# Patient Record
Sex: MALE | Race: WHITE | NOT HISPANIC OR LATINO | Employment: OTHER | URBAN - METROPOLITAN AREA
[De-identification: names, ages, dates, MRNs, and addresses within clinical notes are randomized per-mention and may not be internally consistent; named-entity substitution may affect disease eponyms.]

---

## 2018-09-18 ENCOUNTER — OFFICE VISIT (OUTPATIENT)
Dept: PAIN MEDICINE | Facility: CLINIC | Age: 58
End: 2018-09-18

## 2018-09-18 VITALS
DIASTOLIC BLOOD PRESSURE: 82 MMHG | HEART RATE: 93 BPM | TEMPERATURE: 98.8 F | HEIGHT: 64 IN | RESPIRATION RATE: 18 BRPM | SYSTOLIC BLOOD PRESSURE: 129 MMHG | OXYGEN SATURATION: 95 % | BODY MASS INDEX: 40.8 KG/M2 | WEIGHT: 239 LBS

## 2018-09-18 DIAGNOSIS — M54.50 CHRONIC BILATERAL LOW BACK PAIN WITHOUT SCIATICA: Primary | ICD-10-CM

## 2018-09-18 DIAGNOSIS — G89.29 CHRONIC BILATERAL LOW BACK PAIN WITHOUT SCIATICA: Primary | ICD-10-CM

## 2018-09-18 LAB
POC AMPHETAMINES: POSITIVE
POC BARBITURATES: NEGATIVE
POC BENZODIAZEPHINES: NEGATIVE
POC COCAINE: NEGATIVE
POC METHADONE: NEGATIVE
POC METHAMPHETAMINE SCREEN URINE: POSITIVE
POC OPIATES: NEGATIVE
POC OXYCODONE: NEGATIVE
POC PHENCYCLIDINE: NEGATIVE
POC PROPOXYPHENE: NEGATIVE
POC THC: POSITIVE
POC TRICYCLIC ANTIDEPRESSANTS: NEGATIVE

## 2018-09-18 PROCEDURE — 99204 OFFICE O/P NEW MOD 45 MIN: CPT | Performed by: PAIN MEDICINE

## 2018-09-18 PROCEDURE — 80305 DRUG TEST PRSMV DIR OPT OBS: CPT | Performed by: PAIN MEDICINE

## 2018-09-18 NOTE — PROGRESS NOTES
CHIEF COMPLAINT: Back Pain    Lloyd Greene is a 58 y.o. male.   He was referred here by SOFIA Reynoso . He presents to the office for evaluation and treatment of Back Pain    HPI  Back Pain  Started about 5 years ago. Mr. Greene states that he was previously in pain management at [facility] for about 8 or 9 months until they closed in June 2018. Has been on disability for low back pain for over 2 years. Managed with pain medication only. Was on for 5 times/day. Helping him be more active with less pain. denies side effects. Has not undergone low back injections due to cost. States he can only perform things that are 100% covered my medicare. Living on disability.   The patient states their pain is a 7 on a scale of 1-10.  The patient describes this pain as constant sharp, stabbing and throbbing.  The pain is located in bilateral low back and does not radiate. This painful problem is aggravated by physical activity, lifting, prolonged sitting and carrying heavy items and is alleviated by pain medication, relaxation and lying down.    Admits to MJ use - last use was this am.     Past pain medications:   norco 10 mg 5/day - helping  mobic - helping     Current pain medications:   Gabapentin 300 mg tid - picking up today to restart  Diclofenac 75 mg bid - not sure if it helping    Past therapies:  Physical Therapy: yes  Chiropractor: yes  Massage Therapy: no  TENS: yes  Neck or back surgery: no  Past pain management: yes ([facility])    Previous Injections: none    PEG Assessment   What number best describes your pain on average in the past week? 7  What number best describes how, during the past week, pain has interfered with your enjoyment of life? 8  What number best describes how, during the past week, pain has interfered with your general activity? 7      Current Outpatient Prescriptions:   •  ARIPiprazole (ABILIFY) 5 MG tablet, Take 5 mg by mouth daily., Disp: ,  Rfl:   •  diclofenac (VOLTAREN) 75 MG EC tablet, Take 75 mg by mouth 2 (two) times a day., Disp: , Rfl:   •  fenofibrate (TRICOR) 145 MG tablet, Take 145 mg by mouth daily., Disp: , Rfl:   •  gabapentin (NEURONTIN) 300 MG capsule, Take 300 mg by mouth 2 (two) times a day., Disp: , Rfl:   •  lisinopril (PRINIVIL,ZESTRIL) 20 MG tablet, Take 20 mg by mouth daily., Disp: , Rfl:   •  metFORMIN (GLUCOPHAGE) 500 MG tablet, Take 500 mg by mouth daily., Disp: , Rfl:   •  simvastatin (ZOCOR) 20 MG tablet, Take 20 mg by mouth every night., Disp: , Rfl:   •  traZODone (DESYREL) 100 MG tablet, Take 100 mg by mouth every night., Disp: , Rfl:   •  venlafaxine (EFFEXOR) 75 MG tablet, Take 150 mg by mouth daily., Disp: , Rfl:     REVIEW OF PERTINENT MEDICAL DATA  Chart reviewed and summarization of all medical records up to new patient visit performed.  Last A1c: 7.7. Referral by urgent care APRN. Castorena score: 29. Was on xanex for anxiety.     IMAGING  Lumbar MRI - 4/2018:        PFSH:  The following portions of the patient's history were reviewed and updated as appropriate: problem list, past medical history, past surgery history, social history, family history, medications, and allergies    Review of Systems   Constitutional: Negative for fatigue.   HENT: Negative for congestion.    Eyes: Negative for visual disturbance.   Respiratory: Negative for cough, shortness of breath and wheezing.    Cardiovascular: Negative.    Gastrointestinal: Negative for constipation and diarrhea.   Genitourinary: Negative for difficulty urinating.   Musculoskeletal: Positive for back pain.   Skin: Negative for rash and wound.   Allergic/Immunologic: Negative for immunocompromised state.   Neurological: Negative for weakness and numbness.   Hematological: Does not bruise/bleed easily.   Psychiatric/Behavioral: Positive for sleep disturbance. Negative for suicidal ideas. The patient is nervous/anxious.    All other systems reviewed and are  "negative.      Vitals:    09/18/18 1435   BP: 129/82   Pulse: 93   Resp: 18   Temp: 98.8 °F (37.1 °C)   SpO2: 95%   Weight: 108 kg (239 lb)   Height: 162.6 cm (64\")   PainSc:   7   PainLoc: Back       Physical Exam   Constitutional: He appears well-developed and well-nourished. No distress.   HENT:   Head: Normocephalic and atraumatic.   Nose: Nose normal.   Mouth/Throat: Oropharynx is clear and moist.   Eyes: Conjunctivae and EOM are normal.   Neck: Normal range of motion. Neck supple.   Cardiovascular: Normal rate, regular rhythm and normal heart sounds.    Pulmonary/Chest: Effort normal and breath sounds normal. No stridor. No respiratory distress.   Abdominal: Soft. Bowel sounds are normal. He exhibits no distension. There is no tenderness.   Musculoskeletal:        Lumbar back: He exhibits decreased range of motion, tenderness and pain.   +bilateral lumbar facet tenderness  +bilateral lumbar facet loading    Neurological: He is alert. He has normal strength. No cranial nerve deficit or sensory deficit. Gait normal.   Skin: Skin is warm and dry. No rash noted. He is not diaphoretic.   Psychiatric: He has a normal mood and affect. His speech is normal and behavior is normal.   Nursing note and vitals reviewed.    Ortho Exam  Neurologic Exam     Mental Status   Speech: speech is normal     Cranial Nerves     CN III, IV, VI   Extraocular motions are normal.     Motor Exam     Strength   Strength 5/5 throughout.     Gait, Coordination, and Reflexes     Gait  Gait: normal      No results found for: POCMETH, POCAMPHET, POCBARBITUR, POCBENZO, POCCOCAINE, POCMETHADO, POCOPIATES, POCOXYCODO, POCPHENCYC, POCPROPOXY, POCTHC, POCTRICYC    Comments: Reviewed POC today  +MJ +meth +amph    Date of last BENI reviewed : 09/18/18   Comments: Aldair Desai was seen today for back pain.    Diagnoses and all orders for this visit:    Chronic bilateral low back pain without sciatica      Requested Prescriptions      No " prescriptions requested or ordered in this encounter     - Imaging reviewed with patient.  Degenerative changes seen.   - Compound cream ordered today with gabapentin 5% and ketamine 5%. Ordered today.  Alternatives ok if his insurance does not cover.   - Random urine drug screen per office policy today, to be checked at next visit. +MJ, +amph +meth - given such abnormalities in his POC will not give any pain medications today. Will reschedule follow up in a few weeks. Will repeat UDS at that time. If prelim is negative for any illegal substances, will write pain medication at that time. If positive, will have to send off to lab and wait for confirmation before prescribing. Discussed how small amount of THC may remain in his system if he has used daily for a long time. As long as his levels are minimal and prove that he has stopped, can restart medication. Will restart at lower dose since he has been off for several months and his tolerance is lower.   - can try injections but if any co-pay he will not perform. I am unsure if any co pays with his insurance?  - will need psych for opioid risk assessment if we take over pain medications in future.   - will need narcotic agreement if take over pain medication.   - continue with gabapentin and diclofenac - no refills needed.   - not interested in surgical consult -states he has never heard good things of low back surgery. I'm surprised he received disability for low back when he has never had surgical evaluation.   -    Wt Readings from Last 3 Encounters:   09/18/18 108 kg (239 lb)   11/07/16 101 kg (222 lb)   08/18/16 95.3 kg (210 lb)     Body mass index is 41.02 kg/m². Patient counseled on the importance of weight loss to help with overall health and pain control. Patient instructed to attempt weight loss.   Plan: Calorie counting reduce fast food intake and plan meals    Follow-up in 1 month.       Kelsie Hargrove MD  Pain Management

## 2018-09-23 ENCOUNTER — RESULTS ENCOUNTER (OUTPATIENT)
Dept: PAIN MEDICINE | Facility: CLINIC | Age: 58
End: 2018-09-23

## 2018-09-23 DIAGNOSIS — M54.50 CHRONIC BILATERAL LOW BACK PAIN WITHOUT SCIATICA: ICD-10-CM

## 2018-09-23 DIAGNOSIS — G89.29 CHRONIC BILATERAL LOW BACK PAIN WITHOUT SCIATICA: ICD-10-CM

## 2018-10-22 ENCOUNTER — OFFICE VISIT (OUTPATIENT)
Dept: PAIN MEDICINE | Facility: CLINIC | Age: 58
End: 2018-10-22

## 2018-10-22 VITALS
SYSTOLIC BLOOD PRESSURE: 160 MMHG | RESPIRATION RATE: 16 BRPM | DIASTOLIC BLOOD PRESSURE: 98 MMHG | HEIGHT: 64 IN | BODY MASS INDEX: 39.95 KG/M2 | HEART RATE: 92 BPM | WEIGHT: 234 LBS | TEMPERATURE: 97.8 F | OXYGEN SATURATION: 98 %

## 2018-10-22 DIAGNOSIS — M54.50 CHRONIC BILATERAL LOW BACK PAIN WITHOUT SCIATICA: ICD-10-CM

## 2018-10-22 DIAGNOSIS — M47.816 LUMBAR FACET ARTHROPATHY: ICD-10-CM

## 2018-10-22 DIAGNOSIS — G89.29 CHRONIC BILATERAL LOW BACK PAIN WITHOUT SCIATICA: ICD-10-CM

## 2018-10-22 DIAGNOSIS — G89.29 OTHER CHRONIC PAIN: Primary | ICD-10-CM

## 2018-10-22 PROCEDURE — 99214 OFFICE O/P EST MOD 30 MIN: CPT | Performed by: NURSE PRACTITIONER

## 2018-10-22 NOTE — PROGRESS NOTES
"CHIEF COMPLAINT  Pt states his LBP is \"not quite tolerably controlled now,since being off of Norco\".    Subjective   Lloyd Greene is a 58 y.o. male  who presents to the office for follow-up.He has a history of chronic low back pain. Reports his pain is unchanged since last office visit and not well controlled. Is off Norco since July.  Just returned from vacation in [place].    Initially evaluated by Dr. Hargrove 9-18-18 for back pain. Previously under care of [facility].  Admitted to Marijuana use. Last used morning of appointment.  Compounded pain creme ordered. Random urine drug screen in office was positive MJ, Amphetamine and Methamphetamine. Confirmation was positive for marijuana. NO plans for opioid therapy at initial office visit. Plan to follow-up in 1 month and repeat UDS. If appropriate, could consider opioids. Would need opioid risk assessment before prescribing. Patient had previously declined injections due to cost. Also declined ANNELISE consult. Continues with Gabapentin and DIclofenac at that time.     Complains of pain in his low back. Today his pain is 7/10VAS. Describes the pain as continuous burning with occasional hot poker sensation. Pain does not radiate down legs. Pain increases with standing, walking, bending/lifting/twisting, and prolonged sitting; pain decreases with laying down and medication. Currently taking Diclofenac 75 mg 1-2/day and gabapentin 300 mg 3/night. ADL's by self.  Is on disability. He reports he needs pain medication to do every day activities without pain. Having trouble with household chores and cooking.     His PCP is currently a NP at [facility], goes by the name of \"kim.\" Will be seeing Dr. Donell Diggs on 12-6-18 as a new patient. Is no longer wanting to be a patient at [facility].  Admits his last use of marijuana was approximately 3 weeks. Which means it was since his last office visit.  He now reports he has stopped using marijuana. I asked if his urine drug " "screen would be positive for marijuana today and he replied affirmatively.   Back Pain   This is a chronic problem. The current episode started more than 1 year ago. The problem occurs constantly. The problem has been gradually worsening since onset. The pain is present in the lumbar spine. The quality of the pain is described as burning and stabbing. The pain does not radiate. The pain is at a severity of 7/10 (ranges from 5-8/10VAS). The pain is moderate. The pain is worse during the day. The symptoms are aggravated by bending, position, standing and twisting. Pertinent negatives include no bladder incontinence, bowel incontinence, chest pain, numbness or weakness. Risk factors include lack of exercise, obesity and sedentary lifestyle. He has tried analgesics and NSAIDs (gabapentin) for the symptoms.      Past pain medications:   norco 10 mg 5/day - helping  mobic - helping      Current pain medications:   Gabapentin 300 mg tid - picking up today to restart  Diclofenac 75 mg bid - not sure if it helping     Past therapies:  Physical Therapy: yes-- \"A couple of years ago.\" --- minimal relief  Chiropractor: yes  Massage Therapy: no  TENS: yes  Neck or back surgery: no  Past pain management: yes ([facility])     Previous Injections: none-- reports cost as a deterrent. \"If medicare doesn't 100% cover it, I can't do it.\" Reports he is on disability and can't afford anything extra.     PEG Assessment   What number best describes your pain on average in the past week?8  What number best describes how, during the past week, pain has interfered with your enjoyment of life?7  What number best describes how, during the past week, pain has interfered with your general activity?  7    The following portions of the patient's history were reviewed and updated as appropriate: allergies, current medications, past family history, past medical history, past social history, past surgical history and problem " "list.    Review of Systems   Constitutional: Positive for activity change (decrease). Negative for fatigue.   HENT: Negative for congestion.    Eyes: Negative for visual disturbance.   Respiratory: Positive for apnea (cpap) and shortness of breath (copd). Negative for cough, chest tightness and wheezing.    Cardiovascular: Negative.  Negative for chest pain.   Gastrointestinal: Negative for bowel incontinence, constipation and diarrhea.   Genitourinary: Negative for bladder incontinence and difficulty urinating.   Musculoskeletal: Positive for back pain.   Skin: Negative for rash and wound.   Allergic/Immunologic: Negative for immunocompromised state.   Neurological: Negative for dizziness, weakness, light-headedness and numbness.   Hematological: Does not bruise/bleed easily.   Psychiatric/Behavioral: Positive for sleep disturbance. Negative for suicidal ideas. The patient is not nervous/anxious.    All other systems reviewed and are negative.      Vitals:    10/22/18 0852   BP: 160/98   Pulse: 92   Resp: 16   Temp: 97.8 °F (36.6 °C)   SpO2: 98%   Weight: 106 kg (234 lb)   Height: 162.6 cm (64.02\")   PainSc: 7  Comment: LBP bilaterally ranges from 5-8/10   PainLoc: Back     Objective   Physical Exam   Constitutional: He is oriented to person, place, and time. Vital signs are normal. He appears well-developed and well-nourished. He is cooperative.   HENT:   Head: Normocephalic and atraumatic.   Nose: Nose normal.   Eyes: Conjunctivae and lids are normal.   Cardiovascular: Normal rate.    Pulmonary/Chest: Effort normal.   Abdominal:   obese   Musculoskeletal:        Lumbar back: He exhibits decreased range of motion, tenderness and bony tenderness (moderate tenderness of bilateral L2-L5 facets-- + loading manuever).   Negative SLR bilaterally   Neurological: He is alert and oriented to person, place, and time. Gait normal.   Reflex Scores:       Patellar reflexes are 1+ on the right side and 1+ on the left " side.  Skin: Skin is warm, dry and intact.   Psychiatric: He has a normal mood and affect. His speech is normal and behavior is normal. Judgment and thought content normal. Cognition and memory are normal.   Nursing note and vitals reviewed.      Assessment/Plan   Lloyd was seen today for back pain.    Diagnoses and all orders for this visit:    Other chronic pain  -     Ambulatory Referral to Psychology    Chronic bilateral low back pain without sciatica  -     Ambulatory Referral to Psychology    Lumbar facet arthropathy  -     Case Request  -     Ambulatory Referral to Psychology      --- The urine drug screen confirmation from 9-19-18 has been reviewed and the result is ABNORMAL (presence of MJ) based on patient history and BENI report  --- No opioids at this time. Can refer for opioid risk assessment. Patient wants to try Norco in future if injections are not helpful. Reviewed importance of completing alternatives before continuing with opioid therapy, in accordance with HB 1 expectations.   --- Bilateral L2-L5 MBB. no blood thinners. Reviewed the procedure at length with the patient.  Included in the review was expectations, complications, risk and benefits.The procedure was described in detail and the risks, benefits and alternatives were discussed with the patient (including but not limited to: bleeding, infection, nerve damage, worsening of pain, inability to perform injection, paralysis, seizures, and death) who agreed to proceed.  Discussed the potential for sedation if warranted/wanted.  Questions were answered and in a way the patient could understand.  Patient verbalized understanding and wishes to proceed.  This intervention will be ordered.  --- Referral to Dr. Paul for opioid risk assessment.  --- Encouraged weight loss.  --- Follow-up after procedure or sooner if needed.    -------  Education about Medial Branch Blockade and RF Therapy:    This medial branch blockade (MBB) suggested is  "intended for diagnostic purposes, with the intent of offering the patient Radiofrequency thermal rhizotomy (RF) if the MBB is diagnostically effective.  The diagnostic blockade is necessary to determine the likelihood that RF therapy could be efficacious in providing long term relief to the patient.    Medial branches are sensory nerve branches that connect to a facet joint and transmit sensations & pain signals from that joint.  Facet is a term for the type of joints found in the spine.  Medial branches are the nerves that go to a facet, and therefore are also sometimes called \"facet joint nerves\" (FJNs).      In a medial branch blockade procedure, xray fluoroscopy is used to verify the locations of the outside of the joint lines which are being targeted.  Under xray guidance, needles are placed to these areas.  Contrast dye is injected to confirm proper placement, with dye flowing over the joint area, and to ensure that the dye does not flow into unintended areas such as a vein.  When this is confirmed, local anesthetic is injected to block the medial branch at that joint level.      If MBBs are diagnostically successful in blocking pain, then the patient is most likely a great candidate for Radiofrequency of those facet joint nerves.  In the RF procedure, needles are placed to the joint lines in the same fashion, and after testing, the needle tips are heated to thermally treat the nerves, blocking the nerves by in essence damaging the nerves with the heat treatment.       Medically, a successful RF procedure should provide a patient with 50% pain relief or more for at least 6 months.  Clinical experience suggests that successful patients receive relief more in the range of 12 months on average.  We also discussed that a fortunate minority of patients receive therapeutic success from the MBB, and may not require RF ablation.  If a patient receives more than 8 weeks of relief from MBB, then occasional repeat MBB " for therapeutic purposes is a very reasonable alternative therapy.  This course of therapy is consistent with our LCDs according to our CMS  in the area, and therefore other insurance providers should follow accordingly.  We will monitor our patients to screen for these therapeutic responders and will offer RF therapy only when necessary.        We discussed that MBB & RF are not without risks.  Guidelines regarding anticoagulant use & neuraxial procedures will be respected.  Patients that are ill or otherwise may be at risk for sepsis will not have their spines accessed by neuraxial injections of any type.  This patient will not be offered these therapies if there is an increased risk.   We discussed that there is a risk of postprocedural pain and also a risk of worsening of clinical picture with these procedures as with any neuraxial procedure.    -------       BENI REPORT  BENI report has been reviewed and scanned into the patient's chart.    As the clinician, I personally reviewed the BENI from 10-18-18 while the patient was in the office today.          EMR Dragon/Transcription disclaimer:   Much of this encounter note is an electronic transcription/translation of spoken language to printed text. The electronic translation of spoken language may permit erroneous, or at times, nonsensical words or phrases to be inadvertently transcribed; Although I have reviewed the note for such errors, some may still exist.

## 2018-11-14 ENCOUNTER — DOCUMENTATION (OUTPATIENT)
Dept: PAIN MEDICINE | Facility: CLINIC | Age: 58
End: 2018-11-14

## 2018-11-14 ENCOUNTER — OUTSIDE FACILITY SERVICE (OUTPATIENT)
Dept: PAIN MEDICINE | Facility: CLINIC | Age: 58
End: 2018-11-14

## 2018-11-14 PROCEDURE — 64494 INJ PARAVERT F JNT L/S 2 LEV: CPT | Performed by: PAIN MEDICINE

## 2018-11-14 PROCEDURE — 64495 INJ PARAVERT F JNT L/S 3 LEV: CPT | Performed by: PAIN MEDICINE

## 2018-11-14 PROCEDURE — 64493 INJ PARAVERT F JNT L/S 1 LEV: CPT | Performed by: PAIN MEDICINE

## 2018-11-14 NOTE — PROGRESS NOTES
Bilateral L2-5 Lumbar Medial Branch Blockade  [facility]    PREOPERATIVE DIAGNOSIS:  Lumbar spondylosis without myelopathy    POSTOPERATIVE DIAGNOSIS:  Lumbar spondylosis without myelopathy    PROCEDURE:   Diagnostic Bilateral Lumbar Medial Branch Nerve Blockades, with fluoroscopy:  L2, L3, L4, and L5 nerves (at the L3, L4, L5 transverse processes and the sacral alar groove) to block facet joints L3-4, L4-5, and L5-S1  1. 12447-64 -- Bilateral Lumbar Facet blocks, 1st Level  2. 94010-45 -- Bilateral Lumbar Facet blocks, 2nd  Level  3. 29654-31 -- Bilateral Lumbar Facet blocks, 3rd Level    PRE-PROCEDURE DISCUSSION WITH PATIENT:    Risks and complications were discussed with the patient prior to starting the procedure and informed consent was obtained.      SURGEON:  Kelsie Hargrove MD    REASON FOR PROCEDURE:   The patient complains of pain that seems to have a significant axial component, Increased back pain on range of motion exams and Pain on extension of the lumbar spine    SEDATION:  Patient declined administration of moderate sedation    ANESTHETIC:  Marcaine 0.25%  STEROID:  Methylprednisolone (DEPO MEDROL) 80mg/ml  TOTAL VOLUME OF SOLUTION: 8ml    DESCRIPTON OF PROCEDURE:  After obtaining informed consent, IV access was obtained in the preoperative area.   The patient was taken to the operating room.  The patient was placed in the prone position with a pillow under the abdomen. All pressure points were well padded.  EKG, blood pressure, and pulse oximeter were monitored.  The patient was monitored and sedated by the RN under my direction. The lumbosacral area was prepped with Chloraprep and draped in a sterile fashion. Under fluoroscopic guidance the transverse processes of the L3, L4, and L5 vertebrae at the junctions of the superior articular processes were identified on the right. Also identified was the groove between the ala and the superior articular process of the sacrum on the  ipsilateral side.  Skin and subcutaneous tissue were anesthetized with 1% lidocaine above each of these points. A 22-gauge spinal needle was introduced under fluoroscopic guidance at the above junctions. Aspiration was negative for blood and CSF.  After confirming the position of the needle with fluoroscope in all views, 1 mL of the anesthetic solution noted above was injected at each of these points.  Needles were removed intact from each of the areas.  A similar procedure was repeated to block the L2, L3, L4, and L5 nerves on the contralateral side.   Onset of analgesia was noted.  Vital signs remained stable throughout.      ESTIMATED BLOOD LOSS:  <5 mL  SPECIMENS:  none    COMPLICATIONS:   No complications were noted.    TOLERANCE & DISCHARGE CONDITION:    The patient tolerated the procedure well.  The patient was transported to the recovery area without difficulties.  The patient was discharged to home under the care of family in stable and satisfactory condition.    PLAN OF CARE:  1. The patient was given our standard instruction sheet.  2. We discussed that Lumbar Medial Branch Blockade is a diagnostic procedure in consideration for radiofrequency ablation if two diagnostic procedures prove to be positive for significant benefit.  If sustained relief of 6 to eight weeks is obtained, then an alternative plan could be therapeutic lumbar branch blockades.  3. The patient is asked to keep a pain log each hour for 8 hours after the procedure today.  4. The patient will  Return to clinic 4 wks.  5. The patient will resume all medications as per the medication reconciliation sheet.

## 2018-11-15 ENCOUNTER — HOSPITAL ENCOUNTER (INPATIENT)
Facility: HOSPITAL | Age: 58
LOS: 2 days | Discharge: HOME | End: 2018-11-17
Attending: EMERGENCY MEDICINE | Admitting: SURGERY

## 2018-11-15 ENCOUNTER — APPOINTMENT (OUTPATIENT)
Dept: CT IMAGING | Facility: HOSPITAL | Age: 58
End: 2018-11-15

## 2018-11-15 DIAGNOSIS — K56.600 PARTIAL SMALL BOWEL OBSTRUCTION (HCC): Primary | ICD-10-CM

## 2018-11-15 LAB
ALBUMIN SERPL-MCNC: 4.4 G/DL (ref 3.5–5.2)
ALBUMIN/GLOB SERPL: 1.4 G/DL
ALP SERPL-CCNC: 69 U/L (ref 40–129)
ALT SERPL W P-5'-P-CCNC: 40 U/L (ref 5–41)
ANION GAP SERPL CALCULATED.3IONS-SCNC: 13 MMOL/L
AST SERPL-CCNC: 23 U/L (ref 5–40)
BASOPHILS # BLD AUTO: 0.05 10*3/MM3 (ref 0–0.2)
BASOPHILS NFR BLD AUTO: 0.4 % (ref 0–2)
BILIRUB SERPL-MCNC: 0.3 MG/DL (ref 0.2–1.2)
BILIRUB UR QL STRIP: NEGATIVE
BUN BLD-MCNC: 21 MG/DL (ref 6–20)
BUN/CREAT SERPL: 19.4 (ref 7–25)
CALCIUM SPEC-SCNC: 9 MG/DL (ref 8.6–10.5)
CHLORIDE SERPL-SCNC: 98 MMOL/L (ref 98–107)
CLARITY UR: CLEAR
CO2 SERPL-SCNC: 24 MMOL/L (ref 22–29)
COLOR UR: YELLOW
CREAT BLD-MCNC: 1.08 MG/DL (ref 0.76–1.27)
DEPRECATED RDW RBC AUTO: 43 FL (ref 37–54)
EOSINOPHIL # BLD AUTO: 0.01 10*3/MM3 (ref 0.1–0.3)
EOSINOPHIL NFR BLD AUTO: 0.1 % (ref 0–4)
ERYTHROCYTE [DISTWIDTH] IN BLOOD BY AUTOMATED COUNT: 12.8 % (ref 11.5–14.5)
GFR SERPL CREATININE-BSD FRML MDRD: 71 ML/MIN/1.73
GLOBULIN UR ELPH-MCNC: 3.2 GM/DL
GLUCOSE BLD-MCNC: 270 MG/DL (ref 65–99)
GLUCOSE BLDC GLUCOMTR-MCNC: 164 MG/DL (ref 70–130)
GLUCOSE BLDC GLUCOMTR-MCNC: 171 MG/DL (ref 70–130)
GLUCOSE UR STRIP-MCNC: ABNORMAL MG/DL
HCT VFR BLD AUTO: 42.9 % (ref 42–52)
HGB BLD-MCNC: 14.1 G/DL (ref 14–18)
HGB UR QL STRIP.AUTO: NEGATIVE
IMM GRANULOCYTES # BLD: 0.06 10*3/MM3 (ref 0–0.03)
IMM GRANULOCYTES NFR BLD: 0.4 % (ref 0–0.5)
KETONES UR QL STRIP: NEGATIVE
LEUKOCYTE ESTERASE UR QL STRIP.AUTO: NEGATIVE
LYMPHOCYTES # BLD AUTO: 2.8 10*3/MM3 (ref 0.6–4.8)
LYMPHOCYTES NFR BLD AUTO: 20.5 % (ref 20–45)
MCH RBC QN AUTO: 30.3 PG (ref 27–31)
MCHC RBC AUTO-ENTMCNC: 32.9 G/DL (ref 31–37)
MCV RBC AUTO: 92.1 FL (ref 80–94)
MONOCYTES # BLD AUTO: 0.87 10*3/MM3 (ref 0–1)
MONOCYTES NFR BLD AUTO: 6.4 % (ref 3–8)
NEUTROPHILS # BLD AUTO: 9.88 10*3/MM3 (ref 1.5–8.3)
NEUTROPHILS NFR BLD AUTO: 72.2 % (ref 45–70)
NITRITE UR QL STRIP: NEGATIVE
NRBC BLD MANUAL-RTO: 0 /100 WBC (ref 0–0)
PH UR STRIP.AUTO: 5.5 [PH] (ref 4.5–8)
PLATELET # BLD AUTO: 412 10*3/MM3 (ref 140–500)
PMV BLD AUTO: 11.5 FL (ref 7.4–10.4)
POTASSIUM BLD-SCNC: 4.4 MMOL/L (ref 3.5–5.2)
PROT SERPL-MCNC: 7.6 G/DL (ref 6–8.5)
PROT UR QL STRIP: NEGATIVE
RBC # BLD AUTO: 4.66 10*6/MM3 (ref 4.7–6.1)
SODIUM BLD-SCNC: 135 MMOL/L (ref 136–145)
SP GR UR STRIP: 1.02 (ref 1–1.03)
UROBILINOGEN UR QL STRIP: ABNORMAL
WBC NRBC COR # BLD: 13.67 10*3/MM3 (ref 4.8–10.8)

## 2018-11-15 PROCEDURE — 99284 EMERGENCY DEPT VISIT MOD MDM: CPT

## 2018-11-15 PROCEDURE — 81003 URINALYSIS AUTO W/O SCOPE: CPT | Performed by: EMERGENCY MEDICINE

## 2018-11-15 PROCEDURE — 80053 COMPREHEN METABOLIC PANEL: CPT | Performed by: EMERGENCY MEDICINE

## 2018-11-15 PROCEDURE — 99223 1ST HOSP IP/OBS HIGH 75: CPT | Performed by: SURGERY

## 2018-11-15 PROCEDURE — 99284 EMERGENCY DEPT VISIT MOD MDM: CPT | Performed by: EMERGENCY MEDICINE

## 2018-11-15 PROCEDURE — 85025 COMPLETE CBC W/AUTO DIFF WBC: CPT | Performed by: EMERGENCY MEDICINE

## 2018-11-15 PROCEDURE — 0 IOPAMIDOL PER 1 ML: Performed by: EMERGENCY MEDICINE

## 2018-11-15 PROCEDURE — 74177 CT ABD & PELVIS W/CONTRAST: CPT

## 2018-11-15 PROCEDURE — 82962 GLUCOSE BLOOD TEST: CPT

## 2018-11-15 PROCEDURE — 25810000003 SODIUM CHLORIDE 0.9 % WITH KCL 20 MEQ 20-0.9 MEQ/L-% SOLUTION: Performed by: SURGERY

## 2018-11-15 RX ORDER — PROMETHAZINE HYDROCHLORIDE 25 MG/ML
12.5 INJECTION, SOLUTION INTRAMUSCULAR; INTRAVENOUS EVERY 6 HOURS PRN
Status: DISCONTINUED | OUTPATIENT
Start: 2018-11-15 | End: 2018-11-17 | Stop reason: HOSPADM

## 2018-11-15 RX ORDER — PROMETHAZINE HYDROCHLORIDE 12.5 MG/1
12.5 TABLET ORAL EVERY 6 HOURS PRN
Status: DISCONTINUED | OUTPATIENT
Start: 2018-11-15 | End: 2018-11-17 | Stop reason: HOSPADM

## 2018-11-15 RX ORDER — SODIUM CHLORIDE AND POTASSIUM CHLORIDE 150; 900 MG/100ML; MG/100ML
50 INJECTION, SOLUTION INTRAVENOUS CONTINUOUS
Status: DISCONTINUED | OUTPATIENT
Start: 2018-11-15 | End: 2018-11-17 | Stop reason: HOSPADM

## 2018-11-15 RX ORDER — LISINOPRIL 20 MG/1
20 TABLET ORAL DAILY
Status: DISCONTINUED | OUTPATIENT
Start: 2018-11-15 | End: 2018-11-17 | Stop reason: HOSPADM

## 2018-11-15 RX ORDER — ACETAMINOPHEN 325 MG/1
650 TABLET ORAL EVERY 4 HOURS PRN
Status: DISCONTINUED | OUTPATIENT
Start: 2018-11-15 | End: 2018-11-17 | Stop reason: HOSPADM

## 2018-11-15 RX ORDER — PROMETHAZINE HYDROCHLORIDE 12.5 MG/1
12.5 SUPPOSITORY RECTAL EVERY 6 HOURS PRN
Status: DISCONTINUED | OUTPATIENT
Start: 2018-11-15 | End: 2018-11-17 | Stop reason: HOSPADM

## 2018-11-15 RX ORDER — VENLAFAXINE 37.5 MG/1
150 TABLET ORAL DAILY
Status: DISCONTINUED | OUTPATIENT
Start: 2018-11-15 | End: 2018-11-17 | Stop reason: HOSPADM

## 2018-11-15 RX ORDER — GABAPENTIN 300 MG/1
300 CAPSULE ORAL EVERY 12 HOURS SCHEDULED
Status: DISCONTINUED | OUTPATIENT
Start: 2018-11-15 | End: 2018-11-17 | Stop reason: HOSPADM

## 2018-11-15 RX ORDER — NALOXONE HCL 0.4 MG/ML
0.4 VIAL (ML) INJECTION
Status: DISCONTINUED | OUTPATIENT
Start: 2018-11-15 | End: 2018-11-17 | Stop reason: HOSPADM

## 2018-11-15 RX ORDER — NICOTINE POLACRILEX 4 MG
15 LOZENGE BUCCAL
Status: DISCONTINUED | OUTPATIENT
Start: 2018-11-15 | End: 2018-11-17 | Stop reason: HOSPADM

## 2018-11-15 RX ORDER — SODIUM CHLORIDE 0.9 % (FLUSH) 0.9 %
3 SYRINGE (ML) INJECTION EVERY 12 HOURS SCHEDULED
Status: DISCONTINUED | OUTPATIENT
Start: 2018-11-15 | End: 2018-11-17 | Stop reason: HOSPADM

## 2018-11-15 RX ORDER — HYDROMORPHONE HCL 110MG/55ML
0.5 PATIENT CONTROLLED ANALGESIA SYRINGE INTRAVENOUS
Status: DISCONTINUED | OUTPATIENT
Start: 2018-11-15 | End: 2018-11-17 | Stop reason: HOSPADM

## 2018-11-15 RX ORDER — DEXTROSE MONOHYDRATE 25 G/50ML
25 INJECTION, SOLUTION INTRAVENOUS
Status: DISCONTINUED | OUTPATIENT
Start: 2018-11-15 | End: 2018-11-17 | Stop reason: HOSPADM

## 2018-11-15 RX ORDER — ARIPIPRAZOLE 5 MG/1
5 TABLET ORAL DAILY
Status: DISCONTINUED | OUTPATIENT
Start: 2018-11-15 | End: 2018-11-17 | Stop reason: HOSPADM

## 2018-11-15 RX ORDER — TRAZODONE HYDROCHLORIDE 50 MG/1
100 TABLET ORAL NIGHTLY
Status: DISCONTINUED | OUTPATIENT
Start: 2018-11-15 | End: 2018-11-17 | Stop reason: HOSPADM

## 2018-11-15 RX ORDER — SODIUM CHLORIDE AND POTASSIUM CHLORIDE 150; 900 MG/100ML; MG/100ML
INJECTION, SOLUTION INTRAVENOUS
Status: COMPLETED
Start: 2018-11-15 | End: 2018-11-16

## 2018-11-15 RX ADMIN — Medication 3 ML: at 22:13

## 2018-11-15 RX ADMIN — POTASSIUM CHLORIDE AND SODIUM CHLORIDE 150 ML/HR: 900; 150 INJECTION, SOLUTION INTRAVENOUS at 15:49

## 2018-11-15 RX ADMIN — GABAPENTIN 300 MG: 300 CAPSULE ORAL at 22:11

## 2018-11-15 RX ADMIN — TRAZODONE HYDROCHLORIDE 100 MG: 50 TABLET ORAL at 22:12

## 2018-11-15 RX ADMIN — SODIUM CHLORIDE 1000 ML: 9 INJECTION, SOLUTION INTRAVENOUS at 11:42

## 2018-11-15 RX ADMIN — IOPAMIDOL 100 ML: 755 INJECTION, SOLUTION INTRAVENOUS at 11:13

## 2018-11-15 RX ADMIN — POTASSIUM CHLORIDE AND SODIUM CHLORIDE 150 ML/HR: 900; 150 INJECTION, SOLUTION INTRAVENOUS at 23:05

## 2018-11-15 NOTE — PLAN OF CARE
Problem: Patient Care Overview  Goal: Plan of Care Review  Outcome: Ongoing (interventions implemented as appropriate)   11/15/18 0502   Coping/Psychosocial   Plan of Care Reviewed With patient;mother   Plan of Care Review   Progress no change       Problem: Bowel Obstruction (Adult)  Goal: Signs and Symptoms of Listed Potential Problems Will be Absent, Minimized or Managed (Bowel Obstruction)  Outcome: Ongoing (interventions implemented as appropriate)

## 2018-11-15 NOTE — H&P
CC: mid abdominal pain      Patient is a 58 y.o. male presenting to the emergency room with a one-week history of mid abdominal pain that started acutely one week ago in the mid abdomen that felt like a stabbing sensation.  Patient reports he had associated nausea and vomiting.  Patient reports he has been having normal bowel movements and had a bowel movement today.  Patient had been slowly improving however it had lingered longer than he expected therefore he presented to the emergency room.  Patient denies fever, chills, diarrhea or unexplained weight loss.  Patient has not had this in the past.  Patient has had a previous ruptured diverticulitis with colostomy about 2 years ago and reversal.    Past Medical History:   Diagnosis Date   • Anxiety    • Arthritis    • Colon polyp    • Depression    • Diabetes mellitus (CMS/HCC)    • Diverticulitis    • Hyperlipidemia    • Hypertension    • Sleep apnea    • Stroke (CMS/HCC)      Past Surgical History:   Procedure Laterality Date   • COLONOSCOPY  2014   • COLOSTOMY      Dr. Gupta   • COLOSTOMY REVISION  07/2006    Diverticulitis-Dr. Gupta   • INCISIONAL HERNIA REPAIR  2011   • ORIF ANKLE FRACTURE Right 1995     Family History   Problem Relation Age of Onset   • Depression Mother    • Depression Father    • Kidney disease Father    • COPD Father    • Hypertension Father      Social History     Tobacco Use   • Smoking status: Former Smoker     Types: Electronic Cigarette   • Smokeless tobacco: Never Used   Substance Use Topics   • Alcohol use: Yes     Comment: rarely   • Drug use: No     No Known Allergies    Current Facility-Administered Medications:   •  acetaminophen (TYLENOL) tablet 650 mg, 650 mg, Oral, Q4H PRN, Lisette Whittington MD  •  ARIPiprazole (ABILIFY) tablet 5 mg, 5 mg, Oral, Daily, Lisette Whittington MD  •  dextrose (D50W) 25 g/ 50mL Intravenous Solution 25 g, 25 g, Intravenous, Q15 Min PRN, Lisette Whittington MD  •  dextrose (GLUTOSE) oral gel 15 g, 15  g, Oral, Q15 Min PRN, Lisette Whittington MD  •  gabapentin (NEURONTIN) capsule 300 mg, 300 mg, Oral, Q12H, Lisette Whittington MD  •  glucagon (GLUCAGEN) injection 1 mg, 1 mg, Subcutaneous, PRN, Lisette Whittington MD  •  HYDROmorphone (DILAUDID) injection 0.5 mg, 0.5 mg, Intravenous, Q2H PRN **AND** naloxone (NARCAN) injection 0.4 mg, 0.4 mg, Intravenous, Q5 Min PRN, Lisette Whittington MD  •  lisinopril (PRINIVIL,ZESTRIL) tablet 20 mg, 20 mg, Oral, Daily, Lisette Whittington MD  •  promethazine (PHENERGAN) tablet 12.5 mg, 12.5 mg, Oral, Q6H PRN **OR** promethazine (PHENERGAN) injection 12.5 mg, 12.5 mg, Intramuscular, Q6H PRN **OR** promethazine (PHENERGAN) injection 12.5 mg, 12.5 mg, Intravenous, Q6H PRN **OR** promethazine (PHENERGAN) suppository 12.5 mg, 12.5 mg, Rectal, Q6H PRN, Lisette Whittington MD  •  sodium chloride 0.9 % flush 3 mL, 3 mL, Intravenous, Q12H, Lisette Whittington MD  •  sodium chloride 0.9 % with KCl 20 mEq/L infusion, 150 mL/hr, Intravenous, Continuous, Lisette Whittington MD, Last Rate: 150 mL/hr at 11/15/18 1549, 150 mL/hr at 11/15/18 1549  •  traZODone (DESYREL) tablet 100 mg, 100 mg, Oral, Nightly, Lisette Whittington MD  •  venlafaxine (EFFEXOR) tablet 150 mg, 150 mg, Oral, Daily, Lisette Whittington MD    Review of Systems  General: Patient reports good health  Eyes: No eye problems  Ears, nose, mouth and throat: No rhinitis, no hearing problems, no chronic cough  Cardiovascular/heart: Denies palpitations, syncope or chest pain  Respiratory/lung: Denies shortness of breath, hemoptysis, or dyspnea on exertion   Genital/urinary: No frequency, hematuria or dysuria  Hematological/lymphatic: Denies anemia or other problems  Musculoskeletal: Positive joint pain  Skin: No psoriasis or other skin issues  Neurological: No seizures or other neurological problems  Psychiatric: Anxiety and depression  Endocrine: Negative  Gastro-intestinal: No constipation, no diarrhea, no melena, no  "hematochezia  Vitals:    11/15/18 1333 11/15/18 1358 11/15/18 1431 11/15/18 1440   BP: 148/83  (!) 185/88 (!) 185/88   BP Location:   Right arm Right arm   Patient Position:   Sitting Sitting   Pulse:  74 82 82   Resp:  16 16 16   Temp:  98.8 °F (37.1 °C) 98.3 °F (36.8 °C) 98.3 °F (36.8 °C)   TempSrc:   Oral    SpO2:  97% 97% 97%   Weight:   108 kg (238 lb)    Height:   165.1 cm (65\")        Physical Exam  General/physcological:   Alert and oriented x3, in no acute distress  HEENT: Normal cephalic, atraumatic, PERRLA, EOMI, sclera anicteric, moist mucous membranes, neck is supple, no JVD, no carotid bruits, no thyromegaly no adenopathy  Respiratory: CTA and percussion  CVA: RRR, normal S1-S2, no murmurs, no gallops or rubs  GI: Positive BS, soft, nondistended, nontender, no rebound, no guarding, no hernias, no organomegaly and no masses  Musculoskeletal: Full range of motion, no clubbing, no cyanosis or edema  Neurovascular: Grossly intact  Debilities: none  Emotional behavior: appropriate   Results from last 7 days   Lab Units  11/15/18   1011   WBC 10*3/mm3  13.67*   HEMOGLOBIN g/dL  14.1   HEMATOCRIT %  42.9   PLATELETS 10*3/mm3  412     Results from last 7 days   Lab Units  11/15/18   1011   SODIUM mmol/L  135*   POTASSIUM mmol/L  4.4   CHLORIDE mmol/L  98   CO2 mmol/L  24.0   BUN mg/dL  21*   CREATININE mg/dL  1.08   GLUCOSE mg/dL  270*   CALCIUM mg/dL  9.0      I have personally reviewed his CT scan and revealed some dilated loops of small bowel which may represent a resolving partial small bowel obstruction     Assessment:  abdominal pain  Partial small bowel obstruction  Leukocytosis    Plan:    At this time we will treat patient with supportive care, nothing by mouth, IV fluids, bowel rest, pain medicine and anti-emetics.  I will continue to monitor and recheck CBC in a.m. as well as KUB.      Lisette Whittington MD  General, Minimally Invasive and Endoscopic Surgery  [facility]      [address] [facility] [address] [place] [address]   [address]    [address]  [place] [id number]               [place] [address]    P: [phone]  F: [phone]

## 2018-11-15 NOTE — ED NOTES
Call made to Dr. Whittington at 12:19, answered on the first call. Transferred to Dr. Marina, this call is complete.      Geraldine Celaya, CNA  11/15/18 5845

## 2018-11-15 NOTE — PLAN OF CARE
Problem: Patient Care Overview  Goal: Plan of Care Review  Outcome: Ongoing (interventions implemented as appropriate)   11/15/18 1710   Coping/Psychosocial   Plan of Care Reviewed With patient;mother   Plan of Care Review   Progress no change   OTHER   Outcome Summary Bowel Rest; Mtc Fluids on board; LBM 11/15/18; NPO w/ sips/chips; Ambulation Promoted; VSS       Problem: Bowel Obstruction (Adult)  Goal: Signs and Symptoms of Listed Potential Problems Will be Absent, Minimized or Managed (Bowel Obstruction)  Outcome: Ongoing (interventions implemented as appropriate)         Regarding: possible implantation bleeding on monday  ----- Message from Ty Hayes sent at 2/8/2018  5:49 PM CST -----  Patient Name: Nicole Floyd  Specialist or PCP: Dr. Santa Mehta  Pregnant (If Yes, how long?): possibly but on mirena  Symptoms: possible implantation bleeding on monday  Call Back #: [phone]  Is the patientâs permanent residence located in [place], [place], or a [facility]?  Julio Ontiveros [place] [address] Account #: [id number]

## 2018-11-15 NOTE — ED PROVIDER NOTES
Subjective   History of Present Illness  History of Present Illness    Chief complaint: Abdominal pain    Location: Supra umbilical, nonradiating    Quality/Severity:  3/10 at its worst, sharp    Timing/Onset/Duration: Gradual onset since Thursday    Modifying Factors: Gotten better since Sunday    Associated Symptoms: No fever or chills.  The patient's had nausea and vomiting on Saturday.  The emesis was nonbloody and nonbilious.  The patient denies any diarrhea.  No chest pain or shortness of breath.    Narrative: This 58-year-old white male with history of ruptured diverticulitis with colostomy in the past, presents with supra umbilical abdominal pain since Thursday.  The patient had partial colectomy with colostomy and then colostomy takedown in 2006 and 2007 by Dr. Gupta for perforated diverticulitis.    PCP:  No Known Provider      Review of Systems   Constitutional: Negative for chills and fever.   HENT: Negative for ear pain and sore throat.    Respiratory: Negative for cough, chest tightness, shortness of breath and wheezing.    Cardiovascular: Negative for chest pain.   Gastrointestinal: Positive for abdominal pain, nausea and vomiting. Negative for blood in stool, constipation and diarrhea.   Genitourinary: Negative for difficulty urinating, dysuria and flank pain.   Musculoskeletal: Negative for back pain.   Skin: Negative for rash.   Neurological: Negative for headaches.   Psychiatric/Behavioral: Negative.  Negative for confusion.        Medication List      ASK your doctor about these medications    ARIPiprazole 5 MG tablet  Commonly known as:  ABILIFY     diclofenac 75 MG EC tablet  Commonly known as:  VOLTAREN     fenofibrate 145 MG tablet  Commonly known as:  TRICOR     gabapentin 300 MG capsule  Commonly known as:  NEURONTIN     lisinopril 20 MG tablet  Commonly known as:  PRINIVILZESTRIL     metFORMIN 500 MG tablet  Commonly known as:  GLUCOPHAGE     simvastatin 20 MG tablet  Commonly known as:   ZOCOR     traZODone 100 MG tablet  Commonly known as:  DESYREL     venlafaxine 75 MG tablet  Commonly known as:  EFFEXOR          Past Medical History:   Diagnosis Date   • Anxiety    • Arthritis    • Colon polyp    • Depression    • Diabetes mellitus (CMS/HCC)    • Diverticulitis    • Hyperlipidemia    • Hypertension    • Sleep apnea    • Stroke (CMS/HCC)        No Known Allergies    Past Surgical History:   Procedure Laterality Date   • COLONOSCOPY  2014   • COLOSTOMY      Dr. Gupta   • COLOSTOMY REVISION  07/2006    Diverticulitis-Dr. Gupta   • INCISIONAL HERNIA REPAIR  2011   • ORIF ANKLE FRACTURE Right 1995       Family History   Problem Relation Age of Onset   • Depression Mother    • Depression Father    • Kidney disease Father    • COPD Father    • Hypertension Father        Social History     Socioeconomic History   • Marital status:      Spouse name: Not on file   • Number of children: 2   • Years of education: Not on file   • Highest education level: Not on file   Occupational History   • Occupation:    Tobacco Use   • Smoking status: Former Smoker     Types: Electronic Cigarette   • Smokeless tobacco: Never Used   Substance and Sexual Activity   • Alcohol use: Yes     Comment: rarely   • Drug use: Yes     Types: Marijuana   • Sexual activity: Yes     Partners: Female           Objective   Physical Exam   Constitutional: He is oriented to person, place, and time. He appears well-developed and well-nourished. No distress.   ED Triage Vitals (11/15/18 0953)  Temp: 98 °F (36.7 °C)  Heart Rate: 93  Resp: 18  BP: 151/99  SpO2: 96 %  Temp src: n/a  Heart Rate Source: n/a  Patient Position: n/a  BP Location: n/a  FiO2 (%): n/a    The patient's vitals were reviewed by me.  Unless otherwise noted they are within normal limits.     HENT:   Head: Normocephalic and atraumatic.   Right Ear: External ear normal.   Left Ear: External ear normal.   Nose: Nose normal.   Mouth/Throat: Oropharynx is  clear and moist. No oropharyngeal exudate.   Eyes: Conjunctivae and EOM are normal. Pupils are equal, round, and reactive to light. Right eye exhibits no discharge. Left eye exhibits no discharge. No scleral icterus.   Neck: Normal range of motion. Neck supple. No JVD present. No tracheal deviation present. No thyromegaly present.   Cardiovascular: Normal rate, regular rhythm, normal heart sounds and intact distal pulses. Exam reveals no gallop and no friction rub.   No murmur heard.  Pulmonary/Chest: Effort normal and breath sounds normal. No stridor. No respiratory distress. He has no wheezes. He has no rales. He exhibits no tenderness.   Abdominal: Soft. Bowel sounds are normal. He exhibits no distension and no mass. There is tenderness (Mild supraumbilical). There is no rebound and no guarding. No hernia.   Musculoskeletal: Normal range of motion. He exhibits no edema or deformity.   Lymphadenopathy:     He has no cervical adenopathy.   Neurological: He is alert and oriented to person, place, and time.   Skin: Skin is warm and dry. No rash noted. He is not diaphoretic. No erythema. No pallor.   Psychiatric: His behavior is normal.   Nursing note and vitals reviewed.      Procedures           ED Course  ED Course as of Nov 15 1222   Thu Nov 15, 2018   1215 Laboratory values were reviewed by me.  The glucose in the urine is greater than 1000.  The CMP shows a glucose of 270 and the BUN 21 and sodium of 135.  The white blood cell count is 13.6.  The neutrophil percent is 72%.  The laboratory values otherwise unremarkable.  [RC]      ED Course User Index  [RC] London Marina MD      12:22 PM, 11/15/18:  I spoke with Dr. Whittington, on-call for general surgery, she will admit the patient.    12:22 PM, 11/15/18:  The patient was reassessed.  He rates his pain as 3/10.  His vital signs were reviewed and are stable.  Abdominal exam: Soft, mild supraumbilical tenderness, no rebound, no guarding, no masses, positive bowel  maria.     12:23 PM, 11/15/18:  The patient's diagnosis of partial small bowel obstruction was discussed with him.  HKe will be admitted for gut rest to Dr. Whittington.  All of the patient's questions were answered the patient will be admitted in stable condition.            MDM    No orders to display     Labs Reviewed - No data to display  No results found.    Final diagnoses:   Partial small bowel obstruction (CMS/HCC)         ED Medications:  Medications - No data to display    New Medications:     Medication List      ASK your doctor about these medications    ARIPiprazole 5 MG tablet  Commonly known as:  ABILIFY     diclofenac 75 MG EC tablet  Commonly known as:  VOLTAREN     fenofibrate 145 MG tablet  Commonly known as:  TRICOR     gabapentin 300 MG capsule  Commonly known as:  NEURONTIN     lisinopril 20 MG tablet  Commonly known as:  PRINIVIL,ZESTRIL     metFORMIN 500 MG tablet  Commonly known as:  GLUCOPHAGE     simvastatin 20 MG tablet  Commonly known as:  ZOCOR     traZODone 100 MG tablet  Commonly known as:  DESYREL     venlafaxine 75 MG tablet  Commonly known as:  EFFEXOR          Stopped Medications:     Medication List      ASK your doctor about these medications    ARIPiprazole 5 MG tablet  Commonly known as:  ABILIFY     diclofenac 75 MG EC tablet  Commonly known as:  VOLTAREN     fenofibrate 145 MG tablet  Commonly known as:  TRICOR     gabapentin 300 MG capsule  Commonly known as:  NEURONTIN     lisinopril 20 MG tablet  Commonly known as:  PRINIVIL,ZESTRIL     metFORMIN 500 MG tablet  Commonly known as:  GLUCOPHAGE     simvastatin 20 MG tablet  Commonly known as:  ZOCOR     traZODone 100 MG tablet  Commonly known as:  DESYREL     venlafaxine 75 MG tablet  Commonly known as:  EFFEXOR            Final diagnoses:   Partial small bowel obstruction (CMS/HCC)            London Marina MD  11/15/18 3458

## 2018-11-16 ENCOUNTER — APPOINTMENT (OUTPATIENT)
Dept: GENERAL RADIOLOGY | Facility: HOSPITAL | Age: 58
End: 2018-11-16

## 2018-11-16 LAB
ANION GAP SERPL CALCULATED.3IONS-SCNC: 9.3 MMOL/L
BASOPHILS # BLD AUTO: 0.05 10*3/MM3 (ref 0–0.2)
BASOPHILS NFR BLD AUTO: 0.5 % (ref 0–2)
BUN BLD-MCNC: 17 MG/DL (ref 6–20)
BUN/CREAT SERPL: 20.7 (ref 7–25)
CALCIUM SPEC-SCNC: 8.3 MG/DL (ref 8.6–10.5)
CHLORIDE SERPL-SCNC: 106 MMOL/L (ref 98–107)
CO2 SERPL-SCNC: 23.7 MMOL/L (ref 22–29)
CREAT BLD-MCNC: 0.82 MG/DL (ref 0.76–1.27)
DEPRECATED RDW RBC AUTO: 44.6 FL (ref 37–54)
EOSINOPHIL # BLD AUTO: 0.04 10*3/MM3 (ref 0.1–0.3)
EOSINOPHIL NFR BLD AUTO: 0.4 % (ref 0–4)
ERYTHROCYTE [DISTWIDTH] IN BLOOD BY AUTOMATED COUNT: 13 % (ref 11.5–14.5)
GFR SERPL CREATININE-BSD FRML MDRD: 97 ML/MIN/1.73
GLUCOSE BLD-MCNC: 190 MG/DL (ref 65–99)
GLUCOSE BLDC GLUCOMTR-MCNC: 119 MG/DL (ref 70–130)
GLUCOSE BLDC GLUCOMTR-MCNC: 162 MG/DL (ref 70–130)
GLUCOSE BLDC GLUCOMTR-MCNC: 172 MG/DL (ref 70–130)
GLUCOSE BLDC GLUCOMTR-MCNC: 175 MG/DL (ref 70–130)
HCT VFR BLD AUTO: 38.3 % (ref 42–52)
HGB BLD-MCNC: 12.2 G/DL (ref 14–18)
IMM GRANULOCYTES # BLD: 0.04 10*3/MM3 (ref 0–0.03)
IMM GRANULOCYTES NFR BLD: 0.4 % (ref 0–0.5)
LYMPHOCYTES # BLD AUTO: 2.81 10*3/MM3 (ref 0.6–4.8)
LYMPHOCYTES NFR BLD AUTO: 28.1 % (ref 20–45)
MCH RBC QN AUTO: 29.8 PG (ref 27–31)
MCHC RBC AUTO-ENTMCNC: 31.9 G/DL (ref 31–37)
MCV RBC AUTO: 93.4 FL (ref 80–94)
MONOCYTES # BLD AUTO: 0.68 10*3/MM3 (ref 0–1)
MONOCYTES NFR BLD AUTO: 6.8 % (ref 3–8)
NEUTROPHILS # BLD AUTO: 6.38 10*3/MM3 (ref 1.5–8.3)
NEUTROPHILS NFR BLD AUTO: 63.8 % (ref 45–70)
NRBC BLD MANUAL-RTO: 0 /100 WBC (ref 0–0)
PLATELET # BLD AUTO: 322 10*3/MM3 (ref 140–500)
PMV BLD AUTO: 11.2 FL (ref 7.4–10.4)
POTASSIUM BLD-SCNC: 4.9 MMOL/L (ref 3.5–5.2)
RBC # BLD AUTO: 4.1 10*6/MM3 (ref 4.7–6.1)
SODIUM BLD-SCNC: 139 MMOL/L (ref 136–145)
WBC NRBC COR # BLD: 10 10*3/MM3 (ref 4.8–10.8)

## 2018-11-16 PROCEDURE — 25810000003 SODIUM CHLORIDE 0.9 % WITH KCL 20 MEQ 20-0.9 MEQ/L-% SOLUTION: Performed by: SURGERY

## 2018-11-16 PROCEDURE — 80048 BASIC METABOLIC PNL TOTAL CA: CPT | Performed by: SURGERY

## 2018-11-16 PROCEDURE — 82962 GLUCOSE BLOOD TEST: CPT

## 2018-11-16 PROCEDURE — 85025 COMPLETE CBC W/AUTO DIFF WBC: CPT | Performed by: SURGERY

## 2018-11-16 PROCEDURE — 99232 SBSQ HOSP IP/OBS MODERATE 35: CPT | Performed by: SURGERY

## 2018-11-16 PROCEDURE — 74018 RADEX ABDOMEN 1 VIEW: CPT

## 2018-11-16 PROCEDURE — 25810000003 SODIUM CHLORIDE 0.9 % WITH KCL 20 MEQ 20-0.9 MEQ/L-% SOLUTION

## 2018-11-16 RX ORDER — SODIUM CHLORIDE AND POTASSIUM CHLORIDE 150; 900 MG/100ML; MG/100ML
INJECTION, SOLUTION INTRAVENOUS
Status: COMPLETED
Start: 2018-11-16 | End: 2018-11-16

## 2018-11-16 RX ADMIN — POTASSIUM CHLORIDE AND SODIUM CHLORIDE 150 ML/HR: 900; 150 INJECTION, SOLUTION INTRAVENOUS at 05:11

## 2018-11-16 RX ADMIN — ARIPIPRAZOLE 5 MG: 5 TABLET ORAL at 11:08

## 2018-11-16 RX ADMIN — POTASSIUM CHLORIDE AND SODIUM CHLORIDE 150 ML/HR: 900; 150 INJECTION, SOLUTION INTRAVENOUS at 11:36

## 2018-11-16 RX ADMIN — GABAPENTIN 300 MG: 300 CAPSULE ORAL at 21:12

## 2018-11-16 RX ADMIN — TRAZODONE HYDROCHLORIDE 100 MG: 50 TABLET ORAL at 21:12

## 2018-11-16 RX ADMIN — VENLAFAXINE HYDROCHLORIDE 150 MG: 37.5 TABLET ORAL at 11:08

## 2018-11-16 RX ADMIN — Medication 10 ML: at 08:45

## 2018-11-16 RX ADMIN — GABAPENTIN 300 MG: 300 CAPSULE ORAL at 08:44

## 2018-11-16 RX ADMIN — LISINOPRIL 20 MG: 20 TABLET ORAL at 08:44

## 2018-11-16 RX ADMIN — POTASSIUM CHLORIDE AND SODIUM CHLORIDE 50 ML/HR: 900; 150 INJECTION, SOLUTION INTRAVENOUS at 21:15

## 2018-11-16 RX ADMIN — Medication 3 ML: at 21:07

## 2018-11-16 NOTE — PLAN OF CARE
Problem: Patient Care Overview  Goal: Plan of Care Review  Outcome: Ongoing (interventions implemented as appropriate)   11/16/18 3633   Coping/Psychosocial   Plan of Care Reviewed With patient   Plan of Care Review   Progress improving   OTHER   Outcome Summary ADV to clears; tolerating well; Ambulating Independently; PSBO resolving; Tentative D/C 11/7/18       Problem: Bowel Obstruction (Adult)  Goal: Signs and Symptoms of Listed Potential Problems Will be Absent, Minimized or Managed (Bowel Obstruction)  Outcome: Ongoing (interventions implemented as appropriate)

## 2018-11-16 NOTE — PLAN OF CARE
Problem: Patient Care Overview  Goal: Discharge Needs Assessment  Outcome: Ongoing (interventions implemented as appropriate)   11/16/18 1017   Discharge Needs Assessment   Readmission Within the Last 30 Days no previous admission in last 30 days   Concerns to be Addressed no discharge needs identified;denies needs/concerns at this time   Patient/Family Anticipates Transition to home   Patient/Family Anticipated Services at Transition none   Transportation Concerns car, none   Transportation Anticipated car, drives self;family or friend will provide   Anticipated Changes Related to Illness none   Equipment Needed After Discharge none   Outpatient/Agency/Support Group Needs homecare agency  (HH in the past)   Offered/Gave Vendor List no   Disability   Equipment Currently Used at Home bipap/cpap

## 2018-11-16 NOTE — NURSING NOTE
Discharge Planning Assessment   Francesca Cardozo     Patient Name: Lloyd Greene  MRN: [id number]  Today's Date: 11/16/2018    Admit Date: 11/15/2018    Discharge Needs Assessment     Row Name 11/16/18 1017       Living Environment    Lives With  alone    Current Living Arrangements  home/apartment/condo    Primary Care Provided by  self    Provides Primary Care For  no one    Family Caregiver if Needed  parent(s)    Quality of Family Relationships  helpful;supportive;involved    Able to Return to Prior Arrangements  yes       Resource/Environmental Concerns    Resource/Environmental Concerns  none    Transportation Concerns  car, none       Transition Planning    Patient/Family Anticipates Transition to  home    Patient/Family Anticipated Services at Transition  none    Transportation Anticipated  car, drives self;family or friend will provide       Discharge Needs Assessment    Readmission Within the Last 30 Days  no previous admission in last 30 days    Concerns to be Addressed  no discharge needs identified;denies needs/concerns at this time    Equipment Currently Used at Home  bipap/cpap    Anticipated Changes Related to Illness  none    Equipment Needed After Discharge  none    Outpatient/Agency/Support Group Needs  homecare agency HH in the past    Offered/Gave Vendor List  no        Discharge Plan     Row Name 11/16/18 1018       Plan    Plan  home    Patient/Family in Agreement with Plan  yes    Plan Comments  spoke with patient at bedside. agreeable to speak to . he lives at home alone in a 2nd level apt. HH in the past. he has a c-pap. independent with ADL's including shopping, meal prep and driving. uses [facility] and denies having trouble paying for medications. he has prescription coverage. does not have a living will and is not interested at this time. PCP not listed: he states his first appt is 12/6 with Dr Diggs. face sheet verified. plan is home when medically stable. will  continue to follow.         Destination      No service coordination in this encounter.      Durable Medical Equipment      No service coordination in this encounter.      Dialysis/Infusion      No service coordination in this encounter.      Home Medical Care      No service coordination in this encounter.      Community Resources      No service coordination in this encounter.          Demographic Summary     Row Name 11/16/18 1014       General Information    Admission Type  inpatient    Arrived From  home    Referral Source  admission list    Reason for Consult  discharge planning    Preferred Language  English     Used During This Interaction  no       Contact Information    Permission Granted to Share Info With          Functional Status    No documentation.       Psychosocial    No documentation.       Abuse/Neglect    No documentation.       Legal    No documentation.       Substance Abuse    No documentation.       Patient Forms    No documentation.           Paloma Horn RN

## 2018-11-16 NOTE — PLAN OF CARE
Problem: Patient Care Overview  Goal: Plan of Care Review  Outcome: Ongoing (interventions implemented as appropriate)   11/16/18 1546   Coping/Psychosocial   Plan of Care Reviewed With patient   Plan of Care Review   Progress improving   OTHER   Outcome Summary VSS, NPO diet with sips/chips, VSS, , BS improving, BM 11-15-18, ambulated in [place]      Goal: Individualization and Mutuality  Outcome: Ongoing (interventions implemented as appropriate)      Problem: Bowel Obstruction (Adult)  Goal: Signs and Symptoms of Listed Potential Problems Will be Absent, Minimized or Managed (Bowel Obstruction)  Outcome: Ongoing (interventions implemented as appropriate)

## 2018-11-16 NOTE — PROGRESS NOTES
Daily Progress Note    Chief Complaint: abdominal pain     Subjective     Pt reports feeling better   Objective     Vital signs in last 24 hours:  Temp:  [97.2 °F (36.2 °C)-98.1 °F (36.7 °C)] 98 °F (36.7 °C)  Heart Rate:  [71-76] 73  Resp:  [16] 16  BP: (119-142)/(68-90) 120/74    Intake/Output last 3 shifts:  I/O last 3 completed shifts:  In: 1950 [I.V.:950; IV Piggyback:1000]  Out: 600 [Urine:600]    Physical Exam:  Respiratory: CTA, normal inspiratory effort  CV: RRR, no JVD  Abd: Positive BS, soft, ND, NT, no rebound, no guarding  Ext:  No cyanosis, no edema  Results from last 7 days   Lab Units  11/16/18   0441   WBC 10*3/mm3  10.00   HEMOGLOBIN g/dL  12.2*   HEMATOCRIT %  38.3*   PLATELETS 10*3/mm3  322     Results from last 7 days   Lab Units  11/16/18   0441   SODIUM mmol/L  139   POTASSIUM mmol/L  4.9   CHLORIDE mmol/L  106   CO2 mmol/L  23.7   BUN mg/dL  17   CREATININE mg/dL  0.82   GLUCOSE mg/dL  190*   CALCIUM mg/dL  8.3*     KUB no change     Assessment/Plan   PSBO  Resolving   Try advancing diet     Lisette Whittington MD  General, Minimally Invasive and Endoscopic Surgery  [facility]    [address] [facility] [address] [place] [address]    [address]  [place], [place] [id number] [place] [address]    P: [phone]  F: [phone]    Cc:  Provider, No Known

## 2018-11-17 VITALS
BODY MASS INDEX: 39.65 KG/M2 | RESPIRATION RATE: 18 BRPM | TEMPERATURE: 97.3 F | OXYGEN SATURATION: 97 % | HEIGHT: 65 IN | HEART RATE: 92 BPM | SYSTOLIC BLOOD PRESSURE: 162 MMHG | WEIGHT: 238 LBS | DIASTOLIC BLOOD PRESSURE: 83 MMHG

## 2018-11-17 LAB
ANION GAP SERPL CALCULATED.3IONS-SCNC: 11.5 MMOL/L
BASOPHILS # BLD AUTO: 0.05 10*3/MM3 (ref 0–0.2)
BASOPHILS NFR BLD AUTO: 0.6 % (ref 0–2)
BUN BLD-MCNC: 12 MG/DL (ref 6–20)
BUN/CREAT SERPL: 14.3 (ref 7–25)
CALCIUM SPEC-SCNC: 8.5 MG/DL (ref 8.6–10.5)
CHLORIDE SERPL-SCNC: 109 MMOL/L (ref 98–107)
CO2 SERPL-SCNC: 21.5 MMOL/L (ref 22–29)
CREAT BLD-MCNC: 0.84 MG/DL (ref 0.76–1.27)
DEPRECATED RDW RBC AUTO: 44.2 FL (ref 37–54)
EOSINOPHIL # BLD AUTO: 0.09 10*3/MM3 (ref 0.1–0.3)
EOSINOPHIL NFR BLD AUTO: 1 % (ref 0–4)
ERYTHROCYTE [DISTWIDTH] IN BLOOD BY AUTOMATED COUNT: 12.9 % (ref 11.5–14.5)
GFR SERPL CREATININE-BSD FRML MDRD: 95 ML/MIN/1.73
GLUCOSE BLD-MCNC: 164 MG/DL (ref 65–99)
GLUCOSE BLDC GLUCOMTR-MCNC: 134 MG/DL (ref 70–130)
GLUCOSE BLDC GLUCOMTR-MCNC: 165 MG/DL (ref 70–130)
HCT VFR BLD AUTO: 39 % (ref 42–52)
HGB BLD-MCNC: 12.2 G/DL (ref 14–18)
IMM GRANULOCYTES # BLD: 0.03 10*3/MM3 (ref 0–0.03)
IMM GRANULOCYTES NFR BLD: 0.3 % (ref 0–0.5)
LYMPHOCYTES # BLD AUTO: 2.73 10*3/MM3 (ref 0.6–4.8)
LYMPHOCYTES NFR BLD AUTO: 31.2 % (ref 20–45)
MCH RBC QN AUTO: 29.5 PG (ref 27–31)
MCHC RBC AUTO-ENTMCNC: 31.3 G/DL (ref 31–37)
MCV RBC AUTO: 94.2 FL (ref 80–94)
MONOCYTES # BLD AUTO: 0.69 10*3/MM3 (ref 0–1)
MONOCYTES NFR BLD AUTO: 7.9 % (ref 3–8)
NEUTROPHILS # BLD AUTO: 5.16 10*3/MM3 (ref 1.5–8.3)
NEUTROPHILS NFR BLD AUTO: 59 % (ref 45–70)
NRBC BLD MANUAL-RTO: 0 /100 WBC (ref 0–0)
PLATELET # BLD AUTO: 334 10*3/MM3 (ref 140–500)
PMV BLD AUTO: 11.5 FL (ref 7.4–10.4)
POTASSIUM BLD-SCNC: 4.6 MMOL/L (ref 3.5–5.2)
RBC # BLD AUTO: 4.14 10*6/MM3 (ref 4.7–6.1)
SODIUM BLD-SCNC: 142 MMOL/L (ref 136–145)
WBC NRBC COR # BLD: 8.75 10*3/MM3 (ref 4.8–10.8)

## 2018-11-17 PROCEDURE — 94799 UNLISTED PULMONARY SVC/PX: CPT

## 2018-11-17 PROCEDURE — 82962 GLUCOSE BLOOD TEST: CPT

## 2018-11-17 PROCEDURE — 80048 BASIC METABOLIC PNL TOTAL CA: CPT | Performed by: SURGERY

## 2018-11-17 PROCEDURE — 99238 HOSP IP/OBS DSCHRG MGMT 30/<: CPT | Performed by: SURGERY

## 2018-11-17 PROCEDURE — 85025 COMPLETE CBC W/AUTO DIFF WBC: CPT | Performed by: SURGERY

## 2018-11-17 RX ADMIN — Medication 3 ML: at 08:31

## 2018-11-17 RX ADMIN — LISINOPRIL 20 MG: 20 TABLET ORAL at 08:30

## 2018-11-17 RX ADMIN — VENLAFAXINE HYDROCHLORIDE 150 MG: 37.5 TABLET ORAL at 08:30

## 2018-11-17 RX ADMIN — GABAPENTIN 300 MG: 300 CAPSULE ORAL at 08:30

## 2018-11-17 RX ADMIN — ARIPIPRAZOLE 5 MG: 5 TABLET ORAL at 08:30

## 2018-11-17 NOTE — PROGRESS NOTES
Daily Progress Note    Chief Complaint: abdominal pain     Subjective     Pt reports abdominal pain resolved, tolerating diet     Objective     Vital signs in last 24 hours:  Temp:  [96.8 °F (36 °C)-98 °F (36.7 °C)] 97.3 °F (36.3 °C)  Heart Rate:  [68-92] 92  Resp:  [17-18] 18  BP: (117-162)/(74-89) 162/83    Intake/Output last 3 shifts:  I/O last 3 completed shifts:  In: 4889.8 [P.O.:1244; I.V.:3645.8]  Out: 900 [Urine:900]    Physical Exam:  Respiratory: CTA, normal inspiratory effort  CV: RRR, no JVD  Abd: Positive BS, soft, ND, NT, no rebound, no guarding  Ext:  No cyanosis, no edema    Assessment/Plan   PSBO  Resolved   Adv diet   D/c home   F/u PRN    Lisette Whittington MD  General, Minimally Invasive and Endoscopic Surgery  [facility]    [address]    [address]  [place], [place] [address] [place] [address]    P: [phone]  F: [phone]    Cc:  Provider, No Known

## 2018-11-17 NOTE — PLAN OF CARE
Problem: Patient Care Overview  Goal: Plan of Care Review  Outcome: Ongoing (interventions implemented as appropriate)   11/17/18 1231   Coping/Psychosocial   Plan of Care Reviewed With patient   Plan of Care Review   Progress improving   OTHER   Outcome Summary Patient doing well. Tolerating full liquids. When finished with lunch will be discharged. NO N/V or c/o pain.        Problem: Skin Injury Risk (Adult)  Goal: Skin Health and Integrity  Outcome: Ongoing (interventions implemented as appropriate)

## 2018-11-18 ENCOUNTER — READMISSION MANAGEMENT (OUTPATIENT)
Dept: CALL CENTER | Facility: HOSPITAL | Age: 58
End: 2018-11-18

## 2018-11-18 NOTE — DISCHARGE SUMMARY
Admitting date: 11/15/2018    Discharging date: 11/17/2018    Admitting diagnosis: Partial small bowel obstruction    Discharging diagnoses: Same    Admitting/discharging physician: Dr. Whittington    Procedures: None    Hospital course:  This is a pleasant 58-year-old gentleman who presented to the emergency room on 11/15/2018 with abdominal pain nausea and vomiting.  Patient had had previous intra-abdominal surgeries and was found to have a partial small bowel obstruction on CT scan and elevated white count.  Patient was admitted and treated with conservative bowel rest, anti-medics, pain medicine, IV fluids and supportive care.  Patient's partial small bowel obstruction responded well to conservative treatment and had resolved on 11/17/2018.  Patient was tolerating a soft diet and ambulating in the castillo with positive bowel movements and passing flatus.  Patient was discharged to home in stable condition.  Patient was instructed to advance his diet slowly and to follow-up with me as needed.  Patient was discharged on his admitting medications.        Lisette Whittington MD  General, Minimally Invasive and Endoscopic Surgery  [facility]    [address] [facility] [address] [place] [address]    [address]  [place], [place] [id number] [place] [id number]    P: [phone]  F: [phone]    Cc:  Provider, No Known

## 2018-11-18 NOTE — OUTREACH NOTE
Prep Survey      Responses   Facility patient discharged from?  [facility]   Is LACE score < 7 ?  Yes   Is patient eligible?  Yes   Discharge diagnosis  Partial Small Bowel Obstruction    Does the patient have one of the following disease processes/diagnoses(primary or secondary)?  Other   Does the patient have Home health ordered?  No   Is there a DME ordered?  No   Prep survey completed?  Yes          Daria Noble RN

## 2018-11-18 NOTE — NURSING NOTE
Case Management Discharge Note    Final Note: Discharged  home    Destination      No service has been selected for the patient.      Durable Medical Equipment      No service has been selected for the patient.      Dialysis/Infusion      No service has been selected for the patient.      Home Medical Care      No service has been selected for the patient.      Community Resources      No service has been selected for the patient.             Final Discharge Disposition Code: 01 - home or self-care

## 2018-11-19 ENCOUNTER — READMISSION MANAGEMENT (OUTPATIENT)
Dept: CALL CENTER | Facility: HOSPITAL | Age: 58
End: 2018-11-19

## 2018-11-19 NOTE — OUTREACH NOTE
LAG < 7 Survey      Responses   Facility patient discharged from?  [facility]   Does the patient have one of the following disease processes/diagnoses(primary or secondary)?  Other   Is there a successful TCM telephone encounter documented?  No   BHLAG <7 Attempt successful?  No   Unsuccessful attempts  Attempt 1          Gavi Pereira, RN

## 2018-11-20 ENCOUNTER — READMISSION MANAGEMENT (OUTPATIENT)
Dept: CALL CENTER | Facility: HOSPITAL | Age: 58
End: 2018-11-20

## 2018-11-20 NOTE — OUTREACH NOTE
LAG < 7 Survey      Responses   Facility patient discharged from?  [facility]   Does the patient have one of the following disease processes/diagnoses(primary or secondary)?  Other   Is there a successful TCM telephone encounter documented?  No   BHLAG <7 Attempt successful?  No   Unsuccessful attempts  Attempt 2   Call end time  1531   Graduated  Yes   Wrap up additional comments  unable to reach after 2 calls graduated and LACE less than 7          Chantelle Lewis, RN

## 2018-12-06 ENCOUNTER — OFFICE VISIT (OUTPATIENT)
Dept: FAMILY MEDICINE CLINIC | Facility: CLINIC | Age: 58
End: 2018-12-06

## 2018-12-06 VITALS
RESPIRATION RATE: 14 BRPM | BODY MASS INDEX: 39.65 KG/M2 | HEIGHT: 65 IN | HEART RATE: 87 BPM | OXYGEN SATURATION: 97 % | SYSTOLIC BLOOD PRESSURE: 130 MMHG | WEIGHT: 238 LBS | TEMPERATURE: 98.3 F | DIASTOLIC BLOOD PRESSURE: 72 MMHG

## 2018-12-06 DIAGNOSIS — E78.5 HYPERLIPIDEMIA, UNSPECIFIED HYPERLIPIDEMIA TYPE: ICD-10-CM

## 2018-12-06 DIAGNOSIS — M54.50 CHRONIC BILATERAL LOW BACK PAIN WITHOUT SCIATICA: Primary | ICD-10-CM

## 2018-12-06 DIAGNOSIS — I10 HYPERTENSION, ESSENTIAL: ICD-10-CM

## 2018-12-06 DIAGNOSIS — F32.A ANXIETY AND DEPRESSION: ICD-10-CM

## 2018-12-06 DIAGNOSIS — G89.29 CHRONIC BILATERAL LOW BACK PAIN WITHOUT SCIATICA: Primary | ICD-10-CM

## 2018-12-06 DIAGNOSIS — F41.9 ANXIETY AND DEPRESSION: ICD-10-CM

## 2018-12-06 DIAGNOSIS — G89.29 OTHER CHRONIC PAIN: ICD-10-CM

## 2018-12-06 DIAGNOSIS — E11.42 TYPE 2 DIABETES MELLITUS WITH DIABETIC POLYNEUROPATHY, WITHOUT LONG-TERM CURRENT USE OF INSULIN (HCC): ICD-10-CM

## 2018-12-06 DIAGNOSIS — M47.816 LUMBAR FACET ARTHROPATHY: ICD-10-CM

## 2018-12-06 DIAGNOSIS — F51.01 PRIMARY INSOMNIA: ICD-10-CM

## 2018-12-06 PROCEDURE — 99215 OFFICE O/P EST HI 40 MIN: CPT | Performed by: FAMILY MEDICINE

## 2018-12-06 RX ORDER — TRAZODONE HYDROCHLORIDE 100 MG/1
100 TABLET ORAL NIGHTLY
Qty: 90 TABLET | Refills: 3 | Status: SHIPPED | OUTPATIENT
Start: 2018-12-06 | End: 2020-11-23

## 2018-12-06 RX ORDER — VENLAFAXINE 75 MG/1
150 TABLET ORAL DAILY
Qty: 90 TABLET | Refills: 3 | Status: SHIPPED | OUTPATIENT
Start: 2018-12-06 | End: 2019-03-17 | Stop reason: SDUPTHER

## 2018-12-06 RX ORDER — LISINOPRIL 20 MG/1
20 TABLET ORAL DAILY
Qty: 90 TABLET | Refills: 3 | Status: ON HOLD | OUTPATIENT
Start: 2018-12-06 | End: 2019-01-14

## 2018-12-06 RX ORDER — GABAPENTIN 300 MG/1
300 CAPSULE ORAL 3 TIMES DAILY
Qty: 270 CAPSULE | Refills: 0 | Status: ON HOLD | OUTPATIENT
Start: 2018-12-06 | End: 2019-01-13

## 2018-12-06 RX ORDER — ALPRAZOLAM 0.25 MG/1
0.25 TABLET ORAL DAILY
Qty: 30 TABLET | Refills: 0 | Status: SHIPPED | OUTPATIENT
Start: 2018-12-06 | End: 2020-11-23

## 2018-12-06 RX ORDER — SIMVASTATIN 40 MG
40 TABLET ORAL DAILY
COMMUNITY
Start: 2018-11-27 | End: 2018-12-06 | Stop reason: SDUPTHER

## 2018-12-06 RX ORDER — FENOFIBRATE 145 MG/1
145 TABLET, COATED ORAL DAILY
Qty: 90 TABLET | Refills: 3 | Status: SHIPPED | OUTPATIENT
Start: 2018-12-06 | End: 2019-03-17 | Stop reason: SDUPTHER

## 2018-12-06 RX ORDER — ALPRAZOLAM 0.25 MG/1
0.25 TABLET ORAL 3 TIMES DAILY PRN
COMMUNITY
End: 2018-12-06 | Stop reason: SDUPTHER

## 2018-12-06 RX ORDER — SIMVASTATIN 40 MG
40 TABLET ORAL DAILY
Qty: 90 TABLET | Refills: 3 | Status: SHIPPED | OUTPATIENT
Start: 2018-12-06 | End: 2019-03-17 | Stop reason: SDUPTHER

## 2018-12-06 RX ORDER — DICLOFENAC SODIUM 75 MG/1
75 TABLET, DELAYED RELEASE ORAL 2 TIMES DAILY
Qty: 180 TABLET | Refills: 3 | OUTPATIENT
Start: 2018-12-06 | End: 2019-01-07

## 2018-12-06 RX ORDER — ARIPIPRAZOLE 5 MG/1
5 TABLET ORAL DAILY
Qty: 90 TABLET | Refills: 3 | Status: SHIPPED | OUTPATIENT
Start: 2018-12-06 | End: 2019-03-17 | Stop reason: SDUPTHER

## 2018-12-06 NOTE — PROGRESS NOTES
Lloyd Greene is a 58 y.o. male.     Chief Complaint   Patient presents with   • Hypertension     Patient needs to establish a care and refill on med.   • Hyperlipidemia       HPI     Patient presents the office today to discuss issues that are all new to me.  Patient has a complex and complicated past medical history.  Was seeing urgent care for his primary care.  Has a history of using high-dose opiates, benzodiazepines.  Is seeing pain management for chronic bilateral low back pain.  He receives injections.  Patient also with a history of anxiety and depression.  Has seen a psychiatrist previously.  Currently uses Xanax, Abilify, and venlafaxine.  Patient also with history hyperlipidemia.  Currently taking simvastatin and fenofibrate.  History of hypertension for which she is using lisinopril.  History of diabetes.  Currently using metformin.  It is unknown what the patient's previous blood work is look like.  Patient also uses trazodone for insomnia.    The following portions of the patient's history were reviewed and updated as appropriate: allergies, current medications, past family history, past medical history, past social history, past surgical history and problem list.    Review of Systems   Constitutional: Negative for activity change.   All other systems reviewed and are negative.      Objective  Vitals:    12/06/18 1058   BP: 130/72   Pulse: 87   Resp: 14   Temp: 98.3 °F (36.8 °C)   SpO2: 97%       Physical Exam   Constitutional: He is oriented to person, place, and time. He appears well-developed and well-nourished. No distress.   HENT:   Head: Normocephalic and atraumatic.   Right Ear: External ear normal.   Left Ear: External ear normal.   Nose: Nose normal.   Mouth/Throat: Oropharynx is clear and moist.   Eyes: Conjunctivae and EOM are normal. Pupils are equal, round, and reactive to light. Right eye exhibits no discharge. Left eye exhibits no discharge. No scleral icterus.   Cardiovascular:  Normal rate, regular rhythm and normal heart sounds.   Pulmonary/Chest: Effort normal and breath sounds normal. No respiratory distress. He has no wheezes. He has no rales.   Abdominal: Soft. Bowel sounds are normal. He exhibits no distension. There is no tenderness.   Neurological: He is alert and oriented to person, place, and time.   Skin: Skin is warm and dry. He is not diaphoretic.   Nursing note and vitals reviewed.        Current Outpatient Medications:   •  ALPRAZolam (XANAX) 0.25 MG tablet, Take 1 tablet by mouth Daily., Disp: 30 tablet, Rfl: 0  •  ARIPiprazole (ABILIFY) 5 MG tablet, Take 1 tablet by mouth Daily., Disp: 90 tablet, Rfl: 3  •  diclofenac (VOLTAREN) 75 MG EC tablet, Take 1 tablet by mouth 2 (Two) Times a Day., Disp: 180 tablet, Rfl: 3  •  fenofibrate (TRICOR) 145 MG tablet, Take 1 tablet by mouth Daily., Disp: 90 tablet, Rfl: 3  •  gabapentin (NEURONTIN) 300 MG capsule, Take 1 capsule by mouth 3 (Three) Times a Day., Disp: 270 capsule, Rfl: 0  •  lisinopril (PRINIVIL,ZESTRIL) 20 MG tablet, Take 1 tablet by mouth Daily., Disp: 90 tablet, Rfl: 3  •  metFORMIN (GLUCOPHAGE) 500 MG tablet, Take 1 tablet by mouth 3 (Three) Times a Day., Disp: 270 tablet, Rfl: 3  •  simvastatin (ZOCOR) 40 MG tablet, Take 1 tablet by mouth Daily., Disp: 90 tablet, Rfl: 3  •  traZODone (DESYREL) 100 MG tablet, Take 1 tablet by mouth Every Night., Disp: 90 tablet, Rfl: 3  •  venlafaxine (EFFEXOR) 75 MG tablet, Take 2 tablets by mouth Daily., Disp: 90 tablet, Rfl: 3  Current outpatient and discharge medications have been reconciled for the patient.  Reviewed by: Donell Diggs MD      Procedures    Lab Results (most recent)     None                  Lloyd was seen today for hypertension and hyperlipidemia.    Diagnoses and all orders for this visit:    Chronic bilateral low back pain without sciatica  -     diclofenac (VOLTAREN) 75 MG EC tablet; Take 1 tablet by mouth 2 (Two) Times a Day.  -     gabapentin (NEURONTIN)  300 MG capsule; Take 1 capsule by mouth 3 (Three) Times a Day.    Other chronic pain  -     diclofenac (VOLTAREN) 75 MG EC tablet; Take 1 tablet by mouth 2 (Two) Times a Day.  -     gabapentin (NEURONTIN) 300 MG capsule; Take 1 capsule by mouth 3 (Three) Times a Day.    Lumbar facet arthropathy  -     diclofenac (VOLTAREN) 75 MG EC tablet; Take 1 tablet by mouth 2 (Two) Times a Day.  -     gabapentin (NEURONTIN) 300 MG capsule; Take 1 capsule by mouth 3 (Three) Times a Day.    Anxiety and depression  -     ALPRAZolam (XANAX) 0.25 MG tablet; Take 1 tablet by mouth Daily.  -     ARIPiprazole (ABILIFY) 5 MG tablet; Take 1 tablet by mouth Daily.  -     venlafaxine (EFFEXOR) 75 MG tablet; Take 2 tablets by mouth Daily.    Hyperlipidemia, unspecified hyperlipidemia type  -     fenofibrate (TRICOR) 145 MG tablet; Take 1 tablet by mouth Daily.  -     simvastatin (ZOCOR) 40 MG tablet; Take 1 tablet by mouth Daily.    Hypertension, essential  -     lisinopril (PRINIVIL,ZESTRIL) 20 MG tablet; Take 1 tablet by mouth Daily.    Type 2 diabetes mellitus with diabetic polyneuropathy, without long-term current use of insulin (CMS/[facility])  -     gabapentin (NEURONTIN) 300 MG capsule; Take 1 capsule by mouth 3 (Three) Times a Day.  -     metFORMIN (GLUCOPHAGE) 500 MG tablet; Take 1 tablet by mouth 3 (Three) Times a Day.    Primary insomnia  -     traZODone (DESYREL) 100 MG tablet; Take 1 tablet by mouth Every Night.    Extensive conversation had with the patient regarding his above medications.  I advised him on the dangers of using chronic benzodiazepines.  Advised him that if he uses opiates in the future he will not be able to use concomitantly with enzymes.  Patient voiced understanding.  I reviewed records from his pain management office.  We'll give refills as above today.  BENI was run which showed no abnormalities.  We'll do urine drug screen in the next 3 months.  Records have been requested from his previous providers.    35  minutes was spent of the 45 minute visit in direct face to face counseling and coordination of care regarding:   Encounter Diagnoses   Name Primary?   • Chronic bilateral low back pain without sciatica Yes   • Other chronic pain    • Lumbar facet arthropathy    • Anxiety and depression    • Hyperlipidemia, unspecified hyperlipidemia type    • Hypertension, essential    • Type 2 diabetes mellitus with diabetic polyneuropathy, without long-term current use of insulin (CMS/[facility])    • Primary insomnia            Return in about 3 months (around 3/6/2019) for Recheck.      Donell Diggs MD

## 2018-12-13 ENCOUNTER — OFFICE VISIT (OUTPATIENT)
Dept: PAIN MEDICINE | Facility: CLINIC | Age: 58
End: 2018-12-13

## 2018-12-13 VITALS
HEIGHT: 65 IN | DIASTOLIC BLOOD PRESSURE: 99 MMHG | SYSTOLIC BLOOD PRESSURE: 158 MMHG | OXYGEN SATURATION: 97 % | WEIGHT: 242 LBS | RESPIRATION RATE: 18 BRPM | TEMPERATURE: 98.6 F | HEART RATE: 90 BPM | BODY MASS INDEX: 40.32 KG/M2

## 2018-12-13 DIAGNOSIS — G89.29 OTHER CHRONIC PAIN: Primary | ICD-10-CM

## 2018-12-13 DIAGNOSIS — M47.816 LUMBAR FACET ARTHROPATHY: ICD-10-CM

## 2018-12-13 PROCEDURE — 99213 OFFICE O/P EST LOW 20 MIN: CPT | Performed by: NURSE PRACTITIONER

## 2018-12-13 RX ORDER — PHENTERMINE HYDROCHLORIDE 30 MG/1
30 CAPSULE ORAL EVERY MORNING
COMMUNITY
End: 2019-01-07 | Stop reason: ALTCHOICE

## 2018-12-13 NOTE — PROGRESS NOTES
"CHIEF COMPLAINT  Back pain is unchanged since last visit. He states that he received 50-75% relief for 4 days from the injection    Subjective   Lloyd Greene is a 58 y.o. male  who presents to the office for follow-up of procedure.  He completed a Bilateral L2-5 Lumbar Medial Branch Blockade   on  11/14/18 performed by Dr. HARGROVE for management of LOW BACK PAIN. Patient reports 75% relief from the procedure. \"For about 4 or 5 days, I felt good.\" \"The money is the problem.\"    Last evaluated in office on 10/23/18. At that time he was having increasing back pain. At last office visit he was requesting pain medication. He was referred to Dr. Paul. Previously been on Norco 10-3 25. Is also ordered have bilateral L2-L5 MBB performed by Dr. Hargrove. This was performed on 11/14/18.  Since last office visit, the patient was admitted to hospital and 1518 with partial small bowel obstruction. He was treated conservatively with bowel rest, anti-emetics, pain medicine, IV fluids and supportive care. Resolved on 11/17/18 and he was discharged.    Also seen by new PCP Dr. Donell Diggs on 12/6/18-- patient has a history of high-dose opioids, benzodiazepines with a history of anxiety and depression. Currently uses Xanax, Abilify, and venlafaxine. Also has a history of hyperlipidemia and diabetes. Also takes trazodone for insomnia. Advised the dangers of chronic benzodiazepine use. Currently concomitantly with opioids. Refill Xanax and other psychiatric medications. Requested records from previous provider. Urine drug screen in 3 months.    Complains of pain in his low back. Today his pain is 7/10VAS.  Describes the pain as continuous aching and throbbing.  Pain increases with walking, standing; pain decreases with medication, rest and injections.  Currently taking Diclofenac 75 mg 1-2/day and gabapentin 300 mg 3/night. ADL's by self.  Is on disability. He reports he needs pain medication to do every day activities without " "pain. Having trouble with household chores and cooking.   Reviewed he has gained 8 lbs since last office visit. Reviewed how this impacts back pain.   Reports he has prescription for PHenteramine that he takes sparingly.     Back Pain   This is a chronic problem. The current episode started more than 1 year ago. The problem occurs constantly. The problem has been waxing and waning since onset. The pain is present in the sacro-iliac and lumbar spine. The quality of the pain is described as burning. The pain is at a severity of 7/10. The pain is the same all the time. The symptoms are aggravated by bending, coughing, position, standing, stress and twisting. Stiffness is present all day. Pertinent negatives include no abdominal pain, bladder incontinence, bowel incontinence, chest pain, dysuria, fever, headaches, leg pain, numbness, paresis, paresthesias, pelvic pain, perianal numbness, tingling, weakness or weight loss. Risk factors include lack of exercise, obesity and sedentary lifestyle. He has tried NSAIDs, muscle relaxant, analgesics and bed rest (gabapentin) for the symptoms.      Past pain medications:   norco 10 mg 5/day - helping  mobic - helping      Current pain medications:   Gabapentin 300 mg tid - picking up today to restart  Diclofenac 75 mg bid - not sure if it helping     Past therapies:  Physical Therapy: yes-- \"A couple of years ago.\" --- minimal relief  Chiropractor: yes  Massage Therapy: no  TENS: yes  Neck or back surgery: no  Past pain management: yes ([facility])     Previous Injections: bilateral L2-L5 MBB 11-14-18  Effect of Injection (%): 50%  Length of Relief: 4 days      Previous Injections: none-- reports cost as a deterrent. \"If medicare doesn't 100% cover it, I can't do it.\" Reports he is on disability and can't afford anything extra.     PEG Assessment   What number best describes your pain on average in the past week?7  What number best describes how, during the past " "week, pain has interfered with your enjoyment of life?8  What number best describes how, during the past week, pain has interfered with your general activity?  8      The following portions of the patient's history were reviewed and updated as appropriate: allergies, current medications, past family history, past medical history, past social history, past surgical history and problem list.    Review of Systems   Constitutional: Negative for chills, fever and weight loss.   Respiratory: Positive for shortness of breath.    Cardiovascular: Negative for chest pain.   Gastrointestinal: Negative for abdominal pain, bowel incontinence, constipation, diarrhea, nausea and vomiting.   Genitourinary: Negative for bladder incontinence, difficulty urinating, dysuria, enuresis and pelvic pain.   Musculoskeletal: Positive for back pain.   Neurological: Negative for dizziness, tingling, weakness, light-headedness, numbness, headaches and paresthesias.   Psychiatric/Behavioral: Positive for sleep disturbance. Negative for confusion, hallucinations, self-injury and suicidal ideas. The patient is not nervous/anxious.        Vitals:    12/13/18 0837   BP: 158/99   BP Location: Left arm   Patient Position: Sitting   Cuff Size: Adult   Pulse: 90   Resp: 18   Temp: 98.6 °F (37 °C)   TempSrc: Oral   SpO2: 97%   Weight: 110 kg (242 lb)   Height: 165.1 cm (65\")   PainSc:   7   PainLoc: Back     Objective   Physical Exam   Constitutional: He is oriented to person, place, and time. Vital signs are normal. He appears well-developed and well-nourished. He is cooperative.   HENT:   Head: Normocephalic and atraumatic.   Nose: Nose normal.   Eyes: Conjunctivae and lids are normal.   Cardiovascular: Normal rate.   Pulmonary/Chest: Effort normal.   Abdominal:   obese   Musculoskeletal:        Lumbar back: He exhibits decreased range of motion, tenderness and bony tenderness (moderate tenderness of bilateral L2-L5 facets-- + loading manuever). "   Negative SLR bilaterally   Neurological: He is alert and oriented to person, place, and time. Gait normal.   Reflex Scores:       Patellar reflexes are 1+ on the right side and 1+ on the left side.  Skin: Skin is warm, dry and intact.   Psychiatric: He has a normal mood and affect. His speech is normal and behavior is normal. Judgment and thought content normal. Cognition and memory are normal.   Nursing note and vitals reviewed.      Assessment/Plan   Lloyd was seen today for back pain.    Diagnoses and all orders for this visit:    Other chronic pain    Lumbar facet arthropathy  -     Case Request      --- The urine drug screen confirmation from 9-18-18 has been reviewed and the result is ABNORMAL(presence of MARIJUANA) based on patient history and BENI report  --- NO PLANS FOR OPIOID THERAPY.  --- bilateral L2-L5 MBB. No blood thinners. Reviewed the procedure at length with the patient.  Included in the review was expectations, complications, risk and benefits.The procedure was described in detail and the risks, benefits and alternatives were discussed with the patient (including but not limited to: bleeding, infection, nerve damage, worsening of pain, inability to perform injection, paralysis, seizures, and death) who agreed to proceed.  Discussed the potential for sedation if warranted/wanted.  The procedure will plan to be performed at [facility] with fluoroscopic guidance(unless ultrasound is indicated). Questions were answered and in a way the patient could understand.  Patient verbalized understanding and wishes to proceed.  This intervention will be ordered.  --- Reviewed weight gain and how this can affect his pain control.  --- Follow-up after procedure or sooner if needed.    -------  Education about Medial Branch Blockade and RF Therapy:    This medial branch blockade (MBB) suggested is intended for diagnostic purposes, with the intent of offering the patient Radiofrequency  "thermal rhizotomy (RF) if the MBB is diagnostically effective.  The diagnostic blockade is necessary to determine the likelihood that RF therapy could be efficacious in providing long term relief to the patient.    Medial branches are sensory nerve branches that connect to a facet joint and transmit sensations & pain signals from that joint.  Facet is a term for the type of joints found in the spine.  Medial branches are the nerves that go to a facet, and therefore are also sometimes called \"facet joint nerves\" (FJNs).      In a medial branch blockade procedure, xray fluoroscopy is used to verify the locations of the outside of the joint lines which are being targeted.  Under xray guidance, needles are placed to these areas.  Contrast dye is injected to confirm proper placement, with dye flowing over the joint area, and to ensure that the dye does not flow into unintended areas such as a vein.  When this is confirmed, local anesthetic is injected to block the medial branch at that joint level.      If MBBs are diagnostically successful in blocking pain, then the patient is most likely a great candidate for Radiofrequency of those facet joint nerves.  In the RF procedure, needles are placed to the joint lines in the same fashion, and after testing, the needle tips are heated to thermally treat the nerves, blocking the nerves by in essence damaging the nerves with the heat treatment.       Medically, a successful RF procedure should provide a patient with 50% pain relief or more for at least 6 months.  Clinical experience suggests that successful patients receive relief more in the range of 12 months on average.  We also discussed that a fortunate minority of patients receive therapeutic success from the MBB, and may not require RF ablation.  If a patient receives more than 8 weeks of relief from MBB, then occasional repeat MBB for therapeutic purposes is a very reasonable alternative therapy.  This course of therapy " is consistent with our LCDs according to our CMS  in the area, and therefore other insurance providers should follow accordingly.  We will monitor our patients to screen for these therapeutic responders and will offer RF therapy only when necessary.        We discussed that MBB & RF are not without risks.  Guidelines regarding anticoagulant use & neuraxial procedures will be respected.  Patients that are ill or otherwise may be at risk for sepsis will not have their spines accessed by neuraxial injections of any type.  This patient will not be offered these therapies if there is an increased risk.   We discussed that there is a risk of postprocedural pain and also a risk of worsening of clinical picture with these procedures as with any neuraxial procedure.    -------           BENI REPORT  BENI report has been reviewed and scanned into the patient's chart.    As the clinician, I personally reviewed the BENI from 12-12-18 while the patient was in the office today.          EMR Dragon/Transcription disclaimer:   Much of this encounter note is an electronic transcription/translation of spoken language to printed text. The electronic translation of spoken language may permit erroneous, or at times, nonsensical words or phrases to be inadvertently transcribed; Although I have reviewed the note for such errors, some may still exist.

## 2018-12-19 ENCOUNTER — DOCUMENTATION (OUTPATIENT)
Dept: PAIN MEDICINE | Facility: CLINIC | Age: 58
End: 2018-12-19

## 2018-12-19 ENCOUNTER — OUTSIDE FACILITY SERVICE (OUTPATIENT)
Dept: PAIN MEDICINE | Facility: CLINIC | Age: 58
End: 2018-12-19

## 2018-12-19 PROCEDURE — 64495 INJ PARAVERT F JNT L/S 3 LEV: CPT | Performed by: PAIN MEDICINE

## 2018-12-19 PROCEDURE — 64493 INJ PARAVERT F JNT L/S 1 LEV: CPT | Performed by: PAIN MEDICINE

## 2018-12-19 PROCEDURE — 64494 INJ PARAVERT F JNT L/S 2 LEV: CPT | Performed by: PAIN MEDICINE

## 2018-12-19 NOTE — PROGRESS NOTES
Bilateral L2-5 Lumbar Medial Branch Blockade  [facility]    PREOPERATIVE DIAGNOSIS:  Lumbar spondylosis without myelopathy    POSTOPERATIVE DIAGNOSIS:  Lumbar spondylosis without myelopathy    PROCEDURE:   Diagnostic Bilateral Lumbar Medial Branch Nerve Blockades, with fluoroscopy:  L2, L3, L4, and L5 nerves (at the L3, L4, L5 transverse processes and the sacral alar groove) to block facet joints L3-4, L4-5, and L5-S1  1. 02747-51 -- Bilateral Lumbar Facet blocks, 1st Level  2. 44411-78 -- Bilateral Lumbar Facet blocks, 2nd  Level  3. 14617-25 -- Bilateral Lumbar Facet blocks, 3rd Level    PRE-PROCEDURE DISCUSSION WITH PATIENT:    Risks and complications were discussed with the patient prior to starting the procedure and informed consent was obtained.  Lower dose steroids will be used due to evaluated BG. Discussed with patient need to keep close eye on BG over next several days.     SURGEON:  Kelsie Hargrove MD    REASON FOR PROCEDURE:   The patient complains of pain that seems to have a significant axial component, Tenderness of the affected facet joints on palpation, Increased back pain on range of motion exams and Pain on extension of the lumbar spine    SEDATION:  Patient declined administration of moderate sedation    ANESTHETIC:  Marcaine 0.25%  STEROID:  Methylprednisolone (DEPO MEDROL) 40mg/ml  TOTAL VOLUME OF SOLUTION: 8ml    DESCRIPTON OF PROCEDURE:  After obtaining informed consent, IV access was obtained in the preoperative area.   The patient was taken to the operating room.  The patient was placed in the prone position with a pillow under the abdomen. All pressure points were well padded.  EKG, blood pressure, and pulse oximeter were monitored.  The patient was monitored and sedated by the RN under my direction. The lumbosacral area was prepped with Chloraprep and draped in a sterile fashion. Under fluoroscopic guidance the transverse processes of the L3, L4, and L5 vertebrae at  the junctions of the superior articular processes were identified on the right. Also identified was the groove between the ala and the superior articular process of the sacrum on the ipsilateral side.  Skin and subcutaneous tissue were anesthetized with 1% lidocaine above each of these points. A 22-gauge spinal needle was introduced under fluoroscopic guidance at the above junctions. Aspiration was negative for blood and CSF.  After confirming the position of the needle with fluoroscope in all views, 1 mL of the anesthetic solution noted above was injected at each of these points.  Needles were removed intact from each of the areas.  A similar procedure was repeated to block the L2, L3, L4, and L5 nerves on the contralateral side.   Onset of analgesia was noted.  Vital signs remained stable throughout.      ESTIMATED BLOOD LOSS:  <5 mL  SPECIMENS:  none    COMPLICATIONS:   No complications were noted.    TOLERANCE & DISCHARGE CONDITION:    The patient tolerated the procedure well.  The patient was transported to the recovery area without difficulties.  The patient was discharged to home under the care of family in stable and satisfactory condition.    PLAN OF CARE:  1. The patient was given our standard instruction sheet.  2. We discussed that Lumbar Medial Branch Blockade is a diagnostic procedure in consideration for radiofrequency ablation if two diagnostic procedures prove to be positive for significant benefit.  If sustained relief of 6 to eight weeks is obtained, then an alternative plan could be therapeutic lumbar branch blockades.  3. The patient is asked to keep a pain log each hour for 8 hours after the procedure today.  4. The patient will  Return to clinic 4 wks.  5. The patient will resume all medications as per the medication reconciliation sheet.

## 2019-01-07 ENCOUNTER — APPOINTMENT (OUTPATIENT)
Dept: ULTRASOUND IMAGING | Facility: HOSPITAL | Age: 59
End: 2019-01-07

## 2019-01-07 ENCOUNTER — APPOINTMENT (OUTPATIENT)
Dept: GENERAL RADIOLOGY | Facility: HOSPITAL | Age: 59
End: 2019-01-07

## 2019-01-07 ENCOUNTER — HOSPITAL ENCOUNTER (EMERGENCY)
Facility: HOSPITAL | Age: 59
Discharge: HOME | End: 2019-01-07
Attending: EMERGENCY MEDICINE | Admitting: EMERGENCY MEDICINE

## 2019-01-07 VITALS
BODY MASS INDEX: 39.22 KG/M2 | DIASTOLIC BLOOD PRESSURE: 76 MMHG | HEART RATE: 85 BPM | TEMPERATURE: 98.5 F | WEIGHT: 235.44 LBS | RESPIRATION RATE: 14 BRPM | SYSTOLIC BLOOD PRESSURE: 110 MMHG | HEIGHT: 65 IN | OXYGEN SATURATION: 96 %

## 2019-01-07 DIAGNOSIS — R10.13 EPIGASTRIC ABDOMINAL PAIN: Primary | ICD-10-CM

## 2019-01-07 LAB
ALBUMIN SERPL-MCNC: 4.1 G/DL (ref 3.5–5.2)
ALBUMIN/GLOB SERPL: 1.1 G/DL
ALP SERPL-CCNC: 73 U/L (ref 40–129)
ALT SERPL W P-5'-P-CCNC: 22 U/L (ref 5–41)
AMYLASE SERPL-CCNC: 30 U/L (ref 28–100)
ANION GAP SERPL CALCULATED.3IONS-SCNC: 18.7 MMOL/L
AST SERPL-CCNC: 18 U/L (ref 5–40)
BASOPHILS # BLD AUTO: 0.05 10*3/MM3 (ref 0–0.2)
BASOPHILS NFR BLD AUTO: 0.3 % (ref 0–2)
BILIRUB SERPL-MCNC: 0.3 MG/DL (ref 0.2–1.2)
BUN BLD-MCNC: 19 MG/DL (ref 6–20)
BUN/CREAT SERPL: 18.6 (ref 7–25)
CALCIUM SPEC-SCNC: 9.5 MG/DL (ref 8.6–10.5)
CHLORIDE SERPL-SCNC: 93 MMOL/L (ref 98–107)
CO2 SERPL-SCNC: 23.3 MMOL/L (ref 22–29)
CREAT BLD-MCNC: 1.02 MG/DL (ref 0.76–1.27)
DEPRECATED RDW RBC AUTO: 43 FL (ref 37–54)
EOSINOPHIL # BLD AUTO: 0.14 10*3/MM3 (ref 0.1–0.3)
EOSINOPHIL NFR BLD AUTO: 1 % (ref 0–4)
ERYTHROCYTE [DISTWIDTH] IN BLOOD BY AUTOMATED COUNT: 13.2 % (ref 11.5–14.5)
GFR SERPL CREATININE-BSD FRML MDRD: 76 ML/MIN/1.73
GLOBULIN UR ELPH-MCNC: 3.8 GM/DL
GLUCOSE BLD-MCNC: 224 MG/DL (ref 65–99)
HCT VFR BLD AUTO: 39.6 % (ref 42–52)
HGB BLD-MCNC: 13 G/DL (ref 14–18)
IMM GRANULOCYTES # BLD AUTO: 0.11 10*3/MM3 (ref 0–0.03)
IMM GRANULOCYTES NFR BLD AUTO: 0.7 % (ref 0–0.5)
LDH SERPL-CCNC: 149 U/L (ref 135–225)
LIPASE SERPL-CCNC: 69 U/L (ref 13–60)
LYMPHOCYTES # BLD AUTO: 3.71 10*3/MM3 (ref 0.6–4.8)
LYMPHOCYTES NFR BLD AUTO: 25.3 % (ref 20–45)
MCH RBC QN AUTO: 29.1 PG (ref 27–31)
MCHC RBC AUTO-ENTMCNC: 32.8 G/DL (ref 31–37)
MCV RBC AUTO: 88.8 FL (ref 80–94)
MONOCYTES # BLD AUTO: 1.18 10*3/MM3 (ref 0–1)
MONOCYTES NFR BLD AUTO: 8 % (ref 3–8)
NEUTROPHILS # BLD AUTO: 9.48 10*3/MM3 (ref 1.5–8.3)
NEUTROPHILS NFR BLD AUTO: 64.7 % (ref 45–70)
NRBC BLD AUTO-RTO: 0 /100 WBC (ref 0–0)
PLATELET # BLD AUTO: 589 10*3/MM3 (ref 140–500)
PMV BLD AUTO: 10.8 FL (ref 7.4–10.4)
POTASSIUM BLD-SCNC: 4.2 MMOL/L (ref 3.5–5.2)
PROT SERPL-MCNC: 7.9 G/DL (ref 6–8.5)
RBC # BLD AUTO: 4.46 10*6/MM3 (ref 4.7–6.1)
SODIUM BLD-SCNC: 135 MMOL/L (ref 136–145)
TROPONIN T SERPL-MCNC: <0.01 NG/ML (ref 0–0.03)
WBC NRBC COR # BLD: 14.67 10*3/MM3 (ref 4.8–10.8)

## 2019-01-07 PROCEDURE — 96374 THER/PROPH/DIAG INJ IV PUSH: CPT

## 2019-01-07 PROCEDURE — 84484 ASSAY OF TROPONIN QUANT: CPT | Performed by: EMERGENCY MEDICINE

## 2019-01-07 PROCEDURE — 82150 ASSAY OF AMYLASE: CPT | Performed by: EMERGENCY MEDICINE

## 2019-01-07 PROCEDURE — 99284 EMERGENCY DEPT VISIT MOD MDM: CPT

## 2019-01-07 PROCEDURE — 93005 ELECTROCARDIOGRAM TRACING: CPT | Performed by: EMERGENCY MEDICINE

## 2019-01-07 PROCEDURE — 80053 COMPREHEN METABOLIC PANEL: CPT | Performed by: EMERGENCY MEDICINE

## 2019-01-07 PROCEDURE — 76705 ECHO EXAM OF ABDOMEN: CPT

## 2019-01-07 PROCEDURE — 71046 X-RAY EXAM CHEST 2 VIEWS: CPT

## 2019-01-07 PROCEDURE — 85025 COMPLETE CBC W/AUTO DIFF WBC: CPT | Performed by: EMERGENCY MEDICINE

## 2019-01-07 PROCEDURE — 96361 HYDRATE IV INFUSION ADD-ON: CPT

## 2019-01-07 PROCEDURE — 83690 ASSAY OF LIPASE: CPT | Performed by: EMERGENCY MEDICINE

## 2019-01-07 PROCEDURE — 93010 ELECTROCARDIOGRAM REPORT: CPT | Performed by: INTERNAL MEDICINE

## 2019-01-07 PROCEDURE — 25010000002 ONDANSETRON PER 1 MG: Performed by: EMERGENCY MEDICINE

## 2019-01-07 PROCEDURE — 99284 EMERGENCY DEPT VISIT MOD MDM: CPT | Performed by: EMERGENCY MEDICINE

## 2019-01-07 PROCEDURE — 96375 TX/PRO/DX INJ NEW DRUG ADDON: CPT

## 2019-01-07 PROCEDURE — 83615 LACTATE (LD) (LDH) ENZYME: CPT | Performed by: EMERGENCY MEDICINE

## 2019-01-07 RX ORDER — SODIUM CHLORIDE 0.9 % (FLUSH) 0.9 %
10 SYRINGE (ML) INJECTION AS NEEDED
Status: DISCONTINUED | OUTPATIENT
Start: 2019-01-07 | End: 2019-01-07 | Stop reason: HOSPADM

## 2019-01-07 RX ORDER — OMEPRAZOLE 20 MG/1
20 CAPSULE, DELAYED RELEASE ORAL DAILY
Qty: 30 CAPSULE | Refills: 0 | Status: SHIPPED | OUTPATIENT
Start: 2019-01-07 | End: 2019-02-06

## 2019-01-07 RX ORDER — ONDANSETRON 2 MG/ML
8 INJECTION INTRAMUSCULAR; INTRAVENOUS ONCE
Status: COMPLETED | OUTPATIENT
Start: 2019-01-07 | End: 2019-01-07

## 2019-01-07 RX ORDER — SODIUM CHLORIDE 9 MG/ML
250 INJECTION, SOLUTION INTRAVENOUS CONTINUOUS
Status: DISCONTINUED | OUTPATIENT
Start: 2019-01-07 | End: 2019-01-07 | Stop reason: HOSPADM

## 2019-01-07 RX ORDER — GLYCOPYRROLATE 0.2 MG/ML
0.2 INJECTION INTRAMUSCULAR; INTRAVENOUS ONCE
Status: COMPLETED | OUTPATIENT
Start: 2019-01-07 | End: 2019-01-07

## 2019-01-07 RX ORDER — PROMETHAZINE HYDROCHLORIDE 25 MG/1
25 TABLET ORAL EVERY 6 HOURS PRN
Qty: 10 TABLET | Refills: 0 | Status: SHIPPED | OUTPATIENT
Start: 2019-01-07 | End: 2020-07-31 | Stop reason: HOSPADM

## 2019-01-07 RX ORDER — PANTOPRAZOLE SODIUM 40 MG/1
40 TABLET, DELAYED RELEASE ORAL ONCE
Status: COMPLETED | OUTPATIENT
Start: 2019-01-07 | End: 2019-01-07

## 2019-01-07 RX ORDER — GLYCOPYRROLATE 1 MG/1
TABLET ORAL
Qty: 16 TABLET | Refills: 0 | Status: ON HOLD | OUTPATIENT
Start: 2019-01-07 | End: 2019-04-12

## 2019-01-07 RX ADMIN — PANTOPRAZOLE SODIUM 40 MG: 40 TABLET, DELAYED RELEASE ORAL at 09:37

## 2019-01-07 RX ADMIN — GLYCOPYRROLATE 0.2 MG: 0.2 INJECTION INTRAMUSCULAR; INTRAVENOUS at 08:16

## 2019-01-07 RX ADMIN — SODIUM CHLORIDE 250 ML/HR: 9 INJECTION, SOLUTION INTRAVENOUS at 08:11

## 2019-01-07 RX ADMIN — ONDANSETRON 8 MG: 2 INJECTION, SOLUTION INTRAMUSCULAR; INTRAVENOUS at 08:13

## 2019-01-07 NOTE — ED PROVIDER NOTES
Subjective     History provided by:  Patient    History of Present Illness    · Chief complaint: Epigastric pain    · Location: Epigastric area just below the xiphoid process that does not radiate.    · Quality/Severity: The patient reports a constant dull ache just below his breast bone that is constant.    · Timing/Onset: Started on 12/27/18 when he developed flu A.    · Modifying Factors: The patient states he initially thought it was due to the Tamiflu and he stopped it, but the discomfort continued.    · Associated symptoms: Reports associated nausea and has vomited a couple times.  He last vomited yesterday evening.  He reports a little associated shortness of breath and occasional nonproductive cough.  He denies any diaphoresis or fever.    · Narrative: The patient is a 58-year-old white male complaining of pain just below his breast bone that does not radiate.  He describes as a constant dull ache.  This discomfort started while he had flu A. the patient at first thought that the discomfort was due to the Tamiflu and stopped it, but the pain persisted.  He states the discomfort was constant day and night up until 3 days ago when it would go away during the day only to recur at night.  The pain recurred last night and has failed to resolve this morning.  He has no previous history of cardiac disease.  He got a history of hypertension, hypercholesterolemia, diabetes type 2 for which he is on metformin, COPD, diverticulosis, and chronic back pain.  Has surgical history is significant for a diverticular rupture and incisional hernia repair.  Social history he is retired .  He quit smoking 6 years ago and rarely drinks alcohol.  Family history is negative for father having a pacemaker, otherwise he does not know much about it.    ED Triage Vitals [01/07/19 0734]   Temp Heart Rate Resp BP SpO2   98.3 °F (36.8 °C) 99 20 123/95 98 %      Temp src Heart Rate Source Patient Position BP Location FiO2  (%)   Oral Monitor Lying Right arm --       Review of Systems   Constitutional: Negative for activity change, appetite change, chills, diaphoresis, fatigue and fever.   HENT: Negative for congestion, dental problem, ear pain, hearing loss, mouth sores, postnasal drip, rhinorrhea, sinus pressure, sore throat and voice change.    Eyes: Negative for photophobia, pain, discharge, redness and visual disturbance.   Respiratory: Positive for cough (occasional). Negative for chest tightness, shortness of breath, wheezing and stridor.    Cardiovascular: Negative for chest pain, palpitations and leg swelling.   Gastrointestinal: Positive for abdominal pain (epigastric), nausea and vomiting. Negative for diarrhea.   Genitourinary: Negative for difficulty urinating, dysuria, flank pain, frequency, hematuria and urgency.   Musculoskeletal: Negative for arthralgias, back pain, gait problem, joint swelling, myalgias, neck pain and neck stiffness.   Skin: Negative for color change and rash.   Neurological: Negative for dizziness, tremors, seizures, syncope, facial asymmetry, speech difficulty, weakness, light-headedness, numbness and headaches.   Hematological: Negative for adenopathy.   Psychiatric/Behavioral: Positive for sleep disturbance. Negative for confusion and decreased concentration. The patient is not nervous/anxious.        Past Medical History:   Diagnosis Date   • Anxiety    • Arthritis    • Colon polyp    • Depression    • Diabetes mellitus (CMS/HCC)    • Diverticulitis    • Hyperlipidemia    • Hypertension    • Sleep apnea    • Stroke (CMS/HCC)        No Known Allergies    Past Surgical History:   Procedure Laterality Date   • COLONOSCOPY  2014   • COLOSTOMY      Dr. Gupta   • COLOSTOMY REVISION  07/2006    Diverticulitis-Dr. Gupta   • INCISIONAL HERNIA REPAIR  2011   • ORIF ANKLE FRACTURE Right 1995       Family History   Problem Relation Age of Onset   • Depression Mother    • Depression Father    • Kidney disease  Father    • COPD Father    • Hypertension Father        Social History     Socioeconomic History   • Marital status:      Spouse name: Not on file   • Number of children: 2   • Years of education: Not on file   • Highest education level: Not on file   Occupational History   • Occupation:    Tobacco Use   • Smoking status: Former Smoker     Types: Electronic Cigarette   • Smokeless tobacco: Never Used   Substance and Sexual Activity   • Alcohol use: Yes     Comment: rarely   • Drug use: No   • Sexual activity: Yes     Partners: Female           Objective   Physical Exam   Constitutional: He is oriented to person, place, and time. He appears well-developed and well-nourished. No distress.   The patient is obese white male who appears in discomfort.  He does not appear toxic.  Review of his vital signs: He is afebrile with temperature 98.3, tachypnea with a retropharyngeal 20 with a normal oxygen saturation of 98% on room air, tachycardic with a heart rate of 99, blood pressure slightly elevated 123/95.   HENT:   Head: Normocephalic and atraumatic.   Nose: Nose normal.   Mouth/Throat: Oropharynx is clear and moist. No oropharyngeal exudate.   Eyes: EOM are normal. Pupils are equal, round, and reactive to light. Right eye exhibits no discharge. Left eye exhibits no discharge. No scleral icterus.   Neck: Normal range of motion. Neck supple. No JVD present. No thyromegaly present.   Cardiovascular: Normal rate, regular rhythm and normal heart sounds.   No murmur heard.  Pulmonary/Chest: Effort normal. He has wheezes (diffuse end expiratory). He has no rales. He exhibits no tenderness.   Abdominal: Soft. Bowel sounds are normal. He exhibits no distension. There is tenderness.   The patient has epigastric tenderness just below the subxiphoid process, he also has tenderness under the right costal margin with a positive Clinton sign.  Rest the abdomen is benign.  He has a large midline abdominal  surgical scar.   Musculoskeletal: Normal range of motion. He exhibits no edema, tenderness or deformity.   Lymphadenopathy:     He has no cervical adenopathy.   Neurological: He is alert and oriented to person, place, and time. No cranial nerve deficit. Coordination normal.   No focal motor sensory deficit   Skin: Skin is warm and dry. No rash noted. He is not diaphoretic.   Psychiatric: He has a normal mood and affect. His behavior is normal. Judgment and thought content normal.   Nursing note and vitals reviewed.      ECG 12 Lead    Date/Time: 1/7/2019 7:38 AM  Performed by: Angel Greenberg MD  Authorized by: Angel Greenberg MD   Comments: EKG performed 07:38, EKG read 07:40.  Normal sinus rhythm with a rate of 93, normal axis, wide QRS complex due to a right bundle branch block, no acute ST segment elevation or depression consistent with ischemia, no ectopy, normal TX and QT intervals.                 ED Course  ED Course as of Jan 07 0949   Mon Jan 07, 2019   0846 Reviewed the patient's test results: His EKG was interpreted by me as normal sinus rhythm with a rate of 93, right bundle branch block, no acute ischemic changes, no old tracings available for comparison.  His CBC had an elevated white count of 14.67 with a normal differential.  He was mildly anemic with a hemoglobin 13 and hematocrit 39.6.  His CMP had an elevated blood glucose at 224, otherwise had normal electrolytes, normal renal and liver function tests.  Lipase and amylase were normal.  His chest x-ray was interpreted by me as no acute disease.  Ultrasound of the gallbladder revealed fatty liver but a normal gallbladder.  [TP]   0946 The patient was administered Zofran 8 mg IV and Robinul 0.2 mg IV with relief of his discomfort.  [TP]   0947 The etiology of the patient's epigastric pain is unclear, but presumptively gastric in origin in light of the normal ultrasound.  The patient's symptomatology is inconsistent with coronary artery disease  and is EKG and cardiac enzymes showed no evidence of ischemia.  The patient will be discharged with prescriptions for Prilosec 20 mg, Robinul 1 mg #16, and Phenergan 25 mg #10.  He's instructed to make an appointment to follow-up with GI Dr. Giang in with his PCP Dr. Diggs.  [TP]      ED Course User Index  [TP] Angel Greenberg MD                  MDM  Number of Diagnoses or Management Options  Epigastric abdominal pain: new and requires workup     Amount and/or Complexity of Data Reviewed  Clinical lab tests: ordered and reviewed  Tests in the radiology section of CPT®: ordered and reviewed  Tests in the medicine section of CPT®: ordered and reviewed  Independent visualization of images, tracings, or specimens: yes    Risk of Complications, Morbidity, and/or Mortality  Presenting problems: high  Diagnostic procedures: high  Management options: high  General comments: My differential diagnosis for abdominal pain includes but is not limited to:  Gastritis, gastroenteritis, peptic ulcer disease, GERD, esophageal perforation, acute appendicitis, mesenteric adenitis, Meckel’s diverticulum, epiploic appendagitis, diverticulitis, colon cancer, ulcerative colitis, Crohn’s disease, intussusception, small bowel obstruction, adhesions, ischemic bowel, perforated viscus, ileus, obstipation, biliary colic, cholecystitis, cholelithiasis, Alonzo-Donnie Frederick, hepatitis, pancreatitis, common bile duct obstruction, cholangitis, bile leak, splenic trauma, splenic rupture, splenic infarction, splenic abscess, abdominal abscess, ascites, spontaneous bacterial peritonitis, hernia, UTI, cystitis, prostatitis, ureterolithiasis, urinary obstruction, ovarian cyst, torsion, pregnancy, ectopic pregnancy, PID, pelvic abscess, mittelschmerz, endometriosis, AAA, myocardial infarction, pneumonia, cancer, porphyria, DKA, medications, sickle cell, viral syndrome, zoster    Patient Progress  Patient progress: improved         Final diagnoses:    Epigastric abdominal pain           Labs Reviewed   COMPREHENSIVE METABOLIC PANEL - Abnormal; Notable for the following components:       Result Value    Glucose 224 (*)     Sodium 135 (*)     Chloride 93 (*)     All other components within normal limits   LIPASE - Abnormal; Notable for the following components:    Lipase 69 (*)     All other components within normal limits   CBC WITH AUTO DIFFERENTIAL - Abnormal; Notable for the following components:    WBC 14.67 (*)     RBC 4.46 (*)     Hemoglobin 13.0 (*)     Hematocrit 39.6 (*)     MPV 10.8 (*)     Platelets 589 (*)     Immature Grans % 0.7 (*)     Neutrophils, Absolute 9.48 (*)     Monocytes, Absolute 1.18 (*)     Immature Grans, Absolute 0.11 (*)     All other components within normal limits   TROPONIN (IN-HOUSE) - Normal    Narrative:     Troponin T Reference Ranges:  Less than 0.03 ng/mL:    Negative for AMI  0.03 to 0.09 ng/mL:      Indeterminant for AMI  Greater than 0.09 ng/mL: Positive for AMI   LACTATE DEHYDROGENASE - Normal   AMYLASE - Normal   CBC AND DIFFERENTIAL    Narrative:     The following orders were created for panel order CBC & Differential.  Procedure                               Abnormality         Status                     ---------                               -----------         ------                     CBC Auto Differential[[id number]]        Abnormal            Final result                 Please view results for these tests on the individual orders.     XR Chest 2 View   ED Interpretation   Possible cardiomegaly, normal lung fields, no acute disease.      Final Result   No active disease.       This report was finalized on 1/7/2019 9:23 AM by Dr. Jayjay Arredondo MD.          US Gallbladder   ED Interpretation   1. Negative gallbladder ultrasound examination. No cholelithiasis or   bile duct dilatation.   2. Diffuse hepatic steatosis.       This report was finalized on 1/7/2019 8:44 AM by Dr. Jayjay Arredondo MD.           Final Result   1. Negative gallbladder ultrasound examination. No cholelithiasis or   bile duct dilatation.   2. Diffuse hepatic steatosis.       This report was finalized on 1/7/2019 8:44 AM by Dr. Jayjay Arredondo MD.                 Medication List      New Prescriptions    glycopyrrolate 1 MG tablet  Commonly known as:  ROBINUL  1 tablet po every 6 hours PRN epigastric abdominal pain     omeprazole 20 MG capsule  Commonly known as:  PRILOSEC  Take 1 capsule by mouth Daily for 30 days.     promethazine 25 MG tablet  Commonly known as:  PHENERGAN  Take 1 tablet by mouth Every 6 (Six) Hours As Needed for Nausea or   Vomiting for up to 10 doses.        Stop    diclofenac 75 MG EC tablet  Commonly known as:  Angel Berg MD  01/07/19 0973

## 2019-01-13 ENCOUNTER — HOSPITAL ENCOUNTER (OUTPATIENT)
Facility: HOSPITAL | Age: 59
Setting detail: OBSERVATION
Discharge: HOME | End: 2019-01-14
Attending: EMERGENCY MEDICINE | Admitting: PHYSICIAN ASSISTANT

## 2019-01-13 DIAGNOSIS — E11.69 DIABETES MELLITUS TYPE 2 IN OBESE (HCC): ICD-10-CM

## 2019-01-13 DIAGNOSIS — T78.3XXA ANGIOEDEMA, INITIAL ENCOUNTER: Primary | ICD-10-CM

## 2019-01-13 DIAGNOSIS — E66.9 DIABETES MELLITUS TYPE 2 IN OBESE (HCC): ICD-10-CM

## 2019-01-13 LAB
ANION GAP SERPL CALCULATED.3IONS-SCNC: 14.8 MMOL/L
BASOPHILS # BLD AUTO: 0.06 10*3/MM3 (ref 0–0.2)
BASOPHILS NFR BLD AUTO: 0.6 % (ref 0–2)
BUN BLD-MCNC: 24 MG/DL (ref 6–20)
BUN/CREAT SERPL: 28.6 (ref 7–25)
CALCIUM SPEC-SCNC: 9.6 MG/DL (ref 8.6–10.5)
CHLORIDE SERPL-SCNC: 104 MMOL/L (ref 98–107)
CO2 SERPL-SCNC: 20.2 MMOL/L (ref 22–29)
CREAT BLD-MCNC: 0.84 MG/DL (ref 0.76–1.27)
DEPRECATED RDW RBC AUTO: 42.3 FL (ref 37–54)
EOSINOPHIL # BLD AUTO: 0.11 10*3/MM3 (ref 0.1–0.3)
EOSINOPHIL NFR BLD AUTO: 1.1 % (ref 0–4)
ERYTHROCYTE [DISTWIDTH] IN BLOOD BY AUTOMATED COUNT: 13 % (ref 11.5–14.5)
GFR SERPL CREATININE-BSD FRML MDRD: 95 ML/MIN/1.73
GLUCOSE BLD-MCNC: 201 MG/DL (ref 65–99)
GLUCOSE BLDC GLUCOMTR-MCNC: 271 MG/DL (ref 70–130)
HCT VFR BLD AUTO: 36.8 % (ref 42–52)
HGB BLD-MCNC: 12.1 G/DL (ref 14–18)
HOLD SPECIMEN: NORMAL
HOLD SPECIMEN: NORMAL
IMM GRANULOCYTES # BLD AUTO: 0.08 10*3/MM3 (ref 0–0.03)
IMM GRANULOCYTES NFR BLD AUTO: 0.8 % (ref 0–0.5)
LYMPHOCYTES # BLD AUTO: 3.37 10*3/MM3 (ref 0.6–4.8)
LYMPHOCYTES NFR BLD AUTO: 33.8 % (ref 20–45)
MCH RBC QN AUTO: 29.6 PG (ref 27–31)
MCHC RBC AUTO-ENTMCNC: 32.9 G/DL (ref 31–37)
MCV RBC AUTO: 90 FL (ref 80–94)
MONOCYTES # BLD AUTO: 0.86 10*3/MM3 (ref 0–1)
MONOCYTES NFR BLD AUTO: 8.6 % (ref 3–8)
NEUTROPHILS # BLD AUTO: 5.5 10*3/MM3 (ref 1.5–8.3)
NEUTROPHILS NFR BLD AUTO: 55.1 % (ref 45–70)
NRBC BLD AUTO-RTO: 0 /100 WBC (ref 0–0)
PLATELET # BLD AUTO: 548 10*3/MM3 (ref 140–500)
PMV BLD AUTO: 10.5 FL (ref 7.4–10.4)
POTASSIUM BLD-SCNC: 4.5 MMOL/L (ref 3.5–5.2)
RBC # BLD AUTO: 4.09 10*6/MM3 (ref 4.7–6.1)
SODIUM BLD-SCNC: 139 MMOL/L (ref 136–145)
WBC NRBC COR # BLD: 9.98 10*3/MM3 (ref 4.8–10.8)
WHOLE BLOOD HOLD SPECIMEN: NORMAL
WHOLE BLOOD HOLD SPECIMEN: NORMAL

## 2019-01-13 PROCEDURE — 82962 GLUCOSE BLOOD TEST: CPT

## 2019-01-13 PROCEDURE — 99219 PR INITIAL OBSERVATION CARE/DAY 50 MINUTES: CPT | Performed by: INTERNAL MEDICINE

## 2019-01-13 PROCEDURE — 99285 EMERGENCY DEPT VISIT HI MDM: CPT

## 2019-01-13 PROCEDURE — 96374 THER/PROPH/DIAG INJ IV PUSH: CPT

## 2019-01-13 PROCEDURE — 96375 TX/PRO/DX INJ NEW DRUG ADDON: CPT

## 2019-01-13 PROCEDURE — 99284 EMERGENCY DEPT VISIT MOD MDM: CPT | Performed by: PHYSICIAN ASSISTANT

## 2019-01-13 PROCEDURE — 80048 BASIC METABOLIC PNL TOTAL CA: CPT | Performed by: PHYSICIAN ASSISTANT

## 2019-01-13 PROCEDURE — G0378 HOSPITAL OBSERVATION PER HR: HCPCS

## 2019-01-13 PROCEDURE — 85025 COMPLETE CBC W/AUTO DIFF WBC: CPT | Performed by: PHYSICIAN ASSISTANT

## 2019-01-13 PROCEDURE — 25010000002 METHYLPREDNISOLONE PER 125 MG: Performed by: PHYSICIAN ASSISTANT

## 2019-01-13 RX ORDER — GABAPENTIN 800 MG/1
900 TABLET ORAL NIGHTLY
COMMUNITY
End: 2019-04-26 | Stop reason: SDUPTHER

## 2019-01-13 RX ORDER — VENLAFAXINE 75 MG/1
75 TABLET ORAL NIGHTLY
COMMUNITY
End: 2019-02-21 | Stop reason: SDUPTHER

## 2019-01-13 RX ORDER — METHYLPREDNISOLONE SODIUM SUCCINATE 125 MG/2ML
125 INJECTION, POWDER, LYOPHILIZED, FOR SOLUTION INTRAMUSCULAR; INTRAVENOUS ONCE
Status: COMPLETED | OUTPATIENT
Start: 2019-01-13 | End: 2019-01-13

## 2019-01-13 RX ORDER — SODIUM CHLORIDE 0.9 % (FLUSH) 0.9 %
10 SYRINGE (ML) INJECTION AS NEEDED
Status: DISCONTINUED | OUTPATIENT
Start: 2019-01-13 | End: 2019-01-14 | Stop reason: HOSPADM

## 2019-01-13 RX ADMIN — METHYLPREDNISOLONE SODIUM SUCCINATE 125 MG: 125 INJECTION, POWDER, FOR SOLUTION INTRAMUSCULAR; INTRAVENOUS at 18:19

## 2019-01-13 RX ADMIN — FAMOTIDINE 20 MG: 10 INJECTION, SOLUTION INTRAVENOUS at 18:18

## 2019-01-13 NOTE — ED PROVIDER NOTES
Subjective   History of Present Illness  History of Present Illness    Chief complaint: tongue swelling    Location: tongue    Quality/Severity:  swollen    Timing/Duration: 3 hours pta    Modifying Factors: Nothing specific makes worse or better.  Patient has taken 2 doses of Benadryl prior to arrival    Associated Symptoms: Denies shortness of breath.  Denies wheezing.  Denies lip swelling.  Denies rash.  Denies dizziness.  Denies nausea or vomiting.    Narrative: 58-year-old male presents with tongue swelling as above.  He does take lisinopril and took his last dose this morning.  He had one episode similar approximately 2 months ago.  He took Benadryl and it quickly resolved.  Patient states he thought the same thing was going to happen today, but it did not resolve, so he came here for further evaluation.    Review of Systems  General: Denies fevers or chills.  Denies any weakness or fatigue.  Denies any weight loss or weight gain.  SKIN: Denies any rashes lesions or ulcers.  Denies color change.  ENT: as above. Denies sore throat or rhinorrhea.  Denies ear pain.    EYES: Denies any blurred vision.  Denies any change in vision.  Denies any photophobia.  Denies any vision loss.  LUNGS: Denies any shortness of breath or wheezing.  Denies any cough.  Denies any hemoptysis.  CARDIAC: Denies any chest pain.  Denies palpitations.  Denies syncope.  Denies any edema  ABD: Denies any abdominal pain.  Denies any nausea or vomiting or diarrhea.  Denies any rectal bleeding.  Denies constipation  : Denies any dysuria, urgency, frequency or hematuria.  Denies discharge.  Denies flank pain.  NEURO: Denies any focal weakness.  Denies headache.  Denies seizures.  Denies changes in speech or difficulty walking.  ENDOCRINE: Denies polydipsia and polyuria  M/S: Denies arthralgias, back pain, myalgias or neck pain  HEME/LYMPH: Negative for adenopathy. Does not bruise/bleed easily.   PSYCH: Negative for suicidal ideas. Denies  anxiety or depression  review was performed in addition to those in the above all other reviews are negative.      Past Medical History:   Diagnosis Date   • Anxiety    • Arthritis    • Colon polyp    • Depression    • Diabetes mellitus (CMS/HCC)    • Diverticulitis    • Hyperlipidemia    • Hypertension    • Sleep apnea    • Stroke (CMS/HCC)        Allergies   Allergen Reactions   • Lisinopril Angioedema       Past Surgical History:   Procedure Laterality Date   • COLONOSCOPY  2014   • COLOSTOMY      Dr. Gupta   • COLOSTOMY REVISION  07/2006    Diverticulitis-Dr. Gupta   • INCISIONAL HERNIA REPAIR  2011   • ORIF ANKLE FRACTURE Right 1995       Family History   Problem Relation Age of Onset   • Depression Mother    • Depression Father    • Kidney disease Father    • COPD Father    • Hypertension Father        Social History     Socioeconomic History   • Marital status:      Spouse name: Not on file   • Number of children: 2   • Years of education: Not on file   • Highest education level: Not on file   Occupational History   • Occupation:    Tobacco Use   • Smoking status: Former Smoker     Types: Electronic Cigarette   • Smokeless tobacco: Never Used   Substance and Sexual Activity   • Alcohol use: Yes     Comment: rarely   • Drug use: No   • Sexual activity: Yes     Partners: Female         Current Facility-Administered Medications:   •  Insert peripheral IV, , , Once **AND** sodium chloride 0.9 % flush 10 mL, 10 mL, Intravenous, PRN, Rosemary Gray, PARIMA    Current Outpatient Medications:   •  ALPRAZolam (XANAX) 0.25 MG tablet, Take 1 tablet by mouth Daily., Disp: 30 tablet, Rfl: 0  •  ARIPiprazole (ABILIFY) 5 MG tablet, Take 1 tablet by mouth Daily., Disp: 90 tablet, Rfl: 3  •  fenofibrate (TRICOR) 145 MG tablet, Take 1 tablet by mouth Daily., Disp: 90 tablet, Rfl: 3  •  gabapentin (NEURONTIN) 300 MG capsule, Take 1 capsule by mouth 3 (Three) Times a Day., Disp: 270 capsule, Rfl: 0  •   glycopyrrolate (ROBINUL) 1 MG tablet, 1 tablet po every 6 hours PRN epigastric abdominal pain, Disp: 16 tablet, Rfl: 0  •  lisinopril (PRINIVIL,ZESTRIL) 20 MG tablet, Take 1 tablet by mouth Daily., Disp: 90 tablet, Rfl: 3  •  metFORMIN (GLUCOPHAGE) 500 MG tablet, Take 1 tablet by mouth 3 (Three) Times a Day., Disp: 270 tablet, Rfl: 3  •  omeprazole (PRILOSEC) 20 MG capsule, Take 1 capsule by mouth Daily for 30 days., Disp: 30 capsule, Rfl: 0  •  promethazine (PHENERGAN) 25 MG tablet, Take 1 tablet by mouth Every 6 (Six) Hours As Needed for Nausea or Vomiting for up to 10 doses., Disp: 10 tablet, Rfl: 0  •  simvastatin (ZOCOR) 40 MG tablet, Take 1 tablet by mouth Daily., Disp: 90 tablet, Rfl: 3  •  traZODone (DESYREL) 100 MG tablet, Take 1 tablet by mouth Every Night., Disp: 90 tablet, Rfl: 3  •  venlafaxine (EFFEXOR) 75 MG tablet, Take 2 tablets by mouth Daily., Disp: 90 tablet, Rfl: 3      Objective   Physical Exam  Vitals:    01/13/19 1807   BP: 110/84   Pulse: 88   Resp: 20   Temp: 98.7 °F (37.1 °C)   SpO2: 97%   GENERAL: a/o x 4, NAD  SKIN: Warm pink and dry.  No rashes noted.   HEENT:  PERRLA, EOM intact, conjunctiva normal, sclera clear.  Significant tongue edema, right greater than left.  No lip swelling.  NECK: supple  LUNGS: Clear to auscultation bilaterally without wheezes, rales or rhonchi.  No accessory muscle use and no nasal flaring.  CARDIAC:  Regular rate and rhythm, S1-S2.  No murmurs, rubs or gallops.  No peripheral edema.  Equal pulses bilaterally.  ABDOMEN: Soft, nontender, nondistended.  No guarding or rebound tenderness.  Normal bowel sounds.  MUSCULOSKELETAL: Moves all extremities well.  No deformity.  NEURO: Cranial nerves II through XII grossly intact.  No gross focal deficits.  Alert.  Normal speech and motor.  PSYCH: Normal mood and affect        Procedures           ED Course    Benadryl prior to arrival.  Solu-Medrol and Pepcid given.    Results for orders placed or performed during the  hospital encounter of 01/13/19   Basic Metabolic Panel   Result Value Ref Range    Glucose 201 (H) 65 - 99 mg/dL    BUN 24 (H) 6 - 20 mg/dL    Creatinine 0.84 0.76 - 1.27 mg/dL    Sodium 139 136 - 145 mmol/L    Potassium 4.5 3.5 - 5.2 mmol/L    Chloride 104 98 - 107 mmol/L    CO2 20.2 (L) 22.0 - 29.0 mmol/L    Calcium 9.6 8.6 - 10.5 mg/dL    eGFR Non African Amer 95 >60 mL/min/1.73    BUN/Creatinine Ratio 28.6 (H) 7.0 - 25.0    Anion Gap 14.8 mmol/L   CBC Auto Differential   Result Value Ref Range    WBC 9.98 4.80 - 10.80 10*3/mm3    RBC 4.09 (L) 4.70 - 6.10 10*6/mm3    Hemoglobin 12.1 (L) 14.0 - 18.0 g/dL    Hematocrit 36.8 (L) 42.0 - 52.0 %    MCV 90.0 80.0 - 94.0 fL    MCH 29.6 27.0 - 31.0 pg    MCHC 32.9 31.0 - 37.0 g/dL    RDW 13.0 11.5 - 14.5 %    RDW-SD 42.3 37.0 - 54.0 fl    MPV 10.5 (H) 7.4 - 10.4 fL    Platelets 548 (H) 140 - 500 10*3/mm3    Neutrophil % 55.1 45.0 - 70.0 %    Lymphocyte % 33.8 20.0 - 45.0 %    Monocyte % 8.6 (H) 3.0 - 8.0 %    Eosinophil % 1.1 0.0 - 4.0 %    Basophil % 0.6 0.0 - 2.0 %    Immature Grans % 0.8 (H) 0.0 - 0.5 %    Neutrophils, Absolute 5.50 1.50 - 8.30 10*3/mm3    Lymphocytes, Absolute 3.37 0.60 - 4.80 10*3/mm3    Monocytes, Absolute 0.86 0.00 - 1.00 10*3/mm3    Eosinophils, Absolute 0.11 0.10 - 0.30 10*3/mm3    Basophils, Absolute 0.06 0.00 - 0.20 10*3/mm3    Immature Grans, Absolute 0.08 (H) 0.00 - 0.03 10*3/mm3    nRBC 0.0 0.0 - 0.0 /100 WBC     1928- improved, but still has some edema.  Admit for obs. Pt agrees.    CONSULT  Time 1930  Discussed case with Dr Longo  Reviewed history, exam, results and treatments.  Discussed concerns and plan of care. Dr Longo accepts pt to be admitted to obs.              MDM  Number of Diagnoses or Management Options  Angioedema, initial encounter: new and requires workup     Amount and/or Complexity of Data Reviewed  Clinical lab tests: reviewed and ordered  Tests in the medicine section of CPT®: ordered and reviewed    Risk of Complications,  Morbidity, and/or Mortality  Presenting problems: high  Diagnostic procedures: moderate  Management options: moderate    Patient Progress  Patient progress: improved        Final diagnoses:   Angioedema, initial encounter     Dictated utilizing Dragon dictation         Rosemary Gray PA-C  01/13/19 1930

## 2019-01-13 NOTE — ED NOTES
Nurse attempted blood draw X2, I attempted once in the left hand with no success. Called lab and spoke with Na at 18:35 who said she would come try and help      Geraldine Celaya, JEY  01/13/19 3290

## 2019-01-14 VITALS
SYSTOLIC BLOOD PRESSURE: 112 MMHG | TEMPERATURE: 97.7 F | OXYGEN SATURATION: 96 % | HEIGHT: 64 IN | DIASTOLIC BLOOD PRESSURE: 77 MMHG | WEIGHT: 233.69 LBS | BODY MASS INDEX: 39.9 KG/M2 | RESPIRATION RATE: 20 BRPM | HEART RATE: 82 BPM

## 2019-01-14 PROBLEM — T78.3XXA ANGIO-EDEMA: Status: ACTIVE | Noted: 2019-01-14

## 2019-01-14 LAB
GLUCOSE BLDC GLUCOMTR-MCNC: 305 MG/DL (ref 70–130)
GLUCOSE BLDC GLUCOMTR-MCNC: 407 MG/DL (ref 70–130)
GLUCOSE BLDC GLUCOMTR-MCNC: 468 MG/DL (ref 70–130)

## 2019-01-14 PROCEDURE — 63710000001 INSULIN ASPART PER 5 UNITS: Performed by: INTERNAL MEDICINE

## 2019-01-14 PROCEDURE — 94799 UNLISTED PULMONARY SVC/PX: CPT

## 2019-01-14 PROCEDURE — 96372 THER/PROPH/DIAG INJ SC/IM: CPT

## 2019-01-14 PROCEDURE — 25010000002 ENOXAPARIN PER 10 MG: Performed by: INTERNAL MEDICINE

## 2019-01-14 PROCEDURE — 82962 GLUCOSE BLOOD TEST: CPT

## 2019-01-14 PROCEDURE — 96376 TX/PRO/DX INJ SAME DRUG ADON: CPT

## 2019-01-14 PROCEDURE — 99217 PR OBSERVATION CARE DISCHARGE MANAGEMENT: CPT | Performed by: NURSE PRACTITIONER

## 2019-01-14 PROCEDURE — G0378 HOSPITAL OBSERVATION PER HR: HCPCS

## 2019-01-14 PROCEDURE — 25010000002 METHYLPREDNISOLONE PER 125 MG: Performed by: INTERNAL MEDICINE

## 2019-01-14 PROCEDURE — 25010000002 METHYLPREDNISOLONE PER 40 MG: Performed by: INTERNAL MEDICINE

## 2019-01-14 RX ORDER — NICOTINE POLACRILEX 4 MG
15 LOZENGE BUCCAL
Status: DISCONTINUED | OUTPATIENT
Start: 2019-01-14 | End: 2019-01-14 | Stop reason: HOSPADM

## 2019-01-14 RX ORDER — TRAZODONE HYDROCHLORIDE 50 MG/1
100 TABLET ORAL NIGHTLY
Status: DISCONTINUED | OUTPATIENT
Start: 2019-01-14 | End: 2019-01-14 | Stop reason: HOSPADM

## 2019-01-14 RX ORDER — ARIPIPRAZOLE 5 MG/1
5 TABLET ORAL DAILY
Status: DISCONTINUED | OUTPATIENT
Start: 2019-01-14 | End: 2019-01-14 | Stop reason: HOSPADM

## 2019-01-14 RX ORDER — GLYCOPYRROLATE 1 MG/1
1 TABLET ORAL 3 TIMES DAILY
Status: DISCONTINUED | OUTPATIENT
Start: 2019-01-14 | End: 2019-01-14 | Stop reason: HOSPADM

## 2019-01-14 RX ORDER — FENOFIBRATE 145 MG/1
145 TABLET, COATED ORAL DAILY
Status: DISCONTINUED | OUTPATIENT
Start: 2019-01-14 | End: 2019-01-14 | Stop reason: HOSPADM

## 2019-01-14 RX ORDER — DIPHENHYDRAMINE HYDROCHLORIDE 50 MG/ML
25 INJECTION INTRAMUSCULAR; INTRAVENOUS EVERY 6 HOURS PRN
Status: DISCONTINUED | OUTPATIENT
Start: 2019-01-14 | End: 2019-01-14 | Stop reason: HOSPADM

## 2019-01-14 RX ORDER — SODIUM CHLORIDE 0.9 % (FLUSH) 0.9 %
3 SYRINGE (ML) INJECTION EVERY 12 HOURS SCHEDULED
Status: DISCONTINUED | OUTPATIENT
Start: 2019-01-14 | End: 2019-01-14 | Stop reason: HOSPADM

## 2019-01-14 RX ORDER — VENLAFAXINE 37.5 MG/1
75 TABLET ORAL NIGHTLY
Status: DISCONTINUED | OUTPATIENT
Start: 2019-01-14 | End: 2019-01-14 | Stop reason: HOSPADM

## 2019-01-14 RX ORDER — IPRATROPIUM BROMIDE AND ALBUTEROL SULFATE 2.5; .5 MG/3ML; MG/3ML
3 SOLUTION RESPIRATORY (INHALATION) EVERY 4 HOURS PRN
Status: DISCONTINUED | OUTPATIENT
Start: 2019-01-14 | End: 2019-01-14 | Stop reason: HOSPADM

## 2019-01-14 RX ORDER — ATORVASTATIN CALCIUM 20 MG/1
20 TABLET, FILM COATED ORAL DAILY
Status: DISCONTINUED | OUTPATIENT
Start: 2019-01-14 | End: 2019-01-14 | Stop reason: HOSPADM

## 2019-01-14 RX ORDER — METHYLPREDNISOLONE 4 MG/1
TABLET ORAL
Qty: 1 EACH | Refills: 0 | Status: SHIPPED | OUTPATIENT
Start: 2019-01-14 | End: 2019-02-21

## 2019-01-14 RX ORDER — PROMETHAZINE HYDROCHLORIDE 25 MG/1
25 TABLET ORAL EVERY 6 HOURS PRN
Status: DISCONTINUED | OUTPATIENT
Start: 2019-01-14 | End: 2019-01-14 | Stop reason: HOSPADM

## 2019-01-14 RX ORDER — AMLODIPINE BESYLATE 2.5 MG/1
2.5 TABLET ORAL DAILY
Qty: 30 TABLET | Refills: 1 | Status: SHIPPED | OUTPATIENT
Start: 2019-01-14 | End: 2019-04-26 | Stop reason: SDUPTHER

## 2019-01-14 RX ORDER — ACETAMINOPHEN 325 MG/1
650 TABLET ORAL EVERY 6 HOURS PRN
Status: DISCONTINUED | OUTPATIENT
Start: 2019-01-14 | End: 2019-01-14 | Stop reason: HOSPADM

## 2019-01-14 RX ORDER — METHYLPREDNISOLONE SODIUM SUCCINATE 40 MG/ML
INJECTION, POWDER, LYOPHILIZED, FOR SOLUTION INTRAMUSCULAR; INTRAVENOUS
Status: DISCONTINUED
Start: 2019-01-14 | End: 2019-01-14 | Stop reason: HOSPADM

## 2019-01-14 RX ORDER — METHYLPREDNISOLONE SODIUM SUCCINATE 125 MG/2ML
60 INJECTION, POWDER, LYOPHILIZED, FOR SOLUTION INTRAMUSCULAR; INTRAVENOUS EVERY 6 HOURS
Status: DISCONTINUED | OUTPATIENT
Start: 2019-01-14 | End: 2019-01-14 | Stop reason: HOSPADM

## 2019-01-14 RX ORDER — DOCUSATE SODIUM 100 MG/1
100 CAPSULE, LIQUID FILLED ORAL 2 TIMES DAILY
Status: DISCONTINUED | OUTPATIENT
Start: 2019-01-14 | End: 2019-01-14 | Stop reason: HOSPADM

## 2019-01-14 RX ORDER — SODIUM CHLORIDE 9 MG/ML
40 INJECTION, SOLUTION INTRAVENOUS AS NEEDED
Status: DISCONTINUED | OUTPATIENT
Start: 2019-01-14 | End: 2019-01-14 | Stop reason: HOSPADM

## 2019-01-14 RX ORDER — SODIUM CHLORIDE 0.9 % (FLUSH) 0.9 %
3-10 SYRINGE (ML) INJECTION AS NEEDED
Status: DISCONTINUED | OUTPATIENT
Start: 2019-01-14 | End: 2019-01-14 | Stop reason: HOSPADM

## 2019-01-14 RX ORDER — DIPHENHYDRAMINE HYDROCHLORIDE 50 MG/ML
25 INJECTION INTRAMUSCULAR; INTRAVENOUS EVERY 6 HOURS PRN
Start: 2019-01-14 | End: 2019-01-14 | Stop reason: HOSPADM

## 2019-01-14 RX ORDER — DEXTROSE MONOHYDRATE 25 G/50ML
25 INJECTION, SOLUTION INTRAVENOUS
Status: DISCONTINUED | OUTPATIENT
Start: 2019-01-14 | End: 2019-01-14 | Stop reason: HOSPADM

## 2019-01-14 RX ORDER — ALPRAZOLAM 0.25 MG/1
0.25 TABLET ORAL DAILY
Status: DISCONTINUED | OUTPATIENT
Start: 2019-01-14 | End: 2019-01-14 | Stop reason: HOSPADM

## 2019-01-14 RX ADMIN — ENOXAPARIN SODIUM 40 MG: 40 INJECTION SUBCUTANEOUS at 02:52

## 2019-01-14 RX ADMIN — VENLAFAXINE HYDROCHLORIDE 75 MG: 37.5 TABLET ORAL at 03:01

## 2019-01-14 RX ADMIN — INSULIN ASPART 7 UNITS: 100 INJECTION, SOLUTION INTRAVENOUS; SUBCUTANEOUS at 02:56

## 2019-01-14 RX ADMIN — DOCUSATE SODIUM 100 MG: 100 CAPSULE, LIQUID FILLED ORAL at 08:02

## 2019-01-14 RX ADMIN — SODIUM CHLORIDE, PRESERVATIVE FREE 3 ML: 5 INJECTION INTRAVENOUS at 02:56

## 2019-01-14 RX ADMIN — METHYLPREDNISOLONE SODIUM SUCCINATE 60 MG: 125 INJECTION, POWDER, FOR SOLUTION INTRAMUSCULAR; INTRAVENOUS at 08:01

## 2019-01-14 RX ADMIN — GLYCOPYRROLATE 1 MG: 1 TABLET ORAL at 08:02

## 2019-01-14 RX ADMIN — METHYLPREDNISOLONE SODIUM SUCCINATE 60 MG: 125 INJECTION, POWDER, FOR SOLUTION INTRAMUSCULAR; INTRAVENOUS at 02:53

## 2019-01-14 RX ADMIN — ATORVASTATIN CALCIUM 20 MG: 20 TABLET, FILM COATED ORAL at 08:02

## 2019-01-14 RX ADMIN — METFORMIN HYDROCHLORIDE 500 MG: 500 TABLET ORAL at 08:02

## 2019-01-14 RX ADMIN — TRAZODONE HYDROCHLORIDE 100 MG: 50 TABLET ORAL at 02:52

## 2019-01-14 RX ADMIN — SODIUM CHLORIDE, PRESERVATIVE FREE 3 ML: 5 INJECTION INTRAVENOUS at 08:03

## 2019-01-14 RX ADMIN — FAMOTIDINE 20 MG: 10 INJECTION, SOLUTION INTRAVENOUS at 05:23

## 2019-01-14 RX ADMIN — ARIPIPRAZOLE 5 MG: 5 TABLET ORAL at 08:02

## 2019-01-14 RX ADMIN — INSULIN ASPART 5 UNITS: 100 INJECTION, SOLUTION INTRAVENOUS; SUBCUTANEOUS at 08:02

## 2019-01-14 RX ADMIN — FENOFIBRATE 145 MG: 145 TABLET ORAL at 08:02

## 2019-01-14 NOTE — CONSULTS
Adult Nutrition  Assessment/PES    Patient Name:  Lloyd Greene  YOB: 1960  MRN: [id number]  Admit Date:  1/13/2019    Assessment Date:  1/14/2019    Comments:  Pt declines diet education.    Reason for Assessment     Row Name 01/14/19 1304          Reason for Assessment    Reason For Assessment  physician consult     Diagnosis  -- Angioedema r/t medication hx DM     Identified At Risk by Screening Criteria  need for education;BMI         Nutrition/Diet History     Row Name 01/14/19 1305          Nutrition/Diet History    Typical Food/Fluid Intake  Pt reports he is going home today and declines edu needs. NKFA. Tolerating po fine.          Anthropometrics     Row Name 01/14/19 1305          Anthropometrics    Weight  106 kg (233 lb 11 oz)        Body Mass Index (BMI)    BMI Assessment  BMI 40 or greater: obesity grade III         Labs/Tests/Procedures/Meds     Row Name 01/14/19 1305          Labs/Procedures/Meds    Lab Results Reviewed  reviewed     Lab Results Comments  glu 201-468 h        Diagnostic Tests/Procedures    Diagnostic Test/Procedure Reviewed  reviewed        Medications    Pertinent Medications Reviewed  reviewed     Pertinent Medications Comments  solumedrol glucophage         Physical Findings     Row Name 01/14/19 1306          Physical Findings    Overall Physical Appearance  obese         Estimated/Assessed Needs     Row Name 01/14/19 1306          Calculation Measurements    Weight Used For Calculations  106 kg (233 lb 11 oz)        Estimated/Assessed Needs    Additional Documentation  Calorie Requirements (Group);Protein Requirements (Group);Fluid Requirements (Group)        Calorie Requirements    Estimated Calorie Need Method  [place] [place]     Estimated Calorie Requirement Comment  1806 kcal ( mifflin no factor obese) 203 gm CHo, 45% kcal                                                                                         Protein Requirements    Est Protein  Requirement Amount (gms/kg)  0.8 gm protein     Estimated Protein Requirements (gms/day)  84.8        Fluid Requirements    Estimated Fluid Requirements (mL/day)  1806     Estimated Fluid Requirement Method  RDA Method     RDA Method (mL)  1806               Nutrition Prescription Ordered     Row Name 01/14/19 1307          Nutrition Prescription PO    Common Modifiers  Cardiac;Consistent Carbohydrate         Evaluation of Received Nutrient/Fluid Intake     Row Name 01/14/19 1307 01/14/19 1306       Calculation Measurements    Weight Used For Calculations  --  106 kg (233 lb 11 oz)       Fluid Intake Evaluation    Oral Fluid (mL)  -- insufficent data  --       PO Evaluation    Number of Days PO Intake Evaluated  Insufficient Data  --        Evaluation of Prescribed Nutrient/Fluid Intake     Row Name 01/14/19 1306          Calculation Measurements    Weight Used For Calculations  106 kg (233 lb 11 oz)             Problem/Interventions:  Problem 1     Row Name 01/14/19 1308          Nutrition Diagnoses Problem 1    Problem 1  Overweight/Obesity     Etiology (related to)  Factors Affecting Nutrition     Signs/Symptoms (evidenced by)  BMI     BMI  Greater than 40                 Intervention Goal     Row Name 01/14/19 1308          Intervention Goal    General  Provide information regarding MNT for treatment/condition     PO  PO intake (%)     PO Intake %  50 % or greater     Weight  No significant weight loss         Nutrition Intervention     Row Name 01/14/19 1308          Nutrition Intervention    RD/Tech Action  Interview for preference;Follow Tx progress           Education/Evaluation     Row Name 01/14/19 1308          Education    Education  Education offered and refused        Monitor/Evaluation    Monitor  Per protocol;I&O;PO intake;Pertinent labs;Weight           Electronically signed by:  Rhonda Theodore RD  01/14/19 1:08 PM

## 2019-01-14 NOTE — ED NOTES
Call made to Dr. Longo at 19:29, answered on the first call. Call completed.      Geraldine Celyaa, JEY  01/13/19 1929

## 2019-01-14 NOTE — DISCHARGE SUMMARY
Lloyd BRAMBILA Klemme  1960  [id number]    Hospitalists Discharge Summary    Date of Admission: 1/13/2019  Date of Discharge:  1/14/2019    Primary Discharge Diagnoses:   Angioedema secondary to lisinopril use  Secondary Discharge Diagnoses:   Recent tamiflu allergic reaction  DM 2 with hyperglycemia in obese  Hypertension  COPD  Hyperlipidemia  Anxiety/depression  [facility]  PCP  Patient Care Team:  Donell Diggs MD as PCP - General (Family Medicine)    Consults:   Consults     No orders found for last 30 day(s).        Operations and Procedures Performed:     Xr Chest 2 View    Result Date: 1/7/2019  Narrative: CHEST X-RAY, 1/7/2019     HISTORY: 58-year-old male in the ED complaining of one week history cough, weakness and shortness of air. Recently diagnosed with influenza.  TECHNIQUE: AP and lateral upright chest x-ray.  FINDINGS: Lung volumes are low, but the lungs appear clear. No visible pulmonary infiltrate, pulmonary edema or pleural effusion. Borderline cardiomegaly. No change since 8/18/2016.      Impression: No active disease.  This report was finalized on 1/7/2019 9:23 AM by Dr. Jayjay Arredondo MD.      Us Gallbladder    Result Date: 1/7/2019  Narrative: ULTRASOUND ABDOMEN, LIMITED, 1/7/2019  HISTORY: 58-year-old male in the ED complaining of 2 week history of epigastric abdomen pain  TECHNIQUE: Grayscale ultrasound imaging of the right upper quadrant was performed.  COMPARISON: *  CT abdomen/pelvis, 11/15/2018.  FINDINGS: The gallbladder is normal in ultrasound appearance. There is no visible cholelithiasis, gallbladder wall thickening or adjacent fluid collection. There is no intrahepatic or extra hepatic bile duct dilatation (CBD 3 mm).  Diffusely echodense liver parenchyma suggests diffuse hepatic steatosis. The pancreas is completely obscured by overlying bowel gas. Right kidney is negative with no hydronephrosis.      Impression: 1. Negative gallbladder ultrasound examination. No cholelithiasis or  "bile duct dilatation. 2. Diffuse hepatic steatosis.  This report was finalized on 1/7/2019 8:44 AM by Dr. Jayjay Arredondo MD.      Allergies:  is allergic to lisinopril and tamiflu [oseltamivir phosphate].    Landon  Xanax and hydrocodone 12/2018 per report of 1/13/2019    Discharge Medications:     Discharge Medications      New Medications      Instructions Start Date   amLODIPine 2.5 MG tablet  Commonly known as:  NORVASC   2.5 mg, Oral, Daily      MethylPREDNISolone 4 MG tablet  Commonly known as:  MEDROL (ADAM)   Take as directed on package instructions.         Changes to Medications      Instructions Start Date   fenofibrate 145 MG tablet  Commonly known as:  TRICOR  What changed:  when to take this   145 mg, Oral, Daily      omeprazole 20 MG capsule  Commonly known as:  PRILOSEC  What changed:  when to take this   20 mg, Oral, Daily         Continue These Medications      Instructions Start Date   ALPRAZolam 0.25 MG tablet  Commonly known as:  XANAX   0.25 mg, Oral, Daily      ARIPiprazole 5 MG tablet  Commonly known as:  ABILIFY   5 mg, Oral, Daily      gabapentin 800 MG tablet  Commonly known as:  NEURONTIN   900 mg, Oral, Nightly      glycopyrrolate 1 MG tablet  Commonly known as:  ROBINUL   1 tablet po every 6 hours PRN epigastric abdominal pain      metFORMIN 500 MG tablet  Commonly known as:  GLUCOPHAGE   500 mg, Oral, 3 Times Daily      promethazine 25 MG tablet  Commonly known as:  PHENERGAN   25 mg, Oral, Every 6 Hours PRN      simvastatin 40 MG tablet  Commonly known as:  ZOCOR   40 mg, Oral, Daily      traZODone 100 MG tablet  Commonly known as:  DESYREL   100 mg, Oral, Nightly      venlafaxine 75 MG tablet  Commonly known as:  EFFEXOR   75 mg, Oral, Nightly      venlafaxine 75 MG tablet  Commonly known as:  EFFEXOR   150 mg, Oral, Daily           History of Present Illness: Taken from [facility] on admit:  \"As per Rosemary PARRISH's ER Note:  Narrative: 58-year-old male presents with tongue swelling as " "above.  He does take lisinopril and took his last dose this morning.  He had one episode similar approximately 2 months ago.  He took Benadryl and it quickly resolved.  Patient states he thought the same thing was going to happen today, but it did not resolve, so he came here for further evaluation.      Patient claims he has been taking Lisinopril for 20 years without any problem.   Patient denies any sx of fever, headache, chest pain, shortness of breath, abdominal pain, nausea/ vomiting, dysuria, urgency/ frequency or any recent hx of blood in stool. No other medical history available.\"    Hospital Course  Angioedema secondary to lisinopril use:  Resolved with steroid use only, did not require FFP, no airway compromise at any point, tongue edema only  Follow up Donell Diggs MD 1/21/19 as scheduled    Recent Tamiflu allergic reaction:  Patient has follow-up on 1/21/2019 with Donell Diggs MD for this    DM 2 with hyperglycemia in obese:  Glucose elevated secondary to IV steroids given  Continued on home metformin 500 mg 3 times daily and low-dose sliding scale insulin  Home on medrol dosepack  F/U Donell Diggs MD as scheduled    Hypertension:  BP overall at goal  Off home lisinopril, will add amlodipine 2.5 mg daily.    Patient is to log blood pressure and bring to appointment Donell Diggs MD     COPD: no acute issues here    Hyperlipidemia: No acute issues on Lipitor 20 mg daily, TriCor 145 mg daily    Anxiety/depression: No acute issues on home Xanax 0.25 mg daily, Abilify 5 mg daily, trazodone 100 mg nightly, Effexor 150 mg daily, 75 mg nightly    GREGORY: no acute issues here  Last Lab Results:   Lab Results (most recent)     Procedure Component Value Units Date/Time    POC Glucose Once [[id number]]  (Abnormal) Collected:  01/14/19 0744    Specimen:  Blood Updated:  01/14/19 0750     Glucose 305 mg/dL     POC Glucose Once [[id number]]  (Abnormal) Collected:  01/14/19 0240    Specimen:  Blood Updated:  " 01/14/19 0248     Glucose 407 mg/dL     POC Glucose Once [[id number]]  (Abnormal) Collected:  01/14/19 0238    Specimen:  Blood Updated:  01/14/19 0248     Glucose 468 mg/dL     POC Glucose Once [[id number]]  (Abnormal) Collected:  01/13/19 2138    Specimen:  Blood Updated:  01/13/19 2145     Glucose 271 mg/dL     [place] Draw [[id number]] Collected:  01/13/19 1844    Specimen:  Blood Updated:  01/13/19 1945    Narrative:       The following orders were created for panel order [place] Draw.  Procedure                               Abnormality         Status                     ---------                               -----------         ------                     Light Blue Top[[id number]]                                   Final result               Green Top (Gel)[[id number]]                                  Final result               Lavender Top[[id number]]                                     Final result               Gold Top - SST[[id number]]                                   Final result                 Please view results for these tests on the individual orders.    Light Blue Top [[id number]] Collected:  01/13/19 1844    Specimen:  Blood Updated:  01/13/19 1945     Extra Tube hold for add-on     Comment: Auto resulted       Lavender Top [[id number]] Collected:  01/13/19 1844    Specimen:  Blood Updated:  01/13/19 1945     Extra Tube hold for add-on     Comment: Auto resulted       Gold Top - SST [[id number]] Collected:  01/13/19 1844    Specimen:  Blood Updated:  01/13/19 1945     Extra Tube Hold for add-ons.     Comment: Auto resulted.       Green Top (Gel) [[id number]] Collected:  01/13/19 1844    Specimen:  Blood Updated:  01/13/19 1945     Extra Tube Hold for add-ons.     Comment: Auto resulted.       Basic Metabolic Panel [[id number]]  (Abnormal) Collected:  01/13/19 1844    Specimen:  Blood Updated:  01/13/19 1908     Glucose 201 mg/dL      BUN 24 mg/dL      Creatinine 0.84 mg/dL      Sodium 139 mmol/L       Potassium 4.5 mmol/L      Chloride 104 mmol/L      CO2 20.2 mmol/L      Calcium 9.6 mg/dL      eGFR Non African Amer 95 mL/min/1.73      BUN/Creatinine Ratio 28.6     Anion Gap 14.8 mmol/L     Narrative:       GFR Normal >60  Chronic Kidney Disease <60  Kidney Failure <15    CBC & Differential [[id number]] Collected:  01/13/19 1844    Specimen:  Blood Updated:  01/13/19 1846    Narrative:       The following orders were created for panel order CBC & Differential.  Procedure                               Abnormality         Status                     ---------                               -----------         ------                     CBC Auto Differential[[id number]]        Abnormal            Final result                 Please view results for these tests on the individual orders.    CBC Auto Differential [[id number]]  (Abnormal) Collected:  01/13/19 1844    Specimen:  Blood Updated:  01/13/19 1846     WBC 9.98 10*3/mm3      RBC 4.09 10*6/mm3      Hemoglobin 12.1 g/dL      Hematocrit 36.8 %      MCV 90.0 fL      MCH 29.6 pg      MCHC 32.9 g/dL      RDW 13.0 %      RDW-SD 42.3 fl      MPV 10.5 fL      Platelets 548 10*3/mm3      Neutrophil % 55.1 %      Lymphocyte % 33.8 %      Monocyte % 8.6 %      Eosinophil % 1.1 %      Basophil % 0.6 %      Immature Grans % 0.8 %      Neutrophils, Absolute 5.50 10*3/mm3      Lymphocytes, Absolute 3.37 10*3/mm3      Monocytes, Absolute 0.86 10*3/mm3      Eosinophils, Absolute 0.11 10*3/mm3      Basophils, Absolute 0.06 10*3/mm3      Immature Grans, Absolute 0.08 10*3/mm3      nRBC 0.0 /100 WBC         Imaging Results (most recent)     None        PROCEDURES: NONE    Condition on Discharge:  stable    Physical Exam at Discharge  Vital Signs  Temp:  [97.6 °F (36.4 °C)-98.7 °F (37.1 °C)] 97.7 °F (36.5 °C)  Heart Rate:  [76-88] 76  Resp:  [18-20] 20  BP: (108-136)/(69-97) 112/77    Physical Exam:  Physical Exam   Constitutional: Patient appears well-developed and well-nourished and in  no acute distress, obese   HEENT:   Head: Normocephalic and atraumatic.   Eyes:  Pupils are equal, round, and reactive to light. EOM are intact. Sclera are anicteric and non-injected.  Mouth and Throat: Patient has moist mucous membranes. Oropharynx is clear of any erythema or exudate. No tongue swelling noted    Cardiovascular: Regular rate, regular rhythm, S1 normal and S2 normal.  Exam reveals no gallop and no friction rub.  No murmur heard.  Pulmonary/Chest: Lungs are clear to auscultation bilaterally. No respiratory distress. No wheezes. No rhonchi. No rales.   Abdominal: obese, Soft. Bowel sounds are normal. No distension and no mass. There is no hepatosplenomegaly. There is no tenderness.   Musculoskeletal: Normal Muscle tone  Extremities: No edema. Pulses are palpable in all 4 extremities.  Neurological: Patient is alert and oriented to person, place, and time. Cranial nerves II-XII are grossly intact with no focal deficits.  Skin: Skin is warm. No rash noted. Nails show no clubbing.  No cyanosis or erythema.    Discharge Disposition  Home    Visiting Nurse:    No     Home PT/OT:  No     Home Safety Evaluation:  No     DME  None new    Discharge Diet:      Dietary Orders (From admission, onward)    Start     Ordered    01/14/19 0125  Diet Regular; Cardiac, Consistent Carbohydrate  Diet Effective Now     Question Answer Comment   Diet Texture / Consistency Regular    Common Modifiers Cardiac    Common Modifiers Consistent Carbohydrate        01/14/19 0125        Activity at Discharge:  As tolerated    Pre-discharge education  Diabetic, medications, follow up    Follow-up Appointments  Future Appointments   Date Time Provider Department Center   1/17/2019 12:00 PM Tin Giang MD [facility] None   1/21/2019  2:15 PM Donell Diggs MD [facility] None   1/22/2019 12:40 PM Mary Morton APRN [facility] None   3/6/2019 11:00 AM Donell Diggs MD [facility] None     Additional  Instructions for the Follow-ups that You Need to Schedule     Discharge Follow-up with PCP   As directed       Currently Documented PCP:    Donell Diggs MD    PCP Phone Number:    [phone]     Follow Up Details:  as scheduled 1/23/19             Test Results Pending at Discharge:NONE     Liyah Decker, SOFIA  01/14/19  12:01 PM    Time: Discharge 30 min (if over 30 minutes give explanation as to why it took greater than 30 minutes)            '

## 2019-01-14 NOTE — NURSING NOTE
DR NORTON  IS HERE, NOTIFIED HIM OF PATIENT NOT KNOWIG DOSES ON ROBINUL AND ABILIFY DOSES.  ALSO PATIENT PASSED HIS SWALLOW TEST.

## 2019-01-14 NOTE — PLAN OF CARE
Problem: Patient Care Overview  Goal: Plan of Care Review  Outcome: Outcome(s) achieved Date Met: 01/14/19 01/14/19 1114   Plan of Care Review   Progress improving   OTHER   Outcome Summary Discharge home     Goal: Individualization and Mutuality  Outcome: Outcome(s) achieved Date Met: 01/14/19    Goal: Discharge Needs Assessment  Outcome: Outcome(s) achieved Date Met: 01/14/19      Problem: Anaphylactic/Systemic Hypersensitivity Reaction (Adult)  Goal: Signs and Symptoms of Listed Potential Problems Will be Absent, Minimized or Managed (Anaphylactic/Systemic Hypersensitivity Reaction)  Outcome: Outcome(s) achieved Date Met: 01/14/19

## 2019-01-14 NOTE — H&P
HOSPITALIST SERVICES     @ [facility], [place]        [facility] Hospitalist Team    ADMISSION HISTORY AND PHYSICAL    PATIENT:      Patient Care Team:  Donell Diggs MD as PCP - General (Family Medicine)    PATIENT IS A RESIDENT OF:  Lives at home      PATIENT ACCOMPANIED BY:  Alone. NO family present      CHIEF COMPLAINT:     Swollen tongue for 3 hours    HISTORY OF PRESENT ILLNESS:    A per Rosemary PARRISH's ER Note:  Narrative: 58-year-old male presents with tongue swelling as above.  He does take lisinopril and took his last dose this morning.  He had one episode similar approximately 2 months ago.  He took Benadryl and it quickly resolved.  Patient states he thought the same thing was going to happen today, but it did not resolve, so he came here for further evaluation.      Patient claims he has been taking Lisinopril for 20 years without any problem.   Patient denies any sx of fever, headache, chest pain, shortness of breath, abdominal pain, nausea/ vomiting, dysuria, urgency/ frequency or any recent hx of blood in stool. No other medical history available.            Past Medical History:   Diagnosis Date   • Anxiety    • Arthritis    • Colon polyp    • COPD (chronic obstructive pulmonary disease) (CMS/HCC)    • Depression    • Diabetes mellitus (CMS/HCC)    • Diverticulitis    • Elevated cholesterol    • Hyperlipidemia    • Hypertension    • Sleep apnea    • Stroke (CMS/HCC)      Past Surgical History:   Procedure Laterality Date   • COLONOSCOPY  2014   • COLOSTOMY      Dr. Gupta   • COLOSTOMY REVISION  07/2006    Diverticulitis-Dr. Gupta   • INCISIONAL HERNIA REPAIR  2011   • ORIF ANKLE FRACTURE Right 1995     Family History   Problem Relation Age of Onset   • Depression Mother    • Depression Father    • Kidney disease Father    • COPD Father    • Hypertension Father        Family History as documented in the Problem List.    Social History     Tobacco Use   • Smoking  status: Former Smoker     Types: Electronic Cigarette   • Smokeless tobacco: Never Used   Substance Use Topics   • Alcohol use: Yes     Comment: rarely   • Drug use: No     Medications Prior to Admission   Medication Sig Dispense Refill Last Dose   • ALPRAZolam (XANAX) 0.25 MG tablet Take 1 tablet by mouth Daily. 30 tablet 0 Past Month at Unknown time   • ARIPiprazole (ABILIFY) 5 MG tablet Take 1 tablet by mouth Daily. 90 tablet 3 1/13/2019 at Unknown time   • fenofibrate (TRICOR) 145 MG tablet Take 1 tablet by mouth Daily. (Patient taking differently: Take 145 mg by mouth Every Night.) 90 tablet 3 1/12/2019 at Unknown time   • gabapentin (NEURONTIN) 800 MG tablet Take 900 mg by mouth Every Night.      • glycopyrrolate (ROBINUL) 1 MG tablet 1 tablet po every 6 hours PRN epigastric abdominal pain 16 tablet 0 Past Week at Unknown time   • lisinopril (PRINIVIL,ZESTRIL) 20 MG tablet Take 1 tablet by mouth Daily. 90 tablet 3 1/13/2019 at Unknown time   • metFORMIN (GLUCOPHAGE) 500 MG tablet Take 1 tablet by mouth 3 (Three) Times a Day. 270 tablet 3 1/13/2019 at Unknown time   • omeprazole (PRILOSEC) 20 MG capsule Take 1 capsule by mouth Daily for 30 days. (Patient taking differently: Take 20 mg by mouth Every Night.) 30 capsule 0 1/12/2019 at Unknown time   • promethazine (PHENERGAN) 25 MG tablet Take 1 tablet by mouth Every 6 (Six) Hours As Needed for Nausea or Vomiting for up to 10 doses. 10 tablet 0 Past Week at Unknown time   • simvastatin (ZOCOR) 40 MG tablet Take 1 tablet by mouth Daily. 90 tablet 3 1/13/2019 at Unknown time   • traZODone (DESYREL) 100 MG tablet Take 1 tablet by mouth Every Night. 90 tablet 3 Past Week at Unknown time   • venlafaxine (EFFEXOR) 75 MG tablet Take 2 tablets by mouth Daily. 90 tablet 3 1/13/2019 at Unknown time   • venlafaxine (EFFEXOR) 75 MG tablet Take 75 mg by mouth Every Night.        Allergies:  Lisinopril    REVIEW OF SYSTEMS (ROS):  Please see the above history of present  illness for pertinent positives and negatives.  The remainder of the patient's systems have been reviewed and are negative.     ROS negative except current and past medical and surgical history as noted in the Problem List.      14 SYSTEM REVIEW OF SYSTEM (Per CMS):    Constitutional Symptoms:  No hx of weight loss/gain, fatigue, fever/chills, sweats, headache or appetite disturbances     Allergic/Immunologic:  Drug/food/pet allergies: Lisinopril, No hx of hayfever or asthma, No hx of HIV/AIDS     Integumentary:  Skin color changes: NONE, No hx of itching, rash, sores, hives, moles, or alopecia; No hx of breast pain/discharge/lumps      Eyes:  No hx of vision change, blurriness, photophobia dryness, pain, or cataracts     ENT:  No hx of tinnitus, hearing loss, discharge, pain, obstruction, sinusitis, PND, epistaxis, sore throat, dysphagia, or canker sores     Cardiovascular:  Positive hx of HTN. No hx of chest pain upon exertion, murmurs, angina, MI, palpitations, thrombosis, PVD or DVT     Respiratory:  Positive hx of COPD. No hx of  cough, sputum production, hemoptysis or congestion     Musculoskeletal:  No hx of pain, weakness, cramps, joint pain, swelling, arthritis, strain/sprain/fracture, scoliosis or atrophy     Gastrointestinal:  Hx of colon polyp and diverticulitis. No hx of bloating, nausea, vomiting, heartburn, diarrhea, constipation, abdominal pain or hematemesis     Neurological:  No hx of syncope, seizures, vertigo, lack of coordination, memory loss, paralysis, tremors, blackouts, ataxia or numbness     Genitourinary:  No hx of dysuria, hematuria, frequency, burning, urgency, renal stones, STD     Endocrine:  Positive hx of diabetes. No hx of excessive hunger/thirst, goiter    Hematologic/Lymphatic:  No hx of bleeding disorders, anemia, tenderness of lymph nodes     Psychiatric:  Positive hx of depression, anxiety. No hx of  hallucinations, suicidal ideation, substance abuse or  "addictions                  Vital Signs  Temp:  [97.6 °F (36.4 °C)-98.7 °F (37.1 °C)] 97.6 °F (36.4 °C)  Heart Rate:  [76-88] 76  Resp:  [18-20] 20  BP: (108-136)/(69-97) 136/75    Flowsheet Rows      First Filed Value   Admission Height  162.6 cm (64\") Documented at 01/13/2019 1807   Admission Weight  105 kg (230 lb 6.4 oz) Documented at 01/13/2019 1807           Physical Exam:    PHYSICAL EXAMINATION:    VITAL SIGNS: As per Nurse's notes    GENERAL APPEARANCE: The patient is a well developed, well nourished, , in no acute distress without complaints of shortness of breath, dyspnea or acute pain. Lips and nail beds are pink.    BMI:  40.22      The same groups apply to both men and women.   Underweight: BMI is less than 18.5.   Normal weight: BMI is 18.5 to 24.9.   Overweight: BMI is 25 to 29.9.   Obese: BMI is 30 or more.    BMI Table for Adults  This is the World Health Organization's (WHO) recommended body weight based on BMI values for adults. It is used for both men and women, age 18 or older.   Category BMI range - kg/m2   Severe Thinness < 16   Moderate Thinness 16 - 17   Mild Thinness 17 - 18.5   Normal 18.5 - 25   Overweight 25 - 30   Obese Class I 30 - 35   Obese Class II 35 - 40   Obese Class III > 40         HEENT: Normocephalic, atraumatic. PERRL. The sclerae anicteric and conjunctivae pink and moist. Extraocular muscle movements intact. External inspection of the ears and nose showed no scars, lesions, or masses. No rhinitis. Lips, teeth, and gums showed normal mucosa. The oral mucosa, hard and soft palate, tongue and posterior pharynx were normal. Tongue swelling had gone down by the time I saw the patient    NECK: Supple and symmetric. No masses. No thyromegaly. No tenderness. Trachea central. No carotid bruits. No evidence of JVD.    LYMPH NODES: No palpable lymphadenopathy.    SKIN: Warm, dry and intact. No rash or lesions or wounds or patechiae.    CHEST: Normal AP diameter and normal " "contour.     LUNGS: Accessory muscles of respiration are not active. Clear to auscultation bilaterally. Respiratory expansion equal bilaterally. No wheezes/rales/crackles/rubs.    CARDIOVASCULAR: RRR. S1, S2 normal without murmur/gallop/rub. No S3, S4. The carotid pulses normal and 2+ bilaterally without bruits. Peripheral pulses 2+ and symmetric. Capillary refill less than 3 seconds.    ABDOMEN: Soft, non-tender, non-distended. No masses. No rigidity/ rebound/guarding. No hepatosplenomegaly. Normal bowel sounds. No auscultatable bruits. There were no inguinal or umbilical hernias noted. No evidence of ascites. No costovertebral angle tenderness to percussion.     GENITO-URINARY: Examination not indicated.    RECTAL: Not performed.     EXTREMITIES:  No evidence of cyanosis, clubbing, or edema. No rash or lesions. + pedal pulses. No ulcers or varicosities identified. Pulses are 2+ throughout. No evidence of Pallor, Pulselessness, Poikilothermia (\"coldness\"), Paralysis or Paresthesia. Moves all extremities well. Negative Homans sign bilaterally.     MUSCULOSKELETAL: Examination of cervical and lumbosacral spines unremarkable. ROM of extremities WNL. No evidence of redness, swelling, warmth or pain of the joints of the extremities. Muscle strength and tone normal.    PSYCHIATRY: Responds appropriately to questions. No evidence of acute anxiety, depression, panic attacks or hallucinations.    MENTAL STATUS EXAMINATION: Neatly dressed, conscious and alert. Speech normal in tone. Pleasant and cooperative. Orientation x3. Thought process, content and insight are normal. Memory without deficits.    NEUROLOGIC: Examination is grossly intact globally with no focal deficits. Cranial nerves II through XII are grossly intact. No motor weakness. No decrease in sensation. No facial asymmetry. Gait was not tested.        Emotional Behavior:    Judgement and Insight: JUDGEMENT: Assessment of real life problem-solving skills is not " possible but it appears patient has good judgement. INSIGHT: Patient has understanding of medical illness and is compliant with treatments.     Mental Status:  Alertness  Patient is fully alert   Memory:  Seems intact for both recent and remote events   Mood and Affect:         Depression  None seen               Anxiety  None seen    Debilities:   Physical Weakness  NONE    Handicaps  None   Disabilities  No evidence   Agitation  None seen         Results Review:    I reviewed the patient's new clinical results.  Lab Results (most recent)     Procedure Component Value Units Date/Time    POC Glucose Once [[id number]]  (Abnormal) Collected:  01/13/19 2138    Specimen:  Blood Updated:  01/13/19 2145     Glucose 271 mg/dL     [place] Draw [[id number]] Collected:  01/13/19 1844    Specimen:  Blood Updated:  01/13/19 1945    Narrative:       The following orders were created for panel order [place] Draw.  Procedure                               Abnormality         Status                     ---------                               -----------         ------                     Light Blue Top[[id number]]                                   Final result               Green Top (Gel)[[id number]]                                  Final result               Lavender Top[[id number]]                                     Final result               Gold Top - SST[[id number]]                                   Final result                 Please view results for these tests on the individual orders.    Light Blue Top [[id number]] Collected:  01/13/19 1844    Specimen:  Blood Updated:  01/13/19 1945     Extra Tube hold for add-on     Comment: Auto resulted       Lavender Top [[id number]] Collected:  01/13/19 1844    Specimen:  Blood Updated:  01/13/19 1945     Extra Tube hold for add-on     Comment: Auto resulted       Gold Top - SST [[id number]] Collected:  01/13/19 1844    Specimen:  Blood Updated:  01/13/19 1945     Extra Tube Hold for  add-ons.     Comment: Auto resulted.       Green Top (Gel) [[id number]] Collected:  01/13/19 1844    Specimen:  Blood Updated:  01/13/19 1945     Extra Tube Hold for add-ons.     Comment: Auto resulted.       Basic Metabolic Panel [[id number]]  (Abnormal) Collected:  01/13/19 1844    Specimen:  Blood Updated:  01/13/19 1908     Glucose 201 mg/dL      BUN 24 mg/dL      Creatinine 0.84 mg/dL      Sodium 139 mmol/L      Potassium 4.5 mmol/L      Chloride 104 mmol/L      CO2 20.2 mmol/L      Calcium 9.6 mg/dL      eGFR Non African Amer 95 mL/min/1.73      BUN/Creatinine Ratio 28.6     Anion Gap 14.8 mmol/L     Narrative:       GFR Normal >60  Chronic Kidney Disease <60  Kidney Failure <15    CBC & Differential [[id number]] Collected:  01/13/19 1844    Specimen:  Blood Updated:  01/13/19 1846    Narrative:       The following orders were created for panel order CBC & Differential.  Procedure                               Abnormality         Status                     ---------                               -----------         ------                     CBC Auto Differential[[id number]]        Abnormal            Final result                 Please view results for these tests on the individual orders.    CBC Auto Differential [[id number]]  (Abnormal) Collected:  01/13/19 1844    Specimen:  Blood Updated:  01/13/19 1846     WBC 9.98 10*3/mm3      RBC 4.09 10*6/mm3      Hemoglobin 12.1 g/dL      Hematocrit 36.8 %      MCV 90.0 fL      MCH 29.6 pg      MCHC 32.9 g/dL      RDW 13.0 %      RDW-SD 42.3 fl      MPV 10.5 fL      Platelets 548 10*3/mm3      Neutrophil % 55.1 %      Lymphocyte % 33.8 %      Monocyte % 8.6 %      Eosinophil % 1.1 %      Basophil % 0.6 %      Immature Grans % 0.8 %      Neutrophils, Absolute 5.50 10*3/mm3      Lymphocytes, Absolute 3.37 10*3/mm3      Monocytes, Absolute 0.86 10*3/mm3      Eosinophils, Absolute 0.11 10*3/mm3      Basophils, Absolute 0.06 10*3/mm3      Immature Grans, Absolute 0.08  10*3/mm3      nRBC 0.0 /100 WBC           Imaging Results (most recent)     None        reviewed    ECG/EMG Results (most recent)     None        reviewed    Assessment/Plan       DIFFERENTIAL DIAGNOSES CONSIDERED FOR CHIEF COMPLAINT:        The following clinical entities were considered in differential diagnosis. The list includes commonly encountered practical probabilities and rare esoteric diagnoses worked out through systemic diagnostic methods used to identify the presence of a disease entity where multiple diagnoses are possible. The decision is reached through either of the following methods: 1) direct confirmatory testing, or 2) a process of elimination, or 3) at least a process of obtaining information that shrinks the “probabilities” of candidate conditions to negligible levels, by using clinical evidence and thus implementing aspects of the hypothetico-deductive method.        DIFFERENTIAL DIAGNOSIS OF ANGIOEDEMA:  • Acute Urticaria  • Anaphylaxis  • Cellulitis  • Delayed Hypersensitivity Reactions  • Drug Allergies  • Food Allergies  • Hymenoptera Stings  • Immediate Hypersensitivity Reactions  • Latex Allergy  • Stinging Insect Hypersensitivity          ASSESSMENT AND PLAN:       SUMMARY:    ?   PROPHYLAXIS:   -Oxygen Saturation: As per Nurses' notes.   -DVT Prophylaxis: On Inj. Lovenox and SCDs  -Gastritis Prophylaxis: Pantoprazole    -Constipation Prophylaxis: Colace    -Immunizations: N/A  -Intake and Output Orders: As per order sheet   -Beltran Catheter: Not indicated at this time  -Smoking/ Nicotine issues: N/A      ACTIVITIES:  -Bathroom Privileges: May use bathroom   -Activity: Encouraged to sit up in side chair for 2-4 hours BID   -Swallowing evaluation by Nurse on bedside  -PT/OT: N/A      NUTRITION AND FLUIDS:  -Diet/ Nutrition: Regular, cardiac diet   -Fluid Status/Electrolytes: Saline Lock       SOCIAL ISSUES:   -MRSA SCREEN: As per institutional protocol   -Behavioral/ Agitation Issues:  NONE   -Social Issues: Lives at home with his family.   -Occupational Issues: Patient is on disability due to backache    -Code Status: FULL CODE on admission   -Disposition: Should be able to go back home once ready for discharge     THERAPEUTIC:   ALLERGIES: Lisinopril  ANTIBIOTICS: N/S  PAIN MANAGEMENT: N/S   ANTIPYRETIC: Tylenol 650 mg for headache or   temp >100 degrees   INSULIN THERAPY: Low dose insulin coverage as ordered   CHEST PAIN: N/A    NEBULIZER TREATMENT: DuoNeb 3 ml/ Albuterol via nebulizer q4-6 hrs PRN for shortness of breath and/or wheezing   ANXIETY: Xanax     DEPRESSION: Abilify and Venlafaxine  INSOMNIA: Trazodone               PLAN:    Labs and diagnostic tests reviewed: YES    Diagnostic tests reviewed: YES    Patient is clinically and hemodynamically stable    New Labs ordered: N/A    New Diagnostic tests ordered: N/A    Any changes in Medications: D/C Lisinopril    To continue current management and supportive care    Discharge planning issues: NONE. Patient should be able to go back home once ready for discharge.    Will follow patient closely    Nothing new to add for right now            DIAGNOSES:      PRIMARY DIAGNOSES:      Angioedema: Angiotensin-converting enzyme (ACE) inhibitors are the leading cause of drug-induced angioedema in the [place] because they are so widely prescribed. Patients most commonly present with swelling of the lips, tongue, or face, although another presentation is episodic abdominal pain due to intestinal angioedema. Urticaria and itching are notably absent. For unclear reasons, ACE inhibitor-related angioedema occurs more commonly in black patients. Angioedema can be life threatening but more times than not its occurrence can be managed with conservative treatment measures including discontinuation of the medication and/or administration of an antihistamine. Occasionally, epinephrine and/or steroid therapy may be warranted. The pathobiologic mechanism  of angioedema with regard to ACE inhibitor therapy is believed to relate to the kallikrein-kinin plasma effector system. One hypothesis is that bradykinin, which is normally degraded by kininase II/ACE, accumulates in tissues. In this regard, plasma bradykinin has been shown to increase up to 12-fold during acute angioedema attacks in patients with hereditary or acquired forms of angioedema. The mechanism of angioedema with ACE inhibitors is not believed to be immunologic because angioedema may occur within hours to years after an ACE inhibitor is first taken. Thus, ACE inhibitors do not mediate angioedema through an allergic or idiosyncratic reaction: they seem to facilitate angioedema in predisposed individuals. Despite the aforementioned associations no skin or blood test is currently available to predictably identify patients at risk for angioedema from ACE inhibitors.        HTN: Last /75. He is off Lisinopril. Will need antihypertensive of another class including betablocker or calcium channel blocker if indicated    HLD: On atorvastatin. No acute issues at this time    Type II DM: Last blood sugar 271.    COPD: On bronchodilators. No acute issues    Obesity: BMI 40.22. Needs to lose weight. Nutritional counselling    Sleep Apnea: Hx noted. No acute issues    Hx of CVA: Hx noted. No acute issues    Anxiety: On Xanax. No acute issues    Depression: Hx noted. On Venlafaxine and Abilify. No acute issues    DVT Prophylaxis: Inj Lovenox and SCDs      ?     SECONDARY DIAGNOSES:  ?     As above        SURGICAL DIAGNOSES:      As per Problem List      I discussed the patients findings and my recommendations with patient and patient is agreeable to current treatment and management plan.     Mario Longo MD  01/13/19  11:30 PM          Mario Longo M.D., [facility]  Internal Medicine/ Hospitalist        Time:  45 minutes

## 2019-01-15 ENCOUNTER — READMISSION MANAGEMENT (OUTPATIENT)
Dept: CALL CENTER | Facility: HOSPITAL | Age: 59
End: 2019-01-15

## 2019-01-15 NOTE — OUTREACH NOTE
Prep Survey      Responses   Facility patient discharged from?  [facility]   Is LACE score < 7 ?  Yes   Is patient eligible?  Yes   Discharge diagnosis  Angioedema secondary to lisinopril use   Does the patient have one of the following disease processes/diagnoses(primary or secondary)?  Other   Does the patient have Home health ordered?  No   Is there a DME ordered?  No   General alerts for this patient  Lace < 7   Prep survey completed?  Yes          Darlene Davidson RN

## 2019-01-16 ENCOUNTER — READMISSION MANAGEMENT (OUTPATIENT)
Dept: CALL CENTER | Facility: HOSPITAL | Age: 59
End: 2019-01-16

## 2019-01-16 NOTE — OUTREACH NOTE
LAG < 7 Survey      Responses   Facility patient discharged from?  [facility]   Does the patient have one of the following disease processes/diagnoses(primary or secondary)?  Other   Is there a successful TCM telephone encounter documented?  No   BHLAG <7 Attempt successful?  No   Unsuccessful attempts  Attempt 1          Mello Tejada RN

## 2019-01-17 ENCOUNTER — READMISSION MANAGEMENT (OUTPATIENT)
Dept: CALL CENTER | Facility: HOSPITAL | Age: 59
End: 2019-01-17

## 2019-01-17 ENCOUNTER — OFFICE VISIT (OUTPATIENT)
Dept: GASTROENTEROLOGY | Facility: CLINIC | Age: 59
End: 2019-01-17

## 2019-01-17 VITALS — HEIGHT: 64 IN | WEIGHT: 228 LBS | BODY MASS INDEX: 38.93 KG/M2

## 2019-01-17 DIAGNOSIS — K56.600 PARTIAL SMALL BOWEL OBSTRUCTION (HCC): ICD-10-CM

## 2019-01-17 DIAGNOSIS — R10.13 EPIGASTRIC PAIN: ICD-10-CM

## 2019-01-17 DIAGNOSIS — T78.3XXA ANGIOEDEMA, INITIAL ENCOUNTER: ICD-10-CM

## 2019-01-17 DIAGNOSIS — D12.6 ADENOMATOUS POLYP OF COLON, UNSPECIFIED PART OF COLON: Primary | ICD-10-CM

## 2019-01-17 DIAGNOSIS — G47.33 OSA (OBSTRUCTIVE SLEEP APNEA): ICD-10-CM

## 2019-01-17 PROCEDURE — 99204 OFFICE O/P NEW MOD 45 MIN: CPT | Performed by: INTERNAL MEDICINE

## 2019-01-17 NOTE — PROGRESS NOTES
PATIENT INFORMATION  Lloyd Greene       - 1960    CHIEF COMPLAINT  Chief Complaint   Patient presents with   • Abdominal Pain   • Nausea   • Difficulty Swallowing       HISTORY OF PRESENT ILLNESS  Is 10 years out form ruptured diverticulum that required resection and temporary colostomy    Recent Flu and with tamiflu experienced upper abdominal pain but negative labs and US of GB    A week later had Angioedema fromLisinopril and received steroids so his BS shot up. Had a SBO in Nov as well ans was hospitalized.            REVIEW OF SYSTEMS  Review of Systems   Constitutional: Positive for fatigue.   HENT: Positive for trouble swallowing.    Respiratory: Positive for apnea and shortness of breath.    Gastrointestinal: Positive for vomiting.   Musculoskeletal: Positive for back pain.   All other systems reviewed and are negative.        ACTIVE PROBLEMS  Patient Active Problem List    Diagnosis   • Angio-edema [T78.3XXA]   • Partial small bowel obstruction (CMS/HCC) [K56.600]   • Other chronic pain [G89.29]   • Lumbar facet arthropathy [M47.816]   • Chronic bilateral low back pain without sciatica [M54.5, G89.29]         PAST MEDICAL HISTORY  Past Medical History:   Diagnosis Date   • Anxiety    • Arthritis    • Colon polyp    • COPD (chronic obstructive pulmonary disease) (CMS/HCC)    • Depression    • Diabetes mellitus (CMS/HCC)    • Diverticulitis    • Elevated cholesterol    • Hyperlipidemia    • Hypertension    • Sleep apnea    • Stroke (CMS/HCC)          SURGICAL HISTORY  Past Surgical History:   Procedure Laterality Date   • COLONOSCOPY     • COLOSTOMY      Dr. Gupta   • COLOSTOMY REVISION  2006    Diverticulitis-Dr. Gupta   • INCISIONAL HERNIA REPAIR     • ORIF ANKLE FRACTURE Right          FAMILY HISTORY  Family History   Problem Relation Age of Onset   • Depression Mother    • Depression Father    • Kidney disease Father    • COPD Father    • Hypertension Father    • Colon cancer  Neg Hx    • Colon polyps Neg Hx          SOCIAL HISTORY  Social History     Occupational History   • Occupation:    Tobacco Use   • Smoking status: Former Smoker     Types: Electronic Cigarette   • Smokeless tobacco: Never Used   Substance and Sexual Activity   • Alcohol use: Yes     Comment: rarely   • Drug use: No   • Sexual activity: Yes     Partners: Female       Debilities/Disabilities Identified: None    Emotional Behavior: Appropriate    CURRENT MEDICATIONS    Current Outpatient Medications:   •  ALPRAZolam (XANAX) 0.25 MG tablet, Take 1 tablet by mouth Daily., Disp: 30 tablet, Rfl: 0  •  amLODIPine (NORVASC) 2.5 MG tablet, Take 1 tablet by mouth Daily., Disp: 30 tablet, Rfl: 1  •  ARIPiprazole (ABILIFY) 5 MG tablet, Take 1 tablet by mouth Daily., Disp: 90 tablet, Rfl: 3  •  fenofibrate (TRICOR) 145 MG tablet, Take 1 tablet by mouth Daily. (Patient taking differently: Take 145 mg by mouth Every Night.), Disp: 90 tablet, Rfl: 3  •  gabapentin (NEURONTIN) 800 MG tablet, Take 900 mg by mouth Every Night., Disp: , Rfl:   •  glycopyrrolate (ROBINUL) 1 MG tablet, 1 tablet po every 6 hours PRN epigastric abdominal pain, Disp: 16 tablet, Rfl: 0  •  metFORMIN (GLUCOPHAGE) 500 MG tablet, Take 1 tablet by mouth 3 (Three) Times a Day., Disp: 270 tablet, Rfl: 3  •  MethylPREDNISolone (MEDROL, ADAM,) 4 MG tablet, Take as directed on package instructions., Disp: 1 each, Rfl: 0  •  omeprazole (PRILOSEC) 20 MG capsule, Take 1 capsule by mouth Daily for 30 days. (Patient taking differently: Take 20 mg by mouth Every Night.), Disp: 30 capsule, Rfl: 0  •  promethazine (PHENERGAN) 25 MG tablet, Take 1 tablet by mouth Every 6 (Six) Hours As Needed for Nausea or Vomiting for up to 10 doses., Disp: 10 tablet, Rfl: 0  •  simvastatin (ZOCOR) 40 MG tablet, Take 1 tablet by mouth Daily., Disp: 90 tablet, Rfl: 3  •  traZODone (DESYREL) 100 MG tablet, Take 1 tablet by mouth Every Night., Disp: 90 tablet, Rfl: 3  •   "venlafaxine (EFFEXOR) 75 MG tablet, Take 2 tablets by mouth Daily., Disp: 90 tablet, Rfl: 3  •  venlafaxine (EFFEXOR) 75 MG tablet, Take 75 mg by mouth Every Night., Disp: , Rfl:     ALLERGIES  Lisinopril and Tamiflu [oseltamivir phosphate]    VITALS  Vitals:    01/17/19 1221   Weight: 103 kg (228 lb)   Height: 162.6 cm (64.02\")       LAST RESULTS   Admission on 01/13/2019, Discharged on 01/14/2019   Component Date Value Ref Range Status   • Extra Tube 01/13/2019 hold for add-on   Final    Auto resulted   • Extra Tube 01/13/2019 Hold for add-ons.   Final    Auto resulted.   • Extra Tube 01/13/2019 hold for add-on   Final    Auto resulted   • Extra Tube 01/13/2019 Hold for add-ons.   Final    Auto resulted.   • Glucose 01/13/2019 201* 65 - 99 mg/dL Final   • BUN 01/13/2019 24* 6 - 20 mg/dL Final   • Creatinine 01/13/2019 0.84  0.76 - 1.27 mg/dL Final   • Sodium 01/13/2019 139  136 - 145 mmol/L Final   • Potassium 01/13/2019 4.5  3.5 - 5.2 mmol/L Final   • Chloride 01/13/2019 104  98 - 107 mmol/L Final   • CO2 01/13/2019 20.2* 22.0 - 29.0 mmol/L Final   • Calcium 01/13/2019 9.6  8.6 - 10.5 mg/dL Final   • eGFR Non African Amer 01/13/2019 95  >60 mL/min/1.73 Final   • BUN/Creatinine Ratio 01/13/2019 28.6* 7.0 - 25.0 Final   • Anion Gap 01/13/2019 14.8  mmol/L Final   • WBC 01/13/2019 9.98  4.80 - 10.80 10*3/mm3 Final   • RBC 01/13/2019 4.09* 4.70 - 6.10 10*6/mm3 Final   • Hemoglobin 01/13/2019 12.1* 14.0 - 18.0 g/dL Final   • Hematocrit 01/13/2019 36.8* 42.0 - 52.0 % Final   • MCV 01/13/2019 90.0  80.0 - 94.0 fL Final   • MCH 01/13/2019 29.6  27.0 - 31.0 pg Final   • MCHC 01/13/2019 32.9  31.0 - 37.0 g/dL Final   • RDW 01/13/2019 13.0  11.5 - 14.5 % Final   • RDW-SD 01/13/2019 42.3  37.0 - 54.0 fl Final   • MPV 01/13/2019 10.5* 7.4 - 10.4 fL Final   • Platelets 01/13/2019 548* 140 - 500 10*3/mm3 Final   • Neutrophil % 01/13/2019 55.1  45.0 - 70.0 % Final   • Lymphocyte % 01/13/2019 33.8  20.0 - 45.0 % Final   • " Monocyte % 01/13/2019 8.6* 3.0 - 8.0 % Final   • Eosinophil % 01/13/2019 1.1  0.0 - 4.0 % Final   • Basophil % 01/13/2019 0.6  0.0 - 2.0 % Final   • Immature Grans % 01/13/2019 0.8* 0.0 - 0.5 % Final   • Neutrophils, Absolute 01/13/2019 5.50  1.50 - 8.30 10*3/mm3 Final   • Lymphocytes, Absolute 01/13/2019 3.37  0.60 - 4.80 10*3/mm3 Final   • Monocytes, Absolute 01/13/2019 0.86  0.00 - 1.00 10*3/mm3 Final   • Eosinophils, Absolute 01/13/2019 0.11  0.10 - 0.30 10*3/mm3 Final   • Basophils, Absolute 01/13/2019 0.06  0.00 - 0.20 10*3/mm3 Final   • Immature Grans, Absolute 01/13/2019 0.08* 0.00 - 0.03 10*3/mm3 Final   • nRBC 01/13/2019 0.0  0.0 - 0.0 /100 WBC Final   • Glucose 01/13/2019 271* 70 - 130 mg/dL Final   • Glucose 01/14/2019 468* 70 - 130 mg/dL Final   • Glucose 01/14/2019 407* 70 - 130 mg/dL Final   • Glucose 01/14/2019 305* 70 - 130 mg/dL Final     Xr Chest 2 View    Result Date: 1/7/2019  Narrative: CHEST X-RAY, 1/7/2019     HISTORY: 58-year-old male in the ED complaining of one week history cough, weakness and shortness of air. Recently diagnosed with influenza.  TECHNIQUE: AP and lateral upright chest x-ray.  FINDINGS: Lung volumes are low, but the lungs appear clear. No visible pulmonary infiltrate, pulmonary edema or pleural effusion. Borderline cardiomegaly. No change since 8/18/2016.      Impression: No active disease.  This report was finalized on 1/7/2019 9:23 AM by Dr. Jayjay Arredondo MD.      Us Gallbladder    Result Date: 1/7/2019  Narrative: ULTRASOUND ABDOMEN, LIMITED, 1/7/2019  HISTORY: 58-year-old male in the ED complaining of 2 week history of epigastric abdomen pain  TECHNIQUE: Grayscale ultrasound imaging of the right upper quadrant was performed.  COMPARISON: *  CT abdomen/pelvis, 11/15/2018.  FINDINGS: The gallbladder is normal in ultrasound appearance. There is no visible cholelithiasis, gallbladder wall thickening or adjacent fluid collection. There is no intrahepatic or extra  hepatic bile duct dilatation (CBD 3 mm).  Diffusely echodense liver parenchyma suggests diffuse hepatic steatosis. The pancreas is completely obscured by overlying bowel gas. Right kidney is negative with no hydronephrosis.      Impression: 1. Negative gallbladder ultrasound examination. No cholelithiasis or bile duct dilatation. 2. Diffuse hepatic steatosis.  This report was finalized on 1/7/2019 8:44 AM by Dr. Jayjay Arredondo MD.        PHYSICAL EXAM  Physical Exam   Constitutional: He is oriented to person, place, and time. He appears well-developed and well-nourished.   Eyes: Conjunctivae and EOM are normal. Pupils are equal, round, and reactive to light. No scleral icterus.   Neck: Normal range of motion. Neck supple. No thyromegaly present.   Cardiovascular: Normal rate, regular rhythm, normal heart sounds and intact distal pulses. Exam reveals no gallop.   No murmur heard.  Pulmonary/Chest: Effort normal and breath sounds normal. He has no wheezes. He has no rales.   Abdominal: Soft. Bowel sounds are normal. He exhibits no shifting dullness, no distension, no fluid wave, no abdominal bruit, no ascites and no mass. There is no hepatosplenomegaly. There is no tenderness. There is no guarding and negative Clinton's sign. Hernia confirmed negative in the ventral area.   Musculoskeletal: Normal range of motion. He exhibits no edema.   Lymphadenopathy:     He has no cervical adenopathy.   Neurological: He is alert and oriented to person, place, and time.   Skin: Skin is warm and dry. No rash noted. He is not diaphoretic. No erythema.       ASSESSMENT  Diagnoses and all orders for this visit:    Adenomatous polyp of colon, unspecified part of colon  -     Case Request; Standing  -     Case Request    Partial small bowel obstruction (CMS/HCC)    Angioedema, initial encounter    Epigastric pain    GREGORY (obstructive sleep apnea)    Other orders  -     Follow Anesthesia Guidelines / Standing Orders; Future  -     Obtain  informed consent; Standing  -     Verify bowel prep was successful; Standing  -     Give tap water enema if bowel prep was insufficient; Standing          PLAN    If dyspepsia recurs between now and his Colonoscopy then will add EGD too    Return if symptoms worsen or fail to improve.

## 2019-01-17 NOTE — OUTREACH NOTE
LAG < 7 Survey      Responses   Facility patient discharged from?  [facility]   Does the patient have one of the following disease processes/diagnoses(primary or secondary)?  Other   Is there a successful TCM telephone encounter documented?  No   [facility]AG <7 Attempt successful?  No   Unsuccessful attempts  Attempt 2          Chantelle Diaz RN

## 2019-01-21 ENCOUNTER — OFFICE VISIT (OUTPATIENT)
Dept: FAMILY MEDICINE CLINIC | Facility: CLINIC | Age: 59
End: 2019-01-21

## 2019-01-21 VITALS
OXYGEN SATURATION: 98 % | RESPIRATION RATE: 14 BRPM | HEART RATE: 101 BPM | WEIGHT: 228.5 LBS | SYSTOLIC BLOOD PRESSURE: 138 MMHG | DIASTOLIC BLOOD PRESSURE: 74 MMHG | HEIGHT: 64 IN | TEMPERATURE: 98 F | BODY MASS INDEX: 39.01 KG/M2

## 2019-01-21 DIAGNOSIS — J44.9 CHRONIC OBSTRUCTIVE PULMONARY DISEASE, UNSPECIFIED COPD TYPE (HCC): Primary | ICD-10-CM

## 2019-01-21 PROCEDURE — 99213 OFFICE O/P EST LOW 20 MIN: CPT | Performed by: FAMILY MEDICINE

## 2019-01-21 RX ORDER — BUDESONIDE AND FORMOTEROL FUMARATE DIHYDRATE 160; 4.5 UG/1; UG/1
2 AEROSOL RESPIRATORY (INHALATION)
Qty: 1 INHALER | Refills: 0 | COMMUNITY
Start: 2019-01-21 | End: 2019-02-21 | Stop reason: SDUPTHER

## 2019-01-21 NOTE — PROGRESS NOTES
Lloyd Greene is a 58 y.o. male.     Chief Complaint   Patient presents with   • Shortness of Breath     Patient is folloing up on ER visit. He had flu and allergic reaction to Lisinopril.       HPI     Patient presents the office today to follow-up on some shortness of breath.  Patient was recently admitted and discharged from the hospital after having some complications from the flu.  He states that he is feeling somewhat better.  Continues to have short of breath with exertion.    The following portions of the patient's history were reviewed and updated as appropriate: allergies, current medications, past family history, past medical history, past social history, past surgical history and problem list.    Review of Systems   Constitutional: Positive for fatigue.   Respiratory: Positive for shortness of breath.    Musculoskeletal: Positive for joint swelling.   All other systems reviewed and are negative.      Objective  Vitals:    01/21/19 1419   BP: 138/74   Pulse: 101   Resp: 14   Temp: 98 °F (36.7 °C)   SpO2: 98%       Physical Exam   Constitutional: He is oriented to person, place, and time. He appears well-developed and well-nourished. No distress.   HENT:   Head: Normocephalic and atraumatic.   Right Ear: External ear normal.   Left Ear: External ear normal.   Nose: Nose normal.   Mouth/Throat: Oropharynx is clear and moist.   Eyes: Conjunctivae and EOM are normal. Pupils are equal, round, and reactive to light. Right eye exhibits no discharge. Left eye exhibits no discharge. No scleral icterus.   Cardiovascular: Normal rate, regular rhythm and normal heart sounds.   Pulmonary/Chest: Effort normal and breath sounds normal. No respiratory distress. He has no wheezes. He has no rales.   Abdominal: Soft. Bowel sounds are normal. He exhibits no distension. There is no tenderness.   Neurological: He is alert and oriented to person, place, and time.   Skin: Skin is warm and dry. He is not diaphoretic.    Nursing note and vitals reviewed.        Current Outpatient Medications:   •  ALPRAZolam (XANAX) 0.25 MG tablet, Take 1 tablet by mouth Daily., Disp: 30 tablet, Rfl: 0  •  amLODIPine (NORVASC) 2.5 MG tablet, Take 1 tablet by mouth Daily., Disp: 30 tablet, Rfl: 1  •  ARIPiprazole (ABILIFY) 5 MG tablet, Take 1 tablet by mouth Daily., Disp: 90 tablet, Rfl: 3  •  fenofibrate (TRICOR) 145 MG tablet, Take 1 tablet by mouth Daily. (Patient taking differently: Take 145 mg by mouth Every Night.), Disp: 90 tablet, Rfl: 3  •  gabapentin (NEURONTIN) 800 MG tablet, Take 900 mg by mouth Every Night., Disp: , Rfl:   •  glycopyrrolate (ROBINUL) 1 MG tablet, 1 tablet po every 6 hours PRN epigastric abdominal pain, Disp: 16 tablet, Rfl: 0  •  metFORMIN (GLUCOPHAGE) 500 MG tablet, Take 1 tablet by mouth 3 (Three) Times a Day., Disp: 270 tablet, Rfl: 3  •  MethylPREDNISolone (MEDROL, ADAM,) 4 MG tablet, Take as directed on package instructions., Disp: 1 each, Rfl: 0  •  omeprazole (PRILOSEC) 20 MG capsule, Take 1 capsule by mouth Daily for 30 days. (Patient taking differently: Take 20 mg by mouth Every Night.), Disp: 30 capsule, Rfl: 0  •  promethazine (PHENERGAN) 25 MG tablet, Take 1 tablet by mouth Every 6 (Six) Hours As Needed for Nausea or Vomiting for up to 10 doses., Disp: 10 tablet, Rfl: 0  •  simvastatin (ZOCOR) 40 MG tablet, Take 1 tablet by mouth Daily., Disp: 90 tablet, Rfl: 3  •  traZODone (DESYREL) 100 MG tablet, Take 1 tablet by mouth Every Night., Disp: 90 tablet, Rfl: 3  •  venlafaxine (EFFEXOR) 75 MG tablet, Take 2 tablets by mouth Daily., Disp: 90 tablet, Rfl: 3  •  venlafaxine (EFFEXOR) 75 MG tablet, Take 75 mg by mouth Every Night., Disp: , Rfl:   •  budesonide-formoterol (SYMBICORT) 160-4.5 MCG/ACT inhaler, Inhale 2 puffs 2 (Two) Times a Day., Disp: 1 inhaler, Rfl: 0  Current outpatient and discharge medications have been reconciled for the patient.  Reviewed by: Donell Diggs MD      Procedures    Lab Results  (most recent)     None                  Lloyd was seen today for shortness of breath.    Diagnoses and all orders for this visit:    Chronic obstructive pulmonary disease, unspecified COPD type (CMS/[facility])  -     budesonide-formoterol (SYMBICORT) 160-4.5 MCG/ACT inhaler; Inhale 2 puffs 2 (Two) Times a Day.    I reviewed the patient's chart from his hospital admission.  We will try a 30 day sample of Symbicort to see if this helps with his shortness of breath.  Otherwise advised gradually increasing his physical activity.      Return in about 4 weeks (around 2/18/2019) for Recheck.      Donell Diggs MD

## 2019-01-22 ENCOUNTER — OFFICE VISIT (OUTPATIENT)
Dept: PAIN MEDICINE | Facility: CLINIC | Age: 59
End: 2019-01-22

## 2019-01-22 VITALS
OXYGEN SATURATION: 95 % | DIASTOLIC BLOOD PRESSURE: 92 MMHG | WEIGHT: 226 LBS | TEMPERATURE: 98.6 F | BODY MASS INDEX: 38.58 KG/M2 | HEIGHT: 64 IN | RESPIRATION RATE: 15 BRPM | HEART RATE: 109 BPM | SYSTOLIC BLOOD PRESSURE: 143 MMHG

## 2019-01-22 DIAGNOSIS — G89.29 OTHER CHRONIC PAIN: Primary | ICD-10-CM

## 2019-01-22 DIAGNOSIS — M47.816 LUMBAR FACET ARTHROPATHY: ICD-10-CM

## 2019-01-22 PROCEDURE — 99212 OFFICE O/P EST SF 10 MIN: CPT | Performed by: NURSE PRACTITIONER

## 2019-01-22 NOTE — PROGRESS NOTES
"CHIEF COMPLAINT  Pt is here today to f/u on back pain    Subjective   Lloyd Greene is a 58 y.o. male  who presents to the office for follow-up of procedure.  He completed a bilateral L2-L5 MBB   on  12-19-18 performed by Dr. DANIEL for management of LOW BACK PAIN. Patient reports 50% relief from the procedure for 4-5 days.  He reports he had relief for about 4-5 days, but also of note, was diagnosed with flu around that time. \"It's hard to tell.\"    Complains of pain in his low back. Today his pain is 7/10VAS. Describes the pain as continuous aching. Pain increases with walking, standing and illness; pain decreases with medication and rest.  Was stopped from Diclofenac at ED. Continues with Gabapentin 3000 mg 3/HS. ADL\"s by self. Denies any bowel or bladder changes since last office visit.    Had flu after ting. Found out he was allergic to Tamiflu and went to ED. A week later, had an allergic reaction to Lisinopril    Back Pain   This is a chronic problem. The current episode started more than 1 year ago. The problem occurs constantly. The problem has been waxing and waning since onset. The pain is present in the lumbar spine and sacro-iliac. The quality of the pain is described as burning. The pain radiates to the right thigh. The pain is at a severity of 7/10. The pain is worse during the day. The symptoms are aggravated by bending, position, standing and twisting. Stiffness is present all day. Pertinent negatives include no abdominal pain, bladder incontinence, bowel incontinence, chest pain, dysuria, fever, headaches, leg pain, numbness, paresis, paresthesias, pelvic pain, perianal numbness, tingling, weakness or weight loss. Risk factors include obesity and sedentary lifestyle.            Past pain medications:   norco 10 mg 5/day - helping  mobic - helping      Current pain medications:   Gabapentin 300 mg tid - picking up today to restart  Diclofenac 75 mg bid - not sure if it helping     Past " "therapies:  Physical Therapy: yes-- \"A couple of years ago.\" --- minimal relief  Chiropractor: yes  Massage Therapy: no  TENS: yes  Neck or back surgery: no  Past pain management: yes ([facility])     Previous Injections: bilateral L2-L5 MBB 12-19-18  Effect of Injection (%): 50%  Length of Relief: 4-5 days      Previous Injections: bilateral L2-L5 MBB 11-14-18  Effect of Injection (%): 50%  Length of Relief: 4 days        Previous Injections: none- before seeing Dr. Hargrove- reports cost as a deterrent. \"If medicare doesn't 100% cover it, I can't do it.\" Reports he is on disability and can't afford anything extra.     PEG Assessment   What number best describes your pain on average in the past week?7  What number best describes how, during the past week, pain has interfered with your enjoyment of life?7  What number best describes how, during the past week, pain has interfered with your general activity? 7     The following portions of the patient's history were reviewed and updated as appropriate: allergies, current medications, past family history, past medical history, past social history, past surgical history and problem list.    Review of Systems   Constitutional: Negative for chills, fever and weight loss.   Respiratory: Positive for shortness of breath.    Cardiovascular: Negative for chest pain.   Gastrointestinal: Negative for abdominal pain, bowel incontinence, constipation, diarrhea, nausea and vomiting.   Genitourinary: Negative for bladder incontinence, difficulty urinating, dysuria, enuresis and pelvic pain.   Musculoskeletal: Positive for back pain.   Neurological: Negative for dizziness, tingling, weakness, light-headedness, numbness, headaches and paresthesias.   Psychiatric/Behavioral: Positive for sleep disturbance. Negative for confusion, hallucinations, self-injury and suicidal ideas. The patient is not nervous/anxious.        Vitals:    01/22/19 1237   BP: 143/92   Pulse: 109 " "  Resp: 15   Temp: 98.6 °F (37 °C)   SpO2: 95%   Weight: 103 kg (226 lb)   Height: 162.6 cm (64.02\")   PainSc:   7   PainLoc: Back     Objective   Physical Exam   Constitutional: He is oriented to person, place, and time. Vital signs are normal. He appears well-developed and well-nourished. He is cooperative.   HENT:   Head: Normocephalic and atraumatic.   Nose: Nose normal.   Eyes: Conjunctivae and lids are normal.   Cardiovascular: Normal rate.   Pulmonary/Chest: Effort normal.   Abdominal:   obese   Musculoskeletal:        Lumbar back: He exhibits decreased range of motion, tenderness and bony tenderness (moderate tenderness of bilateral L2-L5 facets-- + loading manuever).   Neurological: He is alert and oriented to person, place, and time. Gait normal.   Skin: Skin is warm, dry and intact.   Psychiatric: He has a normal mood and affect. His speech is normal and behavior is normal. Judgment and thought content normal. Cognition and memory are normal.   Nursing note and vitals reviewed.    Assessment/Plan   Lloyd was seen today for back pain.    Diagnoses and all orders for this visit:    Other chronic pain    Lumbar facet arthropathy      --- The urine drug screen confirmation from 9-18-18 has been reviewed and the result is ABNORMAL(presence of MARIJUANA) based on patient history and BENI report  --- NO PLANS FOR OPIOID THERAPY. MEETS CRITERIA FOR OPIOID USE DISORDER.  --- Applauded on weight loss efforts  --- Consider lumbar RFL. Patient wants to wait at this time. Concerns for cost. This would be next step in our plan of care. Reviewed with patient.  --- Reviewed Dr. Paul opioid risk assessment with patient. Reviewed no plans for opioid therapy ever. Suggested evaluation with [facility]. Patient refused.    --- Follow-up 3-6 months or sooner if needed.       BENI REPORT    BENI report has been reviewed and scanned into the patient's chart.    As the clinician, I personally reviewed the BENI from " 1-21-19  while the patient was in the office today.        EMR Dragon/Transcription disclaimer:   Much of this encounter note is an electronic transcription/translation of spoken language to printed text. The electronic translation of spoken language may permit erroneous, or at times, nonsensical words or phrases to be inadvertently transcribed; Although I have reviewed the note for such errors, some may still exist.

## 2019-02-21 ENCOUNTER — OFFICE VISIT (OUTPATIENT)
Dept: FAMILY MEDICINE CLINIC | Facility: CLINIC | Age: 59
End: 2019-02-21

## 2019-02-21 VITALS
RESPIRATION RATE: 16 BRPM | OXYGEN SATURATION: 99 % | BODY MASS INDEX: 40.63 KG/M2 | HEART RATE: 66 BPM | TEMPERATURE: 98 F | SYSTOLIC BLOOD PRESSURE: 136 MMHG | HEIGHT: 64 IN | DIASTOLIC BLOOD PRESSURE: 68 MMHG | WEIGHT: 238 LBS

## 2019-02-21 DIAGNOSIS — E11.9 TYPE 2 DIABETES MELLITUS WITHOUT COMPLICATION, WITHOUT LONG-TERM CURRENT USE OF INSULIN (HCC): Primary | ICD-10-CM

## 2019-02-21 DIAGNOSIS — J44.9 CHRONIC OBSTRUCTIVE PULMONARY DISEASE, UNSPECIFIED COPD TYPE (HCC): ICD-10-CM

## 2019-02-21 PROCEDURE — 99214 OFFICE O/P EST MOD 30 MIN: CPT | Performed by: FAMILY MEDICINE

## 2019-02-21 RX ORDER — BUDESONIDE AND FORMOTEROL FUMARATE DIHYDRATE 160; 4.5 UG/1; UG/1
2 AEROSOL RESPIRATORY (INHALATION)
Qty: 1 INHALER | Refills: 11 | Status: SHIPPED | OUTPATIENT
Start: 2019-02-21 | End: 2022-12-05

## 2019-02-21 NOTE — PROGRESS NOTES
Lloyd Greene is a 58 y.o. male.     Chief Complaint   Patient presents with   • COPD     Patient is having a follow up.       Shortness of Breath   This is a recurrent problem. The current episode started more than 1 month ago. The problem occurs daily. The problem has been waxing and waning. The average episode lasts 4 minutes. Pertinent negatives include no abdominal pain, chest pain, claudication, coryza, ear pain, fever, headaches, hemoptysis, leg pain, leg swelling, neck pain, orthopnea, PND, rash, rhinorrhea, sore throat, sputum production, swollen glands, syncope, vomiting or wheezing. The symptoms are aggravated by exercise, any activity and weather changes.      Continues to have some shortness of breath but it has improved with the use of Symbicort.  Requesting a prescription be placed.  Patient states the diabetes has been fairly uncontrolled.  Checking his sugars regularly and they are consistently over 200.  Currently only taking 500 mg of metformin 3 times daily.  Has difficult time with weight loss as well as eating a proper diet.      The following portions of the patient's history were reviewed and updated as appropriate: allergies, current medications, past family history, past medical history, past social history, past surgical history and problem list.    Review of Systems   Constitutional: Negative for fever.   HENT: Negative for ear pain, rhinorrhea and sore throat.    Respiratory: Positive for shortness of breath. Negative for hemoptysis, sputum production and wheezing.    Cardiovascular: Negative for chest pain, orthopnea, claudication, leg swelling, syncope and PND.   Gastrointestinal: Negative for abdominal pain and vomiting.   Musculoskeletal: Negative for neck pain.   Skin: Negative for rash.   Neurological: Negative for headaches.   All other systems reviewed and are negative.      Objective  Vitals:    02/21/19 0933   BP: 136/68   Pulse: 66   Resp: 16   Temp: 98 °F (36.7 °C)    SpO2: 99%       Physical Exam   Constitutional: He is oriented to person, place, and time. He appears well-developed and well-nourished. No distress.   HENT:   Head: Normocephalic and atraumatic.   Right Ear: External ear normal.   Left Ear: External ear normal.   Nose: Nose normal.   Mouth/Throat: Oropharynx is clear and moist.   Eyes: Conjunctivae and EOM are normal. Pupils are equal, round, and reactive to light. Right eye exhibits no discharge. Left eye exhibits no discharge. No scleral icterus.   Cardiovascular: Normal rate, regular rhythm and normal heart sounds.   Pulmonary/Chest: Effort normal and breath sounds normal. No respiratory distress. He has no wheezes. He has no rales.   Abdominal: Soft. Bowel sounds are normal. He exhibits no distension. There is no tenderness.   Neurological: He is alert and oriented to person, place, and time.   Skin: Skin is warm and dry. He is not diaphoretic.   Nursing note and vitals reviewed.        Current Outpatient Medications:   •  ALPRAZolam (XANAX) 0.25 MG tablet, Take 1 tablet by mouth Daily., Disp: 30 tablet, Rfl: 0  •  amLODIPine (NORVASC) 2.5 MG tablet, Take 1 tablet by mouth Daily., Disp: 30 tablet, Rfl: 1  •  ARIPiprazole (ABILIFY) 5 MG tablet, Take 1 tablet by mouth Daily., Disp: 90 tablet, Rfl: 3  •  budesonide-formoterol (SYMBICORT) 160-4.5 MCG/ACT inhaler, Inhale 2 puffs 2 (Two) Times a Day., Disp: 1 inhaler, Rfl: 11  •  fenofibrate (TRICOR) 145 MG tablet, Take 1 tablet by mouth Daily. (Patient taking differently: Take 145 mg by mouth Every Night.), Disp: 90 tablet, Rfl: 3  •  gabapentin (NEURONTIN) 800 MG tablet, Take 900 mg by mouth Every Night., Disp: , Rfl:   •  glycopyrrolate (ROBINUL) 1 MG tablet, 1 tablet po every 6 hours PRN epigastric abdominal pain, Disp: 16 tablet, Rfl: 0  •  simvastatin (ZOCOR) 40 MG tablet, Take 1 tablet by mouth Daily., Disp: 90 tablet, Rfl: 3  •  traZODone (DESYREL) 100 MG tablet, Take 1 tablet by mouth Every Night., Disp:  90 tablet, Rfl: 3  •  venlafaxine (EFFEXOR) 75 MG tablet, Take 2 tablets by mouth Daily., Disp: 90 tablet, Rfl: 3  •  promethazine (PHENERGAN) 25 MG tablet, Take 1 tablet by mouth Every 6 (Six) Hours As Needed for Nausea or Vomiting for up to 10 doses., Disp: 10 tablet, Rfl: 0  •  SITagliptin-metFORMIN HCl ER (JANUMET XR)  MG tablet, Take 2 tablets by mouth Daily., Disp: 180 tablet, Rfl: 3  Current outpatient and discharge medications have been reconciled for the patient.  Reviewed by: Donell Diggs MD      Procedures    Lab Results (most recent)     None                  Lloyd was seen today for copd.    Diagnoses and all orders for this visit:    Type 2 diabetes mellitus without complication, without long-term current use of insulin (CMS/HCC)  -     SITagliptin-metFORMIN HCl ER (JANUMET XR)  MG tablet; Take 2 tablets by mouth Daily.    Chronic obstructive pulmonary disease, unspecified COPD type (CMS/HCC)  -     budesonide-formoterol (SYMBICORT) 160-4.5 MCG/ACT inhaler; Inhale 2 puffs 2 (Two) Times a Day.    We will discontinue metformin and start Janumet extended release.  Patient is to take 2 tablets of the 50/1000 daily.  Also prescription given for Symbicort.  Will monitor closely.  Advised patient to improve diet and exercise.      Return in about 2 weeks (around 3/7/2019) for Recheck.      Donell Diggs MD

## 2019-03-06 ENCOUNTER — OFFICE VISIT (OUTPATIENT)
Dept: FAMILY MEDICINE CLINIC | Facility: CLINIC | Age: 59
End: 2019-03-06

## 2019-03-06 VITALS
OXYGEN SATURATION: 96 % | SYSTOLIC BLOOD PRESSURE: 118 MMHG | DIASTOLIC BLOOD PRESSURE: 62 MMHG | RESPIRATION RATE: 18 BRPM | WEIGHT: 263.4 LBS | HEIGHT: 64 IN | TEMPERATURE: 98 F | HEART RATE: 66 BPM | BODY MASS INDEX: 44.97 KG/M2

## 2019-03-06 DIAGNOSIS — E11.9 TYPE 2 DIABETES MELLITUS WITHOUT COMPLICATION, WITHOUT LONG-TERM CURRENT USE OF INSULIN (HCC): Primary | ICD-10-CM

## 2019-03-06 DIAGNOSIS — I10 HYPERTENSION, ESSENTIAL: ICD-10-CM

## 2019-03-06 DIAGNOSIS — E78.01 FAMILIAL HYPERCHOLESTEROLEMIA: ICD-10-CM

## 2019-03-06 PROCEDURE — 99214 OFFICE O/P EST MOD 30 MIN: CPT | Performed by: FAMILY MEDICINE

## 2019-03-06 NOTE — PROGRESS NOTES
Lloyd Greene is a 58 y.o. male.     Chief Complaint   Patient presents with   • Hypertension     Patient is following up .       HPI     Patient presents the office today to follow-up on diabetes, hypertension, and hyperlipidemia.  Has been quite some time since patient has had blood work.  Currently taking tolerating amlodipine, fenofibrate, simvastatin, Janumet extended release.  Patient does not adhere to proper diet and exercise.    The following portions of the patient's history were reviewed and updated as appropriate: allergies, current medications, past family history, past medical history, past social history, past surgical history and problem list.    Review of Systems   Constitutional: Negative for activity change and fatigue.   All other systems reviewed and are negative.      Objective  Vitals:    03/06/19 1052   BP: 118/62   Pulse: 66   Resp: 18   Temp: 98 °F (36.7 °C)   SpO2: 96%       Physical Exam   Constitutional: He is oriented to person, place, and time. He appears well-developed and well-nourished. No distress.   HENT:   Head: Normocephalic and atraumatic.   Right Ear: External ear normal.   Left Ear: External ear normal.   Nose: Nose normal.   Mouth/Throat: Oropharynx is clear and moist.   Eyes: Conjunctivae and EOM are normal. Pupils are equal, round, and reactive to light. Right eye exhibits no discharge. Left eye exhibits no discharge. No scleral icterus.   Cardiovascular: Normal rate, regular rhythm and normal heart sounds.   Pulmonary/Chest: Effort normal and breath sounds normal. No respiratory distress. He has no wheezes. He has no rales.   Abdominal: Soft. Bowel sounds are normal. He exhibits no distension. There is no tenderness.   Neurological: He is alert and oriented to person, place, and time.   Skin: Skin is warm and dry. He is not diaphoretic.   Nursing note and vitals reviewed.        Current Outpatient Medications:   •  ALPRAZolam (XANAX) 0.25 MG tablet, Take 1 tablet  by mouth Daily., Disp: 30 tablet, Rfl: 0  •  amLODIPine (NORVASC) 2.5 MG tablet, Take 1 tablet by mouth Daily., Disp: 30 tablet, Rfl: 1  •  ARIPiprazole (ABILIFY) 5 MG tablet, Take 1 tablet by mouth Daily., Disp: 90 tablet, Rfl: 3  •  budesonide-formoterol (SYMBICORT) 160-4.5 MCG/ACT inhaler, Inhale 2 puffs 2 (Two) Times a Day., Disp: 1 inhaler, Rfl: 11  •  fenofibrate (TRICOR) 145 MG tablet, Take 1 tablet by mouth Daily. (Patient taking differently: Take 145 mg by mouth Every Night.), Disp: 90 tablet, Rfl: 3  •  gabapentin (NEURONTIN) 800 MG tablet, Take 900 mg by mouth Every Night., Disp: , Rfl:   •  glycopyrrolate (ROBINUL) 1 MG tablet, 1 tablet po every 6 hours PRN epigastric abdominal pain, Disp: 16 tablet, Rfl: 0  •  promethazine (PHENERGAN) 25 MG tablet, Take 1 tablet by mouth Every 6 (Six) Hours As Needed for Nausea or Vomiting for up to 10 doses., Disp: 10 tablet, Rfl: 0  •  simvastatin (ZOCOR) 40 MG tablet, Take 1 tablet by mouth Daily., Disp: 90 tablet, Rfl: 3  •  SITagliptin-metFORMIN HCl ER (JANUMET XR)  MG tablet, Take 2 tablets by mouth Daily., Disp: 180 tablet, Rfl: 3  •  traZODone (DESYREL) 100 MG tablet, Take 1 tablet by mouth Every Night., Disp: 90 tablet, Rfl: 3  •  venlafaxine (EFFEXOR) 75 MG tablet, Take 2 tablets by mouth Daily., Disp: 90 tablet, Rfl: 3  Current outpatient and discharge medications have been reconciled for the patient.  Reviewed by: Donell Diggs MD      Procedures    Lab Results (most recent)     None                  Lloyd was seen today for hypertension.    Diagnoses and all orders for this visit:    Type 2 diabetes mellitus without complication, without long-term current use of insulin (CMS/[facility])  -     Comprehensive Metabolic Panel  -     Hemoglobin A1c    Familial hypercholesterolemia  -     Lipid Panel    Hypertension, essential  -     Comprehensive Metabolic Panel      Labs as above to evaluate the status of these chronic medical problems.  Will adjust  treatment plan accordingly.  Discussed the need to make improvements in both diet and exercise.    Return in about 3 months (around 6/6/2019) for Recheck.      Donell Diggs MD

## 2019-03-07 LAB
ALBUMIN SERPL-MCNC: 4.3 G/DL (ref 3.5–5.2)
ALBUMIN/GLOB SERPL: 1.8 G/DL
ALP SERPL-CCNC: 54 U/L (ref 39–117)
ALT SERPL-CCNC: 38 U/L (ref 1–41)
AST SERPL-CCNC: 24 U/L (ref 1–40)
BILIRUB SERPL-MCNC: 0.2 MG/DL (ref 0.1–1.2)
BUN SERPL-MCNC: 12 MG/DL (ref 6–20)
BUN/CREAT SERPL: 12.8 (ref 7–25)
CALCIUM SERPL-MCNC: 9.8 MG/DL (ref 8.6–10.5)
CHLORIDE SERPL-SCNC: 103 MMOL/L (ref 98–107)
CHOLEST SERPL-MCNC: 115 MG/DL (ref 0–200)
CO2 SERPL-SCNC: 21.9 MMOL/L (ref 22–29)
CREAT SERPL-MCNC: 0.94 MG/DL (ref 0.76–1.27)
GLOBULIN SER CALC-MCNC: 2.4 GM/DL
GLUCOSE SERPL-MCNC: 143 MG/DL (ref 65–99)
HBA1C MFR BLD: 9.4 % (ref 4.8–5.6)
HDLC SERPL-MCNC: 36 MG/DL (ref 40–60)
LDLC SERPL CALC-MCNC: 52 MG/DL (ref 0–100)
POTASSIUM SERPL-SCNC: 4.7 MMOL/L (ref 3.5–5.2)
PROT SERPL-MCNC: 6.7 G/DL (ref 6–8.5)
SODIUM SERPL-SCNC: 140 MMOL/L (ref 136–145)
TRIGL SERPL-MCNC: 137 MG/DL (ref 0–150)
VLDLC SERPL CALC-MCNC: 27.4 MG/DL (ref 5–40)

## 2019-03-17 DIAGNOSIS — F32.A ANXIETY AND DEPRESSION: ICD-10-CM

## 2019-03-17 DIAGNOSIS — E78.5 HYPERLIPIDEMIA, UNSPECIFIED HYPERLIPIDEMIA TYPE: ICD-10-CM

## 2019-03-17 DIAGNOSIS — F41.9 ANXIETY AND DEPRESSION: ICD-10-CM

## 2019-03-18 RX ORDER — FENOFIBRATE 145 MG/1
145 TABLET, COATED ORAL DAILY
Qty: 90 TABLET | Refills: 3 | Status: SHIPPED | OUTPATIENT
Start: 2019-03-18 | End: 2020-03-17

## 2019-03-18 RX ORDER — ARIPIPRAZOLE 5 MG/1
5 TABLET ORAL DAILY
Qty: 90 TABLET | Refills: 3 | Status: SHIPPED | OUTPATIENT
Start: 2019-03-18 | End: 2020-03-17

## 2019-03-18 RX ORDER — VENLAFAXINE 75 MG/1
150 TABLET ORAL DAILY
Qty: 90 TABLET | Refills: 3 | Status: SHIPPED | OUTPATIENT
Start: 2019-03-18 | End: 2019-07-25 | Stop reason: SDUPTHER

## 2019-03-18 RX ORDER — SIMVASTATIN 40 MG
40 TABLET ORAL DAILY
Qty: 90 TABLET | Refills: 3 | Status: SHIPPED | OUTPATIENT
Start: 2019-03-18 | End: 2020-06-04

## 2019-03-20 RX ORDER — GABAPENTIN 300 MG/1
CAPSULE ORAL
Qty: 270 CAPSULE | Refills: 0 | Status: SHIPPED | OUTPATIENT
Start: 2019-03-20 | End: 2019-09-10 | Stop reason: SDUPTHER

## 2019-03-26 RX ORDER — CALCIUM CITRATE/VITAMIN D3 200MG-6.25
TABLET ORAL
Qty: 100 EACH | Refills: 0 | Status: SHIPPED | OUTPATIENT
Start: 2019-03-26 | End: 2019-05-01 | Stop reason: SDUPTHER

## 2019-03-26 RX ORDER — GLUCOSAM/CHON-MSM1/C/MANG/BOSW 500-416.6
TABLET ORAL
Qty: 100 EACH | Refills: 5 | Status: SHIPPED | OUTPATIENT
Start: 2019-03-26 | End: 2021-04-09 | Stop reason: SDUPTHER

## 2019-04-11 ENCOUNTER — ANESTHESIA EVENT (OUTPATIENT)
Dept: PERIOP | Facility: HOSPITAL | Age: 59
End: 2019-04-11

## 2019-04-12 ENCOUNTER — ANESTHESIA (OUTPATIENT)
Dept: PERIOP | Facility: HOSPITAL | Age: 59
End: 2019-04-12

## 2019-04-12 ENCOUNTER — HOSPITAL ENCOUNTER (OUTPATIENT)
Facility: HOSPITAL | Age: 59
Setting detail: HOSPITAL OUTPATIENT SURGERY
Discharge: HOME | End: 2019-04-12
Attending: INTERNAL MEDICINE | Admitting: INTERNAL MEDICINE

## 2019-04-12 VITALS
TEMPERATURE: 98.4 F | HEART RATE: 63 BPM | DIASTOLIC BLOOD PRESSURE: 80 MMHG | RESPIRATION RATE: 18 BRPM | SYSTOLIC BLOOD PRESSURE: 129 MMHG | BODY MASS INDEX: 38.96 KG/M2 | WEIGHT: 227 LBS | OXYGEN SATURATION: 96 %

## 2019-04-12 DIAGNOSIS — D12.6 ADENOMATOUS POLYP OF COLON, UNSPECIFIED PART OF COLON: ICD-10-CM

## 2019-04-12 LAB — GLUCOSE BLDC GLUCOMTR-MCNC: 114 MG/DL (ref 70–130)

## 2019-04-12 PROCEDURE — 82962 GLUCOSE BLOOD TEST: CPT

## 2019-04-12 PROCEDURE — 88305 TISSUE EXAM BY PATHOLOGIST: CPT | Performed by: INTERNAL MEDICINE

## 2019-04-12 PROCEDURE — 45385 COLONOSCOPY W/LESION REMOVAL: CPT | Performed by: INTERNAL MEDICINE

## 2019-04-12 PROCEDURE — 25010000002 PROPOFOL 10 MG/ML EMULSION: Performed by: NURSE ANESTHETIST, CERTIFIED REGISTERED

## 2019-04-12 PROCEDURE — 45380 COLONOSCOPY AND BIOPSY: CPT | Performed by: INTERNAL MEDICINE

## 2019-04-12 RX ORDER — SODIUM CHLORIDE 0.9 % (FLUSH) 0.9 %
3 SYRINGE (ML) INJECTION EVERY 12 HOURS SCHEDULED
Status: DISCONTINUED | OUTPATIENT
Start: 2019-04-12 | End: 2019-04-12 | Stop reason: HOSPADM

## 2019-04-12 RX ORDER — MAGNESIUM HYDROXIDE 1200 MG/15ML
LIQUID ORAL AS NEEDED
Status: DISCONTINUED | OUTPATIENT
Start: 2019-04-12 | End: 2019-04-12 | Stop reason: HOSPADM

## 2019-04-12 RX ORDER — SODIUM CHLORIDE, SODIUM LACTATE, POTASSIUM CHLORIDE, CALCIUM CHLORIDE 600; 310; 30; 20 MG/100ML; MG/100ML; MG/100ML; MG/100ML
9 INJECTION, SOLUTION INTRAVENOUS CONTINUOUS
Status: DISCONTINUED | OUTPATIENT
Start: 2019-04-12 | End: 2019-04-12 | Stop reason: HOSPADM

## 2019-04-12 RX ORDER — SODIUM CHLORIDE 0.9 % (FLUSH) 0.9 %
1-10 SYRINGE (ML) INJECTION AS NEEDED
Status: DISCONTINUED | OUTPATIENT
Start: 2019-04-12 | End: 2019-04-12 | Stop reason: HOSPADM

## 2019-04-12 RX ORDER — SODIUM CHLORIDE 9 MG/ML
40 INJECTION, SOLUTION INTRAVENOUS AS NEEDED
Status: DISCONTINUED | OUTPATIENT
Start: 2019-04-12 | End: 2019-04-12 | Stop reason: HOSPADM

## 2019-04-12 RX ORDER — LIDOCAINE HYDROCHLORIDE 10 MG/ML
0.5 INJECTION, SOLUTION EPIDURAL; INFILTRATION; INTRACAUDAL; PERINEURAL ONCE AS NEEDED
Status: DISCONTINUED | OUTPATIENT
Start: 2019-04-12 | End: 2019-04-12 | Stop reason: HOSPADM

## 2019-04-12 RX ORDER — PROPOFOL 10 MG/ML
VIAL (ML) INTRAVENOUS AS NEEDED
Status: DISCONTINUED | OUTPATIENT
Start: 2019-04-12 | End: 2019-04-12 | Stop reason: SURG

## 2019-04-12 RX ORDER — LIDOCAINE HYDROCHLORIDE 20 MG/ML
INJECTION, SOLUTION INFILTRATION; PERINEURAL AS NEEDED
Status: DISCONTINUED | OUTPATIENT
Start: 2019-04-12 | End: 2019-04-12 | Stop reason: SURG

## 2019-04-12 RX ADMIN — SODIUM CHLORIDE, POTASSIUM CHLORIDE, SODIUM LACTATE AND CALCIUM CHLORIDE: 600; 310; 30; 20 INJECTION, SOLUTION INTRAVENOUS at 13:50

## 2019-04-12 RX ADMIN — PROPOFOL 30 MG: 10 INJECTION, EMULSION INTRAVENOUS at 14:10

## 2019-04-12 RX ADMIN — PROPOFOL 50 MG: 10 INJECTION, EMULSION INTRAVENOUS at 14:03

## 2019-04-12 RX ADMIN — LIDOCAINE HYDROCHLORIDE 60 MG: 20 INJECTION, SOLUTION INFILTRATION; PERINEURAL at 14:03

## 2019-04-12 RX ADMIN — PROPOFOL 30 MG: 10 INJECTION, EMULSION INTRAVENOUS at 14:15

## 2019-04-12 RX ADMIN — PROPOFOL 50 MG: 10 INJECTION, EMULSION INTRAVENOUS at 14:05

## 2019-04-12 NOTE — OP NOTE
COLONOSCOPY  Procedure Report    Patient Name:  Lloyd Greene  YOB: 1960    Date of Surgery:  4/12/2019     Indications:  Previous Polyps    Pre-op Diagnosis:   Adenomatous polyp of colon, unspecified part of colon [D12.6]    Post-Op Diagnosis Codes:     * Adenomatous polyp of colon, unspecified part of colon [D12.6]     * S/P left colectomy [Z90.49]     * Colon polyp [K63.5]         Procedure/CPT® Codes:      Procedure(s):  COLONOSCOPY w/ polypectomy    Staff:  Surgeon(s):  Tin Giang MD         Anesthesia: Monitor Anesthesia Care    Estimated Blood Loss: none    Specimens:   ID Type Source Tests Collected by Time   A : ascending polyp - hot snare Polyp Large Intestine, Right / Ascending Colon TISSUE PATHOLOGY EXAM Tin Giang MD 4/12/2019 1415   B : rectal polyps X 2 Polyp Large Intestine, Rectum TISSUE PATHOLOGY EXAM Tin Giang MD 4/12/2019 1419       Implants:    Nothing was implanted during the procedure      Description of Procedure: After having signed informed consent he was brought to the endoscopy suite, placed in the left lateral decubitus position, and given his IV sedation. Rectal exam revealed no external lesions, normal anal tone, and normal palpable prostate. The scope was introduced in the rectum and advanced under direct visualization with ease through the rectum, sigmoid colon, past the anastomosis that was end-to-end. The scope was advanced through the descending colon, to and around the splenic flexure, reduced and advanced through the transverse colon around the hepatic flexure and easily into the cecum. The cecum was identified by the appendiceal orifice and ileocecal valve. It was well-prepped and normal-appearing. The ileocecal valve was intubated. The terminal ileum was normal-appearing. The scope was withdrawn back to the ascending colon. There was a sessile 10 x 14 mm polyp that was removed with hot snare cautery with  excellent hemostasis and no residual polyp. The scope was withdrawn through the remainder of the ascending colon, hepatic flexure, transverse colon, descending colon, into the rectum. The anastomosis was at 30 cm, no evidence of residual diverticula. Within the rectum there were 2 sessile diminutive polyps, most likely hyperplastic. They were biopsied and sent together as rectal polyps x2. The scope was retroflexed, revealing an intact dentate line and no mucosal lesions. The scope was de-retroflexed and withdrawn. The patient tolerated the procedure very well.          Findings: Colon to Cecum  S/P Sigmoid Colectomy   Polyps 3- Hot Snare/cold Biopsy    Complications: None    Recommendations: No ASA/NSAIDS for 2 weeks, Results to be called  Repeat in 3 years      Tin Giang MD     Date: 4/12/2019  Time: 2:26 PM

## 2019-04-12 NOTE — ANESTHESIA POSTPROCEDURE EVALUATION
Patient: Lloyd Greene    Procedure Summary     Date:  04/12/19 Room / Location:  [facility] ENDOSCOPY 1 /  [facility] OR    Anesthesia Start:  1359 Anesthesia Stop:  1424    Procedure:  COLONOSCOPY w/ polypectomy (N/A ) Diagnosis:       Adenomatous polyp of colon, unspecified part of colon      S/P left colectomy      Colon polyp      (Adenomatous polyp of colon, unspecified part of colon [D12.6])    Surgeon:  Tin Giang MD Provider:  Tin Dejesus CRNA    Anesthesia Type:  MAC ASA Status:  3          Anesthesia Type: MAC  Last vitals  BP   115/69 (04/12/19 1426)   Temp   98.4 °F (36.9 °C) (04/12/19 1426)   Pulse   67 (04/12/19 1426)   Resp   16 (04/12/19 1426)     SpO2   97 % (04/12/19 1426)     Post Anesthesia Care and Evaluation    Patient location during evaluation: bedside  Patient participation: complete - patient participated  Level of consciousness: awake and alert  Pain score: 0  Pain management: adequate  Airway patency: patent  Anesthetic complications: No anesthetic complications    Cardiovascular status: acceptable  Respiratory status: acceptable  Hydration status: acceptable

## 2019-04-12 NOTE — H&P
Patient Care Team:  Donell Diggs MD as PCP - General (Family Medicine)    CHIEF COMPLAINT: Previous Polyp    HISTORY OF PRESENT ILLNESS:    Last colon 2014      Past Medical History:   Diagnosis Date   • Anxiety    • Arthritis    • Colon polyp    • COPD (chronic obstructive pulmonary disease) (CMS/HCC)    • Depression    • Diabetes mellitus (CMS/HCC)    • Diverticulitis    • Elevated cholesterol    • Hyperlipidemia    • Hypertension    • Sleep apnea    • Stroke (CMS/HCC)      Past Surgical History:   Procedure Laterality Date   • COLONOSCOPY  2014   • COLOSTOMY      Dr. Gupta   • COLOSTOMY REVISION  07/2006    Diverticulitis-Dr. Gupta   • INCISIONAL HERNIA REPAIR  2011   • ORIF ANKLE FRACTURE Right 1995     Family History   Problem Relation Age of Onset   • Depression Mother    • Depression Father    • Kidney disease Father    • COPD Father    • Hypertension Father    • Colon cancer Neg Hx    • Colon polyps Neg Hx      Social History     Tobacco Use   • Smoking status: Former Smoker     Packs/day: 2.00     Years: 35.00     Pack years: 70.00     Types: Electronic Cigarette   • Smokeless tobacco: Never Used   • Tobacco comment: QUIT 2014   Substance Use Topics   • Alcohol use: Yes     Comment: rarely   • Drug use: No     Medications Prior to Admission   Medication Sig Dispense Refill Last Dose   • amLODIPine (NORVASC) 2.5 MG tablet Take 1 tablet by mouth Daily. 30 tablet 1 4/12/2019 at 0700   • ARIPiprazole (ABILIFY) 5 MG tablet Take 1 tablet by mouth Daily. 90 tablet 3 4/12/2019 at 0700   • budesonide-formoterol (SYMBICORT) 160-4.5 MCG/ACT inhaler Inhale 2 puffs 2 (Two) Times a Day. 1 inhaler 11 4/12/2019 at 0700   • fenofibrate (TRICOR) 145 MG tablet Take 1 tablet by mouth Daily. 90 tablet 3 4/12/2019 at 0700   • gabapentin (NEURONTIN) 800 MG tablet Take 900 mg by mouth Every Night.   4/11/2019 at 2000   • simvastatin (ZOCOR) 40 MG tablet Take 1 tablet by mouth Daily. 90 tablet 3 4/12/2019 at 0700   •  SITagliptin-metFORMIN HCl ER (JANUMET XR)  MG tablet Take 2 tablets by mouth Daily. 180 tablet 3 4/12/2019 at 0700   • venlafaxine (EFFEXOR) 75 MG tablet Take 2 tablets by mouth Daily. 90 tablet 3 4/12/2019 at 0700   • ALPRAZolam (XANAX) 0.25 MG tablet Take 1 tablet by mouth Daily. 30 tablet 0 More than a month at Unknown time   • gabapentin (NEURONTIN) 300 MG capsule TAKE 1 CAPSULE BY MOUTH THREE TIMES DAILY 270 capsule 0    • promethazine (PHENERGAN) 25 MG tablet Take 1 tablet by mouth Every 6 (Six) Hours As Needed for Nausea or Vomiting for up to 10 doses. 10 tablet 0 More than a month at Unknown time   • traZODone (DESYREL) 100 MG tablet Take 1 tablet by mouth Every Night. 90 tablet 3 4/10/2019   • TRUE METRIX BLOOD GLUCOSE TEST test strip CHECK BLOOD SUGAR TWICE DAILY 100 each 0    • TRUEPLUS LANCETS 28G misc CHECK BLOOD SUGAR TWICE DAILY 100 each 5      Allergies:  Lisinopril and Tamiflu [oseltamivir phosphate]    REVIEW OF SYSTEMS:  Please see the above history of present illness for pertinent positives and negatives.  The remainder of the patient's systems have been reviewed and are negative.     Vital Signs  Temp:  [98.7 °F (37.1 °C)] 98.7 °F (37.1 °C)  Heart Rate:  [78] 78  Resp:  [16] 16  BP: (135)/(88) 135/88    Flowsheet Rows      First Filed Value   Admission Height  --   Admission Weight  103 kg (227 lb) Documented at 04/12/2019 1325           Physical Exam:  Physical Exam   Constitutional: Patient appears well-developed and well-nourished and in no acute distress   HEENT:   Head: Normocephalic and atraumatic.   Eyes:  Pupils are equal, round, and reactive to light. EOM are intact. Sclerae are anicteric and non-injected.  Mouth and Throat: Patient has moist mucous membranes. Oropharynx is clear of any erythema or exudate.     Neck: Neck supple. No JVD present. No thyromegaly present. No lymphadenopathy present.  Cardiovascular: Regular rate, regular rhythm, S1 normal and S2 normal.  Exam  reveals no gallop and no friction rub.  No murmur heard.  Pulmonary/Chest: Lungs are clear to auscultation bilaterally. No respiratory distress. No wheezes. No rhonchi. No rales.   Abdominal: Soft. Bowel sounds are normal. No distension and no mass. There is no hepatosplenomegaly. There is no tenderness.   Musculoskeletal: Normal Muscle tone  Extremities: No edema. Pulses are palpable in all 4 extremities.  Neurological: Patient is alert and oriented to person, place, and time. Cranial nerves II-XII are grossly intact with no focal deficits.  Skin: Skin is warm. No rash noted. Nails show no clubbing.  No cyanosis or erythema.    Debilities/Disabilities Identified: None  Emotional Behavior: Appropriate     Results Review:    I reviewed the patient's new clinical results.  Lab Results (most recent)     Procedure Component Value Units Date/Time    POC Glucose Once [[id number]]  (Normal) Collected:  04/12/19 1341    Specimen:  Blood Updated:  04/12/19 1347     Glucose 114 mg/dL           Imaging Results (most recent)     None        reviewed    ECG/EMG Results (most recent)     None        reviewed    Assessment/Plan     Personal History of Colon polyps  S/P Colectomy /Ostomy/Reversal for Diverticular rupture    Colonoscopy    I discussed the patients findings and my recommendations with patient.     Tin Giang MD  04/12/19  2:01 PM    Time: 10 min prior to procedure.

## 2019-04-12 NOTE — ANESTHESIA POSTPROCEDURE EVALUATION
Patient: Lloyd Greene    Procedure Summary     Date:  04/12/19 Room / Location:  [facility] ENDOSCOPY 1 /  [facility] OR    Anesthesia Start:  1359 Anesthesia Stop:  1424    Procedure:  COLONOSCOPY w/ polypectomy (N/A ) Diagnosis:       Adenomatous polyp of colon, unspecified part of colon      S/P left colectomy      Colon polyp      (Adenomatous polyp of colon, unspecified part of colon [D12.6])    Surgeon:  Tin Giang MD Provider:  Tin Dejesus CRNA    Anesthesia Type:  MAC ASA Status:  3          Anesthesia Type: MAC  Last vitals  BP   135/88 (04/12/19 1325)   Temp   98.7 °F (37.1 °C) (04/12/19 1325)   Pulse   78 (04/12/19 1325)   Resp   16 (04/12/19 1325)     SpO2   95 % (04/12/19 1325)     Post Anesthesia Care and Evaluation    Patient location during evaluation: bedside  Patient participation: complete - patient participated  Level of consciousness: awake and alert  Pain score: 0  Pain management: adequate  Airway patency: patent  Anesthetic complications: No anesthetic complications    Cardiovascular status: acceptable  Respiratory status: acceptable  Hydration status: acceptable

## 2019-04-12 NOTE — ANESTHESIA PREPROCEDURE EVALUATION
" Anesthesia Evaluation     Patient summary reviewed and Nursing notes reviewed   NPO Solid Status: > 8 hours  NPO Liquid Status: > 8 hours           Airway   Mallampati: II  TM distance: >3 FB  Neck ROM: full  Possible difficult intubation  Dental - normal exam     Pulmonary - normal exam    breath sounds clear to auscultation  (+) a smoker (quit 5 years ago) Former, COPD (used inhaler today) mild, sleep apnea on CPAP,   Cardiovascular - normal exam    ECG reviewed  Rhythm: regular  Rate: normal    (+) hypertension well controlled less than 2 medications,       Neuro/Psych  (+) CVA (\"mini-stroke 3-4 years ago\" -- no residual), psychiatric history Anxiety and Depression,     GI/Hepatic/Renal/Endo    (+) obesity, morbid obesity,  diabetes mellitus type 2 poorly controlled,     Musculoskeletal     (+) back pain, chronic pain,   Abdominal   (+) obese,    Substance History   (+) alcohol use (rare),      OB/GYN          Other   (+) arthritis                     Anesthesia Plan    ASA 3     MAC     intravenous induction   Anesthetic plan, all risks, benefits, and alternatives have been provided, discussed and informed consent has been obtained with: patient.  Use of blood products discussed with patient  Consented to blood products.     "

## 2019-04-12 NOTE — BRIEF OP NOTE
COLONOSCOPY  Progress Note    Lloyd BRAMBILA Nokomis  4/12/2019    Pre-op Diagnosis:   Adenomatous polyp of colon, unspecified part of colon [D12.6]       Post-Op Diagnosis Codes:     * Adenomatous polyp of colon, unspecified part of colon [D12.6]     * S/P left colectomy [Z90.49]     * Colon polyp [K63.5]    Procedure/CPT® Codes:      Procedure(s):  COLONOSCOPY w/ polypectomy    Surgeon(s):  Tin Giang MD    Anesthesia: Monitor Anesthesia Care    Staff:   Circulator: Delfina Garcia RN  Scrub Person: Naomie Cui    Estimated Blood Loss: none    Urine Voided: * No values recorded between 4/12/2019  1:59 PM and 4/12/2019  2:21 PM *    Specimens:                Specimens     ID Source Type Tests Collected By Collected At Frozen?      A Large Intestine, Right / Ascending Colon Polyp · TISSUE PATHOLOGY EXAM   Tin Giang MD 4/12/19 1415 No     Description: ascending polyp - hot snare    B Large Intestine, Rectum Polyp · TISSUE PATHOLOGY EXAM   Tin Giang MD 4/12/19 1419 No     Description: rectal polyps X 2                Drains:      Findings: Colon to Cecum  S/P Sigmoid Colectomy   Polyps 3- Hot Snare/cold Biopsy    Complications: None      Tin Giang MD     Date: 4/12/2019  Time: 2:25 PM

## 2019-04-16 LAB
CYTO UR: NORMAL
LAB AP CASE REPORT: NORMAL
PATH REPORT.FINAL DX SPEC: NORMAL
PATH REPORT.GROSS SPEC: NORMAL

## 2019-04-26 ENCOUNTER — OFFICE VISIT (OUTPATIENT)
Dept: FAMILY MEDICINE CLINIC | Facility: CLINIC | Age: 59
End: 2019-04-26

## 2019-04-26 VITALS
BODY MASS INDEX: 38.76 KG/M2 | SYSTOLIC BLOOD PRESSURE: 128 MMHG | HEART RATE: 68 BPM | DIASTOLIC BLOOD PRESSURE: 74 MMHG | WEIGHT: 227 LBS | TEMPERATURE: 98 F | HEIGHT: 64 IN | RESPIRATION RATE: 14 BRPM | OXYGEN SATURATION: 99 %

## 2019-04-26 DIAGNOSIS — M25.551 RIGHT HIP PAIN: ICD-10-CM

## 2019-04-26 DIAGNOSIS — E11.9 TYPE 2 DIABETES MELLITUS WITHOUT COMPLICATION, WITHOUT LONG-TERM CURRENT USE OF INSULIN (HCC): Primary | ICD-10-CM

## 2019-04-26 DIAGNOSIS — I10 HYPERTENSION, ESSENTIAL: ICD-10-CM

## 2019-04-26 PROCEDURE — 99214 OFFICE O/P EST MOD 30 MIN: CPT | Performed by: FAMILY MEDICINE

## 2019-04-26 RX ORDER — MELOXICAM 15 MG/1
15 TABLET ORAL DAILY
Qty: 30 TABLET | Refills: 0 | Status: SHIPPED | OUTPATIENT
Start: 2019-04-26 | End: 2019-05-20 | Stop reason: SDUPTHER

## 2019-04-26 RX ORDER — AMLODIPINE BESYLATE 2.5 MG/1
2.5 TABLET ORAL DAILY
Qty: 90 TABLET | Refills: 3 | Status: SHIPPED | OUTPATIENT
Start: 2019-04-26 | End: 2020-03-17

## 2019-04-26 NOTE — PROGRESS NOTES
Lloyd Greene is a 58 y.o. male.     Chief Complaint   Patient presents with   • Edema     patient has swelling in both feet.   • Hip Pain     patient has sever right hip pain.   • Diabetes     patient is fillowing up on elevated A1c.       Diabetes   He has type 2 diabetes mellitus. No MedicAlert identification noted. The initial diagnosis of diabetes was made 6 years ago. Pertinent negatives for hypoglycemia include no confusion, dizziness, headaches, hunger, mood changes, nervousness/anxiousness, pallor, sleepiness, speech difficulty, sweats or tremors. Associated symptoms include fatigue. Pertinent negatives for hypoglycemia complications include no blackouts, no hospitalization, no nocturnal hypoglycemia, no required assistance and no required glucagon injection. Symptoms are improving. Pertinent negatives for diabetic complications include no CVA, heart disease, impotence, nephropathy, peripheral neuropathy, PVD or retinopathy. Risk factors for coronary artery disease include dyslipidemia, hypertension, obesity and sedentary lifestyle. Current diabetic treatment includes oral agent (monotherapy). He is compliant with treatment most of the time. His weight is decreasing steadily. He is following a generally healthy diet. Meal planning includes none and avoidance of concentrated sweets. He has not had a previous visit with a dietitian. He never participates in exercise. He monitors blood glucose at home 1-2 x per day. Blood glucose monitoring compliance is good. His breakfast blood glucose is taken between 7-8 am. His breakfast blood glucose range is generally 140-180 mg/dl. His dinner blood glucose is taken between 6-7 pm. His dinner blood glucose range is generally 130-140 mg/dl. His highest blood glucose is 140-180 mg/dl. His overall blood glucose range is 140-180 mg/dl. He does not see a podiatrist.Eye exam is not current.        Patient also has been experiencing some right hip pain.  No known  injury.  Patient is stating that the pain is sharp stabbing and goes from the back to the front of his hip.  Gets worse with movement.  Has not tried any over-the-counter pain medications.  Patient also has a history of hypertension for which he takes amlodipine.  In need of refills.    The following portions of the patient's history were reviewed and updated as appropriate: allergies, current medications, past family history, past medical history, past social history, past surgical history and problem list.    Review of Systems   Constitutional: Positive for fatigue.   Genitourinary: Negative for impotence.   Skin: Negative for pallor.   Neurological: Negative for dizziness, tremors, speech difficulty and headaches.   Psychiatric/Behavioral: Negative for confusion. The patient is not nervous/anxious.    All other systems reviewed and are negative.      Objective  Vitals:    04/26/19 1515   BP: 128/74   Pulse: 68   Resp: 14   Temp: 98 °F (36.7 °C)   SpO2: 99%       Physical Exam   Constitutional: He is oriented to person, place, and time. He appears well-developed and well-nourished. No distress.   HENT:   Head: Normocephalic and atraumatic.   Right Ear: External ear normal.   Left Ear: External ear normal.   Nose: Nose normal.   Mouth/Throat: Oropharynx is clear and moist.   Eyes: Conjunctivae and EOM are normal. Pupils are equal, round, and reactive to light. Right eye exhibits no discharge. Left eye exhibits no discharge. No scleral icterus.   Cardiovascular: Normal rate, regular rhythm and normal heart sounds.   Pulmonary/Chest: Effort normal and breath sounds normal. No respiratory distress. He has no wheezes. He has no rales.   Abdominal: Soft. Bowel sounds are normal. He exhibits no distension. There is no tenderness.   Musculoskeletal:        Right hip: He exhibits decreased range of motion. He exhibits normal strength, no tenderness, no bony tenderness, no swelling, no crepitus, no deformity and no  laceration.   Neurological: He is alert and oriented to person, place, and time.   Skin: Skin is warm and dry. He is not diaphoretic.   Nursing note and vitals reviewed.        Current Outpatient Medications:   •  ALPRAZolam (XANAX) 0.25 MG tablet, Take 1 tablet by mouth Daily., Disp: 30 tablet, Rfl: 0  •  amLODIPine (NORVASC) 2.5 MG tablet, Take 1 tablet by mouth Daily., Disp: 90 tablet, Rfl: 3  •  ARIPiprazole (ABILIFY) 5 MG tablet, Take 1 tablet by mouth Daily., Disp: 90 tablet, Rfl: 3  •  budesonide-formoterol (SYMBICORT) 160-4.5 MCG/ACT inhaler, Inhale 2 puffs 2 (Two) Times a Day., Disp: 1 inhaler, Rfl: 11  •  fenofibrate (TRICOR) 145 MG tablet, Take 1 tablet by mouth Daily., Disp: 90 tablet, Rfl: 3  •  gabapentin (NEURONTIN) 300 MG capsule, TAKE 1 CAPSULE BY MOUTH THREE TIMES DAILY, Disp: 270 capsule, Rfl: 0  •  promethazine (PHENERGAN) 25 MG tablet, Take 1 tablet by mouth Every 6 (Six) Hours As Needed for Nausea or Vomiting for up to 10 doses., Disp: 10 tablet, Rfl: 0  •  simvastatin (ZOCOR) 40 MG tablet, Take 1 tablet by mouth Daily., Disp: 90 tablet, Rfl: 3  •  SITagliptin-metFORMIN HCl ER (JANUMET XR)  MG tablet, Take 2 tablets by mouth Daily., Disp: 180 tablet, Rfl: 3  •  traZODone (DESYREL) 100 MG tablet, Take 1 tablet by mouth Every Night., Disp: 90 tablet, Rfl: 3  •  TRUE METRIX BLOOD GLUCOSE TEST test strip, CHECK BLOOD SUGAR TWICE DAILY, Disp: 100 each, Rfl: 0  •  TRUEPLUS LANCETS 28G misc, CHECK BLOOD SUGAR TWICE DAILY, Disp: 100 each, Rfl: 5  •  venlafaxine (EFFEXOR) 75 MG tablet, Take 2 tablets by mouth Daily., Disp: 90 tablet, Rfl: 3  •  Ertugliflozin L-PyroglutamicAc (STEGLATRO) 15 MG tablet, Take 1 tablet by mouth Every Morning., Disp: 60 tablet, Rfl: 0  •  meloxicam (MOBIC) 15 MG tablet, Take 1 tablet by mouth Daily for 30 days. 1 tab PO QD for pain, Disp: 30 tablet, Rfl: 0  Current outpatient and discharge medications have been reconciled for the patient.  Reviewed by: Donell Diggs,  MD      Procedures    Lab Results (most recent)     None                  Lloyd was seen today for edema, hip pain and diabetes.    Diagnoses and all orders for this visit:    Type 2 diabetes mellitus without complication, without long-term current use of insulin (CMS/[facility])  -     Ertugliflozin L-PyroglutamicAc (STEGLATRO) 15 MG tablet; Take 1 tablet by mouth Every Morning.    Right hip pain  -     meloxicam (MOBIC) 15 MG tablet; Take 1 tablet by mouth Daily for 30 days. 1 tab PO QD for pain    Hypertension, essential  -     amLODIPine (NORVASC) 2.5 MG tablet; Take 1 tablet by mouth Daily.      Patient's hemoglobin A1c is increasing.  We discussed treatment options.  Patient stated that he will improve diet and exercise.  Will add Steglatro as above.  Meloxicam for his right hip pain.  Refill for amlodipine.    Return in about 3 months (around 7/26/2019) for Recheck.      Donell Diggs MD

## 2019-05-02 RX ORDER — CALCIUM CITRATE/VITAMIN D3 200MG-6.25
TABLET ORAL
Qty: 100 EACH | Refills: 0 | Status: SHIPPED | OUTPATIENT
Start: 2019-05-02 | End: 2020-08-18 | Stop reason: SDUPTHER

## 2019-05-20 DIAGNOSIS — M25.551 RIGHT HIP PAIN: ICD-10-CM

## 2019-05-21 RX ORDER — MELOXICAM 15 MG/1
TABLET ORAL
Qty: 30 TABLET | Refills: 0 | Status: SHIPPED | OUTPATIENT
Start: 2019-05-21 | End: 2019-05-23 | Stop reason: SDUPTHER

## 2019-05-23 DIAGNOSIS — M25.551 RIGHT HIP PAIN: ICD-10-CM

## 2019-05-24 RX ORDER — MELOXICAM 15 MG/1
TABLET ORAL
Qty: 30 TABLET | Refills: 0 | Status: SHIPPED | OUTPATIENT
Start: 2019-05-24 | End: 2019-07-03 | Stop reason: SDUPTHER

## 2019-06-26 ENCOUNTER — OFFICE VISIT (OUTPATIENT)
Dept: FAMILY MEDICINE CLINIC | Facility: CLINIC | Age: 59
End: 2019-06-26

## 2019-06-26 VITALS
TEMPERATURE: 97.3 F | OXYGEN SATURATION: 98 % | WEIGHT: 222 LBS | SYSTOLIC BLOOD PRESSURE: 126 MMHG | HEIGHT: 64 IN | HEART RATE: 83 BPM | DIASTOLIC BLOOD PRESSURE: 70 MMHG | BODY MASS INDEX: 37.9 KG/M2

## 2019-06-26 DIAGNOSIS — I10 HYPERTENSION, ESSENTIAL: Primary | ICD-10-CM

## 2019-06-26 DIAGNOSIS — E78.01 FAMILIAL HYPERCHOLESTEROLEMIA: ICD-10-CM

## 2019-06-26 DIAGNOSIS — E11.9 TYPE 2 DIABETES MELLITUS WITHOUT COMPLICATION, WITHOUT LONG-TERM CURRENT USE OF INSULIN (HCC): ICD-10-CM

## 2019-06-26 PROCEDURE — 99214 OFFICE O/P EST MOD 30 MIN: CPT | Performed by: FAMILY MEDICINE

## 2019-06-26 RX ORDER — VITAMINS A AND D
CAPSULE ORAL
COMMUNITY
Start: 2019-05-23 | End: 2020-07-09

## 2019-06-26 NOTE — PROGRESS NOTES
Lloyd Greene is a 58 y.o. male.     Chief Complaint   Patient presents with   • Diabetes     Pt here for A1C. No complaints.       Diabetes   He has type 2 diabetes mellitus. No MedicAlert identification noted. The initial diagnosis of diabetes was made 3 years ago. Pertinent negatives for hypoglycemia include no confusion, dizziness, headaches, hunger, mood changes, nervousness/anxiousness, pallor, seizures, sleepiness, speech difficulty, sweats or tremors. Pertinent negatives for diabetes include no blurred vision, no chest pain, no fatigue, no foot paresthesias, no foot ulcerations, no polydipsia, no polyphagia, no polyuria, no visual change, no weakness and no weight loss. Pertinent negatives for hypoglycemia complications include no blackouts, no hospitalization, no nocturnal hypoglycemia, no required assistance and no required glucagon injection. Symptoms are stable. Pertinent negatives for diabetic complications include no CVA, heart disease, impotence, nephropathy, peripheral neuropathy, PVD or retinopathy. Risk factors for coronary artery disease include dyslipidemia, hypertension, obesity and sedentary lifestyle. Current diabetic treatment includes oral agent (dual therapy). He is compliant with treatment all of the time. He is currently taking insulin pre-breakfast. Insulin injections are given by patient. Rotation sites for injection include the arms. His weight is decreasing steadily. He is following a generally healthy diet. When asked about meal planning, he reported none. He has not had a previous visit with a dietitian. He never participates in exercise. He monitors blood glucose at home 1-2 x per day. He monitors urine at home <1 x per month. Blood glucose monitoring compliance is excellent. His home blood glucose trend is decreasing steadily. His breakfast blood glucose is taken between 7-8 am. His breakfast blood glucose range is generally 110-130 mg/dl. His highest blood glucose is  110-130 mg/dl. His overall blood glucose range is  mg/dl. He does not see a podiatrist.Eye exam is not current.        Patient also following up on hypertension and hyperlipidemia.  Taking and tolerating amlodipine at, simvastatin.  Not adhering to proper diet and exercise.    The following portions of the patient's history were reviewed and updated as appropriate: allergies, current medications, past family history, past medical history, past social history, past surgical history and problem list.    Review of Systems   Constitutional: Negative for fatigue and weight loss.   Eyes: Negative for blurred vision.   Cardiovascular: Negative for chest pain, palpitations and leg swelling.   Endocrine: Negative for polydipsia, polyphagia and polyuria.   Genitourinary: Negative for impotence.   Skin: Negative for pallor.   Neurological: Negative for dizziness, tremors, seizures, speech difficulty, weakness and headaches.   Psychiatric/Behavioral: Negative for confusion. The patient is not nervous/anxious.    All other systems reviewed and are negative.      Objective  Vitals:    06/26/19 1011   BP: 126/70   Pulse: 83   Temp: 97.3 °F (36.3 °C)   SpO2: 98%       Physical Exam   Constitutional: He is oriented to person, place, and time. He appears well-developed and well-nourished. No distress.   HENT:   Head: Normocephalic and atraumatic.   Right Ear: External ear normal.   Left Ear: External ear normal.   Nose: Nose normal.   Mouth/Throat: Oropharynx is clear and moist.   Eyes: Conjunctivae and EOM are normal. Pupils are equal, round, and reactive to light. Right eye exhibits no discharge. Left eye exhibits no discharge. No scleral icterus.   Cardiovascular: Normal rate, regular rhythm and normal heart sounds.   Pulmonary/Chest: Effort normal and breath sounds normal. No respiratory distress. He has no wheezes. He has no rales.   Abdominal: Soft. Bowel sounds are normal. He exhibits no distension. There is no  tenderness.   Neurological: He is alert and oriented to person, place, and time.   Skin: Skin is warm and dry. He is not diaphoretic.   Nursing note and vitals reviewed.        Current Outpatient Medications:   •  amLODIPine (NORVASC) 2.5 MG tablet, Take 1 tablet by mouth Daily., Disp: 90 tablet, Rfl: 3  •  ARIPiprazole (ABILIFY) 5 MG tablet, Take 1 tablet by mouth Daily., Disp: 90 tablet, Rfl: 3  •  budesonide-formoterol (SYMBICORT) 160-4.5 MCG/ACT inhaler, Inhale 2 puffs 2 (Two) Times a Day., Disp: 1 inhaler, Rfl: 11  •  Ertugliflozin L-PyroglutamicAc (STEGLATRO) 15 MG tablet, Take 1 tablet by mouth Every Morning., Disp: 90 tablet, Rfl: 3  •  fenofibrate (TRICOR) 145 MG tablet, Take 1 tablet by mouth Daily., Disp: 90 tablet, Rfl: 3  •  gabapentin (NEURONTIN) 300 MG capsule, TAKE 1 CAPSULE BY MOUTH THREE TIMES DAILY, Disp: 270 capsule, Rfl: 0  •  meloxicam (MOBIC) 15 MG tablet, TAKE 1 TABLET EVERY DAY  FOR  PAIN, Disp: 30 tablet, Rfl: 0  •  Multiple Vitamin (MULTI-VITAMIN DAILY PO), , Disp: , Rfl:   •  simvastatin (ZOCOR) 40 MG tablet, Take 1 tablet by mouth Daily., Disp: 90 tablet, Rfl: 3  •  traZODone (DESYREL) 100 MG tablet, Take 1 tablet by mouth Every Night., Disp: 90 tablet, Rfl: 3  •  TRUE METRIX BLOOD GLUCOSE TEST test strip, CHECK BLOOD SUGAR TWICE DAILY, Disp: 100 each, Rfl: 0  •  TRUEPLUS LANCETS 28G misc, CHECK BLOOD SUGAR TWICE DAILY, Disp: 100 each, Rfl: 5  •  venlafaxine (EFFEXOR) 75 MG tablet, Take 2 tablets by mouth Daily., Disp: 90 tablet, Rfl: 3  •  Vitamins A & D 5000-400 units capsule, , Disp: , Rfl:   •  ALPRAZolam (XANAX) 0.25 MG tablet, Take 1 tablet by mouth Daily., Disp: 30 tablet, Rfl: 0  •  promethazine (PHENERGAN) 25 MG tablet, Take 1 tablet by mouth Every 6 (Six) Hours As Needed for Nausea or Vomiting for up to 10 doses., Disp: 10 tablet, Rfl: 0  •  SITagliptin-metFORMIN HCl ER (JANUMET XR)  MG tablet, Take 2 tablets by mouth Daily., Disp: 180 tablet, Rfl: 3  Current outpatient  and discharge medications have been reconciled for the patient.  Reviewed by: Donell Diggs MD      Procedures    Lab Results (most recent)     None                  Lloyd was seen today for diabetes.    Diagnoses and all orders for this visit:    Hypertension, essential  -     Comprehensive Metabolic Panel  -     Hemoglobin A1c    Type 2 diabetes mellitus without complication, without long-term current use of insulin (CMS/[facility])  -     Comprehensive Metabolic Panel  -     Hemoglobin A1c  -     Ertugliflozin L-PyroglutamicAc (STEGLATRO) 15 MG tablet; Take 1 tablet by mouth Every Morning.  -     SITagliptin-metFORMIN HCl ER (JANUMET XR)  MG tablet; Take 2 tablets by mouth Daily.    Familial hypercholesterolemia      Will get labs as above.  Refills given.  Will adjust treatment plan based on results.  Advised on the need to improve diet and exercise and adherence to prescription medication schedules.    Return in about 3 months (around 9/26/2019) for Recheck.      Donell Diggs MD

## 2019-06-27 LAB
ALBUMIN SERPL-MCNC: 4.3 G/DL (ref 3.5–5.2)
ALBUMIN/GLOB SERPL: 1.6 G/DL
ALP SERPL-CCNC: 62 U/L (ref 39–117)
ALT SERPL-CCNC: 20 U/L (ref 1–41)
AST SERPL-CCNC: 20 U/L (ref 1–40)
BILIRUB SERPL-MCNC: 0.3 MG/DL (ref 0.2–1.2)
BUN SERPL-MCNC: 15 MG/DL (ref 6–20)
BUN/CREAT SERPL: 13.3 (ref 7–25)
CALCIUM SERPL-MCNC: 9.8 MG/DL (ref 8.6–10.5)
CHLORIDE SERPL-SCNC: 104 MMOL/L (ref 98–107)
CO2 SERPL-SCNC: 26.1 MMOL/L (ref 22–29)
CREAT SERPL-MCNC: 1.13 MG/DL (ref 0.76–1.27)
GLOBULIN SER CALC-MCNC: 2.7 GM/DL
GLUCOSE SERPL-MCNC: 115 MG/DL (ref 65–99)
HBA1C MFR BLD: 7.1 % (ref 4.8–5.6)
POTASSIUM SERPL-SCNC: 4.8 MMOL/L (ref 3.5–5.2)
PROT SERPL-MCNC: 7 G/DL (ref 6–8.5)
SODIUM SERPL-SCNC: 143 MMOL/L (ref 136–145)

## 2019-07-03 DIAGNOSIS — M25.551 RIGHT HIP PAIN: ICD-10-CM

## 2019-07-05 RX ORDER — MELOXICAM 15 MG/1
TABLET ORAL
Qty: 30 TABLET | Refills: 11 | Status: SHIPPED | OUTPATIENT
Start: 2019-07-05 | End: 2020-04-21

## 2019-07-25 DIAGNOSIS — F32.A ANXIETY AND DEPRESSION: ICD-10-CM

## 2019-07-25 DIAGNOSIS — F41.9 ANXIETY AND DEPRESSION: ICD-10-CM

## 2019-07-26 RX ORDER — VENLAFAXINE 75 MG/1
TABLET ORAL
Qty: 180 TABLET | Refills: 3 | Status: SHIPPED | OUTPATIENT
Start: 2019-07-26 | End: 2020-04-21

## 2019-09-11 RX ORDER — GABAPENTIN 300 MG/1
CAPSULE ORAL
Qty: 270 CAPSULE | Refills: 0 | Status: SHIPPED | OUTPATIENT
Start: 2019-09-11 | End: 2020-01-03 | Stop reason: SDUPTHER

## 2019-09-26 ENCOUNTER — OFFICE VISIT (OUTPATIENT)
Dept: FAMILY MEDICINE CLINIC | Facility: CLINIC | Age: 59
End: 2019-09-26

## 2019-09-26 VITALS
BODY MASS INDEX: 36.09 KG/M2 | HEIGHT: 64 IN | WEIGHT: 211.4 LBS | DIASTOLIC BLOOD PRESSURE: 68 MMHG | RESPIRATION RATE: 14 BRPM | HEART RATE: 72 BPM | OXYGEN SATURATION: 99 % | SYSTOLIC BLOOD PRESSURE: 128 MMHG | TEMPERATURE: 98.3 F

## 2019-09-26 DIAGNOSIS — E11.9 TYPE 2 DIABETES MELLITUS WITHOUT COMPLICATION, WITHOUT LONG-TERM CURRENT USE OF INSULIN (HCC): Primary | ICD-10-CM

## 2019-09-26 LAB — HBA1C MFR BLD: 6.5 % (ref 4.8–5.6)

## 2019-09-26 PROCEDURE — 99213 OFFICE O/P EST LOW 20 MIN: CPT | Performed by: FAMILY MEDICINE

## 2019-09-26 NOTE — PROGRESS NOTES
Lloyd Greene is a 59 y.o. male.     Chief Complaint   Patient presents with   • Diabetes     patient has a follow up on diabetes.       Diabetes   He has type 2 diabetes mellitus. No MedicAlert identification noted. The initial diagnosis of diabetes was made 5 years ago. Pertinent negatives for hypoglycemia include no confusion, dizziness, headaches, hunger, mood changes, nervousness/anxiousness, pallor, seizures, sleepiness, speech difficulty, sweats or tremors. Pertinent negatives for diabetes include no blurred vision, no chest pain, no fatigue, no foot paresthesias, no foot ulcerations, no polydipsia, no polyphagia, no polyuria, no visual change and no weakness. Pertinent negatives for hypoglycemia complications include no blackouts, no hospitalization, no nocturnal hypoglycemia, no required assistance and no required glucagon injection. Symptoms are stable. Pertinent negatives for diabetic complications include no CVA, heart disease, impotence, nephropathy, peripheral neuropathy, PVD or retinopathy. Risk factors for coronary artery disease include dyslipidemia, hypertension, obesity and sedentary lifestyle. Current diabetic treatment includes oral agent (monotherapy). He is compliant with treatment all of the time. His weight is fluctuating minimally. He is following a generally healthy diet. When asked about meal planning, he reported none. He has not had a previous visit with a dietitian. He never participates in exercise. He monitors blood glucose at home 1-2 x per day. Blood glucose monitoring compliance is fair. His home blood glucose trend is fluctuating minimally. His breakfast blood glucose is taken between 7-8 am. His breakfast blood glucose range is generally 110-130 mg/dl. His highest blood glucose is 110-130 mg/dl. His overall blood glucose range is 110-130 mg/dl. He does not see a podiatrist.Eye exam is not current.            The following portions of the patient's history were reviewed  and updated as appropriate: allergies, current medications, past family history, past medical history, past social history, past surgical history and problem list.    Review of Systems   Constitutional: Negative.  Negative for fatigue.   HENT: Negative.    Eyes: Negative.  Negative for blurred vision.   Respiratory: Negative.    Cardiovascular: Negative.  Negative for chest pain.   Gastrointestinal: Negative.    Endocrine: Negative.  Negative for polydipsia, polyphagia and polyuria.   Genitourinary: Negative.  Negative for impotence.   Musculoskeletal: Negative.    Skin: Negative.  Negative for pallor.   Allergic/Immunologic: Negative.    Neurological: Negative for dizziness, tremors, seizures, speech difficulty, weakness and headaches.   Hematological: Negative.    Psychiatric/Behavioral: Negative.  Negative for confusion. The patient is not nervous/anxious.    All other systems reviewed and are negative.    I have reviewed and agree with documentation of above ROS.    Objective  Vitals:    09/26/19 0931   BP: 128/68   Pulse: 72   Resp: 14   Temp: 98.3 °F (36.8 °C)   SpO2: 99%     Body mass index is 36.29 kg/m².    Physical Exam   Constitutional: He is oriented to person, place, and time. He appears well-developed and well-nourished. No distress.   HENT:   Head: Normocephalic and atraumatic.   Right Ear: External ear normal.   Left Ear: External ear normal.   Nose: Nose normal.   Mouth/Throat: Oropharynx is clear and moist.   Eyes: Conjunctivae and EOM are normal. Pupils are equal, round, and reactive to light. Right eye exhibits no discharge. Left eye exhibits no discharge. No scleral icterus.   Cardiovascular: Normal rate, regular rhythm and normal heart sounds.   Pulmonary/Chest: Effort normal and breath sounds normal. No respiratory distress. He has no wheezes. He has no rales.   Abdominal: Soft. Bowel sounds are normal. He exhibits no distension. There is no tenderness.   Neurological: He is alert and oriented  to person, place, and time.   Skin: Skin is warm and dry. He is not diaphoretic.   Nursing note and vitals reviewed.        Current Outpatient Medications:   •  ALPRAZolam (XANAX) 0.25 MG tablet, Take 1 tablet by mouth Daily., Disp: 30 tablet, Rfl: 0  •  amLODIPine (NORVASC) 2.5 MG tablet, Take 1 tablet by mouth Daily., Disp: 90 tablet, Rfl: 3  •  ARIPiprazole (ABILIFY) 5 MG tablet, Take 1 tablet by mouth Daily., Disp: 90 tablet, Rfl: 3  •  budesonide-formoterol (SYMBICORT) 160-4.5 MCG/ACT inhaler, Inhale 2 puffs 2 (Two) Times a Day., Disp: 1 inhaler, Rfl: 11  •  fenofibrate (TRICOR) 145 MG tablet, Take 1 tablet by mouth Daily., Disp: 90 tablet, Rfl: 3  •  gabapentin (NEURONTIN) 300 MG capsule, TAKE 1 CAPSULE BY MOUTH THREE TIMES DAILY, Disp: 270 capsule, Rfl: 0  •  meloxicam (MOBIC) 15 MG tablet, TAKE 1 TABLET EVERY DAY  FOR  PAIN, Disp: 30 tablet, Rfl: 11  •  Multiple Vitamin (MULTI-VITAMIN DAILY PO), , Disp: , Rfl:   •  promethazine (PHENERGAN) 25 MG tablet, Take 1 tablet by mouth Every 6 (Six) Hours As Needed for Nausea or Vomiting for up to 10 doses., Disp: 10 tablet, Rfl: 0  •  simvastatin (ZOCOR) 40 MG tablet, Take 1 tablet by mouth Daily., Disp: 90 tablet, Rfl: 3  •  SITagliptin-metFORMIN HCl ER (JANUMET XR)  MG tablet, Take 2 tablets by mouth Daily., Disp: 180 tablet, Rfl: 3  •  traZODone (DESYREL) 100 MG tablet, Take 1 tablet by mouth Every Night., Disp: 90 tablet, Rfl: 3  •  TRUE METRIX BLOOD GLUCOSE TEST test strip, CHECK BLOOD SUGAR TWICE DAILY, Disp: 100 each, Rfl: 0  •  TRUEPLUS LANCETS 28G misc, CHECK BLOOD SUGAR TWICE DAILY, Disp: 100 each, Rfl: 5  •  venlafaxine (EFFEXOR) 75 MG tablet, TAKE 2 TABLETS EVERY DAY, Disp: 180 tablet, Rfl: 3  •  Vitamins A & D 5000-400 units capsule, , Disp: , Rfl:   •  Ertugliflozin L-PyroglutamicAc (STEGLATRO) 15 MG tablet, Take 1 tablet by mouth Every Morning., Disp: 90 tablet, Rfl: 3  Current outpatient and discharge medications have been reconciled for the  patient.  Reviewed by: Donell Diggs MD      Procedures    Lab Results (most recent)     None                  Lloyd was seen today for diabetes.    Diagnoses and all orders for this visit:    Type 2 diabetes mellitus without complication, without long-term current use of insulin (CMS/[facility])  -     Hemoglobin A1c      Will await results of the hemoglobin A1c blood draw.  Will adjust treatment plan accordingly.  For now continue current therapy.  Advised on improvements in diet and exercise.    Return in about 3 months (around 12/26/2019) for Recheck.      Donell Diggs MD

## 2019-11-05 ENCOUNTER — TELEPHONE (OUTPATIENT)
Dept: CASE MANAGEMENT | Facility: OTHER | Age: 59
End: 2019-11-05

## 2019-11-11 ENCOUNTER — TELEPHONE (OUTPATIENT)
Dept: FAMILY MEDICINE CLINIC | Facility: CLINIC | Age: 59
End: 2019-11-11

## 2019-11-11 NOTE — TELEPHONE ENCOUNTER
Pt left message saying he is out of town ,he is having flare up on diverticulitis, he needs ant biotic called into the pharmacy . Please advise pt.

## 2019-11-12 RX ORDER — METRONIDAZOLE 500 MG/1
500 TABLET ORAL 2 TIMES DAILY
Qty: 14 TABLET | Refills: 0 | Status: SHIPPED | OUTPATIENT
Start: 2019-11-12 | End: 2019-11-22

## 2019-11-12 RX ORDER — CIPROFLOXACIN 500 MG/1
500 TABLET, FILM COATED ORAL 2 TIMES DAILY
Qty: 20 TABLET | Refills: 0 | Status: SHIPPED | OUTPATIENT
Start: 2019-11-12 | End: 2019-11-22

## 2019-12-18 ENCOUNTER — OFFICE VISIT (OUTPATIENT)
Dept: FAMILY MEDICINE CLINIC | Facility: CLINIC | Age: 59
End: 2019-12-18

## 2019-12-18 VITALS
RESPIRATION RATE: 16 BRPM | WEIGHT: 215.8 LBS | OXYGEN SATURATION: 98 % | SYSTOLIC BLOOD PRESSURE: 128 MMHG | HEART RATE: 95 BPM | TEMPERATURE: 98.3 F | DIASTOLIC BLOOD PRESSURE: 60 MMHG | BODY MASS INDEX: 36.84 KG/M2 | HEIGHT: 64 IN

## 2019-12-18 DIAGNOSIS — I10 HYPERTENSION, ESSENTIAL: ICD-10-CM

## 2019-12-18 DIAGNOSIS — E11.9 TYPE 2 DIABETES MELLITUS WITHOUT COMPLICATION, WITHOUT LONG-TERM CURRENT USE OF INSULIN (HCC): ICD-10-CM

## 2019-12-18 DIAGNOSIS — E78.01 FAMILIAL HYPERCHOLESTEROLEMIA: ICD-10-CM

## 2019-12-18 DIAGNOSIS — E11.42 TYPE 2 DIABETES MELLITUS WITH DIABETIC POLYNEUROPATHY, WITHOUT LONG-TERM CURRENT USE OF INSULIN (HCC): ICD-10-CM

## 2019-12-18 DIAGNOSIS — Z12.5 SCREENING FOR PROSTATE CANCER: ICD-10-CM

## 2019-12-18 DIAGNOSIS — Z00.00 MEDICARE ANNUAL WELLNESS VISIT, SUBSEQUENT: Primary | ICD-10-CM

## 2019-12-18 DIAGNOSIS — Z11.59 ENCOUNTER FOR HEPATITIS C SCREENING TEST FOR LOW RISK PATIENT: ICD-10-CM

## 2019-12-18 PROCEDURE — G0439 PPPS, SUBSEQ VISIT: HCPCS | Performed by: FAMILY MEDICINE

## 2019-12-18 PROCEDURE — 99214 OFFICE O/P EST MOD 30 MIN: CPT | Performed by: FAMILY MEDICINE

## 2019-12-18 PROCEDURE — 96160 PT-FOCUSED HLTH RISK ASSMT: CPT | Performed by: FAMILY MEDICINE

## 2019-12-18 NOTE — PROGRESS NOTES
The ABCs of the Annual Wellness Visit  Subsequent Medicare Wellness Visit    Chief Complaint   Patient presents with   • Medicare Wellness-subsequent     patient needs MWV       Subjective   History of Present Illness:  Lloyd Greene is a 59 y.o. male who presents for a Subsequent Medicare Wellness Visit.    Patient also here to discuss hyperlipidemia, hypertension, diabetes.  Taking and tolerating amlodipine, Steglatro, fenofibrate, simvastatin, Janumet.  Patient takes a number of other medications for psychiatric issues.  Tries to maintain healthy diet and exercise but this is difficult.    HEALTH RISK ASSESSMENT    Recent Hospitalizations:  No hospitalization(s) within the last year.    Current Medical Providers:  Patient Care Team:  Donell Diggs MD as PCP - General (Family Medicine)  Jann Underwood MD as PCP - Claims Attributed    Smoking Status:  Social History     Tobacco Use   Smoking Status Former Smoker   • Packs/day: 2.00   • Years: 35.00   • Pack years: 70.00   • Types: Electronic Cigarette   Smokeless Tobacco Never Used   Tobacco Comment    QUIT 2014       Alcohol Consumption:  Social History     Substance and Sexual Activity   Alcohol Use Yes    Comment: rarely       Depression Screen:   PHQ-2/PHQ-9 Depression Screening 12/18/2019   Little interest or pleasure in doing things 0   Feeling down, depressed, or hopeless 2   Trouble falling or staying asleep, or sleeping too much 3   Feeling tired or having little energy 2   Poor appetite or overeating 2   Feeling bad about yourself - or that you are a failure or have let yourself or your family down 0   Trouble concentrating on things, such as reading the newspaper or watching television 0   Moving or speaking so slowly that other people could have noticed. Or the opposite - being so fidgety or restless that you have been moving around a lot more than usual 0   Thoughts that you would be better off dead, or of hurting yourself in some way 0    Total Score 9   If you checked off any problems, how difficult have these problems made it for you to do your work, take care of things at home, or get along with other people? Not difficult at all       Fall Risk Screen:  JAVED Fall Risk Assessment has not been completed.    Health Habits and Functional and Cognitive Screening:  Functional & Cognitive Status 12/18/2019   Do you have difficulty preparing food and eating? No   Do you have difficulty bathing yourself, getting dressed or grooming yourself? No   Do you have difficulty using the toilet? No   Do you have difficulty moving around from place to place? No   Do you have trouble with steps or getting out of a bed or a chair? Yes   Current Diet Well Balanced Diet   Dental Exam Not up to date   Eye Exam Up to date   Exercise (times per week) 0 times per week   Do you need help using the phone?  No   Are you deaf or do you have serious difficulty hearing?  No   Do you need help with transportation? No   Do you need help shopping? No   Do you need help preparing meals?  No   Do you need help with housework?  No   Do you need help with laundry? No   Do you need help taking your medications? No   Do you need help managing money? No   Do you ever drive or ride in a car without wearing a seat belt? No   Have you felt unusual stress, anger or loneliness in the last month? No   Who do you live with? Alone   If you need help, do you have trouble finding someone available to you? No   Have you been bothered in the last four weeks by sexual problems? No   Do you have difficulty concentrating, remembering or making decisions? No         Does the patient have evidence of cognitive impairment? No    Asprin use counseling:Does not need ASA (and currently is not on it)    Age-appropriate Screening Schedule:  Refer to the list below for future screening recommendations based on patient's age, sex and/or medical conditions. Orders for these recommended tests are listed in the  plan section. The patient has been provided with a written plan.    Health Maintenance   Topic Date Due   • TDAP/TD VACCINES (1 - Tdap) 07/05/1973   • PNEUMOCOCCAL VACCINE (19-64 MEDIUM RISK) (1 of 1 - PPSV23) 07/05/1981   • DIABETIC FOOT EXAM  11/07/2016   • URINE MICROALBUMIN  11/07/2016   • ZOSTER VACCINE (3 of 3) 12/19/2019   • LIPID PANEL  03/06/2020   • HEMOGLOBIN A1C  09/26/2020   • DIABETIC EYE EXAM  11/19/2020   • COLONOSCOPY  04/12/2022   • INFLUENZA VACCINE  Completed          The following portions of the patient's history were reviewed and updated as appropriate: allergies, current medications, past family history, past medical history, past social history, past surgical history and problem list.    Outpatient Medications Prior to Visit   Medication Sig Dispense Refill   • ALPRAZolam (XANAX) 0.25 MG tablet Take 1 tablet by mouth Daily. 30 tablet 0   • amLODIPine (NORVASC) 2.5 MG tablet Take 1 tablet by mouth Daily. 90 tablet 3   • ARIPiprazole (ABILIFY) 5 MG tablet Take 1 tablet by mouth Daily. 90 tablet 3   • budesonide-formoterol (SYMBICORT) 160-4.5 MCG/ACT inhaler Inhale 2 puffs 2 (Two) Times a Day. 1 inhaler 11   • Ertugliflozin L-PyroglutamicAc (STEGLATRO) 15 MG tablet Take 1 tablet by mouth Every Morning. 90 tablet 3   • fenofibrate (TRICOR) 145 MG tablet Take 1 tablet by mouth Daily. 90 tablet 3   • gabapentin (NEURONTIN) 300 MG capsule TAKE 1 CAPSULE BY MOUTH THREE TIMES DAILY 270 capsule 0   • meloxicam (MOBIC) 15 MG tablet TAKE 1 TABLET EVERY DAY  FOR  PAIN 30 tablet 11   • Multiple Vitamin (MULTI-VITAMIN DAILY PO)      • simvastatin (ZOCOR) 40 MG tablet Take 1 tablet by mouth Daily. 90 tablet 3   • SITagliptin-metFORMIN HCl ER (JANUMET XR)  MG tablet Take 2 tablets by mouth Daily. 180 tablet 3   • traZODone (DESYREL) 100 MG tablet Take 1 tablet by mouth Every Night. 90 tablet 3   • TRUE METRIX BLOOD GLUCOSE TEST test strip CHECK BLOOD SUGAR TWICE DAILY 100 each 0   • TRUEPLUS LANCETS  "28G [facility] [place]LOOD [place] TWICE DAILY 100 each 5   • venlafaxine (EFFEXOR) 75 MG tablet TAKE 2 TABLETS EVERY  tablet 3   • Vitamins A & D 5000-400 units capsule      • promethazine (PHENERGAN) 25 MG tablet Take 1 tablet by mouth Every 6 (Six) Hours As Needed for Nausea or Vomiting for up to 10 doses. 10 tablet 0     No facility-administered medications prior to visit.        Patient Active Problem List   Diagnosis   • Chronic bilateral low back pain without sciatica   • Other chronic pain   • Lumbar facet arthropathy   • Partial small bowel obstruction (CMS/HCC)   • Angio-edema   • Adenomatous polyp of colon   • Type 2 diabetes mellitus without complication, without long-term current use of insulin (CMS/HCC)   • Familial hypercholesterolemia   • Hypertension, essential   • Type 2 diabetes mellitus with diabetic polyneuropathy, without long-term current use of insulin (CMS/HCC)       Advanced Care Planning:  Patient has an advance directive - a copy has been provided and is visible in patient header    Review of Systems   Constitutional: Negative for activity change.   All other systems reviewed and are negative.      Compared to one year ago, the patient feels his physical health is the same.  Compared to one year ago, the patient feels his mental health is better.    Reviewed chart for potential of high risk medication in the elderly: yes  Reviewed chart for potential of harmful drug interactions in the elderly:yes    Objective         Vitals:    12/18/19 0940   BP: 128/60   Pulse: 95   Resp: 16   Temp: 98.3 °F (36.8 °C)   TempSrc: Oral   SpO2: 98%   Weight: 97.9 kg (215 lb 12.8 oz)   Height: 162.6 cm (64\")   PainSc: 0-No pain       Body mass index is 37.04 kg/m².  Discussed the patient's BMI with him. The BMI is above average; BMI management plan is completed.    Physical Exam   Constitutional: He is oriented to person, place, and time. He appears well-developed and well-nourished. No distress.   HENT: "   Head: Normocephalic and atraumatic.   Right Ear: External ear normal.   Left Ear: External ear normal.   Nose: Nose normal.   Mouth/Throat: Oropharynx is clear and moist.   Eyes: Pupils are equal, round, and reactive to light. Conjunctivae and EOM are normal. Right eye exhibits no discharge. Left eye exhibits no discharge. No scleral icterus.   Cardiovascular: Normal rate, regular rhythm and normal heart sounds.   Pulmonary/Chest: Effort normal and breath sounds normal. No respiratory distress. He has no wheezes. He has no rales.   Abdominal: Soft. Bowel sounds are normal. He exhibits no distension. There is no tenderness.   Neurological: He is alert and oriented to person, place, and time.   Skin: Skin is warm and dry. He is not diaphoretic.   Nursing note and vitals reviewed.      Lab Results   Component Value Date    HGBA1C 6.50 (H) 09/26/2019        Assessment/Plan   Medicare Risks and Personalized Health Plan  CMS Preventative Services Quick Reference  Cardiovascular risk  Depression/Dysphoria  Diabetic Lab Screening   Obesity/Overweight   Prostate Cancer Screening     The above risks/problems have been discussed with the patient.  Pertinent information has been shared with the patient in the After Visit Summary.  Follow up plans and orders are seen below in the Assessment/Plan Section.    Diagnoses and all orders for this visit:    1. Medicare annual wellness visit, subsequent (Primary)  -     CBC Auto Differential    2. Familial hypercholesterolemia  -     Lipid Panel  -     CBC Auto Differential    3. Hypertension, essential  -     Comprehensive Metabolic Panel  -     CBC Auto Differential    4. Type 2 diabetes mellitus without complication, without long-term current use of insulin (CMS/[facility])  -     Comprehensive Metabolic Panel  -     Hemoglobin A1c  -     CBC Auto Differential    5. Type 2 diabetes mellitus with diabetic polyneuropathy, without long-term current use of insulin (CMS/[facility])  -      Comprehensive Metabolic Panel  -     Hemoglobin A1c  -     CBC Auto Differential    6. Encounter for hepatitis C screening test for low risk patient  -     CBC Auto Differential  -     Hepatitis C Antibody    7. Screening for prostate cancer  -     PSA Screen    Labs as above to evaluate the status of his chronic medical problems.  Will adjust treatment plan accordingly.  Discussed improvements in diet and exercise as well as adherence to medications.  Follow Up:  Return for As needed.     An After Visit Summary and PPPS were given to the patient.

## 2019-12-19 LAB
ALBUMIN SERPL-MCNC: 4.7 G/DL (ref 3.5–5.2)
ALBUMIN/GLOB SERPL: 1.8 G/DL
ALP SERPL-CCNC: 66 U/L (ref 39–117)
ALT SERPL-CCNC: 23 U/L (ref 1–41)
AST SERPL-CCNC: 18 U/L (ref 1–40)
BASOPHILS # BLD AUTO: NORMAL 10*3/UL
BILIRUB SERPL-MCNC: 0.2 MG/DL (ref 0.2–1.2)
BUN SERPL-MCNC: 27 MG/DL (ref 6–20)
BUN/CREAT SERPL: 27.6 (ref 7–25)
CALCIUM SERPL-MCNC: 10.3 MG/DL (ref 8.6–10.5)
CHLORIDE SERPL-SCNC: 103 MMOL/L (ref 98–107)
CHOLEST SERPL-MCNC: 157 MG/DL (ref 0–200)
CO2 SERPL-SCNC: 21 MMOL/L (ref 22–29)
CREAT SERPL-MCNC: 0.98 MG/DL (ref 0.76–1.27)
DIFFERENTIAL COMMENT: ABNORMAL
EOSINOPHIL # BLD AUTO: NORMAL 10*3/UL
EOSINOPHIL # BLD MANUAL: 0.09 10*3/MM3 (ref 0–0.4)
EOSINOPHIL NFR BLD AUTO: NORMAL %
EOSINOPHIL NFR BLD MANUAL: 1 % (ref 0.3–6.2)
ERYTHROCYTE [DISTWIDTH] IN BLOOD BY AUTOMATED COUNT: 13.7 % (ref 12.3–15.4)
GLOBULIN SER CALC-MCNC: 2.6 GM/DL
GLUCOSE SERPL-MCNC: 152 MG/DL (ref 65–99)
HBA1C MFR BLD: 6.4 % (ref 4.8–5.6)
HCT VFR BLD AUTO: 43.8 % (ref 37.5–51)
HCV AB S/CO SERPL IA: <0.1 S/CO RATIO (ref 0–0.9)
HDLC SERPL-MCNC: 40 MG/DL (ref 40–60)
HGB BLD-MCNC: 14.6 G/DL (ref 13–17.7)
LDLC SERPL CALC-MCNC: 92 MG/DL (ref 0–100)
LYMPHOCYTES # BLD AUTO: NORMAL 10*3/UL
LYMPHOCYTES # BLD MANUAL: 2.37 10*3/MM3 (ref 0.7–3.1)
LYMPHOCYTES NFR BLD AUTO: NORMAL %
LYMPHOCYTES NFR BLD MANUAL: 27.7 % (ref 19.6–45.3)
MCH RBC QN AUTO: 29.4 PG (ref 26.6–33)
MCHC RBC AUTO-ENTMCNC: 33.3 G/DL (ref 31.5–35.7)
MCV RBC AUTO: 88.3 FL (ref 79–97)
MONOCYTES # BLD MANUAL: 1.1 10*3/MM3 (ref 0.1–0.9)
MONOCYTES NFR BLD AUTO: NORMAL %
MONOCYTES NFR BLD MANUAL: 12.9 % (ref 5–12)
NEUTROPHILS # BLD MANUAL: 4.99 10*3/MM3 (ref 1.7–7)
NEUTROPHILS NFR BLD AUTO: NORMAL %
NEUTROPHILS NFR BLD MANUAL: 58.4 % (ref 42.7–76)
PLATELET # BLD AUTO: 294 10*3/MM3 (ref 140–450)
PLATELET BLD QL SMEAR: ABNORMAL
POTASSIUM SERPL-SCNC: 4.8 MMOL/L (ref 3.5–5.2)
PROT SERPL-MCNC: 7.3 G/DL (ref 6–8.5)
PSA SERPL-MCNC: 0.5 NG/ML (ref 0–4)
RBC # BLD AUTO: 4.96 10*6/MM3 (ref 4.14–5.8)
RBC MORPH BLD: ABNORMAL
SODIUM SERPL-SCNC: 140 MMOL/L (ref 136–145)
TRIGL SERPL-MCNC: 124 MG/DL (ref 0–150)
VLDLC SERPL CALC-MCNC: 24.8 MG/DL (ref 5–40)
WBC # BLD AUTO: 8.55 10*3/MM3 (ref 3.4–10.8)

## 2020-01-08 RX ORDER — GABAPENTIN 300 MG/1
300 CAPSULE ORAL 3 TIMES DAILY
Qty: 270 CAPSULE | Refills: 0 | Status: SHIPPED | OUTPATIENT
Start: 2020-01-08 | End: 2020-03-05 | Stop reason: SDUPTHER

## 2020-02-10 ENCOUNTER — TELEPHONE (OUTPATIENT)
Dept: FAMILY MEDICINE CLINIC | Facility: CLINIC | Age: 60
End: 2020-02-10

## 2020-02-10 RX ORDER — METRONIDAZOLE 500 MG/1
500 TABLET ORAL 2 TIMES DAILY
Qty: 20 TABLET | Refills: 0 | Status: SHIPPED | OUTPATIENT
Start: 2020-02-10 | End: 2020-02-20

## 2020-02-10 RX ORDER — CIPROFLOXACIN 500 MG/1
500 TABLET, FILM COATED ORAL 2 TIMES DAILY
Qty: 20 TABLET | Refills: 0 | Status: SHIPPED | OUTPATIENT
Start: 2020-02-10 | End: 2020-02-20

## 2020-02-10 NOTE — TELEPHONE ENCOUNTER
Pt was last seen in November for diverticulitis. Pt's symptoms have returned. Sharp pain in abdomen started early Sunday morning with worsening pain and diarrhea. Pt is curious if an antibiotic can be prescribed? Or ucc?  Please advise.

## 2020-02-24 ENCOUNTER — OFFICE VISIT (OUTPATIENT)
Dept: FAMILY MEDICINE CLINIC | Facility: CLINIC | Age: 60
End: 2020-02-24

## 2020-02-24 ENCOUNTER — TELEPHONE (OUTPATIENT)
Dept: FAMILY MEDICINE CLINIC | Facility: CLINIC | Age: 60
End: 2020-02-24

## 2020-02-24 VITALS
WEIGHT: 217 LBS | BODY MASS INDEX: 37.05 KG/M2 | DIASTOLIC BLOOD PRESSURE: 82 MMHG | HEIGHT: 64 IN | SYSTOLIC BLOOD PRESSURE: 130 MMHG | HEART RATE: 78 BPM | TEMPERATURE: 98.3 F | OXYGEN SATURATION: 98 %

## 2020-02-24 DIAGNOSIS — M54.50 LOW BACK PAIN, UNSPECIFIED BACK PAIN LATERALITY, UNSPECIFIED CHRONICITY, UNSPECIFIED WHETHER SCIATICA PRESENT: Primary | ICD-10-CM

## 2020-02-24 PROCEDURE — 99214 OFFICE O/P EST MOD 30 MIN: CPT | Performed by: FAMILY MEDICINE

## 2020-02-24 PROCEDURE — 72110 X-RAY EXAM L-2 SPINE 4/>VWS: CPT | Performed by: FAMILY MEDICINE

## 2020-02-24 RX ORDER — CYCLOBENZAPRINE HCL 10 MG
10 TABLET ORAL 3 TIMES DAILY PRN
Qty: 30 TABLET | Refills: 1 | Status: SHIPPED | OUTPATIENT
Start: 2020-02-24 | End: 2020-03-25

## 2020-02-24 NOTE — PROGRESS NOTES
Lloyd Greene is a 59 y.o. male.     Chief Complaint   Patient presents with   • Leg Pain     bilateral leg pain the right leg is worse  cant barely walk pt has history  of back pain issues        HPI     Patient presents the office today to discuss low back pain.  Sharp stabbing pain.  Radiates down both legs.  Also accompanied with numbness and tingling.  Had MRI done in 2017 that did show some degenerative disc disease.  Symptoms are getting worse.  No known injury.    The following portions of the patient's history were reviewed and updated as appropriate: allergies, current medications, past family history, past medical history, past social history, past surgical history and problem list.    Review of Systems   Constitutional: Negative.    HENT: Negative.    Eyes: Negative.    Respiratory: Negative.    Cardiovascular: Negative for chest pain, palpitations and leg swelling.   Endocrine: Negative.    Genitourinary: Negative.    Musculoskeletal: Positive for arthralgias and myalgias.   Skin: Negative.    Allergic/Immunologic: Negative.    Neurological: Negative.    Hematological: Negative.    Psychiatric/Behavioral: Negative.    All other systems reviewed and are negative.    I have reviewed the ROS as documented by the MA. Donell Diggs MD    Objective  Vitals:    02/24/20 1317   BP: 130/82   Pulse: 78   Temp: 98.3 °F (36.8 °C)   SpO2: 98%     Body mass index is 37.25 kg/m².    Physical Exam   Constitutional: He is oriented to person, place, and time. He appears well-developed and well-nourished. No distress.   Musculoskeletal:        Lumbar back: He exhibits decreased range of motion, tenderness, pain and spasm. He exhibits no bony tenderness, no swelling, no edema, no deformity, no laceration and normal pulse.        Back:    Neurological: He is alert and oriented to person, place, and time.   Skin: He is not diaphoretic.   Psychiatric: He has a normal mood and affect. His behavior is normal.   Nursing  note and vitals reviewed.        Current Outpatient Medications:   •  ALPRAZolam (XANAX) 0.25 MG tablet, Take 1 tablet by mouth Daily., Disp: 30 tablet, Rfl: 0  •  amLODIPine (NORVASC) 2.5 MG tablet, Take 1 tablet by mouth Daily., Disp: 90 tablet, Rfl: 3  •  ARIPiprazole (ABILIFY) 5 MG tablet, Take 1 tablet by mouth Daily., Disp: 90 tablet, Rfl: 3  •  budesonide-formoterol (SYMBICORT) 160-4.5 MCG/ACT inhaler, Inhale 2 puffs 2 (Two) Times a Day., Disp: 1 inhaler, Rfl: 11  •  Ertugliflozin L-PyroglutamicAc (STEGLATRO) 15 MG tablet, Take 1 tablet by mouth Every Morning., Disp: 90 tablet, Rfl: 3  •  fenofibrate (TRICOR) 145 MG tablet, Take 1 tablet by mouth Daily., Disp: 90 tablet, Rfl: 3  •  gabapentin (NEURONTIN) 300 MG capsule, Take 1 capsule by mouth 3 (Three) Times a Day., Disp: 270 capsule, Rfl: 0  •  meloxicam (MOBIC) 15 MG tablet, TAKE 1 TABLET EVERY DAY  FOR  PAIN, Disp: 30 tablet, Rfl: 11  •  Multiple Vitamin (MULTI-VITAMIN DAILY PO), , Disp: , Rfl:   •  promethazine (PHENERGAN) 25 MG tablet, Take 1 tablet by mouth Every 6 (Six) Hours As Needed for Nausea or Vomiting for up to 10 doses., Disp: 10 tablet, Rfl: 0  •  simvastatin (ZOCOR) 40 MG tablet, Take 1 tablet by mouth Daily., Disp: 90 tablet, Rfl: 3  •  SITagliptin-metFORMIN HCl ER (JANUMET XR)  MG tablet, Take 2 tablets by mouth Daily., Disp: 180 tablet, Rfl: 3  •  traZODone (DESYREL) 100 MG tablet, Take 1 tablet by mouth Every Night., Disp: 90 tablet, Rfl: 3  •  TRUE METRIX BLOOD GLUCOSE TEST test strip, CHECK BLOOD SUGAR TWICE DAILY, Disp: 100 each, Rfl: 0  •  TRUEPLUS LANCETS 28G misc, CHECK BLOOD SUGAR TWICE DAILY, Disp: 100 each, Rfl: 5  •  venlafaxine (EFFEXOR) 75 MG tablet, TAKE 2 TABLETS EVERY DAY, Disp: 180 tablet, Rfl: 3  •  Vitamins A & D 5000-400 units capsule, , Disp: , Rfl:   •  cyclobenzaprine (FLEXERIL) 10 MG tablet, Take 1 tablet by mouth 3 (Three) Times a Day As Needed for Muscle Spasms (Pain) for up to 30 days., Disp: 30 tablet,  Rfl: 1  Current outpatient and discharge medications have been reconciled for the patient.  Reviewed by: Donell Diggs MD      Procedures    Lab Results (most recent)     None                  Lloyd was seen today for leg pain.    Diagnoses and all orders for this visit:    Low back pain, unspecified back pain laterality, unspecified chronicity, unspecified whether sciatica present  -     XR Spine Lumbar 4+ View (In Office)  -     cyclobenzaprine (FLEXERIL) 10 MG tablet; Take 1 tablet by mouth 3 (Three) Times a Day As Needed for Muscle Spasms (Pain) for up to 30 days.  -     MRI Lumbar Spine Without Contrast; Future    4 view of the lumbar spine done in the office for pain.  No comparisons available.  Extensive osteoarthritis with osteophytes noted throughout the spine.  Decent joint spacing through the lumbar region.  The low thoracic region does show some narrowing of the disc height.    Will order MRI.  Prescription given for muscle relaxers.  Will consider physical therapy or referral to specialist.      Return for As needed.      Donell Diggs MD

## 2020-02-24 NOTE — TELEPHONE ENCOUNTER
Pt called in with complaints of leg pain, going on for about a week now. Pt states that it is difficult to work and drive and is requesting to be evaluated. Front office is currently watching for cancellations. Fit in or urgent care? Please advise.

## 2020-03-05 ENCOUNTER — OFFICE VISIT (OUTPATIENT)
Dept: FAMILY MEDICINE CLINIC | Facility: CLINIC | Age: 60
End: 2020-03-05

## 2020-03-05 VITALS
WEIGHT: 233 LBS | OXYGEN SATURATION: 97 % | HEIGHT: 64 IN | BODY MASS INDEX: 39.78 KG/M2 | RESPIRATION RATE: 14 BRPM | DIASTOLIC BLOOD PRESSURE: 78 MMHG | HEART RATE: 74 BPM | SYSTOLIC BLOOD PRESSURE: 136 MMHG | TEMPERATURE: 98.3 F

## 2020-03-05 DIAGNOSIS — G89.29 CHRONIC BILATERAL LOW BACK PAIN WITHOUT SCIATICA: Primary | ICD-10-CM

## 2020-03-05 DIAGNOSIS — M54.31 BILATERAL SCIATICA: ICD-10-CM

## 2020-03-05 DIAGNOSIS — M54.50 CHRONIC BILATERAL LOW BACK PAIN WITHOUT SCIATICA: Primary | ICD-10-CM

## 2020-03-05 DIAGNOSIS — M47.816 LUMBAR FACET ARTHROPATHY: ICD-10-CM

## 2020-03-05 DIAGNOSIS — M54.32 BILATERAL SCIATICA: ICD-10-CM

## 2020-03-05 PROBLEM — M54.30 SCIATICA: Status: ACTIVE | Noted: 2020-02-19

## 2020-03-05 PROCEDURE — 99214 OFFICE O/P EST MOD 30 MIN: CPT | Performed by: FAMILY MEDICINE

## 2020-03-05 RX ORDER — DIMENHYDRINATE 50 MG
1000 TABLET ORAL DAILY
COMMUNITY
Start: 2020-02-10 | End: 2020-07-31 | Stop reason: HOSPADM

## 2020-03-05 RX ORDER — GABAPENTIN 400 MG/1
400 CAPSULE ORAL 3 TIMES DAILY
Qty: 270 CAPSULE | Refills: 0 | Status: SHIPPED | OUTPATIENT
Start: 2020-03-05 | End: 2020-07-08 | Stop reason: SDUPTHER

## 2020-03-05 NOTE — PROGRESS NOTES
Subjective   Patient ID: Lloyd Greene is a 59 y.o. male is being seen for consultation today at the request of Donell Diggs MD for severe back pain when standing and walking. He reports bilateral leg pain; right greater than left. He denies numbness, tingling and bladder/ bowel incontinence. He reports weakness in his bilateral legs when he is standing or walking. He states that today is the first day he has used his wheelchair. He is taking Gabapentin 400 mg TID and Flexeril 10mg TID. He has tried no other treatments. He is having trouble caring for himself.     Leg Pain    The incident occurred more than 1 week ago. There was no injury mechanism. The pain is present in the left leg, right leg, left thigh, right thigh, right foot and left foot. The pain is at a severity of 8/10. The pain is severe. Pertinent negatives include no muscle weakness, numbness or tingling.       The following portions of the patient's history were reviewed and updated as appropriate: allergies, current medications, past family history, past medical history, past social history, past surgical history and problem list.    Review of Systems   Constitutional: Positive for activity change.   HENT: Positive for sinus pressure (at night). Negative for congestion and sinus pain.    Eyes: Negative for visual disturbance.   Respiratory: Negative for chest tightness and shortness of breath.    Cardiovascular: Negative for chest pain.   Gastrointestinal: Negative for diarrhea, nausea and vomiting.   Endocrine: Negative for cold intolerance and heat intolerance.   Genitourinary: Negative for difficulty urinating.   Musculoskeletal: Positive for arthralgias, back pain, gait problem and myalgias.   Skin: Negative for rash.   Allergic/Immunologic: Negative for environmental allergies.   Neurological: Positive for weakness. Negative for tingling, numbness and headaches.   Hematological: Does not bruise/bleed easily.   Psychiatric/Behavioral:  Negative for sleep disturbance.       Objective   Physical Exam   Constitutional: He is oriented to person, place, and time. He appears well-developed and well-nourished.   HENT:   Head: Normocephalic and atraumatic.   Right Ear: External ear normal.   Left Ear: External ear normal.   Eyes: Pupils are equal, round, and reactive to light. Conjunctivae and EOM are normal. Right eye exhibits no discharge. Left eye exhibits no discharge.   Neck: Normal range of motion. Neck supple. No tracheal deviation present.   Cardiovascular: Intact distal pulses.   Pulmonary/Chest: Effort normal. No stridor. No respiratory distress.   Musculoskeletal: Normal range of motion. He exhibits no edema, tenderness or deformity.   Neurological: He is alert and oriented to person, place, and time. He has normal strength and normal reflexes. He displays no atrophy, no tremor and normal reflexes. No cranial nerve deficit or sensory deficit. He exhibits normal muscle tone. He displays a negative Romberg sign. He displays no seizure activity. Coordination and gait normal.   No long tract signs   Skin: Skin is warm and dry.   Psychiatric: He has a normal mood and affect. His behavior is normal. Judgment and thought content normal.   Nursing note and vitals reviewed.    Neurologic Exam     Mental Status   Oriented to person, place, and time.     Cranial Nerves     CN III, IV, VI   Pupils are equal, round, and reactive to light.  Extraocular motions are normal.     Motor Exam     Strength   Strength 5/5 throughout.       Assessment/Plan   Independent Review of Radiographic Studies:    I did review the L spine MRI from [facility] on 2/28/20. Shows multilevel DDD with facet arthropathy and disc bulging as well as ligamentum flavum hypertrophy at multiple levels.  There is at least moderate stenosis at L2-L3 and L3-L4 and more severe stenosis at L4-L5.  There is fairly severe bilateral foraminal narrowing at L5-S1.  Medical Decision Making:     Mr. Greene was referred to us by Dr. Diggs for a 3-month history of severe bilateral posterior lateral leg pain.  He admits to a much longer hx of intermittent back and leg pain.  He had epidurals in the past that offered minimal if any relief.  For the most part he was able to just tolerate the pain but without accident or injury the pain got precipitously worse several months ago.  He currently has very little if any back pain it is purely radicular pain.  It is very severe when standing and less leaning forward or walking.  It does improve when sitting or laying down.  No numbness or tingling or focal weakness.  No bowel or bladder incontinence.  The right leg is worse than the left.    His exam does not reveal any focal weakness.    I did review the MRI findings with the patient and explained that the stenosis certainly explains the severe radicular pain.  He has tried injections in the past without relief and I do think it is reasonable to consider surgery given that he has purely radicular symptoms that should improve significantly postoperatively.  I have recommended a myelogram to further evaluate the degree of stenosis at each level.  He will follow-up thereafter with Dr. Tran to discuss surgical options.  He will call sooner with questions or concerns.  Lloyd was seen today for back pain.    Diagnoses and all orders for this visit:    DDD (degenerative disc disease), lumbar  -     IR Myelogram Lumbar Spine; Future  -     CT Lumbar Spine With Intrathecal Contrast; Future  -     XR Spine Lumbar Complete With Flex & Ext; Future  -     dexamethasone (DECADRON) 4 MG tablet; Take 2 tablets by mouth Take As Directed. Take both tablets by mouth 2 hours before myelogram    Lumbar stenosis with neurogenic claudication  -     IR Myelogram Lumbar Spine; Future  -     CT Lumbar Spine With Intrathecal Contrast; Future  -     XR Spine Lumbar Complete With Flex & Ext; Future  -     dexamethasone (DECADRON) 4 MG  tablet; Take 2 tablets by mouth Take As Directed. Take both tablets by mouth 2 hours before myelogram      Return for follow up after radiology test, with Dr. Tran.

## 2020-03-05 NOTE — PROGRESS NOTES
Subjective   Patient ID: Lloyd Greene is a 59 y.o. male is being seen for consultation today at the request of No ref. provider found    History of Present Illness    {Common H&P Review Areas:98586}    Review of Systems    Objective   Physical Exam  Neurologic Exam    Assessment/Plan   Independent Review of Radiographic Studies:    ***  Medical Decision Making:    ***  There are no diagnoses linked to this encounter.  No follow-ups on file.

## 2020-03-05 NOTE — PROGRESS NOTES
Lloyd Greene is a 59 y.o. male.     Chief Complaint   Patient presents with   • DDD     Patient is following up on abnormal MRI results        HPI     Answers for HPI/ROS submitted by the patient on 3/3/2020   What is the primary reason for your visit?: Other  Please describe your symptoms.: Extreme pain in both legs while walking or standing. Pain level 9., Cyclobenzaprine has no effect.  Have you had these symptoms before?: No  How long have you been having these symptoms?: 1-4 weeks ago  Please list any medications you are currently taking for this condition.: Cyclobenzaprine  10mg    Patient here to follow-up on MRI results.  MRI showed extensive degenerative disc disease with moderate foraminal stenosis and spinal stenosis throughout.  Continues to have pain.  Taking gabapentin 303 times daily.  Also muscle relaxers and NSAIDs.  Does not have a neurosurgeon.    The following portions of the patient's history were reviewed and updated as appropriate: allergies, current medications, past family history, past medical history, past social history, past surgical history and problem list.    Review of Systems   Musculoskeletal: Positive for arthralgias, back pain and gait problem.   All other systems reviewed and are negative.    I have reviewed the ROS as documented by the MA. Donell Diggs MD    Objective  Vitals:    03/05/20 0912   BP: 136/78   Pulse: 74   Resp: 14   Temp: 98.3 °F (36.8 °C)   SpO2: 97%     Body mass index is 39.99 kg/m².    Physical Exam   Constitutional: He is oriented to person, place, and time. He appears well-developed and well-nourished. No distress.   HENT:   Head: Normocephalic and atraumatic.   Right Ear: External ear normal.   Left Ear: External ear normal.   Nose: Nose normal.   Mouth/Throat: Oropharynx is clear and moist.   Eyes: Pupils are equal, round, and reactive to light. Conjunctivae and EOM are normal. Right eye exhibits no discharge. Left eye exhibits no discharge.  No scleral icterus.   Cardiovascular: Normal rate, regular rhythm and normal heart sounds.   Pulmonary/Chest: Effort normal and breath sounds normal. No respiratory distress. He has no wheezes. He has no rales.   Abdominal: Soft. Bowel sounds are normal. He exhibits no distension. There is no tenderness.   Neurological: He is alert and oriented to person, place, and time.   Skin: Skin is warm and dry. He is not diaphoretic.   Nursing note and vitals reviewed.        Current Outpatient Medications:   •  amLODIPine (NORVASC) 2.5 MG tablet, Take 1 tablet by mouth Daily., Disp: 90 tablet, Rfl: 3  •  ARIPiprazole (ABILIFY) 5 MG tablet, Take 1 tablet by mouth Daily., Disp: 90 tablet, Rfl: 3  •  budesonide-formoterol (SYMBICORT) 160-4.5 MCG/ACT inhaler, Inhale 2 puffs 2 (Two) Times a Day., Disp: 1 inhaler, Rfl: 11  •  cyclobenzaprine (FLEXERIL) 10 MG tablet, Take 1 tablet by mouth 3 (Three) Times a Day As Needed for Muscle Spasms (Pain) for up to 30 days., Disp: 30 tablet, Rfl: 1  •  Ertugliflozin L-PyroglutamicAc (STEGLATRO) 15 MG tablet, Take 1 tablet by mouth Every Morning., Disp: 90 tablet, Rfl: 3  •  fenofibrate (TRICOR) 145 MG tablet, Take 1 tablet by mouth Daily., Disp: 90 tablet, Rfl: 3  •  Flaxseed, Linseed, (FLAX SEED OIL) 1000 MG capsule, , Disp: , Rfl:   •  gabapentin (NEURONTIN) 400 MG capsule, Take 1 capsule by mouth 3 (Three) Times a Day., Disp: 270 capsule, Rfl: 0  •  meloxicam (MOBIC) 15 MG tablet, TAKE 1 TABLET EVERY DAY  FOR  PAIN, Disp: 30 tablet, Rfl: 11  •  Multiple Vitamin (MULTI-VITAMIN DAILY PO), , Disp: , Rfl:   •  simvastatin (ZOCOR) 40 MG tablet, Take 1 tablet by mouth Daily., Disp: 90 tablet, Rfl: 3  •  SITagliptin-metFORMIN HCl ER (JANUMET XR)  MG tablet, Take 2 tablets by mouth Daily., Disp: 180 tablet, Rfl: 3  •  traZODone (DESYREL) 100 MG tablet, Take 1 tablet by mouth Every Night., Disp: 90 tablet, Rfl: 3  •  TRUE METRIX BLOOD GLUCOSE TEST test strip, CHECK BLOOD SUGAR TWICE DAILY,  Disp: 100 each, Rfl: 0  •  TRUEPLUS LANCETS 28G misc, CHECK BLOOD SUGAR TWICE DAILY, Disp: 100 each, Rfl: 5  •  venlafaxine (EFFEXOR) 75 MG tablet, TAKE 2 TABLETS EVERY DAY, Disp: 180 tablet, Rfl: 3  •  Vitamins A & D 5000-400 units capsule, , Disp: , Rfl:   •  ALPRAZolam (XANAX) 0.25 MG tablet, Take 1 tablet by mouth Daily., Disp: 30 tablet, Rfl: 0  •  promethazine (PHENERGAN) 25 MG tablet, Take 1 tablet by mouth Every 6 (Six) Hours As Needed for Nausea or Vomiting for up to 10 doses., Disp: 10 tablet, Rfl: 0  Current outpatient and discharge medications have been reconciled for the patient.  Reviewed by: Donell Diggs MD      Procedures    Lab Results (most recent)     None                  Lloyd was seen today for ddd.    Diagnoses and all orders for this visit:    Chronic bilateral low back pain without sciatica  -     Ambulatory Referral to Neurosurgery  -     gabapentin (NEURONTIN) 400 MG capsule; Take 1 capsule by mouth 3 (Three) Times a Day.    Bilateral sciatica  -     Ambulatory Referral to Neurosurgery  -     gabapentin (NEURONTIN) 400 MG capsule; Take 1 capsule by mouth 3 (Three) Times a Day.    Lumbar facet arthropathy  -     Ambulatory Referral to Neurosurgery  -     gabapentin (NEURONTIN) 400 MG capsule; Take 1 capsule by mouth 3 (Three) Times a Day.    Results of the MRI reviewed in detail with the patient.  Discussed treatment options.  Will increase gabapentin to 403 times daily.  Will refer to neurosurgery.      Return in about 4 weeks (around 4/2/2020) for Recheck.      Donell Diggs MD

## 2020-03-05 NOTE — H&P (VIEW-ONLY)
Subjective   Patient ID: Lloyd Greene is a 59 y.o. male is being seen for consultation today at the request of Donell Diggs MD for severe back pain when standing and walking. He reports bilateral leg pain; right greater than left. He denies numbness, tingling and bladder/ bowel incontinence. He reports weakness in his bilateral legs when he is standing or walking. He states that today is the first day he has used his wheelchair. He is taking Gabapentin 400 mg TID and Flexeril 10mg TID. He has tried no other treatments. He is having trouble caring for himself.     Leg Pain    The incident occurred more than 1 week ago. There was no injury mechanism. The pain is present in the left leg, right leg, left thigh, right thigh, right foot and left foot. The pain is at a severity of 8/10. The pain is severe. Pertinent negatives include no muscle weakness, numbness or tingling.       The following portions of the patient's history were reviewed and updated as appropriate: allergies, current medications, past family history, past medical history, past social history, past surgical history and problem list.    Review of Systems   Constitutional: Positive for activity change.   HENT: Positive for sinus pressure (at night). Negative for congestion and sinus pain.    Eyes: Negative for visual disturbance.   Respiratory: Negative for chest tightness and shortness of breath.    Cardiovascular: Negative for chest pain.   Gastrointestinal: Negative for diarrhea, nausea and vomiting.   Endocrine: Negative for cold intolerance and heat intolerance.   Genitourinary: Negative for difficulty urinating.   Musculoskeletal: Positive for arthralgias, back pain, gait problem and myalgias.   Skin: Negative for rash.   Allergic/Immunologic: Negative for environmental allergies.   Neurological: Positive for weakness. Negative for tingling, numbness and headaches.   Hematological: Does not bruise/bleed easily.   Psychiatric/Behavioral:  Negative for sleep disturbance.       Objective   Physical Exam   Constitutional: He is oriented to person, place, and time. He appears well-developed and well-nourished.   HENT:   Head: Normocephalic and atraumatic.   Right Ear: External ear normal.   Left Ear: External ear normal.   Eyes: Pupils are equal, round, and reactive to light. Conjunctivae and EOM are normal. Right eye exhibits no discharge. Left eye exhibits no discharge.   Neck: Normal range of motion. Neck supple. No tracheal deviation present.   Cardiovascular: Intact distal pulses.   Pulmonary/Chest: Effort normal. No stridor. No respiratory distress.   Musculoskeletal: Normal range of motion. He exhibits no edema, tenderness or deformity.   Neurological: He is alert and oriented to person, place, and time. He has normal strength and normal reflexes. He displays no atrophy, no tremor and normal reflexes. No cranial nerve deficit or sensory deficit. He exhibits normal muscle tone. He displays a negative Romberg sign. He displays no seizure activity. Coordination and gait normal.   No long tract signs   Skin: Skin is warm and dry.   Psychiatric: He has a normal mood and affect. His behavior is normal. Judgment and thought content normal.   Nursing note and vitals reviewed.    Neurologic Exam     Mental Status   Oriented to person, place, and time.     Cranial Nerves     CN III, IV, VI   Pupils are equal, round, and reactive to light.  Extraocular motions are normal.     Motor Exam     Strength   Strength 5/5 throughout.       Assessment/Plan   Independent Review of Radiographic Studies:    I did review the L spine MRI from [facility] on 2/28/20. Shows multilevel DDD with facet arthropathy and disc bulging as well as ligamentum flavum hypertrophy at multiple levels.  There is at least moderate stenosis at L2-L3 and L3-L4 and more severe stenosis at L4-L5.  There is fairly severe bilateral foraminal narrowing at L5-S1.  Medical Decision Making:     Mr. Greene was referred to us by Dr. Diggs for a 3-month history of severe bilateral posterior lateral leg pain.  He admits to a much longer hx of intermittent back and leg pain.  He had epidurals in the past that offered minimal if any relief.  For the most part he was able to just tolerate the pain but without accident or injury the pain got precipitously worse several months ago.  He currently has very little if any back pain it is purely radicular pain.  It is very severe when standing and less leaning forward or walking.  It does improve when sitting or laying down.  No numbness or tingling or focal weakness.  No bowel or bladder incontinence.  The right leg is worse than the left.    His exam does not reveal any focal weakness.    I did review the MRI findings with the patient and explained that the stenosis certainly explains the severe radicular pain.  He has tried injections in the past without relief and I do think it is reasonable to consider surgery given that he has purely radicular symptoms that should improve significantly postoperatively.  I have recommended a myelogram to further evaluate the degree of stenosis at each level.  He will follow-up thereafter with Dr. Tran to discuss surgical options.  He will call sooner with questions or concerns.  Lloyd was seen today for back pain.    Diagnoses and all orders for this visit:    DDD (degenerative disc disease), lumbar  -     IR Myelogram Lumbar Spine; Future  -     CT Lumbar Spine With Intrathecal Contrast; Future  -     XR Spine Lumbar Complete With Flex & Ext; Future  -     dexamethasone (DECADRON) 4 MG tablet; Take 2 tablets by mouth Take As Directed. Take both tablets by mouth 2 hours before myelogram    Lumbar stenosis with neurogenic claudication  -     IR Myelogram Lumbar Spine; Future  -     CT Lumbar Spine With Intrathecal Contrast; Future  -     XR Spine Lumbar Complete With Flex & Ext; Future  -     dexamethasone (DECADRON) 4 MG  tablet; Take 2 tablets by mouth Take As Directed. Take both tablets by mouth 2 hours before myelogram      Return for follow up after radiology test, with Dr. Tran.

## 2020-03-09 ENCOUNTER — OFFICE VISIT (OUTPATIENT)
Dept: NEUROSURGERY | Facility: CLINIC | Age: 60
End: 2020-03-09

## 2020-03-09 VITALS
HEART RATE: 87 BPM | SYSTOLIC BLOOD PRESSURE: 122 MMHG | BODY MASS INDEX: 39.78 KG/M2 | WEIGHT: 233 LBS | HEIGHT: 64 IN | DIASTOLIC BLOOD PRESSURE: 79 MMHG

## 2020-03-09 DIAGNOSIS — M48.062 LUMBAR STENOSIS WITH NEUROGENIC CLAUDICATION: ICD-10-CM

## 2020-03-09 DIAGNOSIS — M51.36 DDD (DEGENERATIVE DISC DISEASE), LUMBAR: Primary | ICD-10-CM

## 2020-03-09 PROBLEM — M51.369 DDD (DEGENERATIVE DISC DISEASE), LUMBAR: Status: ACTIVE | Noted: 2020-03-09

## 2020-03-09 PROBLEM — M54.30 SCIATICA: Status: RESOLVED | Noted: 2020-02-19 | Resolved: 2020-03-09

## 2020-03-09 PROBLEM — M47.816 LUMBAR FACET ARTHROPATHY: Status: RESOLVED | Noted: 2018-10-22 | Resolved: 2020-03-09

## 2020-03-09 PROCEDURE — 99204 OFFICE O/P NEW MOD 45 MIN: CPT | Performed by: PHYSICIAN ASSISTANT

## 2020-03-09 RX ORDER — DEXAMETHASONE 4 MG/1
8 TABLET ORAL TAKE AS DIRECTED
Qty: 2 TABLET | Refills: 0 | Status: SHIPPED | OUTPATIENT
Start: 2020-03-09 | End: 2020-03-24 | Stop reason: HOSPADM

## 2020-03-17 DIAGNOSIS — F41.9 ANXIETY AND DEPRESSION: ICD-10-CM

## 2020-03-17 DIAGNOSIS — I10 HYPERTENSION, ESSENTIAL: ICD-10-CM

## 2020-03-17 DIAGNOSIS — E78.5 HYPERLIPIDEMIA, UNSPECIFIED HYPERLIPIDEMIA TYPE: ICD-10-CM

## 2020-03-17 DIAGNOSIS — F32.A ANXIETY AND DEPRESSION: ICD-10-CM

## 2020-03-17 RX ORDER — AMLODIPINE BESYLATE 2.5 MG/1
TABLET ORAL
Qty: 90 TABLET | Refills: 3 | Status: SHIPPED | OUTPATIENT
Start: 2020-03-17 | End: 2021-03-09 | Stop reason: SDUPTHER

## 2020-03-17 RX ORDER — FENOFIBRATE 145 MG/1
TABLET, COATED ORAL
Qty: 90 TABLET | Refills: 3 | Status: SHIPPED | OUTPATIENT
Start: 2020-03-17 | End: 2021-03-09 | Stop reason: SDUPTHER

## 2020-03-17 RX ORDER — ARIPIPRAZOLE 5 MG/1
TABLET ORAL
Qty: 90 TABLET | Refills: 3 | Status: SHIPPED | OUTPATIENT
Start: 2020-03-17 | End: 2021-03-09 | Stop reason: SDUPTHER

## 2020-03-20 ENCOUNTER — TELEPHONE (OUTPATIENT)
Dept: NEUROSURGERY | Facility: CLINIC | Age: 60
End: 2020-03-20

## 2020-03-20 NOTE — TELEPHONE ENCOUNTER
I l/m for pt regarding his myelogram scheduled for 3/24. If he is ok with staying on schedule for 3/24/20 we can keep him there. Dr. Tran feels like his issue would be justifiable for doing his myelogram now. If he calls he can still have myelo but will need to arrive at 6:00 am. Taking his dexamethasone at 5:15 am.     If he chooses to move his myelo to another date please just message me and I will take care of it.

## 2020-03-24 ENCOUNTER — HOSPITAL ENCOUNTER (OUTPATIENT)
Dept: GENERAL RADIOLOGY | Facility: HOSPITAL | Age: 60
Discharge: HOME | End: 2020-03-24

## 2020-03-24 ENCOUNTER — HOSPITAL ENCOUNTER (OUTPATIENT)
Dept: CT IMAGING | Facility: HOSPITAL | Age: 60
Discharge: HOME | End: 2020-03-24
Admitting: PHYSICIAN ASSISTANT

## 2020-03-24 VITALS
SYSTOLIC BLOOD PRESSURE: 119 MMHG | HEART RATE: 78 BPM | OXYGEN SATURATION: 96 % | BODY MASS INDEX: 38.82 KG/M2 | HEIGHT: 65 IN | TEMPERATURE: 98.4 F | DIASTOLIC BLOOD PRESSURE: 73 MMHG | WEIGHT: 233 LBS | RESPIRATION RATE: 16 BRPM

## 2020-03-24 DIAGNOSIS — M51.36 DDD (DEGENERATIVE DISC DISEASE), LUMBAR: ICD-10-CM

## 2020-03-24 DIAGNOSIS — M48.062 LUMBAR STENOSIS WITH NEUROGENIC CLAUDICATION: ICD-10-CM

## 2020-03-24 PROCEDURE — 72240 MYELOGRAPHY NECK SPINE: CPT

## 2020-03-24 PROCEDURE — A9270 NON-COVERED ITEM OR SERVICE: HCPCS | Performed by: NEUROLOGICAL SURGERY

## 2020-03-24 PROCEDURE — 25010000003 LIDOCAINE 1 % SOLUTION: Performed by: NEUROLOGICAL SURGERY

## 2020-03-24 PROCEDURE — 62304 MYELOGRAPHY LUMBAR INJECTION: CPT

## 2020-03-24 PROCEDURE — 72114 X-RAY EXAM L-S SPINE BENDING: CPT

## 2020-03-24 PROCEDURE — 63710000001 DEXAMETHASONE PER 0.25 MG: Performed by: NEUROLOGICAL SURGERY

## 2020-03-24 PROCEDURE — 63710000001 HYDROCODONE-ACETAMINOPHEN 5-325 MG TABLET: Performed by: NEUROLOGICAL SURGERY

## 2020-03-24 PROCEDURE — 72132 CT LUMBAR SPINE W/DYE: CPT

## 2020-03-24 PROCEDURE — 0 IOPAMIDOL 41 % SOLUTION: Performed by: NEUROLOGICAL SURGERY

## 2020-03-24 RX ORDER — HYDROCODONE BITARTRATE AND ACETAMINOPHEN 5; 325 MG/1; MG/1
1 TABLET ORAL EVERY 4 HOURS PRN
Status: DISCONTINUED | OUTPATIENT
Start: 2020-03-24 | End: 2020-03-25 | Stop reason: HOSPADM

## 2020-03-24 RX ORDER — ACETAMINOPHEN 325 MG/1
650 TABLET ORAL EVERY 4 HOURS PRN
Status: DISCONTINUED | OUTPATIENT
Start: 2020-03-24 | End: 2020-03-25 | Stop reason: HOSPADM

## 2020-03-24 RX ORDER — LIDOCAINE HYDROCHLORIDE 10 MG/ML
10 INJECTION, SOLUTION INFILTRATION; PERINEURAL ONCE
Status: COMPLETED | OUTPATIENT
Start: 2020-03-24 | End: 2020-03-24

## 2020-03-24 RX ORDER — DEXAMETHASONE 4 MG/1
8 TABLET ORAL ONCE
Status: COMPLETED | OUTPATIENT
Start: 2020-03-24 | End: 2020-03-24

## 2020-03-24 RX ADMIN — HYDROCODONE BITARTRATE AND ACETAMINOPHEN 1 TABLET: 5; 325 TABLET ORAL at 07:31

## 2020-03-24 RX ADMIN — IOPAMIDOL 20 ML: 408 INJECTION, SOLUTION INTRATHECAL at 07:05

## 2020-03-24 RX ADMIN — DEXAMETHASONE 8 MG: 4 TABLET ORAL at 06:32

## 2020-03-24 RX ADMIN — LIDOCAINE HYDROCHLORIDE 5 ML: 10 INJECTION, SOLUTION INFILTRATION; PERINEURAL at 07:05

## 2020-03-24 NOTE — DISCHARGE INSTRUCTIONS
EDUCATION /DISCHARGE INSTRUCTIONS:    A myelogram is a special radiology procedure of the spinal cord, spinal nerves and other related structures.  You will be awake during the examination.  An area of your lower back will be cleansed with an antiseptic solution.  The physician will inject a numbing medication in your lower back.  While your back is numb, a needle will be placed in the lower back area.  A small amount of spinal fluid may be withdrawn and sent to the lab if ordered by your physician. While the needle is in the back, an injection of a contrast material (xray dye) will be given through the needle.  The contrast material will allow the physician to see the spinal cord and spinal nerves.  Once injected, the needle will be removed and a band aid will be placed over the injection site.  The table will be tilted during the process to allow the contrast material to flow to particular areas in the spine.  Following the injection and xrays, you will be taken to the CT scan where more pictures will be taken. After the procedure is finished, the contrast material will be absorbed by your body and eliminated through your kidneys.  The radiologist will study and interpret your myelogram and send the results to your physician.  Procedure risks of a myelogram include, but are not limited to:  *  Bleeding   *  seizure  *  Infection   *  Headache, possibly severe requiring  *  Contrast reaction      a blood patch  *  Nerve or cord injury  *  Paralysis and death  Benefits of the procedure:  *  Best examination for delineating pathology related to spinal cord compression from a    disc and/or nerve root compression  Alternatives to the procedure:  MRI - a non invasive procedure requiring intravenous contrast injection.  Cannot be done on patients with certain pacemakers or metal in the body.  MRI risks include possible reaction to the contrast material, movement of metal located in the body.Benefit to MRI:  Non-invasive  and usually painless procedure.  THIS EDUCATION INFORMATION WAS REVIEWED PRIOR TO PROCEDURE AND CONSENT. Patient initials_______JP_________Time__________650________    24 hour rest period ends __1100__________________.  Important information following your myelogram:  * ACTIVITY:   *  Lie down with your head elevated no more than 2 pillows high today & tonight  *  Sit up to eat your meals and use the restroom, otherwise, lie down.  *  Remain less active for two to three days.  *  Do not drive for 48 hours following a myelogram  *  You may remove the bandage and shower in the morning  *  Increase your fluids for the next 24 hours.  Caffeinated drinks are encouraged.  Resume taking  (Janumet) in 48 hrs. Your next dose will be: Thursday March 26th at 10am      CALL YOUR PHYSICIAN FOR THE FOLLOWING:  * Pain at the injection site  * Reddness, swelling, bruising or drainage at the injection site.  * A fever by mouth of 101.0 or any new symptoms  Headaches are a common side effect after a myelogram.  If you get a headache, you should stay flat in bed and drink plenty of fluids. If the headache persist and does not go away with rest/medication, CALL Dr. Tran at [phone]

## 2020-03-24 NOTE — NURSING NOTE
Returned to triage. Dressing to low back dry and intact. No complaint of headache. No distress. C/o back pain, medicated as ordered. Fluids encouraged. Awaiting to complete x-rays.

## 2020-03-25 ENCOUNTER — TELEPHONE (OUTPATIENT)
Dept: INTERVENTIONAL RADIOLOGY/VASCULAR | Facility: HOSPITAL | Age: 60
End: 2020-03-25

## 2020-04-07 ENCOUNTER — TELEMEDICINE (OUTPATIENT)
Dept: NEUROSURGERY | Facility: CLINIC | Age: 60
End: 2020-04-07

## 2020-04-07 DIAGNOSIS — M48.062 SPINAL STENOSIS OF LUMBAR REGION WITH NEUROGENIC CLAUDICATION: Primary | ICD-10-CM

## 2020-04-07 PROCEDURE — 99213 OFFICE O/P EST LOW 20 MIN: CPT | Performed by: NEUROLOGICAL SURGERY

## 2020-04-07 NOTE — PROGRESS NOTES
Subjective   History of Present Illness: Lloyd Greene is a 59 y.o. male is here today for follow-up via [facility] video visit with a new Lumbar Myelogram that was ordered at his last office visit with Jennifer for back and leg pain with weakness.    History of Present Illness    This patient presented for a video visit today while he was at home and I was at the office.  He says that since the myelogram his back pain is a lot better.  He really is not bothering him nearly as much as it was prior to the myelogram.  He otherwise is doing okay.  He has no difficulty with bowel bladder control or other associated symptoms.    The following portions of the patient's history were reviewed and updated as appropriate: allergies, current medications, past family history, past medical history, past social history, past surgical history and problem list.    Review of Systems   Respiratory: Negative for shortness of breath.    Cardiovascular: Negative for chest pain.   Gastrointestinal: Negative for abdominal pain.       Objective         Physical Exam   Constitutional: He is oriented to person, place, and time. He appears well-developed and well-nourished.   Neurological: He is oriented to person, place, and time.     Neurologic Exam     Mental Status   Oriented to person, place, and time.           Assessment/Plan   Independent Review of Radiographic Studies:      I personally reviewed the images from the following studies.    I reviewed his plain films, myelogram, and CT scan of his lumbar spine which were done on 24 March.  The plain films show multilevel spondylitic disease but no evidence of instability on flexion and extension.  The myelogram itself shows fairly severe stenosis at L2-3 and L4-5 with more mild stenosis at L3-4.  This stenosis at L2-3 is worse on the right and the stenosis at L4-5 is worse on the left.  This becomes much more accentuated on the standing films.  On the post myelographic CT scan L5-S1 looks  okay.  L4-5 shows fairly severe lateral recess stenosis and L3-4 shows moderate central stenosis.  L2-3 shows no contrast at all.    Medical Decision Making:      I told the patient about the imaging.  I told him that from my point of view if we were to consider surgery he would need to have a decompression from L2-L5 with a fusion.  Since he is feeling better right now I do not think we need to jump into any type of surgery.  I would like to get an opinion from an orthopedic spine surgeon regarding the fusion.  We will get that done now.  In the meantime I told him to lose some weight as this will enhance his chances of a good outcome from surgery and reduce his chances of a complication.  He seems to understand all that and will call if anything gets any worse.  I spent 8 minutes on the phone with the patient.    Diagnoses and all orders for this visit:    Spinal stenosis of lumbar region with neurogenic claudication  -     Ambulatory Referral to Orthopedic Surgery      Return if symptoms worsen or fail to improve.

## 2020-04-20 DIAGNOSIS — F41.9 ANXIETY AND DEPRESSION: ICD-10-CM

## 2020-04-20 DIAGNOSIS — M25.551 RIGHT HIP PAIN: ICD-10-CM

## 2020-04-20 DIAGNOSIS — F32.A ANXIETY AND DEPRESSION: ICD-10-CM

## 2020-04-21 RX ORDER — MELOXICAM 15 MG/1
TABLET ORAL
Qty: 90 TABLET | Refills: 0 | Status: ON HOLD | OUTPATIENT
Start: 2020-04-21 | End: 2020-07-20

## 2020-04-21 RX ORDER — VENLAFAXINE 75 MG/1
TABLET ORAL
Qty: 180 TABLET | Refills: 3 | Status: SHIPPED | OUTPATIENT
Start: 2020-04-21 | End: 2020-08-11

## 2020-06-03 ENCOUNTER — CONSULT (OUTPATIENT)
Dept: ORTHOPEDIC SURGERY | Facility: CLINIC | Age: 60
End: 2020-06-03

## 2020-06-03 VITALS — WEIGHT: 230.6 LBS | BODY MASS INDEX: 38.42 KG/M2 | TEMPERATURE: 98.2 F | HEIGHT: 65 IN

## 2020-06-03 DIAGNOSIS — E78.5 HYPERLIPIDEMIA, UNSPECIFIED HYPERLIPIDEMIA TYPE: ICD-10-CM

## 2020-06-03 DIAGNOSIS — M48.062 SPINAL STENOSIS OF LUMBAR REGION WITH NEUROGENIC CLAUDICATION: Primary | ICD-10-CM

## 2020-06-03 DIAGNOSIS — M54.50 LUMBAR BACK PAIN: ICD-10-CM

## 2020-06-03 PROCEDURE — 99204 OFFICE O/P NEW MOD 45 MIN: CPT | Performed by: ORTHOPAEDIC SURGERY

## 2020-06-03 RX ORDER — CEFAZOLIN SODIUM 2 G/100ML
2 INJECTION, SOLUTION INTRAVENOUS ONCE
Status: CANCELLED | OUTPATIENT
Start: 2020-07-20 | End: 2020-06-03

## 2020-06-03 NOTE — PROGRESS NOTES
New patient or new problem visit    Chief Complaint   Patient presents with   • Lumbar Spine - Establish Care, Pain       HPI: He complains of a longstanding history of back pain worse in the last 4 months and accompanied now by bilateral lower extremity pain.  Pains moderate to severe constant and burning worse with activity.  Equal in the back and legs but more in the right leg than the left.  He has been better since the myelogram a couple weeks ago.  Lumbar epidurals did not help much.  No balance difficulties bowel or bladder complaints.  He is on disability half-way for back problems.    PFSH: See chart- reviewed    Review of Systems   Constitutional: Negative for fever.   Cardiovascular: Negative for chest pain.   Gastrointestinal: Negative for abdominal pain.   Genitourinary: Negative for dysuria.   Neurological: Negative for weakness, numbness and headaches.   Psychiatric/Behavioral: The patient is nervous/anxious.        PE: Constitutional: Vital signs above-noted.  Awake, alert and oriented    Psychiatric: Affect and insight do not appear grossly disturbed.    Pulmonary: Breathing is unlabored, color is good.    Skin: Warm, dry and normal turgor    Cardiac: Pedal pulses intact.  No edema.    Eyesight and hearing appear adequate for examination purposes      Musculoskeletal:  There is no tenderness to percussion and palpation of the spine. Motion appears undisturbed.  Posture is unremarkable to coronal and sagittal inspection.    The skin about the area is intact.  There is no palpable or visible deformity.  There is no local spasm.       Neurologic:   Reflexes are present in the right patellae and absent in the left patella and achilles.   Motor function is undisturbed in quadriceps, EHL, and gastrocnemius   sensation appears symmetrically intact to light touch.  In the bilateral lower extremities there is no evidence of atrophy.   Clonus is absent..  Gait appears undisturbed.  SLR test  negative      MEDICAL DECISION MAKING    XRAY: Plain film x-rays of the lumbar spine show spondylosis over multiple levels and no spondylolisthesis.  Myelogram and CT scan of the lumbar spine show a myelographic block at L2-3 and moderate to severe stenotic changes at L3-4 and 4 5.  5 1 shows mild foraminal narrowing and looks good otherwise apart from degenerative changes and there is minimal change at L1 to above.  I reviewed the radiologist report with which I agree    Other: I reviewed Dr. Hodges's note.  He wants to decompress L2-5.    Impression: I agree with the decompression levels and that he needs to be fused if he has bilaterally decompressed at these upper lumbar reaches.  L2-5 fusion with instrumentation with laminectomy by Dr. Hutchinson should suffice at addressing the leg pain and part, but clearly not all of the back pain.  I definitely do not recommend a longer fusion.    Plan: L2-5 fusion with instrumentation and laminectomy by Dr. Hutchinson.  As he is currently better following the myelogram he wants to hold off surgery until after his birthday in July so I will have them call him to schedule for later in the summer.  I discussed the risks and benefits of posterior spinal fusion, including where applicable, laminectomy.  Risks include adverse anesthetic events such as death, stroke and myocardial infarction.  More specific surgical risks include infection, nonunion, hardware failure, spinal fluid leakage, nerve root injury or paralysis, visceral or vascular injury, persistent pain, deep venous thrombosis, and pulmonary embolism among others. There is a 70 to 90 % chance of success.   Alternatives have been discussed.  After careful consideration the patient wishes to proceed with surgery.  Answers for HPI/ROS submitted by the patient on 5/27/2020   Back pain  What is the primary reason for your visit?: Back Pain  Chronicity: chronic  Onset: more than 1 year ago  Frequency: constantly  Progression since  onset: gradually worsening  Pain location: gluteal, sacro-iliac  Pain quality: burning, shooting, stabbing  Radiates to: left thigh, right foot, right knee, right thigh  Pain - numeric: 8/10  Pain is: worse during the day  Aggravated by: bending, coughing, position, standing, twisting  Stiffness is present: all day  bladder incontinence: No  bowel incontinence: No  leg pain: Yes  paresis: No  paresthesias: No  pelvic pain: No  perianal numbness: No  tingling: No  weight loss: No  Risk factors: lack of exercise, obesity, sedentary lifestyle

## 2020-06-04 ENCOUNTER — PREP FOR SURGERY (OUTPATIENT)
Dept: OTHER | Facility: HOSPITAL | Age: 60
End: 2020-06-04

## 2020-06-04 DIAGNOSIS — M48.062 SPINAL STENOSIS OF LUMBAR REGION WITH NEUROGENIC CLAUDICATION: Primary | ICD-10-CM

## 2020-06-04 RX ORDER — CEFAZOLIN SODIUM 2 G/100ML
2 INJECTION, SOLUTION INTRAVENOUS ONCE
Status: CANCELLED | OUTPATIENT
Start: 2020-07-20 | End: 2020-06-04

## 2020-06-04 RX ORDER — SIMVASTATIN 40 MG
TABLET ORAL
Qty: 90 TABLET | Refills: 3 | Status: SHIPPED | OUTPATIENT
Start: 2020-06-04 | End: 2021-03-09 | Stop reason: SDUPTHER

## 2020-07-02 ENCOUNTER — OFFICE VISIT (OUTPATIENT)
Dept: NEUROSURGERY | Facility: CLINIC | Age: 60
End: 2020-07-02

## 2020-07-02 DIAGNOSIS — M48.062 LUMBAR STENOSIS WITH NEUROGENIC CLAUDICATION: Primary | ICD-10-CM

## 2020-07-02 PROCEDURE — 99441 PR PHYS/QHP TELEPHONE EVALUATION 5-10 MIN: CPT | Performed by: NEUROLOGICAL SURGERY

## 2020-07-07 ENCOUNTER — TRANSCRIBE ORDERS (OUTPATIENT)
Dept: PREADMISSION TESTING | Facility: HOSPITAL | Age: 60
End: 2020-07-07

## 2020-07-07 DIAGNOSIS — Z01.818 OTHER SPECIFIED PRE-OPERATIVE EXAMINATION: Primary | ICD-10-CM

## 2020-07-07 DIAGNOSIS — M48.062 SPINAL STENOSIS OF LUMBAR REGION WITH NEUROGENIC CLAUDICATION: Primary | ICD-10-CM

## 2020-07-08 DIAGNOSIS — G89.29 CHRONIC BILATERAL LOW BACK PAIN WITHOUT SCIATICA: ICD-10-CM

## 2020-07-08 DIAGNOSIS — M47.816 LUMBAR FACET ARTHROPATHY: ICD-10-CM

## 2020-07-08 DIAGNOSIS — M54.32 BILATERAL SCIATICA: ICD-10-CM

## 2020-07-08 DIAGNOSIS — M54.31 BILATERAL SCIATICA: ICD-10-CM

## 2020-07-08 DIAGNOSIS — M54.50 CHRONIC BILATERAL LOW BACK PAIN WITHOUT SCIATICA: ICD-10-CM

## 2020-07-09 ENCOUNTER — APPOINTMENT (OUTPATIENT)
Dept: PREADMISSION TESTING | Facility: HOSPITAL | Age: 60
End: 2020-07-09

## 2020-07-09 VITALS
HEART RATE: 93 BPM | SYSTOLIC BLOOD PRESSURE: 159 MMHG | BODY MASS INDEX: 39.15 KG/M2 | WEIGHT: 235 LBS | HEIGHT: 65 IN | TEMPERATURE: 99.1 F | DIASTOLIC BLOOD PRESSURE: 81 MMHG | OXYGEN SATURATION: 97 % | RESPIRATION RATE: 18 BRPM

## 2020-07-09 LAB
ANION GAP SERPL CALCULATED.3IONS-SCNC: 8.6 MMOL/L (ref 5–15)
BUN SERPL-MCNC: 15 MG/DL (ref 6–20)
BUN/CREAT SERPL: 17.2 (ref 7–25)
CALCIUM SPEC-SCNC: 9.6 MG/DL (ref 8.6–10.5)
CHLORIDE SERPL-SCNC: 100 MMOL/L (ref 98–107)
CO2 SERPL-SCNC: 27.4 MMOL/L (ref 22–29)
CREAT SERPL-MCNC: 0.87 MG/DL (ref 0.76–1.27)
DEPRECATED RDW RBC AUTO: 41.5 FL (ref 37–54)
ERYTHROCYTE [DISTWIDTH] IN BLOOD BY AUTOMATED COUNT: 12.5 % (ref 12.3–15.4)
GFR SERPL CREATININE-BSD FRML MDRD: 90 ML/MIN/1.73
GLUCOSE SERPL-MCNC: 267 MG/DL (ref 65–99)
HCT VFR BLD AUTO: 44.4 % (ref 37.5–51)
HGB BLD-MCNC: 14.6 G/DL (ref 13–17.7)
MCH RBC QN AUTO: 29.9 PG (ref 26.6–33)
MCHC RBC AUTO-ENTMCNC: 32.9 G/DL (ref 31.5–35.7)
MCV RBC AUTO: 90.8 FL (ref 79–97)
PLATELET # BLD AUTO: 312 10*3/MM3 (ref 140–450)
PMV BLD AUTO: 10.9 FL (ref 6–12)
POTASSIUM SERPL-SCNC: 4.3 MMOL/L (ref 3.5–5.2)
RBC # BLD AUTO: 4.89 10*6/MM3 (ref 4.14–5.8)
SODIUM SERPL-SCNC: 136 MMOL/L (ref 136–145)
WBC # BLD AUTO: 9.33 10*3/MM3 (ref 3.4–10.8)

## 2020-07-09 PROCEDURE — 93005 ELECTROCARDIOGRAM TRACING: CPT

## 2020-07-09 PROCEDURE — 85027 COMPLETE CBC AUTOMATED: CPT | Performed by: NEUROLOGICAL SURGERY

## 2020-07-09 PROCEDURE — 93010 ELECTROCARDIOGRAM REPORT: CPT | Performed by: INTERNAL MEDICINE

## 2020-07-09 PROCEDURE — 80048 BASIC METABOLIC PNL TOTAL CA: CPT | Performed by: NEUROLOGICAL SURGERY

## 2020-07-09 PROCEDURE — 36415 COLL VENOUS BLD VENIPUNCTURE: CPT

## 2020-07-09 RX ORDER — GABAPENTIN 400 MG/1
400 CAPSULE ORAL 3 TIMES DAILY
Qty: 270 CAPSULE | Refills: 0 | Status: SHIPPED | OUTPATIENT
Start: 2020-07-09 | End: 2020-11-23 | Stop reason: SDUPTHER

## 2020-07-09 NOTE — DISCHARGE INSTRUCTIONS
Take the following medications the morning of surgery:  AMLODIPINE,EFFEXOR,GABAPENTIN AND ABILIFY  USE SYMBICORT INHALER      General Instructions: CLEAR LIQUIDS UNTIL 4:30 AM MORNING OF SURGERY   • Do not eat solid food after midnight the night before surgery.  • You may drink clear liquids day of surgery but must stop at least one hour before your hospital arrival time.  • It is beneficial for you to have a clear drink that contains carbohydrates the day of surgery.  We suggest a 12 to 20 ounce bottle of Gatorade or Powerade for non-diabetic patients or a 12 to 20 ounce bottle of G2 or Powerade Zero for diabetic patients. (Pediatric patients, are not advised to drink a 12 to 20 ounce carbohydrate drink)    Clear liquids are liquids you can see through.  Nothing red in color.     Plain water                               Sports drinks  Sodas                                   Gelatin (Jell-O)  Fruit juices without pulp such as white grape juice and apple juice  Popsicles that contain no fruit or yogurt  Tea or coffee (no cream or milk added)  Gatorade / Powerade  G2 / Powerade Zero    • Infants may have breast milk up to four hours before surgery.  • Infants drinking formula may drink formula up to six hours before surgery.   • Patients who avoid smoking, chewing tobacco and alcohol for 4 weeks prior to surgery have a reduced risk of post-operative complications.  Quit smoking as many days before surgery as you can.  • Do not smoke, use chewing tobacco or drink alcohol the day of surgery.   • If applicable bring your C-PAP/ BI-PAP machine.  • Bring any papers given to you in the doctor’s office.  • Wear clean comfortable clothes.  • Do not wear contact lenses, false eyelashes or make-up.  Bring a case for your glasses.   • Bring crutches or walker if applicable.  • Remove all piercings.  Leave jewelry and any other valuables at home.  • Hair extensions with metal clips must be removed prior to surgery.  • The  Pre-Admission Testing nurse will instruct you to bring medications if unable to obtain an accurate list in Pre-Admission Testing.        If you were given a blood bank ID arm band remember to bring it with you the day of surgery.    Preventing a Surgical Site Infection:  • For 2 to 3 days before surgery, avoid shaving with a razor because the razor can irritate skin and make it easier to develop an infection.    • Any areas of open skin can increase the risk of a post-operative wound infection by allowing bacteria to enter and travel throughout the body.  Notify your surgeon if you have any skin wounds / rashes even if it is not near the expected surgical site.  The area will need assessed to determine if surgery should be delayed until it is healed.  • The night prior to surgery shower using a fresh bar of anti-bacterial soap (such as Dial) and clean washcloth.  Sleep in a clean bed with clean clothing.  Do not allow pets to sleep with you.  • Shower on the morning of surgery using a fresh bar of anti-bacterial soap (such as Dial) and clean washcloth.  Dry with a clean towel and dress in clean clothing.  • Ask your surgeon if you will be receiving antibiotics prior to surgery.  • Make sure you, your family, and all healthcare providers clean their hands with soap and water or an alcohol based hand  before caring for you or your wound.    Day of surgery: 7/20/2020 ARRIVAL TIME 5:30 AM  Your arrival time is approximately two hours before your scheduled surgery time.  Upon arrival, a Pre-op nurse and Anesthesiologist will review your health history, obtain vital signs, and answer questions you may have.  The only belongings needed at this time will be a list of your home medications and if applicable your C-PAP/BI-PAP machine.  If you are staying overnight your family can leave the rest of your belongings in the car and bring them to your room later.  A Pre-op nurse will start an IV and you may receive  medication in preparation for surgery, including something to help you relax.  Your family will be able to see you in the Pre-op area.  Two visitors at a time will be allowed in the Pre-op room.  While you are in surgery your family should notify the waiting room  if they leave the waiting room area and provide a contact phone number.    Please be aware that surgery does come with discomfort.  We want to make every effort to control your discomfort so please discuss any uncontrolled symptoms with your nurse.   Your doctor will most likely have prescribed pain medications.      If you are going home after surgery you will receive individualized written care instructions before being discharged.  A responsible adult must drive you to and from the hospital on the day of your surgery and stay with you for 24 hours.    If you are staying overnight following surgery, you will be transported to your hospital room following the recovery period.  [facility] has all private rooms.    If you have any questions please call Pre-Admission Testing at [phone].  Deductibles and co-payments are collected on the day of service. Please be prepared to pay the required co-pay, deductible or deposit on the day of service as defined by your plan.    Patient Education for Self-Quarantine Process    Following your COVID testing, we strongly recommend that you do not leave your home after you have been tested for COVID except to get medical care. This includes not going to work, school or to public areas.  If this is not possible for you to do please limit your activities to only required outings.  Be sure to wear a mask when you are with other people, practice social distancing and wash your hands frequently.      The following items provide additional details to keep you safe.  • Wash your hands with soap and water frequently for at least 20 seconds.   • Avoid touching your eyes, nose and mouth with unwashed  hands.  • Do not share anything - utensils, towels, food from the same bowl.   • Have your own utensils, drinking glass, dishes, towels and bedding.   • Do not have visitors.   • Do use FaceTime to stay in touch with family and friends.  • You should stay in a specific room away from others if possible.   • Stay at least 6 feet away from others in the home if you cannot have a dedicated room to yourself.   • Do not snuggle with your pet. While the CDC says there is no evidence that pets can spread COVID-19 or be infected from humans, it is probably best to avoid “petting, snuggling, being kissed or licked and sharing food (during self-quarantine)”, according to the CDC.   • Sanitize household surfaces daily. Include all high touch areas (door handles, light switches, phones, countertops, etc.)  • Do not share a bathroom with others, if possible.   • Wear a mask around others in your home if you are unable to stay in a separate room or 6 feet apart. If  you are unable to wear a mask, have your family member wear a mask if they must be within 6 feet of you.   Call your surgeon immediately if you experience any of the following symptoms:  • Sore Throat  • Shortness of Breath or difficulty breathing  • Cough  • Chills  • Body soreness or muscle pain  • Headache  • Fever  • New loss of taste or smell  • Do not arrive for your surgery ill.  Your procedure will need to be rescheduled to another time.  You will need to call your physician before the day of surgery to avoid any unnecessary exposure to hospital staff as well as other patients.    CHLORHEXIDINE CLOTH INSTRUCTIONS  The morning of surgery follow these instructions using the Chlorhexidine cloths you've been given.  These steps reduce bacteria on the body.  Do not use the cloths near your eyes, ears mouth, genitalia or on open wounds.  Throw the cloths away after use but do not try to flush them down a toilet.      • Open and remove one cloth at a time from the  package.    • Leave the cloth unfolded and begin the bathing.  • Massage the skin with the cloths using gentle pressure to remove bacteria.  Do not scrub harshly.   • Follow the steps below with one 2% CHG cloth per area (6 total cloths).  • One cloth for neck, shoulders and chest.  • One cloth for both arms, hands, fingers and underarms (do underarms last).  • One cloth for the abdomen followed by groin.  • One cloth for right leg and foot including between the toes.  • One cloth for left leg and foot including between the toes.  • The last cloth is to be used for the back of the neck, back and buttocks.    Allow the CHG to air dry 3 minutes on the skin which will give it time to work and decrease the chance of irritation.  The skin may feel sticky until it is dry.  Do not rinse with water or any other liquid or you will lose the beneficial effects of the CHG.  If mild skin irritation occurs, do rinse the skin to remove the CHG.  Report this to the nurse at time of admission.  Do not apply lotions, creams, ointments, deodorants or perfumes after using the clothes. Dress in clean clothes before coming to the hospital.

## 2020-07-17 ENCOUNTER — LAB (OUTPATIENT)
Dept: LAB | Facility: HOSPITAL | Age: 60
End: 2020-07-17

## 2020-07-17 DIAGNOSIS — Z01.818 OTHER SPECIFIED PRE-OPERATIVE EXAMINATION: ICD-10-CM

## 2020-07-17 PROCEDURE — U0004 COV-19 TEST NON-CDC HGH THRU: HCPCS

## 2020-07-17 PROCEDURE — C9803 HOPD COVID-19 SPEC COLLECT: HCPCS

## 2020-07-18 LAB
REF LAB TEST METHOD: NORMAL
SARS-COV-2 RNA RESP QL NAA+PROBE: NOT DETECTED

## 2020-07-20 ENCOUNTER — ANESTHESIA (OUTPATIENT)
Dept: PERIOP | Facility: HOSPITAL | Age: 60
End: 2020-07-20

## 2020-07-20 ENCOUNTER — ANESTHESIA EVENT (OUTPATIENT)
Dept: PERIOP | Facility: HOSPITAL | Age: 60
End: 2020-07-20

## 2020-07-20 ENCOUNTER — HOSPITAL ENCOUNTER (INPATIENT)
Facility: HOSPITAL | Age: 60
LOS: 5 days | Discharge: NURSING, REHAB OR LONG-TERM CARE | End: 2020-07-25
Attending: NEUROLOGICAL SURGERY | Admitting: NEUROLOGICAL SURGERY

## 2020-07-20 ENCOUNTER — APPOINTMENT (OUTPATIENT)
Dept: GENERAL RADIOLOGY | Facility: HOSPITAL | Age: 60
End: 2020-07-20

## 2020-07-20 DIAGNOSIS — M48.062 SPINAL STENOSIS OF LUMBAR REGION WITH NEUROGENIC CLAUDICATION: ICD-10-CM

## 2020-07-20 DIAGNOSIS — M48.062 LUMBAR STENOSIS WITH NEUROGENIC CLAUDICATION: ICD-10-CM

## 2020-07-20 DIAGNOSIS — E11.9 TYPE 2 DIABETES MELLITUS WITHOUT COMPLICATION, WITHOUT LONG-TERM CURRENT USE OF INSULIN (HCC): ICD-10-CM

## 2020-07-20 DIAGNOSIS — R53.1 GENERALIZED WEAKNESS: Primary | ICD-10-CM

## 2020-07-20 DIAGNOSIS — R53.81 PHYSICAL DECONDITIONING: ICD-10-CM

## 2020-07-20 PROBLEM — G47.30 SLEEP APNEA: Status: ACTIVE | Noted: 2020-07-20

## 2020-07-20 LAB
ABO GROUP BLD: NORMAL
BASE EXCESS BLDA CALC-SCNC: -5 MMOL/L (ref -5–5)
BASE EXCESS BLDA CALC-SCNC: -6 MMOL/L (ref -5–5)
BLD GP AB SCN SERPL QL: NEGATIVE
CO2 BLDA-SCNC: 21 MMOL/L (ref 24–29)
CO2 BLDA-SCNC: 21 MMOL/L (ref 24–29)
CO2 BLDA-SCNC: 22 MMOL/L (ref 24–29)
CO2 BLDA-SCNC: 23 MMOL/L (ref 24–29)
GLUCOSE BLDC GLUCOMTR-MCNC: 235 MG/DL (ref 70–130)
GLUCOSE BLDC GLUCOMTR-MCNC: 254 MG/DL (ref 70–130)
GLUCOSE BLDC GLUCOMTR-MCNC: 257 MG/DL (ref 70–130)
GLUCOSE BLDC GLUCOMTR-MCNC: 267 MG/DL (ref 70–130)
GLUCOSE BLDC GLUCOMTR-MCNC: 268 MG/DL (ref 70–130)
GLUCOSE BLDC GLUCOMTR-MCNC: 273 MG/DL (ref 70–130)
GLUCOSE BLDC GLUCOMTR-MCNC: 299 MG/DL (ref 70–130)
GLUCOSE BLDC GLUCOMTR-MCNC: 372 MG/DL (ref 70–130)
HCO3 BLDA-SCNC: 19.7 MMOL/L (ref 22–26)
HCO3 BLDA-SCNC: 20 MMOL/L (ref 22–26)
HCO3 BLDA-SCNC: 20.5 MMOL/L (ref 22–26)
HCO3 BLDA-SCNC: 21.3 MMOL/L (ref 22–26)
HCT VFR BLD AUTO: 40.3 % (ref 37.5–51)
HCT VFR BLDA CALC: 36 % (ref 38–51)
HCT VFR BLDA CALC: 38 % (ref 38–51)
HCT VFR BLDA CALC: 40 % (ref 38–51)
HCT VFR BLDA CALC: 41 % (ref 38–51)
HGB BLD-MCNC: 13.3 G/DL (ref 13–17.7)
HGB BLDA-MCNC: 12.2 G/DL (ref 12–17)
HGB BLDA-MCNC: 12.9 G/DL (ref 12–17)
HGB BLDA-MCNC: 13.6 G/DL (ref 12–17)
HGB BLDA-MCNC: 13.9 G/DL (ref 12–17)
PCO2 BLDA: 35.7 MM HG (ref 35–45)
PCO2 BLDA: 37.3 MM HG (ref 35–45)
PCO2 BLDA: 41 MM HG (ref 35–45)
PCO2 BLDA: 43.6 MM HG (ref 35–45)
PH BLDA: 7.28 PH UNITS (ref 7.35–7.6)
PH BLDA: 7.32 PH UNITS (ref 7.35–7.6)
PH BLDA: 7.34 PH UNITS (ref 7.35–7.6)
PH BLDA: 7.35 PH UNITS (ref 7.35–7.6)
PO2 BLDA: 139 MMHG (ref 80–105)
PO2 BLDA: 145 MMHG (ref 80–105)
PO2 BLDA: 174 MMHG (ref 80–105)
PO2 BLDA: 83 MMHG (ref 80–105)
POTASSIUM BLDA-SCNC: 4.2 MMOL/L (ref 3.5–4.9)
POTASSIUM BLDA-SCNC: 4.3 MMOL/L (ref 3.5–4.9)
POTASSIUM BLDA-SCNC: 4.4 MMOL/L (ref 3.5–4.9)
POTASSIUM BLDA-SCNC: 4.5 MMOL/L (ref 3.5–4.9)
RH BLD: POSITIVE
SAO2 % BLDA: 96 % (ref 95–98)
SAO2 % BLDA: 99 % (ref 95–98)
T&S EXPIRATION DATE: NORMAL

## 2020-07-20 PROCEDURE — 85018 HEMOGLOBIN: CPT

## 2020-07-20 PROCEDURE — 25010000003 CEFAZOLIN IN DEXTROSE 2-4 GM/100ML-% SOLUTION: Performed by: NEUROLOGICAL SURGERY

## 2020-07-20 PROCEDURE — 82803 BLOOD GASES ANY COMBINATION: CPT

## 2020-07-20 PROCEDURE — 94640 AIRWAY INHALATION TREATMENT: CPT

## 2020-07-20 PROCEDURE — 25010000002 PHENYLEPHRINE PER 1 ML: Performed by: NURSE ANESTHETIST, CERTIFIED REGISTERED

## 2020-07-20 PROCEDURE — 22614 ARTHRD PST TQ 1NTRSPC EA ADD: CPT | Performed by: ORTHOPAEDIC SURGERY

## 2020-07-20 PROCEDURE — 25010000002 SUCCINYLCHOLINE PER 20 MG: Performed by: NURSE ANESTHETIST, CERTIFIED REGISTERED

## 2020-07-20 PROCEDURE — 01NB0ZZ RELEASE LUMBAR NERVE, OPEN APPROACH: ICD-10-PCS | Performed by: NEUROLOGICAL SURGERY

## 2020-07-20 PROCEDURE — 63048 LAM FACETEC &FORAMOT EA ADDL: CPT | Performed by: NEUROLOGICAL SURGERY

## 2020-07-20 PROCEDURE — 25010000002 MIDAZOLAM PER 1 MG: Performed by: ANESTHESIOLOGY

## 2020-07-20 PROCEDURE — 25010000003 HYDROMORPHONE HCL PF 50 MG/5ML SOLUTION: Performed by: ORTHOPAEDIC SURGERY

## 2020-07-20 PROCEDURE — 72100 X-RAY EXAM L-S SPINE 2/3 VWS: CPT

## 2020-07-20 PROCEDURE — 25010000003 CEFAZOLIN IN DEXTROSE 2-4 GM/100ML-% SOLUTION: Performed by: ORTHOPAEDIC SURGERY

## 2020-07-20 PROCEDURE — 82962 GLUCOSE BLOOD TEST: CPT

## 2020-07-20 PROCEDURE — 82947 ASSAY GLUCOSE BLOOD QUANT: CPT

## 2020-07-20 PROCEDURE — 25010000002 ONDANSETRON PER 1 MG: Performed by: NURSE ANESTHETIST, CERTIFIED REGISTERED

## 2020-07-20 PROCEDURE — 85014 HEMATOCRIT: CPT | Performed by: ORTHOPAEDIC SURGERY

## 2020-07-20 PROCEDURE — 25010000003 CEFAZOLIN PER 500 MG: Performed by: ORTHOPAEDIC SURGERY

## 2020-07-20 PROCEDURE — 63710000001 INSULIN REGULAR HUMAN PER 5 UNITS: Performed by: ANESTHESIOLOGY

## 2020-07-20 PROCEDURE — 86901 BLOOD TYPING SEROLOGIC RH(D): CPT | Performed by: ORTHOPAEDIC SURGERY

## 2020-07-20 PROCEDURE — C1713 ANCHOR/SCREW BN/BN,TIS/BN: HCPCS | Performed by: NEUROLOGICAL SURGERY

## 2020-07-20 PROCEDURE — 25010000002 NEOSTIGMINE PER 0.5 MG: Performed by: NURSE ANESTHETIST, CERTIFIED REGISTERED

## 2020-07-20 PROCEDURE — 25010000002 FENTANYL CITRATE (PF) 100 MCG/2ML SOLUTION: Performed by: NURSE ANESTHETIST, CERTIFIED REGISTERED

## 2020-07-20 PROCEDURE — 25010000002 PROPOFOL 10 MG/ML EMULSION: Performed by: NURSE ANESTHETIST, CERTIFIED REGISTERED

## 2020-07-20 PROCEDURE — 85014 HEMATOCRIT: CPT

## 2020-07-20 PROCEDURE — 25010000002 HEPARIN (PORCINE) PER 1000 UNITS: Performed by: NEUROLOGICAL SURGERY

## 2020-07-20 PROCEDURE — 22842 INSERT SPINE FIXATION DEVICE: CPT | Performed by: ORTHOPAEDIC SURGERY

## 2020-07-20 PROCEDURE — 76000 FLUOROSCOPY <1 HR PHYS/QHP: CPT

## 2020-07-20 PROCEDURE — 07DR0ZZ EXTRACTION OF ILIAC BONE MARROW, OPEN APPROACH: ICD-10-PCS | Performed by: ORTHOPAEDIC SURGERY

## 2020-07-20 PROCEDURE — 22612 ARTHRD PST TQ 1NTRSPC LUMBAR: CPT | Performed by: ORTHOPAEDIC SURGERY

## 2020-07-20 PROCEDURE — 94664 DEMO&/EVAL PT USE INHALER: CPT

## 2020-07-20 PROCEDURE — 25010000002 HYDROMORPHONE PER 4 MG: Performed by: NURSE ANESTHETIST, CERTIFIED REGISTERED

## 2020-07-20 PROCEDURE — 25010000003 CEFAZOLIN PER 500 MG: Performed by: NEUROLOGICAL SURGERY

## 2020-07-20 PROCEDURE — 86900 BLOOD TYPING SEROLOGIC ABO: CPT | Performed by: ORTHOPAEDIC SURGERY

## 2020-07-20 PROCEDURE — 25010000002 FENTANYL CITRATE (PF) 100 MCG/2ML SOLUTION: Performed by: ANESTHESIOLOGY

## 2020-07-20 PROCEDURE — 85018 HEMOGLOBIN: CPT | Performed by: ORTHOPAEDIC SURGERY

## 2020-07-20 PROCEDURE — 94799 UNLISTED PULMONARY SVC/PX: CPT

## 2020-07-20 PROCEDURE — 86850 RBC ANTIBODY SCREEN: CPT | Performed by: ORTHOPAEDIC SURGERY

## 2020-07-20 PROCEDURE — 20939 BONE MARROW ASPIR BONE GRFG: CPT | Performed by: ORTHOPAEDIC SURGERY

## 2020-07-20 PROCEDURE — 63710000001 INSULIN LISPRO (HUMAN) PER 5 UNITS: Performed by: INTERNAL MEDICINE

## 2020-07-20 PROCEDURE — 63047 LAM FACETEC & FORAMOT LUMBAR: CPT | Performed by: NEUROLOGICAL SURGERY

## 2020-07-20 PROCEDURE — S0260 H&P FOR SURGERY: HCPCS | Performed by: ORTHOPAEDIC SURGERY

## 2020-07-20 PROCEDURE — 0SG1071 FUSION OF 2 OR MORE LUMBAR VERTEBRAL JOINTS WITH AUTOLOGOUS TISSUE SUBSTITUTE, POSTERIOR APPROACH, POSTERIOR COLUMN, OPEN APPROACH: ICD-10-PCS | Performed by: ORTHOPAEDIC SURGERY

## 2020-07-20 DEVICE — SSC BONE WAX
Type: IMPLANTABLE DEVICE | Site: SPINE LUMBAR | Status: FUNCTIONAL
Brand: SSC BONE WAX

## 2020-07-20 DEVICE — DEV CONTRL TISS STRATAFIX PDS PLS OS6 REV SZ1 18IN 45CM: Type: IMPLANTABLE DEVICE | Site: SPINE LUMBAR | Status: FUNCTIONAL

## 2020-07-20 DEVICE — MATRX STRIP PILAFX DBM 50X25X4MM: Type: IMPLANTABLE DEVICE | Site: SPINE LUMBAR | Status: FUNCTIONAL

## 2020-07-20 DEVICE — SCRW INNR EXPEDIUM: Type: IMPLANTABLE DEVICE | Site: SPINE LUMBAR | Status: FUNCTIONAL

## 2020-07-20 DEVICE — SCRW EXPEDIUM PA TI 6X45MM: Type: IMPLANTABLE DEVICE | Site: SPINE LUMBAR | Status: FUNCTIONAL

## 2020-07-20 DEVICE — ROD PREBNT SPINE EXPEDIUM TI 5.5X95MM: Type: IMPLANTABLE DEVICE | Site: SPINE LUMBAR | Status: FUNCTIONAL

## 2020-07-20 DEVICE — FLOSEAL HEMOSTATIC MATRIX, 10ML
Type: IMPLANTABLE DEVICE | Site: SPINE LUMBAR | Status: FUNCTIONAL
Brand: FLOSEAL HEMOSTATIC MATRIX

## 2020-07-20 RX ORDER — GLYCOPYRROLATE 0.2 MG/ML
INJECTION INTRAMUSCULAR; INTRAVENOUS AS NEEDED
Status: DISCONTINUED | OUTPATIENT
Start: 2020-07-20 | End: 2020-07-20 | Stop reason: SURG

## 2020-07-20 RX ORDER — HYDROMORPHONE HCL IN 0.9% NACL 10 MG/50ML
PATIENT CONTROLLED ANALGESIA SYRINGE INTRAVENOUS CONTINUOUS
Status: DISCONTINUED | OUTPATIENT
Start: 2020-07-20 | End: 2020-07-21

## 2020-07-20 RX ORDER — BUDESONIDE AND FORMOTEROL FUMARATE DIHYDRATE 160; 4.5 UG/1; UG/1
2 AEROSOL RESPIRATORY (INHALATION)
Status: DISCONTINUED | OUTPATIENT
Start: 2020-07-20 | End: 2020-07-25 | Stop reason: HOSPADM

## 2020-07-20 RX ORDER — MIDAZOLAM HYDROCHLORIDE 1 MG/ML
1 INJECTION INTRAMUSCULAR; INTRAVENOUS
Status: DISCONTINUED | OUTPATIENT
Start: 2020-07-20 | End: 2020-07-20 | Stop reason: HOSPADM

## 2020-07-20 RX ORDER — PROPOFOL 10 MG/ML
VIAL (ML) INTRAVENOUS AS NEEDED
Status: DISCONTINUED | OUTPATIENT
Start: 2020-07-20 | End: 2020-07-20 | Stop reason: SURG

## 2020-07-20 RX ORDER — LABETALOL HYDROCHLORIDE 5 MG/ML
5 INJECTION, SOLUTION INTRAVENOUS
Status: DISCONTINUED | OUTPATIENT
Start: 2020-07-20 | End: 2020-07-20 | Stop reason: HOSPADM

## 2020-07-20 RX ORDER — LIDOCAINE HYDROCHLORIDE 10 MG/ML
0.5 INJECTION, SOLUTION EPIDURAL; INFILTRATION; INTRACAUDAL; PERINEURAL ONCE AS NEEDED
Status: DISCONTINUED | OUTPATIENT
Start: 2020-07-20 | End: 2020-07-20 | Stop reason: HOSPADM

## 2020-07-20 RX ORDER — DOCUSATE SODIUM 100 MG/1
100 CAPSULE, LIQUID FILLED ORAL 2 TIMES DAILY
Status: DISCONTINUED | OUTPATIENT
Start: 2020-07-20 | End: 2020-07-25 | Stop reason: HOSPADM

## 2020-07-20 RX ORDER — OXYCODONE HYDROCHLORIDE AND ACETAMINOPHEN 5; 325 MG/1; MG/1
2 TABLET ORAL EVERY 4 HOURS PRN
Status: DISCONTINUED | OUTPATIENT
Start: 2020-07-20 | End: 2020-07-21

## 2020-07-20 RX ORDER — SODIUM CHLORIDE 0.9 % (FLUSH) 0.9 %
3 SYRINGE (ML) INJECTION EVERY 12 HOURS SCHEDULED
Status: DISCONTINUED | OUTPATIENT
Start: 2020-07-20 | End: 2020-07-20 | Stop reason: HOSPADM

## 2020-07-20 RX ORDER — FENTANYL CITRATE 50 UG/ML
50 INJECTION, SOLUTION INTRAMUSCULAR; INTRAVENOUS
Status: DISCONTINUED | OUTPATIENT
Start: 2020-07-20 | End: 2020-07-20 | Stop reason: HOSPADM

## 2020-07-20 RX ORDER — PROMETHAZINE HYDROCHLORIDE 25 MG/1
25 TABLET ORAL EVERY 6 HOURS PRN
Status: DISCONTINUED | OUTPATIENT
Start: 2020-07-20 | End: 2020-07-25 | Stop reason: HOSPADM

## 2020-07-20 RX ORDER — POLYETHYLENE GLYCOL 3350 17 G/17G
17 POWDER, FOR SOLUTION ORAL DAILY
Status: DISCONTINUED | OUTPATIENT
Start: 2020-07-20 | End: 2020-07-25 | Stop reason: HOSPADM

## 2020-07-20 RX ORDER — EPHEDRINE SULFATE 50 MG/ML
5 INJECTION, SOLUTION INTRAVENOUS ONCE AS NEEDED
Status: DISCONTINUED | OUTPATIENT
Start: 2020-07-20 | End: 2020-07-20 | Stop reason: HOSPADM

## 2020-07-20 RX ORDER — DIPHENHYDRAMINE HCL 25 MG
25 CAPSULE ORAL
Status: DISCONTINUED | OUTPATIENT
Start: 2020-07-20 | End: 2020-07-20 | Stop reason: HOSPADM

## 2020-07-20 RX ORDER — SODIUM CHLORIDE, SODIUM LACTATE, POTASSIUM CHLORIDE, CALCIUM CHLORIDE 600; 310; 30; 20 MG/100ML; MG/100ML; MG/100ML; MG/100ML
9 INJECTION, SOLUTION INTRAVENOUS CONTINUOUS
Status: DISCONTINUED | OUTPATIENT
Start: 2020-07-20 | End: 2020-07-25 | Stop reason: HOSPADM

## 2020-07-20 RX ORDER — SODIUM CHLORIDE 0.9 % (FLUSH) 0.9 %
3 SYRINGE (ML) INJECTION EVERY 12 HOURS SCHEDULED
Status: DISCONTINUED | OUTPATIENT
Start: 2020-07-20 | End: 2020-07-25 | Stop reason: HOSPADM

## 2020-07-20 RX ORDER — SODIUM CHLORIDE 0.9 % (FLUSH) 0.9 %
3-10 SYRINGE (ML) INJECTION AS NEEDED
Status: DISCONTINUED | OUTPATIENT
Start: 2020-07-20 | End: 2020-07-20 | Stop reason: HOSPADM

## 2020-07-20 RX ORDER — HYDROCODONE BITARTRATE AND ACETAMINOPHEN 7.5; 325 MG/1; MG/1
1 TABLET ORAL ONCE AS NEEDED
Status: DISCONTINUED | OUTPATIENT
Start: 2020-07-20 | End: 2020-07-20 | Stop reason: HOSPADM

## 2020-07-20 RX ORDER — DEXTROSE MONOHYDRATE 25 G/50ML
25-50 INJECTION, SOLUTION INTRAVENOUS
Status: DISCONTINUED | OUTPATIENT
Start: 2020-07-20 | End: 2020-07-20 | Stop reason: SURG

## 2020-07-20 RX ORDER — CEFAZOLIN SODIUM 2 G/100ML
2 INJECTION, SOLUTION INTRAVENOUS ONCE
Status: COMPLETED | OUTPATIENT
Start: 2020-07-20 | End: 2020-07-20

## 2020-07-20 RX ORDER — ATORVASTATIN CALCIUM 20 MG/1
20 TABLET, FILM COATED ORAL DAILY
Status: DISCONTINUED | OUTPATIENT
Start: 2020-07-20 | End: 2020-07-25 | Stop reason: HOSPADM

## 2020-07-20 RX ORDER — TEMAZEPAM 15 MG/1
15 CAPSULE ORAL NIGHTLY PRN
Status: DISCONTINUED | OUTPATIENT
Start: 2020-07-20 | End: 2020-07-24

## 2020-07-20 RX ORDER — SUCCINYLCHOLINE CHLORIDE 20 MG/ML
INJECTION INTRAMUSCULAR; INTRAVENOUS AS NEEDED
Status: DISCONTINUED | OUTPATIENT
Start: 2020-07-20 | End: 2020-07-20 | Stop reason: SURG

## 2020-07-20 RX ORDER — PROMETHAZINE HYDROCHLORIDE 25 MG/ML
6.25 INJECTION, SOLUTION INTRAMUSCULAR; INTRAVENOUS
Status: DISCONTINUED | OUTPATIENT
Start: 2020-07-20 | End: 2020-07-20 | Stop reason: HOSPADM

## 2020-07-20 RX ORDER — PROMETHAZINE HYDROCHLORIDE 25 MG/1
25 TABLET ORAL ONCE AS NEEDED
Status: DISCONTINUED | OUTPATIENT
Start: 2020-07-20 | End: 2020-07-20 | Stop reason: HOSPADM

## 2020-07-20 RX ORDER — DIPHENHYDRAMINE HYDROCHLORIDE 50 MG/ML
12.5 INJECTION INTRAMUSCULAR; INTRAVENOUS
Status: DISCONTINUED | OUTPATIENT
Start: 2020-07-20 | End: 2020-07-20 | Stop reason: HOSPADM

## 2020-07-20 RX ORDER — FAMOTIDINE 10 MG/ML
20 INJECTION, SOLUTION INTRAVENOUS ONCE
Status: COMPLETED | OUTPATIENT
Start: 2020-07-20 | End: 2020-07-20

## 2020-07-20 RX ORDER — ACETAMINOPHEN 325 MG/1
650 TABLET ORAL ONCE AS NEEDED
Status: DISCONTINUED | OUTPATIENT
Start: 2020-07-20 | End: 2020-07-20 | Stop reason: HOSPADM

## 2020-07-20 RX ORDER — GABAPENTIN 400 MG/1
400 CAPSULE ORAL 3 TIMES DAILY
Status: DISCONTINUED | OUTPATIENT
Start: 2020-07-20 | End: 2020-07-25 | Stop reason: HOSPADM

## 2020-07-20 RX ORDER — AMLODIPINE BESYLATE 2.5 MG/1
2.5 TABLET ORAL DAILY
Status: DISCONTINUED | OUTPATIENT
Start: 2020-07-20 | End: 2020-07-25 | Stop reason: HOSPADM

## 2020-07-20 RX ORDER — NICOTINE POLACRILEX 4 MG
15 LOZENGE BUCCAL
Status: DISCONTINUED | OUTPATIENT
Start: 2020-07-20 | End: 2020-07-25 | Stop reason: HOSPADM

## 2020-07-20 RX ORDER — HYDROMORPHONE HYDROCHLORIDE 1 MG/ML
0.5 INJECTION, SOLUTION INTRAMUSCULAR; INTRAVENOUS; SUBCUTANEOUS
Status: DISCONTINUED | OUTPATIENT
Start: 2020-07-20 | End: 2020-07-20 | Stop reason: HOSPADM

## 2020-07-20 RX ORDER — ALPRAZOLAM 0.25 MG/1
0.25 TABLET ORAL DAILY
Status: DISCONTINUED | OUTPATIENT
Start: 2020-07-20 | End: 2020-07-23

## 2020-07-20 RX ORDER — SODIUM CHLORIDE 0.9 % (FLUSH) 0.9 %
10 SYRINGE (ML) INJECTION AS NEEDED
Status: DISCONTINUED | OUTPATIENT
Start: 2020-07-20 | End: 2020-07-25 | Stop reason: HOSPADM

## 2020-07-20 RX ORDER — SODIUM CHLORIDE 0.9 % (FLUSH) 0.9 %
10 SYRINGE (ML) INJECTION EVERY 12 HOURS SCHEDULED
Status: DISCONTINUED | OUTPATIENT
Start: 2020-07-20 | End: 2020-07-20 | Stop reason: HOSPADM

## 2020-07-20 RX ORDER — TRAZODONE HYDROCHLORIDE 100 MG/1
100 TABLET ORAL NIGHTLY
Status: DISCONTINUED | OUTPATIENT
Start: 2020-07-20 | End: 2020-07-25 | Stop reason: HOSPADM

## 2020-07-20 RX ORDER — FENOFIBRATE 145 MG/1
145 TABLET, COATED ORAL DAILY
Status: DISCONTINUED | OUTPATIENT
Start: 2020-07-20 | End: 2020-07-25 | Stop reason: HOSPADM

## 2020-07-20 RX ORDER — CEFAZOLIN SODIUM 2 G/100ML
2 INJECTION, SOLUTION INTRAVENOUS EVERY 8 HOURS
Status: COMPLETED | OUTPATIENT
Start: 2020-07-20 | End: 2020-07-20

## 2020-07-20 RX ORDER — NALOXONE HCL 0.4 MG/ML
0.1 VIAL (ML) INJECTION
Status: DISCONTINUED | OUTPATIENT
Start: 2020-07-20 | End: 2020-07-25 | Stop reason: HOSPADM

## 2020-07-20 RX ORDER — KETAMINE HYDROCHLORIDE 10 MG/ML
INJECTION INTRAMUSCULAR; INTRAVENOUS AS NEEDED
Status: DISCONTINUED | OUTPATIENT
Start: 2020-07-20 | End: 2020-07-20 | Stop reason: SURG

## 2020-07-20 RX ORDER — ONDANSETRON 2 MG/ML
4 INJECTION INTRAMUSCULAR; INTRAVENOUS ONCE AS NEEDED
Status: DISCONTINUED | OUTPATIENT
Start: 2020-07-20 | End: 2020-07-20 | Stop reason: HOSPADM

## 2020-07-20 RX ORDER — SODIUM CHLORIDE 9 MG/ML
100 INJECTION, SOLUTION INTRAVENOUS CONTINUOUS
Status: DISCONTINUED | OUTPATIENT
Start: 2020-07-20 | End: 2020-07-21

## 2020-07-20 RX ORDER — DEXTROSE MONOHYDRATE 25 G/50ML
25-50 INJECTION, SOLUTION INTRAVENOUS
Status: DISCONTINUED | OUTPATIENT
Start: 2020-07-20 | End: 2020-07-20 | Stop reason: HOSPADM

## 2020-07-20 RX ORDER — CYCLOBENZAPRINE HCL 10 MG
10 TABLET ORAL 3 TIMES DAILY PRN
Status: DISCONTINUED | OUTPATIENT
Start: 2020-07-20 | End: 2020-07-24

## 2020-07-20 RX ORDER — PROMETHAZINE HYDROCHLORIDE 25 MG/ML
12.5 INJECTION, SOLUTION INTRAMUSCULAR; INTRAVENOUS ONCE AS NEEDED
Status: DISCONTINUED | OUTPATIENT
Start: 2020-07-20 | End: 2020-07-20 | Stop reason: HOSPADM

## 2020-07-20 RX ORDER — SODIUM CHLORIDE 0.9 % (FLUSH) 0.9 %
10 SYRINGE (ML) INJECTION AS NEEDED
Status: DISCONTINUED | OUTPATIENT
Start: 2020-07-20 | End: 2020-07-20 | Stop reason: HOSPADM

## 2020-07-20 RX ORDER — ARIPIPRAZOLE 5 MG/1
5 TABLET ORAL DAILY
Status: DISCONTINUED | OUTPATIENT
Start: 2020-07-20 | End: 2020-07-25 | Stop reason: HOSPADM

## 2020-07-20 RX ORDER — CEFAZOLIN SODIUM 2 G/100ML
2 INJECTION, SOLUTION INTRAVENOUS ONCE
Status: DISCONTINUED | OUTPATIENT
Start: 2020-07-20 | End: 2020-07-20 | Stop reason: HOSPADM

## 2020-07-20 RX ORDER — OXYCODONE AND ACETAMINOPHEN 7.5; 325 MG/1; MG/1
1 TABLET ORAL ONCE AS NEEDED
Status: DISCONTINUED | OUTPATIENT
Start: 2020-07-20 | End: 2020-07-20 | Stop reason: HOSPADM

## 2020-07-20 RX ORDER — PROMETHAZINE HYDROCHLORIDE 25 MG/1
25 SUPPOSITORY RECTAL ONCE AS NEEDED
Status: DISCONTINUED | OUTPATIENT
Start: 2020-07-20 | End: 2020-07-20 | Stop reason: HOSPADM

## 2020-07-20 RX ORDER — NALOXONE HCL 0.4 MG/ML
0.2 VIAL (ML) INJECTION AS NEEDED
Status: DISCONTINUED | OUTPATIENT
Start: 2020-07-20 | End: 2020-07-20 | Stop reason: HOSPADM

## 2020-07-20 RX ORDER — HYDROMORPHONE HCL 110MG/55ML
PATIENT CONTROLLED ANALGESIA SYRINGE INTRAVENOUS AS NEEDED
Status: DISCONTINUED | OUTPATIENT
Start: 2020-07-20 | End: 2020-07-20 | Stop reason: SURG

## 2020-07-20 RX ORDER — SODIUM CHLORIDE 9 MG/ML
INJECTION, SOLUTION INTRAVENOUS AS NEEDED
Status: DISCONTINUED | OUTPATIENT
Start: 2020-07-20 | End: 2020-07-20 | Stop reason: HOSPADM

## 2020-07-20 RX ORDER — ROCURONIUM BROMIDE 10 MG/ML
INJECTION, SOLUTION INTRAVENOUS AS NEEDED
Status: DISCONTINUED | OUTPATIENT
Start: 2020-07-20 | End: 2020-07-20 | Stop reason: SURG

## 2020-07-20 RX ORDER — DEXTROSE MONOHYDRATE 25 G/50ML
25 INJECTION, SOLUTION INTRAVENOUS
Status: DISCONTINUED | OUTPATIENT
Start: 2020-07-20 | End: 2020-07-25 | Stop reason: HOSPADM

## 2020-07-20 RX ORDER — ONDANSETRON 2 MG/ML
INJECTION INTRAMUSCULAR; INTRAVENOUS AS NEEDED
Status: DISCONTINUED | OUTPATIENT
Start: 2020-07-20 | End: 2020-07-20 | Stop reason: SURG

## 2020-07-20 RX ORDER — LIDOCAINE HYDROCHLORIDE 20 MG/ML
INJECTION, SOLUTION INFILTRATION; PERINEURAL AS NEEDED
Status: DISCONTINUED | OUTPATIENT
Start: 2020-07-20 | End: 2020-07-20 | Stop reason: SURG

## 2020-07-20 RX ORDER — HYDRALAZINE HYDROCHLORIDE 20 MG/ML
5 INJECTION INTRAMUSCULAR; INTRAVENOUS
Status: DISCONTINUED | OUTPATIENT
Start: 2020-07-20 | End: 2020-07-20 | Stop reason: HOSPADM

## 2020-07-20 RX ORDER — FLUMAZENIL 0.1 MG/ML
0.2 INJECTION INTRAVENOUS AS NEEDED
Status: DISCONTINUED | OUTPATIENT
Start: 2020-07-20 | End: 2020-07-20 | Stop reason: HOSPADM

## 2020-07-20 RX ORDER — VENLAFAXINE 75 MG/1
150 TABLET ORAL DAILY
Status: DISCONTINUED | OUTPATIENT
Start: 2020-07-20 | End: 2020-07-25 | Stop reason: HOSPADM

## 2020-07-20 RX ADMIN — OXYCODONE HYDROCHLORIDE AND ACETAMINOPHEN 2 TABLET: 5; 325 TABLET ORAL at 22:05

## 2020-07-20 RX ADMIN — INSULIN LISPRO 6 UNITS: 100 INJECTION, SOLUTION INTRAVENOUS; SUBCUTANEOUS at 17:45

## 2020-07-20 RX ADMIN — FENOFIBRATE 145 MG: 145 TABLET ORAL at 15:36

## 2020-07-20 RX ADMIN — HYDROMORPHONE HYDROCHLORIDE 0.5 MG: 2 INJECTION, SOLUTION INTRAMUSCULAR; INTRAVENOUS; SUBCUTANEOUS at 11:12

## 2020-07-20 RX ADMIN — FENTANYL CITRATE 50 MCG: 50 INJECTION INTRAMUSCULAR; INTRAVENOUS at 07:12

## 2020-07-20 RX ADMIN — NEOSTIGMINE METHYLSULFATE 4 MG: 1 INJECTION INTRAMUSCULAR; INTRAVENOUS; SUBCUTANEOUS at 11:21

## 2020-07-20 RX ADMIN — MIDAZOLAM 1 MG: 1 INJECTION INTRAMUSCULAR; INTRAVENOUS at 07:11

## 2020-07-20 RX ADMIN — CEFAZOLIN SODIUM 2 G: 2 INJECTION, SOLUTION INTRAVENOUS at 15:35

## 2020-07-20 RX ADMIN — GABAPENTIN 400 MG: 400 CAPSULE ORAL at 15:35

## 2020-07-20 RX ADMIN — SODIUM CHLORIDE, POTASSIUM CHLORIDE, SODIUM LACTATE AND CALCIUM CHLORIDE: 600; 310; 30; 20 INJECTION, SOLUTION INTRAVENOUS at 10:25

## 2020-07-20 RX ADMIN — PHENYLEPHRINE HYDROCHLORIDE 100 MCG: 10 INJECTION INTRAVENOUS at 09:41

## 2020-07-20 RX ADMIN — KETAMINE HYDROCHLORIDE 50 MG: 10 INJECTION INTRAMUSCULAR; INTRAVENOUS at 07:53

## 2020-07-20 RX ADMIN — ROCURONIUM BROMIDE 10 MG: 10 INJECTION INTRAVENOUS at 10:24

## 2020-07-20 RX ADMIN — ROCURONIUM BROMIDE 10 MG: 10 INJECTION INTRAVENOUS at 07:31

## 2020-07-20 RX ADMIN — PHENYLEPHRINE HYDROCHLORIDE 100 MCG: 10 INJECTION INTRAVENOUS at 08:34

## 2020-07-20 RX ADMIN — HYDROMORPHONE HYDROCHLORIDE 0.5 MG: 2 INJECTION, SOLUTION INTRAMUSCULAR; INTRAVENOUS; SUBCUTANEOUS at 11:16

## 2020-07-20 RX ADMIN — FAMOTIDINE 20 MG: 10 INJECTION INTRAVENOUS at 06:48

## 2020-07-20 RX ADMIN — SODIUM CHLORIDE, POTASSIUM CHLORIDE, SODIUM LACTATE AND CALCIUM CHLORIDE 9 ML/HR: 600; 310; 30; 20 INJECTION, SOLUTION INTRAVENOUS at 06:48

## 2020-07-20 RX ADMIN — CEFAZOLIN SODIUM 2 G: 2 INJECTION, SOLUTION INTRAVENOUS at 22:13

## 2020-07-20 RX ADMIN — BUDESONIDE AND FORMOTEROL FUMARATE DIHYDRATE 2 PUFF: 160; 4.5 AEROSOL RESPIRATORY (INHALATION) at 19:37

## 2020-07-20 RX ADMIN — LIDOCAINE HYDROCHLORIDE 100 MG: 20 INJECTION, SOLUTION INFILTRATION; PERINEURAL at 07:31

## 2020-07-20 RX ADMIN — FENTANYL CITRATE 50 MCG: 50 INJECTION INTRAMUSCULAR; INTRAVENOUS at 07:31

## 2020-07-20 RX ADMIN — ATORVASTATIN CALCIUM 20 MG: 20 TABLET, FILM COATED ORAL at 14:41

## 2020-07-20 RX ADMIN — ROCURONIUM BROMIDE 40 MG: 10 INJECTION INTRAVENOUS at 07:59

## 2020-07-20 RX ADMIN — FENTANYL CITRATE 50 MCG: 50 INJECTION, SOLUTION INTRAMUSCULAR; INTRAVENOUS at 12:14

## 2020-07-20 RX ADMIN — OXYCODONE HYDROCHLORIDE AND ACETAMINOPHEN 2 TABLET: 5; 325 TABLET ORAL at 15:45

## 2020-07-20 RX ADMIN — TRAZODONE HYDROCHLORIDE 100 MG: 100 TABLET ORAL at 22:06

## 2020-07-20 RX ADMIN — ONDANSETRON HYDROCHLORIDE 4 MG: 2 SOLUTION INTRAMUSCULAR; INTRAVENOUS at 11:12

## 2020-07-20 RX ADMIN — GLYCOPYRROLATE 0.3 MG: 0.2 INJECTION INTRAMUSCULAR; INTRAVENOUS at 11:21

## 2020-07-20 RX ADMIN — HYDROMORPHONE HYDROCHLORIDE: 10 INJECTION, SOLUTION INTRAMUSCULAR; INTRAVENOUS; SUBCUTANEOUS at 12:01

## 2020-07-20 RX ADMIN — SODIUM CHLORIDE 100 ML/HR: 9 INJECTION, SOLUTION INTRAVENOUS at 23:47

## 2020-07-20 RX ADMIN — PROPOFOL 200 MG: 10 INJECTION, EMULSION INTRAVENOUS at 07:31

## 2020-07-20 RX ADMIN — PHENYLEPHRINE HYDROCHLORIDE 100 MCG: 10 INJECTION INTRAVENOUS at 08:27

## 2020-07-20 RX ADMIN — PHENYLEPHRINE HYDROCHLORIDE 100 MCG: 10 INJECTION INTRAVENOUS at 08:47

## 2020-07-20 RX ADMIN — FENTANYL CITRATE 50 MCG: 50 INJECTION INTRAMUSCULAR; INTRAVENOUS at 08:02

## 2020-07-20 RX ADMIN — GABAPENTIN 400 MG: 400 CAPSULE ORAL at 22:05

## 2020-07-20 RX ADMIN — ROCURONIUM BROMIDE 10 MG: 10 INJECTION INTRAVENOUS at 08:56

## 2020-07-20 RX ADMIN — SODIUM CHLORIDE 5 UNITS/HR: 9 INJECTION, SOLUTION INTRAVENOUS at 07:25

## 2020-07-20 RX ADMIN — CEFAZOLIN SODIUM 2 G: 2 INJECTION, SOLUTION INTRAVENOUS at 07:43

## 2020-07-20 RX ADMIN — ALPRAZOLAM 0.25 MG: 0.25 TABLET ORAL at 14:41

## 2020-07-20 RX ADMIN — PHENYLEPHRINE HYDROCHLORIDE 100 MCG: 10 INJECTION INTRAVENOUS at 08:14

## 2020-07-20 RX ADMIN — POLYETHYLENE GLYCOL 3350 17 G: 17 POWDER, FOR SOLUTION ORAL at 14:41

## 2020-07-20 RX ADMIN — PHENYLEPHRINE HYDROCHLORIDE 100 MCG: 10 INJECTION INTRAVENOUS at 08:22

## 2020-07-20 RX ADMIN — SUCCINYLCHOLINE CHLORIDE 120 MG: 20 INJECTION, SOLUTION INTRAMUSCULAR; INTRAVENOUS; PARENTERAL at 07:31

## 2020-07-20 NOTE — ANESTHESIA PROCEDURE NOTES
Airway  Urgency: elective    Date/Time: 7/20/2020 7:33 AM  Airway not difficult    General Information and Staff    Patient location during procedure: OR  CRNA: Deniz Wing CRNA    Indications and Patient Condition  Indications for airway management: airway protection    Preoxygenated: yes  MILS maintained throughout  Mask difficulty assessment: 0 - not attempted    Final Airway Details  Final airway type: endotracheal airway      Successful airway: ETT and reinforced tube  Cuffed: yes   Successful intubation technique: direct laryngoscopy and RSI  Facilitating devices/methods: cricoid pressure and Bougie  Endotracheal tube insertion site: oral  Blade: Lyndon  Blade size: 3  ETT size (mm): 7.5  Cormack-Lehane Classification: grade IIb - view of arytenoids or posterior of glottis only  Placement verified by: chest auscultation   Measured from: teeth  ETT/EBT  to teeth (cm): 2  Number of attempts at approach: 1  Assessment: lips, teeth, and gum same as pre-op and atraumatic intubation

## 2020-07-20 NOTE — ANESTHESIA PREPROCEDURE EVALUATION
Anesthesia Evaluation     Nursing notes reviewed   history of anesthetic complications:  NPO Solid Status: > 8 hours  NPO Liquid Status: > 2 hours           Airway   Mallampati: III  TM distance: >3 FB  Possible difficult intubation  Dental - normal exam     Pulmonary - normal exam   (+) COPD, sleep apnea on CPAP,   Cardiovascular - normal exam    (+) hypertension, hyperlipidemia,       Neuro/Psych  GI/Hepatic/Renal/Endo    (+) morbid obesity,  diabetes mellitus type 2 poorly controlled,     Musculoskeletal     Abdominal    Substance History      OB/GYN          Other   arthritis,                      Anesthesia Plan    ASA 3     general   (Art line and insulin drip)  intravenous induction     Anesthetic plan, all risks, benefits, and alternatives have been provided, discussed and informed consent has been obtained with: patient.    Plan discussed with attending.

## 2020-07-20 NOTE — ANESTHESIA POSTPROCEDURE EVALUATION
"Patient: Lloyd Greene    Procedure Summary     Date:  07/20/20 Room / Location:  [facility] OR  / [facility] MAIN OR    Anesthesia Start:  0729 Anesthesia Stop:  1149    Procedures:       Lumbar 2 to lumbar 3, lumbar 3 to lumbar 4 and lumbar 4 lumbar 5 laminectomy with a fusion by orthopedics (N/A Spine Lumbar)      Lumbar 2 to lumbar 5 fusion with instrumentation and laminectomy by Dr. Hutchinson (N/A Spine Lumbar) Diagnosis:       Spinal stenosis of lumbar region with neurogenic claudication      (Spinal stenosis of lumbar region with neurogenic claudication [M48.062])    Surgeon:  John Tran MD; Melvin Ya MD Provider:  Surinder Dietz MD    Anesthesia Type:  general ASA Status:  3          Anesthesia Type: general    Vitals  Vitals Value Taken Time   /91 7/20/2020  1:05 PM   Temp 37.3 °C (99.2 °F) 7/20/2020 11:47 AM   Pulse 92 7/20/2020  1:08 PM   Resp 16 7/20/2020 12:45 PM   SpO2 98 % 7/20/2020  1:08 PM   Vitals shown include unvalidated device data.        Post Anesthesia Care and Evaluation    Patient location during evaluation: bedside  Patient participation: complete - patient participated  Level of consciousness: awake  Pain management: adequate  Airway patency: patent  Anesthetic complications: No anesthetic complications    Cardiovascular status: acceptable  Respiratory status: acceptable  Hydration status: acceptable    Comments: */84   Pulse 94   Temp 37.3 °C (99.2 °F) (Oral)   Resp 16   Ht 165.1 cm (65\")   Wt 107 kg (236 lb 15.9 oz)   SpO2 98%   BMI 39.44 kg/m²         "

## 2020-07-20 NOTE — ANESTHESIA PROCEDURE NOTES
Arterial Line      Patient reassessed immediately prior to procedure    Patient location during procedure: holding area  Start time: 7/20/2020 7:14 AM  Stop Time:7/20/2020 7:17 AM       Line placed for ABGs/Labs/ISTAT and hemodynamic monitoring.  Performed By   Anesthesiologist: Surinder Dietz MD  Preanesthetic Checklist  Completed: patient identified, site marked, surgical consent, pre-op evaluation, timeout performed, IV checked, risks and benefits discussed and monitors and equipment checked  Arterial Line Prep   Sterile Tech: cap, gloves and mask  Prep: ChloraPrep  Patient monitoring: blood pressure monitoring, continuous pulse oximetry and EKG  Arterial Line Procedure   Laterality:left  Location:  radial artery   Guidance: ultrasound guided  PROCEDURE NOTE/ULTRASOUND INTERPRETATION.  Using ultrasound guidance the potential vascular sites for insertion of the catheter were visualized to determine the patency of the vessel to be used for vascular access.  After selecting the appropriate site for insertion, the needle was visualized under ultrasound being inserted into the radial artery, followed by ultrasound confirmation of wire and catheter placement. There were no abnormalities seen on ultrasound; an image was taken; and the patient tolerated the procedure with no complications.   Number of attempts: 1  Successful placement: yes  Post Assessment   Dressing Type: wrist guard applied and occlusive dressing applied.   Complications no  Patient Tolerance: patient tolerated the procedure well with no apparent complications

## 2020-07-21 PROBLEM — E87.1 HYPONATREMIA: Status: ACTIVE | Noted: 2020-07-21

## 2020-07-21 LAB
ANION GAP SERPL CALCULATED.3IONS-SCNC: 11.7 MMOL/L (ref 5–15)
ANION GAP SERPL CALCULATED.3IONS-SCNC: 11.8 MMOL/L (ref 5–15)
BUN SERPL-MCNC: 19 MG/DL (ref 6–20)
BUN SERPL-MCNC: 21 MG/DL (ref 6–20)
BUN/CREAT SERPL: 15.2 (ref 7–25)
BUN/CREAT SERPL: 17.8 (ref 7–25)
CALCIUM SPEC-SCNC: 8.2 MG/DL (ref 8.6–10.5)
CALCIUM SPEC-SCNC: 8.8 MG/DL (ref 8.6–10.5)
CHLORIDE SERPL-SCNC: 94 MMOL/L (ref 98–107)
CHLORIDE SERPL-SCNC: 95 MMOL/L (ref 98–107)
CO2 SERPL-SCNC: 20.3 MMOL/L (ref 22–29)
CO2 SERPL-SCNC: 23.2 MMOL/L (ref 22–29)
CREAT SERPL-MCNC: 1.18 MG/DL (ref 0.76–1.27)
CREAT SERPL-MCNC: 1.25 MG/DL (ref 0.76–1.27)
GFR SERPL CREATININE-BSD FRML MDRD: 59 ML/MIN/1.73
GFR SERPL CREATININE-BSD FRML MDRD: 63 ML/MIN/1.73
GLUCOSE BLDC GLUCOMTR-MCNC: 328 MG/DL (ref 70–130)
GLUCOSE BLDC GLUCOMTR-MCNC: 329 MG/DL (ref 70–130)
GLUCOSE BLDC GLUCOMTR-MCNC: 354 MG/DL (ref 70–130)
GLUCOSE BLDC GLUCOMTR-MCNC: 379 MG/DL (ref 70–130)
GLUCOSE SERPL-MCNC: 297 MG/DL (ref 65–99)
GLUCOSE SERPL-MCNC: 381 MG/DL (ref 65–99)
HBA1C MFR BLD: 10.5 % (ref 4.8–5.6)
HCT VFR BLD AUTO: 37.2 % (ref 37.5–51)
HGB BLD-MCNC: 12.4 G/DL (ref 13–17.7)
OSMOLALITY SERPL: 301 MOSM/KG (ref 275–300)
OSMOLALITY UR: 610 MOSM/KG
POTASSIUM SERPL-SCNC: 4.1 MMOL/L (ref 3.5–5.2)
POTASSIUM SERPL-SCNC: 4.8 MMOL/L (ref 3.5–5.2)
SODIUM SERPL-SCNC: 126 MMOL/L (ref 136–145)
SODIUM SERPL-SCNC: 130 MMOL/L (ref 136–145)
SODIUM UR-SCNC: 20 MMOL/L
TSH SERPL DL<=0.05 MIU/L-ACNC: 0.55 UIU/ML (ref 0.27–4.2)

## 2020-07-21 PROCEDURE — 97110 THERAPEUTIC EXERCISES: CPT

## 2020-07-21 PROCEDURE — 84443 ASSAY THYROID STIM HORMONE: CPT | Performed by: INTERNAL MEDICINE

## 2020-07-21 PROCEDURE — 83935 ASSAY OF URINE OSMOLALITY: CPT | Performed by: INTERNAL MEDICINE

## 2020-07-21 PROCEDURE — 63710000001 INSULIN GLARGINE PER 5 UNITS: Performed by: INTERNAL MEDICINE

## 2020-07-21 PROCEDURE — 99024 POSTOP FOLLOW-UP VISIT: CPT | Performed by: NURSE PRACTITIONER

## 2020-07-21 PROCEDURE — 97162 PT EVAL MOD COMPLEX 30 MIN: CPT

## 2020-07-21 PROCEDURE — 80048 BASIC METABOLIC PNL TOTAL CA: CPT | Performed by: ORTHOPAEDIC SURGERY

## 2020-07-21 PROCEDURE — 84300 ASSAY OF URINE SODIUM: CPT | Performed by: INTERNAL MEDICINE

## 2020-07-21 PROCEDURE — 99024 POSTOP FOLLOW-UP VISIT: CPT | Performed by: ORTHOPAEDIC SURGERY

## 2020-07-21 PROCEDURE — 63710000001 INSULIN LISPRO (HUMAN) PER 5 UNITS: Performed by: INTERNAL MEDICINE

## 2020-07-21 PROCEDURE — 83930 ASSAY OF BLOOD OSMOLALITY: CPT | Performed by: INTERNAL MEDICINE

## 2020-07-21 PROCEDURE — 83036 HEMOGLOBIN GLYCOSYLATED A1C: CPT | Performed by: INTERNAL MEDICINE

## 2020-07-21 PROCEDURE — 85018 HEMOGLOBIN: CPT | Performed by: ORTHOPAEDIC SURGERY

## 2020-07-21 PROCEDURE — 85014 HEMATOCRIT: CPT | Performed by: ORTHOPAEDIC SURGERY

## 2020-07-21 PROCEDURE — 94799 UNLISTED PULMONARY SVC/PX: CPT

## 2020-07-21 PROCEDURE — 82962 GLUCOSE BLOOD TEST: CPT

## 2020-07-21 PROCEDURE — 80048 BASIC METABOLIC PNL TOTAL CA: CPT | Performed by: INTERNAL MEDICINE

## 2020-07-21 RX ORDER — INSULIN GLARGINE 100 [IU]/ML
15 INJECTION, SOLUTION SUBCUTANEOUS NIGHTLY
Status: DISCONTINUED | OUTPATIENT
Start: 2020-07-21 | End: 2020-07-22

## 2020-07-21 RX ORDER — OXYCODONE AND ACETAMINOPHEN 7.5; 325 MG/1; MG/1
2 TABLET ORAL EVERY 4 HOURS PRN
Status: DISCONTINUED | OUTPATIENT
Start: 2020-07-21 | End: 2020-07-24

## 2020-07-21 RX ORDER — OXYCODONE AND ACETAMINOPHEN 7.5; 325 MG/1; MG/1
1 TABLET ORAL EVERY 4 HOURS PRN
Status: DISCONTINUED | OUTPATIENT
Start: 2020-07-21 | End: 2020-07-24

## 2020-07-21 RX ADMIN — POLYETHYLENE GLYCOL 3350 17 G: 17 POWDER, FOR SOLUTION ORAL at 07:41

## 2020-07-21 RX ADMIN — INSULIN LISPRO 7 UNITS: 100 INJECTION, SOLUTION INTRAVENOUS; SUBCUTANEOUS at 07:39

## 2020-07-21 RX ADMIN — CYCLOBENZAPRINE 10 MG: 10 TABLET, FILM COATED ORAL at 18:02

## 2020-07-21 RX ADMIN — ARIPIPRAZOLE 5 MG: 5 TABLET ORAL at 07:41

## 2020-07-21 RX ADMIN — OXYCODONE HYDROCHLORIDE AND ACETAMINOPHEN 1 TABLET: 7.5; 325 TABLET ORAL at 11:26

## 2020-07-21 RX ADMIN — BUDESONIDE AND FORMOTEROL FUMARATE DIHYDRATE 2 PUFF: 160; 4.5 AEROSOL RESPIRATORY (INHALATION) at 20:11

## 2020-07-21 RX ADMIN — ATORVASTATIN CALCIUM 20 MG: 20 TABLET, FILM COATED ORAL at 07:41

## 2020-07-21 RX ADMIN — INSULIN LISPRO 12 UNITS: 100 INJECTION, SOLUTION INTRAVENOUS; SUBCUTANEOUS at 17:56

## 2020-07-21 RX ADMIN — SODIUM CHLORIDE, PRESERVATIVE FREE 3 ML: 5 INJECTION INTRAVENOUS at 08:00

## 2020-07-21 RX ADMIN — OXYCODONE HYDROCHLORIDE AND ACETAMINOPHEN 1 TABLET: 7.5; 325 TABLET ORAL at 15:30

## 2020-07-21 RX ADMIN — CYCLOBENZAPRINE 10 MG: 10 TABLET, FILM COATED ORAL at 10:17

## 2020-07-21 RX ADMIN — DOCUSATE SODIUM 100 MG: 100 CAPSULE, LIQUID FILLED ORAL at 07:42

## 2020-07-21 RX ADMIN — INSULIN GLARGINE 15 UNITS: 100 INJECTION, SOLUTION SUBCUTANEOUS at 21:14

## 2020-07-21 RX ADMIN — INSULIN LISPRO 10 UNITS: 100 INJECTION, SOLUTION INTRAVENOUS; SUBCUTANEOUS at 11:21

## 2020-07-21 RX ADMIN — BUDESONIDE AND FORMOTEROL FUMARATE DIHYDRATE 2 PUFF: 160; 4.5 AEROSOL RESPIRATORY (INHALATION) at 07:01

## 2020-07-21 RX ADMIN — ALPRAZOLAM 0.25 MG: 0.25 TABLET ORAL at 07:43

## 2020-07-21 RX ADMIN — GABAPENTIN 400 MG: 400 CAPSULE ORAL at 07:42

## 2020-07-21 RX ADMIN — GABAPENTIN 400 MG: 400 CAPSULE ORAL at 15:30

## 2020-07-21 RX ADMIN — OXYCODONE HYDROCHLORIDE AND ACETAMINOPHEN 2 TABLET: 5; 325 TABLET ORAL at 03:08

## 2020-07-21 RX ADMIN — SODIUM CHLORIDE, PRESERVATIVE FREE 3 ML: 5 INJECTION INTRAVENOUS at 21:15

## 2020-07-21 RX ADMIN — AMLODIPINE BESYLATE 2.5 MG: 2.5 TABLET ORAL at 07:42

## 2020-07-21 RX ADMIN — VENLAFAXINE HYDROCHLORIDE 150 MG: 75 TABLET ORAL at 07:41

## 2020-07-21 RX ADMIN — FENOFIBRATE 145 MG: 145 TABLET ORAL at 07:42

## 2020-07-21 RX ADMIN — OXYCODONE HYDROCHLORIDE AND ACETAMINOPHEN 2 TABLET: 7.5; 325 TABLET ORAL at 21:15

## 2020-07-21 RX ADMIN — TRAZODONE HYDROCHLORIDE 100 MG: 100 TABLET ORAL at 21:15

## 2020-07-21 RX ADMIN — GABAPENTIN 400 MG: 400 CAPSULE ORAL at 21:15

## 2020-07-21 RX ADMIN — DOCUSATE SODIUM 100 MG: 100 CAPSULE, LIQUID FILLED ORAL at 21:15

## 2020-07-22 ENCOUNTER — APPOINTMENT (OUTPATIENT)
Dept: GENERAL RADIOLOGY | Facility: HOSPITAL | Age: 60
End: 2020-07-22

## 2020-07-22 LAB
ANION GAP SERPL CALCULATED.3IONS-SCNC: 10.1 MMOL/L (ref 5–15)
B PARAPERT DNA SPEC QL NAA+PROBE: NOT DETECTED
B PERT DNA SPEC QL NAA+PROBE: NOT DETECTED
BASOPHILS # BLD AUTO: 0.03 10*3/MM3 (ref 0–0.2)
BASOPHILS NFR BLD AUTO: 0.3 % (ref 0–1.5)
BUN SERPL-MCNC: 13 MG/DL (ref 6–20)
BUN/CREAT SERPL: 13.1 (ref 7–25)
C PNEUM DNA NPH QL NAA+NON-PROBE: NOT DETECTED
CALCIUM SPEC-SCNC: 9 MG/DL (ref 8.6–10.5)
CHLORIDE SERPL-SCNC: 100 MMOL/L (ref 98–107)
CO2 SERPL-SCNC: 22.9 MMOL/L (ref 22–29)
CREAT SERPL-MCNC: 0.99 MG/DL (ref 0.76–1.27)
DEPRECATED RDW RBC AUTO: 39.8 FL (ref 37–54)
EOSINOPHIL # BLD AUTO: 0.01 10*3/MM3 (ref 0–0.4)
EOSINOPHIL NFR BLD AUTO: 0.1 % (ref 0.3–6.2)
ERYTHROCYTE [DISTWIDTH] IN BLOOD BY AUTOMATED COUNT: 12.1 % (ref 12.3–15.4)
FLUAV H1 2009 PAND RNA NPH QL NAA+PROBE: NOT DETECTED
FLUAV H1 HA GENE NPH QL NAA+PROBE: NOT DETECTED
FLUAV H3 RNA NPH QL NAA+PROBE: NOT DETECTED
FLUAV SUBTYP SPEC NAA+PROBE: NOT DETECTED
FLUBV RNA ISLT QL NAA+PROBE: NOT DETECTED
GFR SERPL CREATININE-BSD FRML MDRD: 78 ML/MIN/1.73
GLUCOSE BLDC GLUCOMTR-MCNC: 241 MG/DL (ref 70–130)
GLUCOSE BLDC GLUCOMTR-MCNC: 278 MG/DL (ref 70–130)
GLUCOSE BLDC GLUCOMTR-MCNC: 296 MG/DL (ref 70–130)
GLUCOSE BLDC GLUCOMTR-MCNC: 302 MG/DL (ref 70–130)
GLUCOSE SERPL-MCNC: 293 MG/DL (ref 65–99)
HADV DNA SPEC NAA+PROBE: NOT DETECTED
HCOV 229E RNA SPEC QL NAA+PROBE: NOT DETECTED
HCOV HKU1 RNA SPEC QL NAA+PROBE: NOT DETECTED
HCOV NL63 RNA SPEC QL NAA+PROBE: NOT DETECTED
HCOV OC43 RNA SPEC QL NAA+PROBE: NOT DETECTED
HCT VFR BLD AUTO: 31.8 % (ref 37.5–51)
HGB BLD-MCNC: 10.7 G/DL (ref 13–17.7)
HMPV RNA NPH QL NAA+NON-PROBE: NOT DETECTED
HPIV1 RNA SPEC QL NAA+PROBE: NOT DETECTED
HPIV2 RNA SPEC QL NAA+PROBE: NOT DETECTED
HPIV3 RNA NPH QL NAA+PROBE: NOT DETECTED
HPIV4 P GENE NPH QL NAA+PROBE: NOT DETECTED
IMM GRANULOCYTES # BLD AUTO: 0.05 10*3/MM3 (ref 0–0.05)
IMM GRANULOCYTES NFR BLD AUTO: 0.5 % (ref 0–0.5)
LYMPHOCYTES # BLD AUTO: 1.98 10*3/MM3 (ref 0.7–3.1)
LYMPHOCYTES NFR BLD AUTO: 18.3 % (ref 19.6–45.3)
M PNEUMO IGG SER IA-ACNC: NOT DETECTED
MCH RBC QN AUTO: 30.2 PG (ref 26.6–33)
MCHC RBC AUTO-ENTMCNC: 33.6 G/DL (ref 31.5–35.7)
MCV RBC AUTO: 89.8 FL (ref 79–97)
MONOCYTES # BLD AUTO: 1.36 10*3/MM3 (ref 0.1–0.9)
MONOCYTES NFR BLD AUTO: 12.6 % (ref 5–12)
NEUTROPHILS NFR BLD AUTO: 68.2 % (ref 42.7–76)
NEUTROPHILS NFR BLD AUTO: 7.38 10*3/MM3 (ref 1.7–7)
NRBC BLD AUTO-RTO: 0 /100 WBC (ref 0–0.2)
PLATELET # BLD AUTO: 272 10*3/MM3 (ref 140–450)
PMV BLD AUTO: 11.3 FL (ref 6–12)
POTASSIUM SERPL-SCNC: 4.2 MMOL/L (ref 3.5–5.2)
RBC # BLD AUTO: 3.54 10*6/MM3 (ref 4.14–5.8)
RHINOVIRUS RNA SPEC NAA+PROBE: NOT DETECTED
RSV RNA NPH QL NAA+NON-PROBE: NOT DETECTED
SARS-COV-2 RNA NPH QL NAA+NON-PROBE: NOT DETECTED
SODIUM SERPL-SCNC: 133 MMOL/L (ref 136–145)
WBC # BLD AUTO: 10.81 10*3/MM3 (ref 3.4–10.8)

## 2020-07-22 PROCEDURE — 94799 UNLISTED PULMONARY SVC/PX: CPT

## 2020-07-22 PROCEDURE — 63710000001 INSULIN GLARGINE PER 5 UNITS: Performed by: INTERNAL MEDICINE

## 2020-07-22 PROCEDURE — 0202U NFCT DS 22 TRGT SARS-COV-2: CPT | Performed by: INTERNAL MEDICINE

## 2020-07-22 PROCEDURE — 99024 POSTOP FOLLOW-UP VISIT: CPT | Performed by: ORTHOPAEDIC SURGERY

## 2020-07-22 PROCEDURE — 99024 POSTOP FOLLOW-UP VISIT: CPT | Performed by: NURSE PRACTITIONER

## 2020-07-22 PROCEDURE — 80048 BASIC METABOLIC PNL TOTAL CA: CPT | Performed by: INTERNAL MEDICINE

## 2020-07-22 PROCEDURE — 71046 X-RAY EXAM CHEST 2 VIEWS: CPT

## 2020-07-22 PROCEDURE — 63710000001 INSULIN LISPRO (HUMAN) PER 5 UNITS: Performed by: INTERNAL MEDICINE

## 2020-07-22 PROCEDURE — 85025 COMPLETE CBC W/AUTO DIFF WBC: CPT | Performed by: NURSE PRACTITIONER

## 2020-07-22 PROCEDURE — 87040 BLOOD CULTURE FOR BACTERIA: CPT | Performed by: INTERNAL MEDICINE

## 2020-07-22 PROCEDURE — 82962 GLUCOSE BLOOD TEST: CPT

## 2020-07-22 PROCEDURE — 97110 THERAPEUTIC EXERCISES: CPT

## 2020-07-22 RX ORDER — BISACODYL 10 MG
10 SUPPOSITORY, RECTAL RECTAL ONCE
Status: COMPLETED | OUTPATIENT
Start: 2020-07-22 | End: 2020-07-22

## 2020-07-22 RX ORDER — IBUPROFEN 200 MG
TABLET ORAL EVERY 8 HOURS SCHEDULED
Status: DISCONTINUED | OUTPATIENT
Start: 2020-07-22 | End: 2020-07-25 | Stop reason: HOSPADM

## 2020-07-22 RX ORDER — INSULIN GLARGINE 100 [IU]/ML
25 INJECTION, SOLUTION SUBCUTANEOUS NIGHTLY
Status: DISCONTINUED | OUTPATIENT
Start: 2020-07-22 | End: 2020-07-23

## 2020-07-22 RX ADMIN — SODIUM CHLORIDE, PRESERVATIVE FREE 3 ML: 5 INJECTION INTRAVENOUS at 08:13

## 2020-07-22 RX ADMIN — BISACODYL 10 MG: 10 SUPPOSITORY RECTAL at 15:23

## 2020-07-22 RX ADMIN — ATORVASTATIN CALCIUM 20 MG: 20 TABLET, FILM COATED ORAL at 08:12

## 2020-07-22 RX ADMIN — TRAZODONE HYDROCHLORIDE 100 MG: 100 TABLET ORAL at 21:53

## 2020-07-22 RX ADMIN — GABAPENTIN 400 MG: 400 CAPSULE ORAL at 21:53

## 2020-07-22 RX ADMIN — BUDESONIDE AND FORMOTEROL FUMARATE DIHYDRATE 2 PUFF: 160; 4.5 AEROSOL RESPIRATORY (INHALATION) at 22:47

## 2020-07-22 RX ADMIN — GABAPENTIN 400 MG: 400 CAPSULE ORAL at 08:12

## 2020-07-22 RX ADMIN — OXYCODONE HYDROCHLORIDE AND ACETAMINOPHEN 2 TABLET: 7.5; 325 TABLET ORAL at 07:20

## 2020-07-22 RX ADMIN — DOCUSATE SODIUM 100 MG: 100 CAPSULE, LIQUID FILLED ORAL at 08:12

## 2020-07-22 RX ADMIN — ARIPIPRAZOLE 5 MG: 5 TABLET ORAL at 08:12

## 2020-07-22 RX ADMIN — DOCUSATE SODIUM 100 MG: 100 CAPSULE, LIQUID FILLED ORAL at 21:53

## 2020-07-22 RX ADMIN — INSULIN LISPRO 8 UNITS: 100 INJECTION, SOLUTION INTRAVENOUS; SUBCUTANEOUS at 08:12

## 2020-07-22 RX ADMIN — INSULIN LISPRO 5 UNITS: 100 INJECTION, SOLUTION INTRAVENOUS; SUBCUTANEOUS at 17:26

## 2020-07-22 RX ADMIN — BUDESONIDE AND FORMOTEROL FUMARATE DIHYDRATE 2 PUFF: 160; 4.5 AEROSOL RESPIRATORY (INHALATION) at 12:37

## 2020-07-22 RX ADMIN — BACITRACIN ZINC, NEOMYCIN, POLYMYXIN B: 400; 3.5; 5 OINTMENT TOPICAL at 15:23

## 2020-07-22 RX ADMIN — FENOFIBRATE 145 MG: 145 TABLET ORAL at 08:12

## 2020-07-22 RX ADMIN — SODIUM CHLORIDE, PRESERVATIVE FREE 3 ML: 5 INJECTION INTRAVENOUS at 21:58

## 2020-07-22 RX ADMIN — INSULIN LISPRO 10 UNITS: 100 INJECTION, SOLUTION INTRAVENOUS; SUBCUTANEOUS at 12:00

## 2020-07-22 RX ADMIN — BACITRACIN ZINC, NEOMYCIN, POLYMYXIN B: 400; 3.5; 5 OINTMENT TOPICAL at 21:54

## 2020-07-22 RX ADMIN — AMLODIPINE BESYLATE 2.5 MG: 2.5 TABLET ORAL at 08:12

## 2020-07-22 RX ADMIN — OXYCODONE HYDROCHLORIDE AND ACETAMINOPHEN 1 TABLET: 7.5; 325 TABLET ORAL at 17:26

## 2020-07-22 RX ADMIN — OXYCODONE HYDROCHLORIDE AND ACETAMINOPHEN 2 TABLET: 7.5; 325 TABLET ORAL at 21:53

## 2020-07-22 RX ADMIN — GABAPENTIN 400 MG: 400 CAPSULE ORAL at 17:26

## 2020-07-22 RX ADMIN — ALPRAZOLAM 0.25 MG: 0.25 TABLET ORAL at 08:12

## 2020-07-22 RX ADMIN — INSULIN GLARGINE 25 UNITS: 100 INJECTION, SOLUTION SUBCUTANEOUS at 21:55

## 2020-07-22 RX ADMIN — VENLAFAXINE HYDROCHLORIDE 150 MG: 75 TABLET ORAL at 08:12

## 2020-07-22 RX ADMIN — OXYCODONE HYDROCHLORIDE AND ACETAMINOPHEN 2 TABLET: 7.5; 325 TABLET ORAL at 12:02

## 2020-07-23 ENCOUNTER — APPOINTMENT (OUTPATIENT)
Dept: CT IMAGING | Facility: HOSPITAL | Age: 60
End: 2020-07-23

## 2020-07-23 LAB
ALBUMIN SERPL-MCNC: 3.4 G/DL (ref 3.5–5.2)
ALBUMIN/GLOB SERPL: 0.9 G/DL
ALP SERPL-CCNC: 65 U/L (ref 39–117)
ALT SERPL W P-5'-P-CCNC: 42 U/L (ref 1–41)
ANION GAP SERPL CALCULATED.3IONS-SCNC: 11.3 MMOL/L (ref 5–15)
AST SERPL-CCNC: 64 U/L (ref 1–40)
BILIRUB SERPL-MCNC: 0.4 MG/DL (ref 0–1.2)
BUN SERPL-MCNC: 12 MG/DL (ref 6–20)
BUN/CREAT SERPL: 13.6 (ref 7–25)
CALCIUM SPEC-SCNC: 9 MG/DL (ref 8.6–10.5)
CHLORIDE SERPL-SCNC: 96 MMOL/L (ref 98–107)
CO2 SERPL-SCNC: 23.7 MMOL/L (ref 22–29)
CREAT SERPL-MCNC: 0.88 MG/DL (ref 0.76–1.27)
DEPRECATED RDW RBC AUTO: 40.7 FL (ref 37–54)
ERYTHROCYTE [DISTWIDTH] IN BLOOD BY AUTOMATED COUNT: 12 % (ref 12.3–15.4)
GFR SERPL CREATININE-BSD FRML MDRD: 89 ML/MIN/1.73
GLOBULIN UR ELPH-MCNC: 3.6 GM/DL
GLUCOSE BLDC GLUCOMTR-MCNC: 237 MG/DL (ref 70–130)
GLUCOSE BLDC GLUCOMTR-MCNC: 278 MG/DL (ref 70–130)
GLUCOSE BLDC GLUCOMTR-MCNC: 284 MG/DL (ref 70–130)
GLUCOSE BLDC GLUCOMTR-MCNC: 291 MG/DL (ref 70–130)
GLUCOSE SERPL-MCNC: 295 MG/DL (ref 65–99)
HCT VFR BLD AUTO: 32.2 % (ref 37.5–51)
HGB BLD-MCNC: 10.3 G/DL (ref 13–17.7)
MCH RBC QN AUTO: 29.3 PG (ref 26.6–33)
MCHC RBC AUTO-ENTMCNC: 32 G/DL (ref 31.5–35.7)
MCV RBC AUTO: 91.7 FL (ref 79–97)
PLATELET # BLD AUTO: 262 10*3/MM3 (ref 140–450)
PMV BLD AUTO: 11 FL (ref 6–12)
POTASSIUM SERPL-SCNC: 4 MMOL/L (ref 3.5–5.2)
PROT SERPL-MCNC: 7 G/DL (ref 6–8.5)
RBC # BLD AUTO: 3.51 10*6/MM3 (ref 4.14–5.8)
SODIUM SERPL-SCNC: 131 MMOL/L (ref 136–145)
WBC # BLD AUTO: 11.69 10*3/MM3 (ref 3.4–10.8)

## 2020-07-23 PROCEDURE — 85027 COMPLETE CBC AUTOMATED: CPT | Performed by: INTERNAL MEDICINE

## 2020-07-23 PROCEDURE — 87086 URINE CULTURE/COLONY COUNT: CPT | Performed by: INTERNAL MEDICINE

## 2020-07-23 PROCEDURE — 97110 THERAPEUTIC EXERCISES: CPT

## 2020-07-23 PROCEDURE — 80053 COMPREHEN METABOLIC PANEL: CPT | Performed by: INTERNAL MEDICINE

## 2020-07-23 PROCEDURE — 63710000001 INSULIN GLARGINE PER 5 UNITS: Performed by: INTERNAL MEDICINE

## 2020-07-23 PROCEDURE — 94660 CPAP INITIATION&MGMT: CPT

## 2020-07-23 PROCEDURE — 99024 POSTOP FOLLOW-UP VISIT: CPT | Performed by: NURSE PRACTITIONER

## 2020-07-23 PROCEDURE — 94799 UNLISTED PULMONARY SVC/PX: CPT

## 2020-07-23 PROCEDURE — 70450 CT HEAD/BRAIN W/O DYE: CPT

## 2020-07-23 PROCEDURE — 99024 POSTOP FOLLOW-UP VISIT: CPT | Performed by: ORTHOPAEDIC SURGERY

## 2020-07-23 PROCEDURE — 81001 URINALYSIS AUTO W/SCOPE: CPT | Performed by: INTERNAL MEDICINE

## 2020-07-23 PROCEDURE — 63710000001 INSULIN LISPRO (HUMAN) PER 5 UNITS: Performed by: INTERNAL MEDICINE

## 2020-07-23 PROCEDURE — 82962 GLUCOSE BLOOD TEST: CPT

## 2020-07-23 RX ORDER — IBUPROFEN 200 MG
TABLET ORAL EVERY 8 HOURS SCHEDULED
Status: ON HOLD
Start: 2020-07-23 | End: 2020-07-31 | Stop reason: SDUPTHER

## 2020-07-23 RX ORDER — POLYETHYLENE GLYCOL 3350 17 G/17G
17 POWDER, FOR SOLUTION ORAL DAILY
Qty: 238 G | Refills: 0 | Status: SHIPPED | OUTPATIENT
Start: 2020-07-24 | End: 2020-08-06

## 2020-07-23 RX ORDER — ACETAMINOPHEN 325 MG/1
650 TABLET ORAL EVERY 6 HOURS PRN
Status: DISCONTINUED | OUTPATIENT
Start: 2020-07-23 | End: 2020-07-25 | Stop reason: HOSPADM

## 2020-07-23 RX ORDER — INSULIN GLARGINE 100 [IU]/ML
30 INJECTION, SOLUTION SUBCUTANEOUS NIGHTLY
Status: DISCONTINUED | OUTPATIENT
Start: 2020-07-23 | End: 2020-07-25 | Stop reason: HOSPADM

## 2020-07-23 RX ORDER — PSEUDOEPHEDRINE HCL 30 MG
100 TABLET ORAL 2 TIMES DAILY
Qty: 60 EACH | Refills: 0 | Status: SHIPPED | OUTPATIENT
Start: 2020-07-23 | End: 2020-08-11

## 2020-07-23 RX ORDER — ALPRAZOLAM 0.25 MG/1
0.25 TABLET ORAL DAILY PRN
Status: DISCONTINUED | OUTPATIENT
Start: 2020-07-23 | End: 2020-07-25 | Stop reason: HOSPADM

## 2020-07-23 RX ADMIN — INSULIN LISPRO 5 UNITS: 100 INJECTION, SOLUTION INTRAVENOUS; SUBCUTANEOUS at 11:32

## 2020-07-23 RX ADMIN — CYCLOBENZAPRINE 10 MG: 10 TABLET, FILM COATED ORAL at 23:39

## 2020-07-23 RX ADMIN — ACETAMINOPHEN 650 MG: 325 TABLET, FILM COATED ORAL at 15:38

## 2020-07-23 RX ADMIN — POLYETHYLENE GLYCOL 3350 17 G: 17 POWDER, FOR SOLUTION ORAL at 09:16

## 2020-07-23 RX ADMIN — OXYCODONE HYDROCHLORIDE AND ACETAMINOPHEN 2 TABLET: 7.5; 325 TABLET ORAL at 03:15

## 2020-07-23 RX ADMIN — AMLODIPINE BESYLATE 2.5 MG: 2.5 TABLET ORAL at 09:15

## 2020-07-23 RX ADMIN — VENLAFAXINE HYDROCHLORIDE 150 MG: 75 TABLET ORAL at 09:16

## 2020-07-23 RX ADMIN — DOCUSATE SODIUM 100 MG: 100 CAPSULE, LIQUID FILLED ORAL at 20:57

## 2020-07-23 RX ADMIN — SODIUM CHLORIDE, PRESERVATIVE FREE 3 ML: 5 INJECTION INTRAVENOUS at 09:22

## 2020-07-23 RX ADMIN — GABAPENTIN 400 MG: 400 CAPSULE ORAL at 09:16

## 2020-07-23 RX ADMIN — INSULIN LISPRO 8 UNITS: 100 INJECTION, SOLUTION INTRAVENOUS; SUBCUTANEOUS at 11:31

## 2020-07-23 RX ADMIN — DOCUSATE SODIUM 100 MG: 100 CAPSULE, LIQUID FILLED ORAL at 09:16

## 2020-07-23 RX ADMIN — BACITRACIN ZINC, NEOMYCIN, POLYMYXIN B: 400; 3.5; 5 OINTMENT TOPICAL at 21:08

## 2020-07-23 RX ADMIN — ARIPIPRAZOLE 5 MG: 5 TABLET ORAL at 09:15

## 2020-07-23 RX ADMIN — BACITRACIN ZINC, NEOMYCIN, POLYMYXIN B: 400; 3.5; 5 OINTMENT TOPICAL at 13:49

## 2020-07-23 RX ADMIN — ATORVASTATIN CALCIUM 20 MG: 20 TABLET, FILM COATED ORAL at 09:15

## 2020-07-23 RX ADMIN — INSULIN GLARGINE 30 UNITS: 100 INJECTION, SOLUTION SUBCUTANEOUS at 21:48

## 2020-07-23 RX ADMIN — OXYCODONE HYDROCHLORIDE AND ACETAMINOPHEN 2 TABLET: 7.5; 325 TABLET ORAL at 07:28

## 2020-07-23 RX ADMIN — ALPRAZOLAM 0.25 MG: 0.25 TABLET ORAL at 09:16

## 2020-07-23 RX ADMIN — INSULIN LISPRO 5 UNITS: 100 INJECTION, SOLUTION INTRAVENOUS; SUBCUTANEOUS at 17:27

## 2020-07-23 RX ADMIN — BUDESONIDE AND FORMOTEROL FUMARATE DIHYDRATE 2 PUFF: 160; 4.5 AEROSOL RESPIRATORY (INHALATION) at 20:41

## 2020-07-23 RX ADMIN — FENOFIBRATE 145 MG: 145 TABLET ORAL at 09:16

## 2020-07-23 RX ADMIN — SODIUM CHLORIDE, PRESERVATIVE FREE 3 ML: 5 INJECTION INTRAVENOUS at 21:03

## 2020-07-23 RX ADMIN — INSULIN LISPRO 5 UNITS: 100 INJECTION, SOLUTION INTRAVENOUS; SUBCUTANEOUS at 17:26

## 2020-07-23 RX ADMIN — ACETAMINOPHEN 650 MG: 325 TABLET, FILM COATED ORAL at 20:57

## 2020-07-23 RX ADMIN — INSULIN LISPRO 8 UNITS: 100 INJECTION, SOLUTION INTRAVENOUS; SUBCUTANEOUS at 09:16

## 2020-07-23 RX ADMIN — CYCLOBENZAPRINE 10 MG: 10 TABLET, FILM COATED ORAL at 03:15

## 2020-07-23 RX ADMIN — BUDESONIDE AND FORMOTEROL FUMARATE DIHYDRATE 2 PUFF: 160; 4.5 AEROSOL RESPIRATORY (INHALATION) at 08:01

## 2020-07-24 ENCOUNTER — APPOINTMENT (OUTPATIENT)
Dept: CARDIOLOGY | Facility: HOSPITAL | Age: 60
End: 2020-07-24

## 2020-07-24 LAB
B PARAPERT DNA SPEC QL NAA+PROBE: NOT DETECTED
B PERT DNA SPEC QL NAA+PROBE: NOT DETECTED
BACTERIA UR QL AUTO: ABNORMAL /HPF
BASOPHILS # BLD AUTO: 0.04 10*3/MM3 (ref 0–0.2)
BASOPHILS NFR BLD AUTO: 0.3 % (ref 0–1.5)
BH CV LOWER VASCULAR LEFT COMMON FEMORAL AUGMENT: NORMAL
BH CV LOWER VASCULAR LEFT COMMON FEMORAL COMPETENT: NORMAL
BH CV LOWER VASCULAR LEFT COMMON FEMORAL COMPRESS: NORMAL
BH CV LOWER VASCULAR LEFT COMMON FEMORAL PHASIC: NORMAL
BH CV LOWER VASCULAR LEFT COMMON FEMORAL SPONT: NORMAL
BH CV LOWER VASCULAR LEFT DISTAL FEMORAL COMPRESS: NORMAL
BH CV LOWER VASCULAR LEFT GASTRONEMIUS COMPRESS: NORMAL
BH CV LOWER VASCULAR LEFT GREATER SAPH AK COMPRESS: NORMAL
BH CV LOWER VASCULAR LEFT GREATER SAPH BK COMPRESS: NORMAL
BH CV LOWER VASCULAR LEFT MID FEMORAL AUGMENT: NORMAL
BH CV LOWER VASCULAR LEFT MID FEMORAL COMPETENT: NORMAL
BH CV LOWER VASCULAR LEFT MID FEMORAL COMPRESS: NORMAL
BH CV LOWER VASCULAR LEFT MID FEMORAL PHASIC: NORMAL
BH CV LOWER VASCULAR LEFT MID FEMORAL SPONT: NORMAL
BH CV LOWER VASCULAR LEFT PERONEAL COMPRESS: NORMAL
BH CV LOWER VASCULAR LEFT POPLITEAL AUGMENT: NORMAL
BH CV LOWER VASCULAR LEFT POPLITEAL COMPETENT: NORMAL
BH CV LOWER VASCULAR LEFT POPLITEAL COMPRESS: NORMAL
BH CV LOWER VASCULAR LEFT POPLITEAL PHASIC: NORMAL
BH CV LOWER VASCULAR LEFT POPLITEAL SPONT: NORMAL
BH CV LOWER VASCULAR LEFT POSTERIOR TIBIAL COMPRESS: NORMAL
BH CV LOWER VASCULAR LEFT PROFUNDA FEMORAL COMPRESS: NORMAL
BH CV LOWER VASCULAR LEFT PROXIMAL FEMORAL COMPRESS: NORMAL
BH CV LOWER VASCULAR LEFT SAPHENOFEMORAL JUNCTION COMPRESS: NORMAL
BH CV LOWER VASCULAR RIGHT COMMON FEMORAL AUGMENT: NORMAL
BH CV LOWER VASCULAR RIGHT COMMON FEMORAL COMPETENT: NORMAL
BH CV LOWER VASCULAR RIGHT COMMON FEMORAL COMPRESS: NORMAL
BH CV LOWER VASCULAR RIGHT COMMON FEMORAL PHASIC: NORMAL
BH CV LOWER VASCULAR RIGHT COMMON FEMORAL SPONT: NORMAL
BH CV LOWER VASCULAR RIGHT DISTAL FEMORAL COMPRESS: NORMAL
BH CV LOWER VASCULAR RIGHT GASTRONEMIUS COMPRESS: NORMAL
BH CV LOWER VASCULAR RIGHT GREATER SAPH AK COMPRESS: NORMAL
BH CV LOWER VASCULAR RIGHT GREATER SAPH BK COMPRESS: NORMAL
BH CV LOWER VASCULAR RIGHT MID FEMORAL AUGMENT: NORMAL
BH CV LOWER VASCULAR RIGHT MID FEMORAL COMPETENT: NORMAL
BH CV LOWER VASCULAR RIGHT MID FEMORAL COMPRESS: NORMAL
BH CV LOWER VASCULAR RIGHT MID FEMORAL PHASIC: NORMAL
BH CV LOWER VASCULAR RIGHT MID FEMORAL SPONT: NORMAL
BH CV LOWER VASCULAR RIGHT PERONEAL COMPRESS: NORMAL
BH CV LOWER VASCULAR RIGHT POPLITEAL AUGMENT: NORMAL
BH CV LOWER VASCULAR RIGHT POPLITEAL COMPETENT: NORMAL
BH CV LOWER VASCULAR RIGHT POPLITEAL COMPRESS: NORMAL
BH CV LOWER VASCULAR RIGHT POPLITEAL PHASIC: NORMAL
BH CV LOWER VASCULAR RIGHT POPLITEAL SPONT: NORMAL
BH CV LOWER VASCULAR RIGHT POSTERIOR TIBIAL COMPRESS: NORMAL
BH CV LOWER VASCULAR RIGHT PROFUNDA FEMORAL COMPRESS: NORMAL
BH CV LOWER VASCULAR RIGHT PROXIMAL FEMORAL COMPRESS: NORMAL
BH CV LOWER VASCULAR RIGHT SAPHENOFEMORAL JUNCTION COMPRESS: NORMAL
BILIRUB UR QL STRIP: NEGATIVE
C PNEUM DNA NPH QL NAA+NON-PROBE: NOT DETECTED
CLARITY UR: CLEAR
COLOR UR: YELLOW
DEPRECATED RDW RBC AUTO: 39 FL (ref 37–54)
EOSINOPHIL # BLD AUTO: 0.07 10*3/MM3 (ref 0–0.4)
EOSINOPHIL NFR BLD AUTO: 0.6 % (ref 0.3–6.2)
ERYTHROCYTE [DISTWIDTH] IN BLOOD BY AUTOMATED COUNT: 12 % (ref 12.3–15.4)
FLUAV H1 2009 PAND RNA NPH QL NAA+PROBE: NOT DETECTED
FLUAV H1 HA GENE NPH QL NAA+PROBE: NOT DETECTED
FLUAV H3 RNA NPH QL NAA+PROBE: NOT DETECTED
FLUAV SUBTYP SPEC NAA+PROBE: NOT DETECTED
FLUBV RNA ISLT QL NAA+PROBE: NOT DETECTED
GLUCOSE BLDC GLUCOMTR-MCNC: 179 MG/DL (ref 70–130)
GLUCOSE BLDC GLUCOMTR-MCNC: 217 MG/DL (ref 70–130)
GLUCOSE BLDC GLUCOMTR-MCNC: 251 MG/DL (ref 70–130)
GLUCOSE BLDC GLUCOMTR-MCNC: 277 MG/DL (ref 70–130)
GLUCOSE UR STRIP-MCNC: ABNORMAL MG/DL
HADV DNA SPEC NAA+PROBE: NOT DETECTED
HCOV 229E RNA SPEC QL NAA+PROBE: NOT DETECTED
HCOV HKU1 RNA SPEC QL NAA+PROBE: NOT DETECTED
HCOV NL63 RNA SPEC QL NAA+PROBE: NOT DETECTED
HCOV OC43 RNA SPEC QL NAA+PROBE: NOT DETECTED
HCT VFR BLD AUTO: 28.7 % (ref 37.5–51)
HGB BLD-MCNC: 9.6 G/DL (ref 13–17.7)
HGB UR QL STRIP.AUTO: ABNORMAL
HMPV RNA NPH QL NAA+NON-PROBE: NOT DETECTED
HPIV1 RNA SPEC QL NAA+PROBE: NOT DETECTED
HPIV2 RNA SPEC QL NAA+PROBE: NOT DETECTED
HPIV3 RNA NPH QL NAA+PROBE: NOT DETECTED
HPIV4 P GENE NPH QL NAA+PROBE: NOT DETECTED
HYALINE CASTS UR QL AUTO: ABNORMAL /LPF
IMM GRANULOCYTES # BLD AUTO: 0.14 10*3/MM3 (ref 0–0.05)
IMM GRANULOCYTES NFR BLD AUTO: 1.2 % (ref 0–0.5)
KETONES UR QL STRIP: ABNORMAL
LEUKOCYTE ESTERASE UR QL STRIP.AUTO: NEGATIVE
LYMPHOCYTES # BLD AUTO: 1.83 10*3/MM3 (ref 0.7–3.1)
LYMPHOCYTES NFR BLD AUTO: 15.4 % (ref 19.6–45.3)
M PNEUMO IGG SER IA-ACNC: NOT DETECTED
MCH RBC QN AUTO: 29.5 PG (ref 26.6–33)
MCHC RBC AUTO-ENTMCNC: 33.4 G/DL (ref 31.5–35.7)
MCV RBC AUTO: 88.3 FL (ref 79–97)
MONOCYTES # BLD AUTO: 1.36 10*3/MM3 (ref 0.1–0.9)
MONOCYTES NFR BLD AUTO: 11.4 % (ref 5–12)
NEUTROPHILS NFR BLD AUTO: 71.1 % (ref 42.7–76)
NEUTROPHILS NFR BLD AUTO: 8.48 10*3/MM3 (ref 1.7–7)
NITRITE UR QL STRIP: NEGATIVE
NRBC BLD AUTO-RTO: 0.1 /100 WBC (ref 0–0.2)
PH UR STRIP.AUTO: 5.5 [PH] (ref 5–8)
PLATELET # BLD AUTO: 294 10*3/MM3 (ref 140–450)
PMV BLD AUTO: 10.9 FL (ref 6–12)
PROT UR QL STRIP: ABNORMAL
RBC # BLD AUTO: 3.25 10*6/MM3 (ref 4.14–5.8)
RBC # UR: ABNORMAL /HPF
REF LAB TEST METHOD: ABNORMAL
RHINOVIRUS RNA SPEC NAA+PROBE: NOT DETECTED
RSV RNA NPH QL NAA+NON-PROBE: NOT DETECTED
SARS-COV-2 RNA NPH QL NAA+NON-PROBE: NOT DETECTED
SP GR UR STRIP: 1.02 (ref 1–1.03)
SQUAMOUS #/AREA URNS HPF: ABNORMAL /HPF
UROBILINOGEN UR QL STRIP: ABNORMAL
WBC # BLD AUTO: 11.92 10*3/MM3 (ref 3.4–10.8)
WBC UR QL AUTO: ABNORMAL /HPF

## 2020-07-24 PROCEDURE — 97112 NEUROMUSCULAR REEDUCATION: CPT

## 2020-07-24 PROCEDURE — 94660 CPAP INITIATION&MGMT: CPT

## 2020-07-24 PROCEDURE — 94799 UNLISTED PULMONARY SVC/PX: CPT

## 2020-07-24 PROCEDURE — 97116 GAIT TRAINING THERAPY: CPT

## 2020-07-24 PROCEDURE — 99024 POSTOP FOLLOW-UP VISIT: CPT | Performed by: NURSE PRACTITIONER

## 2020-07-24 PROCEDURE — 0202U NFCT DS 22 TRGT SARS-COV-2: CPT | Performed by: NEUROLOGICAL SURGERY

## 2020-07-24 PROCEDURE — 99221 1ST HOSP IP/OBS SF/LOW 40: CPT | Performed by: SURGERY

## 2020-07-24 PROCEDURE — 63710000001 INSULIN GLARGINE PER 5 UNITS: Performed by: INTERNAL MEDICINE

## 2020-07-24 PROCEDURE — 63710000001 INSULIN LISPRO (HUMAN) PER 5 UNITS: Performed by: INTERNAL MEDICINE

## 2020-07-24 PROCEDURE — 85025 COMPLETE CBC W/AUTO DIFF WBC: CPT | Performed by: NURSE PRACTITIONER

## 2020-07-24 PROCEDURE — 93970 EXTREMITY STUDY: CPT

## 2020-07-24 PROCEDURE — 99024 POSTOP FOLLOW-UP VISIT: CPT | Performed by: ORTHOPAEDIC SURGERY

## 2020-07-24 PROCEDURE — 82962 GLUCOSE BLOOD TEST: CPT

## 2020-07-24 RX ORDER — HYDROCODONE BITARTRATE AND ACETAMINOPHEN 5; 325 MG/1; MG/1
1 TABLET ORAL EVERY 4 HOURS PRN
Status: DISCONTINUED | OUTPATIENT
Start: 2020-07-24 | End: 2020-07-25 | Stop reason: HOSPADM

## 2020-07-24 RX ADMIN — VENLAFAXINE HYDROCHLORIDE 150 MG: 75 TABLET ORAL at 08:37

## 2020-07-24 RX ADMIN — INSULIN LISPRO 8 UNITS: 100 INJECTION, SOLUTION INTRAVENOUS; SUBCUTANEOUS at 13:11

## 2020-07-24 RX ADMIN — BACITRACIN ZINC, NEOMYCIN, POLYMYXIN B: 400; 3.5; 5 OINTMENT TOPICAL at 13:12

## 2020-07-24 RX ADMIN — POLYETHYLENE GLYCOL 3350 17 G: 17 POWDER, FOR SOLUTION ORAL at 08:37

## 2020-07-24 RX ADMIN — BUDESONIDE AND FORMOTEROL FUMARATE DIHYDRATE 2 PUFF: 160; 4.5 AEROSOL RESPIRATORY (INHALATION) at 21:27

## 2020-07-24 RX ADMIN — ACETAMINOPHEN 650 MG: 325 TABLET, FILM COATED ORAL at 04:05

## 2020-07-24 RX ADMIN — AMLODIPINE BESYLATE 2.5 MG: 2.5 TABLET ORAL at 08:37

## 2020-07-24 RX ADMIN — INSULIN LISPRO 5 UNITS: 100 INJECTION, SOLUTION INTRAVENOUS; SUBCUTANEOUS at 16:52

## 2020-07-24 RX ADMIN — BACITRACIN ZINC, NEOMYCIN, POLYMYXIN B: 400; 3.5; 5 OINTMENT TOPICAL at 21:18

## 2020-07-24 RX ADMIN — SODIUM CHLORIDE, PRESERVATIVE FREE 3 ML: 5 INJECTION INTRAVENOUS at 21:17

## 2020-07-24 RX ADMIN — DOCUSATE SODIUM 100 MG: 100 CAPSULE, LIQUID FILLED ORAL at 21:17

## 2020-07-24 RX ADMIN — INSULIN GLARGINE 30 UNITS: 100 INJECTION, SOLUTION SUBCUTANEOUS at 21:17

## 2020-07-24 RX ADMIN — INSULIN LISPRO 5 UNITS: 100 INJECTION, SOLUTION INTRAVENOUS; SUBCUTANEOUS at 16:51

## 2020-07-24 RX ADMIN — SODIUM CHLORIDE, PRESERVATIVE FREE 3 ML: 5 INJECTION INTRAVENOUS at 08:41

## 2020-07-24 RX ADMIN — BUDESONIDE AND FORMOTEROL FUMARATE DIHYDRATE 2 PUFF: 160; 4.5 AEROSOL RESPIRATORY (INHALATION) at 07:50

## 2020-07-24 RX ADMIN — HYDROCODONE BITARTRATE AND ACETAMINOPHEN 1 TABLET: 5; 325 TABLET ORAL at 13:19

## 2020-07-24 RX ADMIN — DOCUSATE SODIUM 100 MG: 100 CAPSULE, LIQUID FILLED ORAL at 08:38

## 2020-07-24 RX ADMIN — FENOFIBRATE 145 MG: 145 TABLET ORAL at 08:37

## 2020-07-24 RX ADMIN — ARIPIPRAZOLE 5 MG: 5 TABLET ORAL at 08:37

## 2020-07-24 RX ADMIN — GABAPENTIN 400 MG: 400 CAPSULE ORAL at 21:17

## 2020-07-24 RX ADMIN — BACITRACIN ZINC, NEOMYCIN, POLYMYXIN B: 400; 3.5; 5 OINTMENT TOPICAL at 06:46

## 2020-07-24 RX ADMIN — ATORVASTATIN CALCIUM 20 MG: 20 TABLET, FILM COATED ORAL at 08:37

## 2020-07-24 RX ADMIN — TRAZODONE HYDROCHLORIDE 100 MG: 100 TABLET ORAL at 21:17

## 2020-07-24 RX ADMIN — INSULIN LISPRO 5 UNITS: 100 INJECTION, SOLUTION INTRAVENOUS; SUBCUTANEOUS at 08:38

## 2020-07-24 RX ADMIN — INSULIN LISPRO 3 UNITS: 100 INJECTION, SOLUTION INTRAVENOUS; SUBCUTANEOUS at 08:37

## 2020-07-24 RX ADMIN — INSULIN LISPRO 5 UNITS: 100 INJECTION, SOLUTION INTRAVENOUS; SUBCUTANEOUS at 13:11

## 2020-07-25 ENCOUNTER — HOSPITAL ENCOUNTER (INPATIENT)
Facility: HOSPITAL | Age: 60
LOS: 6 days | Discharge: HOME HEALTH | End: 2020-07-31
Attending: HOSPITALIST | Admitting: HOSPITALIST

## 2020-07-25 VITALS
WEIGHT: 236.99 LBS | HEART RATE: 85 BPM | HEIGHT: 65 IN | TEMPERATURE: 98 F | OXYGEN SATURATION: 94 % | BODY MASS INDEX: 39.49 KG/M2 | DIASTOLIC BLOOD PRESSURE: 70 MMHG | SYSTOLIC BLOOD PRESSURE: 117 MMHG | RESPIRATION RATE: 16 BRPM

## 2020-07-25 DIAGNOSIS — M48.062 LUMBAR STENOSIS WITH NEUROGENIC CLAUDICATION: ICD-10-CM

## 2020-07-25 DIAGNOSIS — E11.9 TYPE 2 DIABETES MELLITUS WITHOUT COMPLICATION, WITHOUT LONG-TERM CURRENT USE OF INSULIN (HCC): Primary | ICD-10-CM

## 2020-07-25 PROBLEM — Z51.89 ENCOUNTER FOR REHABILITATION: Status: ACTIVE | Noted: 2020-07-25

## 2020-07-25 LAB
BACTERIA SPEC AEROBE CULT: NORMAL
GLUCOSE BLDC GLUCOMTR-MCNC: 196 MG/DL (ref 70–130)
GLUCOSE BLDC GLUCOMTR-MCNC: 206 MG/DL (ref 70–130)
GLUCOSE BLDC GLUCOMTR-MCNC: 234 MG/DL (ref 70–130)
GLUCOSE BLDC GLUCOMTR-MCNC: 263 MG/DL (ref 70–130)

## 2020-07-25 PROCEDURE — 82962 GLUCOSE BLOOD TEST: CPT

## 2020-07-25 PROCEDURE — 94799 UNLISTED PULMONARY SVC/PX: CPT

## 2020-07-25 PROCEDURE — 97110 THERAPEUTIC EXERCISES: CPT

## 2020-07-25 PROCEDURE — 63710000001 INSULIN ASPART PER 5 UNITS: Performed by: HOSPITALIST

## 2020-07-25 PROCEDURE — 99024 POSTOP FOLLOW-UP VISIT: CPT | Performed by: ORTHOPAEDIC SURGERY

## 2020-07-25 PROCEDURE — 63710000001 INSULIN LISPRO (HUMAN) PER 5 UNITS: Performed by: INTERNAL MEDICINE

## 2020-07-25 PROCEDURE — 99024 POSTOP FOLLOW-UP VISIT: CPT | Performed by: NURSE PRACTITIONER

## 2020-07-25 PROCEDURE — 99305 1ST NF CARE MODERATE MDM 35: CPT | Performed by: INTERNAL MEDICINE

## 2020-07-25 PROCEDURE — 63710000001 INSULIN DETEMIR PER 5 UNITS: Performed by: HOSPITALIST

## 2020-07-25 PROCEDURE — 94660 CPAP INITIATION&MGMT: CPT

## 2020-07-25 RX ORDER — VENLAFAXINE 37.5 MG/1
150 TABLET ORAL DAILY
Status: DISCONTINUED | OUTPATIENT
Start: 2020-07-26 | End: 2020-07-31 | Stop reason: HOSPADM

## 2020-07-25 RX ORDER — BUDESONIDE AND FORMOTEROL FUMARATE DIHYDRATE 160; 4.5 UG/1; UG/1
2 AEROSOL RESPIRATORY (INHALATION)
Status: DISCONTINUED | OUTPATIENT
Start: 2020-07-25 | End: 2020-07-31 | Stop reason: HOSPADM

## 2020-07-25 RX ORDER — HYDROCODONE BITARTRATE AND ACETAMINOPHEN 5; 325 MG/1; MG/1
1 TABLET ORAL EVERY 4 HOURS PRN
Qty: 50 TABLET | Refills: 0 | Status: SHIPPED | OUTPATIENT
Start: 2020-07-25 | End: 2020-07-31 | Stop reason: HOSPADM

## 2020-07-25 RX ORDER — SITAGLIPTIN AND METFORMIN HYDROCHLORIDE 1000; 50 MG/1; MG/1
2 TABLET, FILM COATED, EXTENDED RELEASE ORAL DAILY
Qty: 180 TABLET | Refills: 3
Start: 2020-07-25 | End: 2020-07-31 | Stop reason: HOSPADM

## 2020-07-25 RX ORDER — DIMENHYDRINATE 50 MG
1000 TABLET ORAL DAILY
Status: DISCONTINUED | OUTPATIENT
Start: 2020-07-25 | End: 2020-07-27

## 2020-07-25 RX ORDER — NICOTINE POLACRILEX 4 MG
15 LOZENGE BUCCAL
Status: DISCONTINUED | OUTPATIENT
Start: 2020-07-25 | End: 2020-07-31 | Stop reason: HOSPADM

## 2020-07-25 RX ORDER — TRAZODONE HYDROCHLORIDE 50 MG/1
100 TABLET ORAL NIGHTLY PRN
Status: DISCONTINUED | OUTPATIENT
Start: 2020-07-25 | End: 2020-07-31 | Stop reason: HOSPADM

## 2020-07-25 RX ORDER — DOCUSATE SODIUM 100 MG/1
100 CAPSULE, LIQUID FILLED ORAL 2 TIMES DAILY
Status: DISCONTINUED | OUTPATIENT
Start: 2020-07-25 | End: 2020-07-31 | Stop reason: HOSPADM

## 2020-07-25 RX ORDER — IBUPROFEN 200 MG
TABLET ORAL EVERY 8 HOURS SCHEDULED
Status: DISCONTINUED | OUTPATIENT
Start: 2020-07-25 | End: 2020-07-31 | Stop reason: HOSPADM

## 2020-07-25 RX ORDER — ALPRAZOLAM 0.25 MG/1
0.25 TABLET ORAL DAILY
Status: DISCONTINUED | OUTPATIENT
Start: 2020-07-25 | End: 2020-07-25

## 2020-07-25 RX ORDER — INSULIN GLARGINE 100 [IU]/ML
30 INJECTION, SOLUTION SUBCUTANEOUS NIGHTLY
Refills: 12
Start: 2020-07-25 | End: 2020-07-31 | Stop reason: HOSPADM

## 2020-07-25 RX ORDER — HYDROCODONE BITARTRATE AND ACETAMINOPHEN 5; 325 MG/1; MG/1
1 TABLET ORAL EVERY 4 HOURS PRN
Status: DISCONTINUED | OUTPATIENT
Start: 2020-07-25 | End: 2020-07-31

## 2020-07-25 RX ORDER — AMLODIPINE BESYLATE 5 MG/1
2.5 TABLET ORAL DAILY
Status: DISCONTINUED | OUTPATIENT
Start: 2020-07-26 | End: 2020-07-31 | Stop reason: HOSPADM

## 2020-07-25 RX ORDER — TRAZODONE HYDROCHLORIDE 50 MG/1
100 TABLET ORAL NIGHTLY
Status: DISCONTINUED | OUTPATIENT
Start: 2020-07-25 | End: 2020-07-25

## 2020-07-25 RX ORDER — ALPRAZOLAM 0.25 MG/1
TABLET ORAL
Status: COMPLETED
Start: 2020-07-25 | End: 2020-07-25

## 2020-07-25 RX ORDER — HYDROCODONE BITARTRATE AND ACETAMINOPHEN 5; 325 MG/1; MG/1
TABLET ORAL
Status: COMPLETED
Start: 2020-07-25 | End: 2020-07-25

## 2020-07-25 RX ORDER — DEXTROSE MONOHYDRATE 25 G/50ML
25 INJECTION, SOLUTION INTRAVENOUS
Status: DISCONTINUED | OUTPATIENT
Start: 2020-07-25 | End: 2020-07-31 | Stop reason: HOSPADM

## 2020-07-25 RX ORDER — ARIPIPRAZOLE 2 MG/1
5 TABLET ORAL DAILY
Status: DISCONTINUED | OUTPATIENT
Start: 2020-07-26 | End: 2020-07-31 | Stop reason: HOSPADM

## 2020-07-25 RX ORDER — ALPRAZOLAM 0.25 MG/1
0.25 TABLET ORAL 2 TIMES DAILY PRN
Status: DISCONTINUED | OUTPATIENT
Start: 2020-07-25 | End: 2020-07-31 | Stop reason: HOSPADM

## 2020-07-25 RX ORDER — ATORVASTATIN CALCIUM 20 MG/1
20 TABLET, FILM COATED ORAL DAILY
Status: DISCONTINUED | OUTPATIENT
Start: 2020-07-26 | End: 2020-07-31 | Stop reason: HOSPADM

## 2020-07-25 RX ORDER — MELATONIN
1000 DAILY
Status: DISCONTINUED | OUTPATIENT
Start: 2020-07-26 | End: 2020-07-31 | Stop reason: HOSPADM

## 2020-07-25 RX ORDER — GABAPENTIN 400 MG/1
400 CAPSULE ORAL 3 TIMES DAILY
Status: DISCONTINUED | OUTPATIENT
Start: 2020-07-25 | End: 2020-07-31 | Stop reason: HOSPADM

## 2020-07-25 RX ORDER — PROMETHAZINE HYDROCHLORIDE 25 MG/1
25 TABLET ORAL EVERY 6 HOURS PRN
Status: DISCONTINUED | OUTPATIENT
Start: 2020-07-25 | End: 2020-07-31 | Stop reason: HOSPADM

## 2020-07-25 RX ADMIN — HYDROCODONE BITARTRATE AND ACETAMINOPHEN 1 TABLET: 5; 325 TABLET ORAL at 04:11

## 2020-07-25 RX ADMIN — BACITRACIN ZINC NEOMYCIN SULFATE POLYMYXIN B SULFATE 1 APPLICATION: 400; 3.5; 5 OINTMENT TOPICAL at 22:16

## 2020-07-25 RX ADMIN — TUBERCULIN PURIFIED PROTEIN DERIVATIVE 5 UNITS: 5 INJECTION INTRADERMAL at 17:32

## 2020-07-25 RX ADMIN — ALPRAZOLAM 0.25 MG: 0.25 TABLET ORAL at 18:24

## 2020-07-25 RX ADMIN — ATORVASTATIN CALCIUM 20 MG: 20 TABLET, FILM COATED ORAL at 08:12

## 2020-07-25 RX ADMIN — GABAPENTIN 400 MG: 400 CAPSULE ORAL at 21:27

## 2020-07-25 RX ADMIN — SODIUM CHLORIDE, PRESERVATIVE FREE 3 ML: 5 INJECTION INTRAVENOUS at 08:09

## 2020-07-25 RX ADMIN — FENOFIBRATE 145 MG: 145 TABLET ORAL at 08:08

## 2020-07-25 RX ADMIN — BACITRACIN ZINC, NEOMYCIN, POLYMYXIN B: 400; 3.5; 5 OINTMENT TOPICAL at 04:06

## 2020-07-25 RX ADMIN — HYDROCODONE BITARTRATE AND ACETAMINOPHEN 1 TABLET: 5; 325 TABLET ORAL at 08:07

## 2020-07-25 RX ADMIN — INSULIN LISPRO 5 UNITS: 100 INJECTION, SOLUTION INTRAVENOUS; SUBCUTANEOUS at 08:09

## 2020-07-25 RX ADMIN — GABAPENTIN 400 MG: 400 CAPSULE ORAL at 18:23

## 2020-07-25 RX ADMIN — BUDESONIDE AND FORMOTEROL FUMARATE DIHYDRATE 2 PUFF: 160; 4.5 AEROSOL RESPIRATORY (INHALATION) at 08:31

## 2020-07-25 RX ADMIN — VENLAFAXINE HYDROCHLORIDE 150 MG: 75 TABLET ORAL at 08:08

## 2020-07-25 RX ADMIN — INSULIN DETEMIR 30 UNITS: 100 INJECTION, SOLUTION SUBCUTANEOUS at 21:30

## 2020-07-25 RX ADMIN — HYDROCODONE BITARTRATE AND ACETAMINOPHEN 1 TABLET: 5; 325 TABLET ORAL at 17:29

## 2020-07-25 RX ADMIN — HYDROCODONE BITARTRATE AND ACETAMINOPHEN 1 TABLET: 5; 325 TABLET ORAL at 13:38

## 2020-07-25 RX ADMIN — INSULIN LISPRO 5 UNITS: 100 INJECTION, SOLUTION INTRAVENOUS; SUBCUTANEOUS at 08:07

## 2020-07-25 RX ADMIN — INSULIN ASPART 5 UNITS: 100 INJECTION, SOLUTION INTRAVENOUS; SUBCUTANEOUS at 18:34

## 2020-07-25 RX ADMIN — AMLODIPINE BESYLATE 2.5 MG: 2.5 TABLET ORAL at 08:08

## 2020-07-25 RX ADMIN — DOCUSATE SODIUM 100 MG: 100 CAPSULE, LIQUID FILLED ORAL at 08:07

## 2020-07-25 RX ADMIN — DOCUSATE SODIUM 100 MG: 100 CAPSULE, LIQUID FILLED ORAL at 21:27

## 2020-07-25 RX ADMIN — ARIPIPRAZOLE 5 MG: 5 TABLET ORAL at 08:08

## 2020-07-25 RX ADMIN — POLYETHYLENE GLYCOL 3350 17 G: 17 POWDER, FOR SOLUTION ORAL at 08:07

## 2020-07-25 RX ADMIN — BACITRACIN ZINC, NEOMYCIN, POLYMYXIN B: 400; 3.5; 5 OINTMENT TOPICAL at 13:38

## 2020-07-25 RX ADMIN — GABAPENTIN 400 MG: 400 CAPSULE ORAL at 08:07

## 2020-07-25 RX ADMIN — INSULIN LISPRO 3 UNITS: 100 INJECTION, SOLUTION INTRAVENOUS; SUBCUTANEOUS at 12:39

## 2020-07-25 RX ADMIN — INSULIN LISPRO 5 UNITS: 100 INJECTION, SOLUTION INTRAVENOUS; SUBCUTANEOUS at 12:41

## 2020-07-25 RX ADMIN — HYDROCODONE BITARTRATE AND ACETAMINOPHEN 1 TABLET: 5; 325 TABLET ORAL at 21:33

## 2020-07-25 RX ADMIN — INSULIN ASPART 4 UNITS: 100 INJECTION, SOLUTION INTRAVENOUS; SUBCUTANEOUS at 18:28

## 2020-07-25 NOTE — PLAN OF CARE
Problem: Patient Care Overview  Goal: Plan of Care Review  Outcome: Ongoing (interventions implemented as appropriate)  Flowsheets (Taken 7/25/2020 9110)  Plan of Care Reviewed With: patient

## 2020-07-26 LAB
GLUCOSE BLDC GLUCOMTR-MCNC: 191 MG/DL (ref 70–130)
GLUCOSE BLDC GLUCOMTR-MCNC: 197 MG/DL (ref 70–130)
GLUCOSE BLDC GLUCOMTR-MCNC: 214 MG/DL (ref 70–130)
GLUCOSE BLDC GLUCOMTR-MCNC: 218 MG/DL (ref 70–130)

## 2020-07-26 PROCEDURE — 82962 GLUCOSE BLOOD TEST: CPT

## 2020-07-26 PROCEDURE — 97161 PT EVAL LOW COMPLEX 20 MIN: CPT | Performed by: PHYSICAL THERAPIST

## 2020-07-26 PROCEDURE — 63710000001 INSULIN ASPART PER 5 UNITS: Performed by: HOSPITALIST

## 2020-07-26 PROCEDURE — 63710000001 INSULIN DETEMIR PER 5 UNITS: Performed by: HOSPITALIST

## 2020-07-26 PROCEDURE — 97165 OT EVAL LOW COMPLEX 30 MIN: CPT

## 2020-07-26 PROCEDURE — 97535 SELF CARE MNGMENT TRAINING: CPT

## 2020-07-26 RX ADMIN — ATORVASTATIN CALCIUM 20 MG: 20 TABLET, FILM COATED ORAL at 09:17

## 2020-07-26 RX ADMIN — INSULIN ASPART 2 UNITS: 100 INJECTION, SOLUTION INTRAVENOUS; SUBCUTANEOUS at 11:56

## 2020-07-26 RX ADMIN — INSULIN ASPART 5 UNITS: 100 INJECTION, SOLUTION INTRAVENOUS; SUBCUTANEOUS at 11:56

## 2020-07-26 RX ADMIN — INSULIN ASPART 5 UNITS: 100 INJECTION, SOLUTION INTRAVENOUS; SUBCUTANEOUS at 17:07

## 2020-07-26 RX ADMIN — DOCUSATE SODIUM 100 MG: 100 CAPSULE, LIQUID FILLED ORAL at 09:17

## 2020-07-26 RX ADMIN — INSULIN ASPART 5 UNITS: 100 INJECTION, SOLUTION INTRAVENOUS; SUBCUTANEOUS at 09:27

## 2020-07-26 RX ADMIN — GABAPENTIN 400 MG: 400 CAPSULE ORAL at 16:59

## 2020-07-26 RX ADMIN — HYDROCODONE BITARTRATE AND ACETAMINOPHEN 1 TABLET: 5; 325 TABLET ORAL at 21:29

## 2020-07-26 RX ADMIN — INSULIN ASPART 2 UNITS: 100 INJECTION, SOLUTION INTRAVENOUS; SUBCUTANEOUS at 09:28

## 2020-07-26 RX ADMIN — BACITRACIN ZINC NEOMYCIN SULFATE POLYMYXIN B SULFATE: 400; 3.5; 5 OINTMENT TOPICAL at 15:00

## 2020-07-26 RX ADMIN — HYDROCODONE BITARTRATE AND ACETAMINOPHEN 1 TABLET: 5; 325 TABLET ORAL at 01:53

## 2020-07-26 RX ADMIN — VENLAFAXINE 150 MG: 37.5 TABLET ORAL at 09:16

## 2020-07-26 RX ADMIN — HYDROCODONE BITARTRATE AND ACETAMINOPHEN 1 TABLET: 5; 325 TABLET ORAL at 12:57

## 2020-07-26 RX ADMIN — BUDESONIDE AND FORMOTEROL FUMARATE DIHYDRATE 2 PUFF: 160; 4.5 AEROSOL RESPIRATORY (INHALATION) at 21:29

## 2020-07-26 RX ADMIN — MELATONIN 1000 UNITS: at 09:17

## 2020-07-26 RX ADMIN — AMLODIPINE BESYLATE 2.5 MG: 5 TABLET ORAL at 09:27

## 2020-07-26 RX ADMIN — POLYETHYLENE GLYCOL 3350 17 G: 17 POWDER, FOR SOLUTION ORAL at 09:27

## 2020-07-26 RX ADMIN — BUDESONIDE AND FORMOTEROL FUMARATE DIHYDRATE 2 PUFF: 160; 4.5 AEROSOL RESPIRATORY (INHALATION) at 09:20

## 2020-07-26 RX ADMIN — ARIPIPRAZOLE 5 MG: 2 TABLET ORAL at 09:17

## 2020-07-26 RX ADMIN — GABAPENTIN 400 MG: 400 CAPSULE ORAL at 21:28

## 2020-07-26 RX ADMIN — DOCUSATE SODIUM 100 MG: 100 CAPSULE, LIQUID FILLED ORAL at 21:28

## 2020-07-26 RX ADMIN — INSULIN ASPART 3 UNITS: 100 INJECTION, SOLUTION INTRAVENOUS; SUBCUTANEOUS at 17:07

## 2020-07-26 RX ADMIN — HYDROCODONE BITARTRATE AND ACETAMINOPHEN 1 TABLET: 5; 325 TABLET ORAL at 17:03

## 2020-07-26 RX ADMIN — GABAPENTIN 400 MG: 400 CAPSULE ORAL at 09:17

## 2020-07-26 RX ADMIN — BACITRACIN ZINC NEOMYCIN SULFATE POLYMYXIN B SULFATE: 400; 3.5; 5 OINTMENT TOPICAL at 09:21

## 2020-07-26 RX ADMIN — HYDROCODONE BITARTRATE AND ACETAMINOPHEN 1 TABLET: 5; 325 TABLET ORAL at 09:17

## 2020-07-26 RX ADMIN — INSULIN DETEMIR 30 UNITS: 100 INJECTION, SOLUTION SUBCUTANEOUS at 21:14

## 2020-07-26 RX ADMIN — BACITRACIN ZINC NEOMYCIN SULFATE POLYMYXIN B SULFATE: 400; 3.5; 5 OINTMENT TOPICAL at 21:29

## 2020-07-26 NOTE — THERAPY EVALUATION
"SNF - Physical Therapy Initial Evaluation   [facility]ances[facility]     Patient Name: Lloyd Greene  : 1960  MRN: [id number]  Today's Date: 2020   Onset of Illness/Injury or Date of Surgery: 20  Date of Referral to PT: 20  Referring Physician: Dr. Jerome      Admit Date: 2020    Visit Dx: No diagnosis found.  Patient Active Problem List   Diagnosis   • Other chronic pain   • Partial small bowel obstruction (CMS/HCC)   • Angio-edema   • Adenomatous polyp of colon   • Type 2 diabetes mellitus without complication, without long-term current use of insulin (CMS/HCC)   • Familial hypercholesterolemia   • Hypertension, essential   • Type 2 diabetes mellitus with diabetic polyneuropathy, without long-term current use of insulin (CMS/HCC)   • DDD (degenerative disc disease), lumbar   • Lumbar stenosis with neurogenic claudication   • Spinal stenosis of lumbar region with neurogenic claudication   • Sleep apnea   • Hyponatremia   • Encounter for rehabilitation     Past Medical History:   Diagnosis Date   • Anesthesia complication     PATIENT STATES \"STOPPED BREATHING DURING COLONOSCOPY APPROX 4-5 YRS AGO\".   • Anxiety    • Arthritis    • Colon polyp    • COPD (chronic obstructive pulmonary disease) (CMS/HCC)    • Depression    • Diabetes mellitus (CMS/HCC)    • Diverticulitis    • Elevated cholesterol    • History of TIA (transient ischemic attack)     3 yr ago   • Hyperlipidemia    • Hypertension    • Low back pain    • Numbness and tingling     BILATERAL LEGS RIGHT LEG WORSE   • Sleep apnea     uses cpap occasionally     Past Surgical History:   Procedure Laterality Date   • COLON SURGERY     • COLONOSCOPY     • COLONOSCOPY N/A 2019    Procedure: COLONOSCOPY w/ polypectomy;  Surgeon: Tin Giang MD;  Location: [facility];  Service: Gastroenterology   • COLOSTOMY      Dr. Gupta   • COLOSTOMY REVISION  2006    Diverticulitis-Dr. Gupta   • INCISIONAL HERNIA REPAIR     • LUMBAR " LAMINECTOMY WITH FUSION N/A 7/20/2020    Procedure: Lumbar 2 to lumbar 3, lumbar 3 to lumbar 4 and lumbar 4 lumbar 5 laminectomy with a fusion by orthopedics;  Surgeon: John Tran MD;  Location: [facility];  Service: Neurosurgery;  Laterality: N/A;   • LUMBAR LAMINECTOMY WITH FUSION N/A 7/20/2020    Procedure: Lumbar 2 to lumbar 5 fusion with instrumentation and laminectomy by Dr. Hutchinson;  Surgeon: Melvin Ya MD;  Location: [facility];  Service: Neurosurgery;  Laterality: N/A;   • ORIF ANKLE FRACTURE Right 1995        PT ASSESSMENT (last 12 hours)      Physical Therapy Evaluation     Row Name 07/26/20 0730          PT Evaluation Time/Intention    Subjective Information  complains of;pain  -     Document Type  evaluation  -     Mode of Treatment  physical therapy  -     Patient Effort  good  -GC     Row Name 07/26/20 0730          General Information    Patient Profile Reviewed?  yes  -GC     Onset of Illness/Injury or Date of Surgery  07/20/20  -     Referring Physician  Dr. Jerome  -     Patient Observations  alert  -     Patient/Family Observations  Pt initially seen supine in bed with HOB elevated  -     Prior Level of Function  independent:;all household mobility;community mobility;ADL's  -GC     Equipment Currently Used at Home  cane, quad;dressing device;shoe horn;other (see comments) long handled reacher and sponge  -     Pertinent History of Current Functional Problem  per H&P, pt is a 60 year old  male with hx of HTN, HLD, COPD, Type II DM, Hx of TIA and sleep apnea. He has hx of chronic backache with radiculopathy. He was diagnosed to have Lumbar stenosis with lumbar radiculopathy and neurogenic claudication L2-3, L3-4 and L4-5 and Dr John Tran did Bilateral L2-3, L3-4 and L4-5 laminectomy, medial facetectomy and foraminotomies using microsurgical technique microsurgical instrumentation om 07/20/2020 at [facility]. Patient has jayce transferred here to [facility]  Francesca Cardozo for PT/OT. Pt lives alone in a 2nd floor apartment (17 steps to access apt). Pt reports he was independent with adl's and mobility (using a quad cane) priro to surgery, yet all activity caused his to have severe pain. Pt reports the pain with impacting his function. Pt's main concern for returning home is management to the stairs to enter his apartment.   -     Existing Precautions/Restrictions  brace worn when out of bed;other (see comments) no lifting > 5#, no trunk flexion, no twisting  -GC     Risks Reviewed  patient:;increased discomfort  -GC     Benefits Reviewed  patient:;improve function  -     Barriers to Rehab  none identified  -     Row Name 07/26/20 0730          Relationship/Environment    Lives With  alone  -     Row Name 07/26/20 0730          Resource/Environmental Concerns    Current Living Arrangements  home/apartment/condo  -     Row Name 07/26/20 0730          Home Main Entrance    Number of Stairs, Main Entrance  other (see comments) 17  -GC     Stair Railings, Main Entrance  railings on both sides of stairs;other (see comments) rails are to wide to use both  -     Row Name 07/26/20 0730          Cognitive Assessment/Intervention- PT/OT    Orientation Status (Cognition)  oriented x 4  -GC     Follows Commands (Cognition)  WFL  -     Personal Safety Interventions  fall prevention program maintained;gait belt;nonskid shoes/slippers when out of bed  -     Row Name 07/26/20 0730          Bed Mobility Assessment/Treatment    Supine-Sit [place] (Bed Mobility)  conditional independence  -     Assistive Device (Bed Mobility)  bed rails;head of bed elevated  -     Row Name 07/26/20 0730          Transfer Assessment/Treatment    Sit-Stand [place] (Transfers)  conditional independence  -GC     Stand-Sit [place] (Transfers)  conditional independence  -     Row Name 07/26/20 0730          Sit-Stand Transfer    Assistive Device (Sit-Stand Transfers)  walker,  front-wheeled  -GC     Row Name 07/26/20 0730          Stand-Sit Transfer    Assistive Device (Stand-Sit Transfers)  walker, front-wheeled  -GC     Row Name 07/26/20 0730          Gait/Stairs Assessment/Training    [place] Level (Gait)  supervision  -     Assistive Device (Gait)  walker, front-wheeled  -GC     Distance in Feet (Gait)  177'  -GC     Pattern (Gait)  step-through  -GC     Deviations/Abnormal Patterns (Gait)  gait speed decreased  -GC     Bilateral Gait Deviations  forward flexed posture  -GC     Comment (Gait/Stairs)  Pt in back brace for all functional mobility  -GC     Row Name 07/26/20 0730          General ROM    GENERAL ROM COMMENTS  bilateral LE ROM generally WFL  -GC     Row Name 07/26/20 0730          MMT (Manual Muscle Testing)    General MMT Comments  bilateral LE strength generally WFL  -GC     Row Name 07/26/20 0730          Pain Scale: Numbers Pre/Post-Treatment    Pain Scale: Numbers, Pretreatment  3/10  -GC     Pain Scale: Numbers, Post-Treatment  3/10  -GC     Pain Location  back  -GC     Pain Intervention(s)  Cold applied;Repositioned ice packs in back brace  -GC     Row Name             Wound 07/20/20 0803 lumbar spine Incision    Wound - Properties Group Date first assessed: 07/20/20  - Time first assessed: 0803  -HM Location: lumbar spine  -HM Primary Wound Type: Incision  -HM    Row Name             Wound 07/20/20 1140 coccyx    Wound - Properties Group Date first assessed: 07/20/20  - Time first assessed: 1140  -HM Location: coccyx  -HM    Row Name 07/26/20 0730          Plan of Care Review    Plan of Care Reviewed With  patient  -     Outcome Summary  PT evaluation completed: Pt manages bed mobility and transfers with conditional independence of usning bed rails and having head of bed elevated. He ambulates with front wheeled walker for distance of 177' with supervision. Pt will be seen daily for 5 days for work on bed mobility, transfers, and gait with emphasis on  stairs as we prepare him for discharge home.  -GC     Row Name 07/26/20 0730          Physical Therapy Clinical Impression    Date of Referral to PT  07/25/20  -GC     Rehab Potential (PT Clinical Summary)  good, to achieve stated therapy goals  -GC     Predicted Duration of Therapy (PT)  5 days  -GC     Care Plan Review (PT)  evaluation/treatment results reviewed;care plan/treatment goals reviewed;risks/benefits reviewed  -     Row Name 07/26/20 0730          Bed Mobility Goal 1 (PT)    Activity/Assistive Device (Bed Mobility Goal 1, PT)  bed mobility activities, all  -GC     [place] Level/Cues Needed (Bed Mobility Goal 1, PT)  independent  -GC     Time Frame (Bed Mobility Goal 1, PT)  5 days  -GC     Progress/Outcomes (Bed Mobility Goal 1, PT)  other (see comments) new goal  -     Row Name 07/26/20 0730          Transfer Goal 1 (PT)    Activity/Assistive Device (Transfer Goal 1, PT)  transfers, all  -GC     [place] Level/Cues Needed (Transfer Goal 1, PT)  independent  -GC     Time Frame (Transfer Goal 1, PT)  5 days  -GC     Progress/Outcome (Transfer Goal 1, PT)  other (see comments) new goal  -     Row Name 07/26/20 0730          Gait Training Goal 1 (PT)    Activity/Assistive Device (Gait Training Goal 1, PT)  gait (walking locomotion);assistive device use;walker, rolling  -GC     [place] Level (Gait Training Goal 1, PT)  independent  -GC     Distance (Gait Goal 1, PT)  300'  -GC     Time Frame (Gait Training Goal 1, PT)  5 days  -GC     Progress/Outcome (Gait Training Goal 1, PT)  other (see comments) new goal  -     Row Name 07/26/20 0730          Stairs Goal 1 (PT)    Activity/Assistive Device (Stairs Goal 1, PT)  stairs, all skills  -GC     [place] Level/Cues Needed (Stairs Goal 1, PT)  independent  -GC     Number of Stairs (Stairs Goal 1, PT)  17  -GC     Time Frame (Stairs Goal 1, PT)  5 days  -GC     Progress/Outcome (Stairs Goal 1, PT)  other (see comments) new goal  -      Row Name 07/26/20 0730          Positioning and Restraints    Pre-Treatment Position  in bed  -     Post Treatment Position  chair  -     In Chair  sitting;call light within reach;encouraged to call for assist  -       User Key  (r) = Recorded By, (t) = Taken By, (c) = Cosigned By    Initials Name Provider Type     Marlon Canales, PT Physical Therapist    Georgiana Owens, RN Registered Nurse        Physical Therapy Education                 Title: PT OT SLP Therapies (Done)     Topic: Physical Therapy (Done)     Point: Mobility training (Done)     Description:   Instruct learner(s) on safety and technique for assisting patient out of bed, chair or wheelchair.  Instruct in the proper use of assistive devices, such as walker, crutches, cane or brace.              Patient Friendly Description:   It's important to get you on your feet again, but we need to do so in a way that is safe for you. Falling has serious consequences, and your personal safety is the most important thing of all.        When it's time to get out of bed, one of us or a family member will sit next to you on the bed to give you support.     If your doctor or nurse tells you to use a walker, crutches, a cane, or a brace, be sure you use it every time you get out of bed, even if you think you don't need it.    Learning Progress Summary           Patient Acceptance, E,TB, VU by  at 7/26/2020 1201    Comment:  instructed pt on importance of maintaining restrictions of no bending or twisting                   Point: Precautions (Done)     Description:   Instruct learner(s) on prescribed precautions during mobility and gait tasks              Learning Progress Summary           Patient Acceptance, E,TB, VU by  at 7/26/2020 1201    Comment:  instructed pt on importance of maintaining restrictions of no bending or twisting                               User Key     Initials Effective Dates Name Provider Type [facility] 04/06/17 -   Marlon Canales, PT Physical Therapist PT              PT Recommendation and Plan  Anticipated Discharge Disposition (PT): home  Therapy Frequency (PT Clinical Impression): daily  Outcome Summary/Treatment Plan (PT)  Anticipated Discharge Disposition (PT): home  Plan of Care Reviewed With: patient  Outcome Summary: PT evaluation completed: Pt manages bed mobility and transfers with conditional independence of usning bed rails and having head of bed elevated. He ambulates with front wheeled walker for distance of 177' with supervision. Pt will be seen daily for 5 days for work on bed mobility, transfers, and gait with emphasis on stairs as we prepare him for discharge home.     Time Calculation:   PT Charges     Row Name 07/26/20 1203             Time Calculation    Start Time  0835  -GC      Stop Time  0853  -GC      Time Calculation (min)  18 min  -GC        User Key  (r) = Recorded By, (t) = Taken By, (c) = Cosigned By    Initials Name Provider Type    GC Marlon Canales, PT Physical Therapist        Therapy Charges for Today     Code Description Service Date Service Provider Modifiers Qty    [id number] HC PT EVAL LOW COMPLEXITY 2 7/26/2020 Marlon Canales, PT GP 1                 Marlon Canales PT  7/26/2020

## 2020-07-26 NOTE — THERAPY EVALUATION
"SNF - Occupational Therapy Initial Evaluation   Francesca Cardozo     Patient Name: Lloyd Greene  : 1960  MRN: [id number]  Today's Date: 2020  Onset of Illness/Injury or Date of Surgery: 20  Date of Referral to OT: 20  Referring Physician: Justus    Admit Date: 2020     No diagnosis found.  Patient Active Problem List   Diagnosis   • Other chronic pain   • Partial small bowel obstruction (CMS/HCC)   • Angio-edema   • Adenomatous polyp of colon   • Type 2 diabetes mellitus without complication, without long-term current use of insulin (CMS/HCC)   • Familial hypercholesterolemia   • Hypertension, essential   • Type 2 diabetes mellitus with diabetic polyneuropathy, without long-term current use of insulin (CMS/HCC)   • DDD (degenerative disc disease), lumbar   • Lumbar stenosis with neurogenic claudication   • Spinal stenosis of lumbar region with neurogenic claudication   • Sleep apnea   • Hyponatremia   • Encounter for rehabilitation     Past Medical History:   Diagnosis Date   • Anesthesia complication     PATIENT STATES \"STOPPED BREATHING DURING COLONOSCOPY APPROX 4-5 YRS AGO\".   • Anxiety    • Arthritis    • Colon polyp    • COPD (chronic obstructive pulmonary disease) (CMS/HCC)    • Depression    • Diabetes mellitus (CMS/HCC)    • Diverticulitis    • Elevated cholesterol    • History of TIA (transient ischemic attack)     3 yr ago   • Hyperlipidemia    • Hypertension    • Low back pain    • Numbness and tingling     BILATERAL LEGS RIGHT LEG WORSE   • Sleep apnea     uses cpap occasionally     Past Surgical History:   Procedure Laterality Date   • COLON SURGERY     • COLONOSCOPY     • COLONOSCOPY N/A 2019    Procedure: COLONOSCOPY w/ polypectomy;  Surgeon: Tin Giang MD;  Location: [facility];  Service: Gastroenterology   • COLOSTOMY      Dr. Gupta   • COLOSTOMY REVISION  2006    Diverticulitis-Dr. Gupta   • INCISIONAL HERNIA REPAIR     • LUMBAR LAMINECTOMY " WITH FUSION N/A 7/20/2020    Procedure: Lumbar 2 to lumbar 3, lumbar 3 to lumbar 4 and lumbar 4 lumbar 5 laminectomy with a fusion by orthopedics;  Surgeon: John Tran MD;  Location: [facility];  Service: Neurosurgery;  Laterality: N/A;   • LUMBAR LAMINECTOMY WITH FUSION N/A 7/20/2020    Procedure: Lumbar 2 to lumbar 5 fusion with instrumentation and laminectomy by Dr. Hutchinson;  Surgeon: Melvin Ya MD;  Location: [facility];  Service: Neurosurgery;  Laterality: N/A;   • ORIF ANKLE FRACTURE Right 1995          OT ASSESSMENT FLOWSHEET (last 12 hours)      Occupational Therapy Evaluation     Row Name 07/26/20 0700                OT Evaluation Time/Intention    Subjective Information  complains of;pain  -SD       Document Type  evaluation  -SD       Mode of Treatment  occupational therapy  -SD       Patient Effort  good  -SD          General Information    Patient Profile Reviewed?  yes  -SD       Onset of Illness/Injury or Date of Surgery  07/25/20  -SD       Referring Physician  Justus  -SD       Patient Observations  alert;cooperative;agree to therapy  -SD       Patient/Family Observations  Pt was in bed (HOB elevated). Pt agreeable to therapy.  -SD       Prior Level of Function  independent:;ADL's;all household mobility pt reported severe pain with adl's/mobility prior to sx  -SD       Equipment Currently Used at Home  cane, quad;dressing device;shoe horn;other (see comments) LH reacher and sponge  -SD       Pertinent History of Current Functional Problem  per H&P, pt is a 60 year old  male with hx of HTN, HLD, COPD, Type II DM, Hx of TIA and sleep apnea. He has hx of chronic backache with radiculopathy. He was diagnosed to have Lumbar stenosis with lumbar radiculopathy and neurogenic claudication L2-3, L3-4 and L4-5 and Dr John Tran did Bilateral L2-3, L3-4 and L4-5 laminectomy, medial facetectomy and foraminotomies using microsurgical technique microsurgical instrumentation om  07/20/2020 at [facility]. Patient has jayce transferred here to [facility] for PT/OT. Pt lives alone in a 2nd floor apartment (17 steps to access apt). Pt reports he was independent with adl's and mobility (using a quad cane) priro to surgery, yet all activity caused his to have severe pain. Pt reports the pain with impacting his function. Pt's main concern for returning home is management to the stairs to enter his apartment.   -SD       Existing Precautions/Restrictions  brace worn when out of bed;other (see comments) no lifting >5#, limit forward bending and twistin  -SD       Risks Reviewed  patient:;increased discomfort  -SD       Benefits Reviewed  patient:;improve function  -SD       Barriers to Rehab  none identified  -SD          Relationship/Environment    Lives With  alone  -SD          Resource/Environmental Concerns    Current Living Arrangements  home/apartment/condo 2nd floor apt  -SD          Home Main Entrance    Number of Stairs, Main Entrance  other (see comments) 17  -SD       Stair Railings, Main Entrance  railings on both sides of stairs;other (see comments) pt not able to reach both rails at the same time  -SD          Cognitive Assessment/Intervention- PT/OT    Orientation Status (Cognition)  oriented x 4  -SD       Follows Commands (Cognition)  WFL  -SD       Personal Safety Interventions  fall prevention program maintained;nonskid shoes/slippers when out of bed;supervised activity  -SD          Bed Mobility Assessment/Treatment    Supine-Sit [place] (Bed Mobility)  conditional independence  -SD       Assistive Device (Bed Mobility)  bed rails;head of bed elevated  -SD          Functional Mobility    Functional Mobility- Ind. Level  supervision required  -SD       Functional Mobility- Device  rolling walker  -SD       Functional Mobility-Distance (Feet)  175  -SD          Sit-Stand Transfer    Sit-Stand [place] (Transfers)  conditional independence  -SD       Assistive  Device (Sit-Stand Transfers)  walker, front-wheeled  -SD          Stand-Sit Transfer    Stand-Sit [place] (Transfers)  conditional independence  -SD       Assistive Device (Stand-Sit Transfers)  walker, front-wheeled  -SD          Bathing Assessment/Intervention    Bathing [place] Level  bathing skills;lower body;minimum assist (75% patient effort)  -SD       Comment (Bathing)  pt requires min assist with lower body adl's due to surgical restrictions to limit bending/twisiting. Pt needs to use  AE for LB adl tasks.   -SD          Lower Body Dressing Assessment/Training    Lower Body Dressing [place] Level  lower body dressing skills;conditional independence;don;shoes/slippers;minimum assist (75% patient effort);pants/bottoms  -SD       Assistive Devices (Lower Body Dressing)  long-handled shoe horn  -SD       Lower Body Dressing Position  supported standing  -SD       Comment (Lower Body Dressing)  Pt donned his shoes independently standing at bedside using a  shoehorn. Pt can benefit from education with use of  reacher/sock aid for lb adl management.   -SD          Toileting Assessment/Training    Comment (Toileting)  Pt reports concerns regarding management of hygiene after toileting due to bending/twisting restrictions following surgery. Pt may need an assisitive device to independently manage toilet hygiene   -SD          BADL Safety/Performance    Impairments, BADL Safety/Performance  pain;range of motion  -SD       Skilled BADL Treatment/Intervention  adaptive equipment training;compensatory training  -SD          General ROM    GENERAL ROM COMMENTS  BUE rom wfl  -SD          MMT (Manual Muscle Testing)    General MMT Comments  BUE grossly wfl (not formally tested due to recent back surgery)  -SD          Static Sitting Balance    Level of [place] (Unsupported Sitting, Static Balance)  independent  -SD          Standing Balance Activity    Activities Performed (Standing, Balance  Training)  other (see comments) donned shoes while standing  -SD       Support Needed for Balance (Standing, Balance Training)  balances without upper extremity support;other (see comments) pt had walker for support as needed  -SD       Comment (Standing, Balance Training)  pt able to don his shoes while standing with conditional independence (using a lh shoe horn and having walker for support as needed)  -SD          Pain Scale: Numbers Pre/Post-Treatment    Pain Scale: Numbers, Pretreatment  3/10  -SD       Pain Scale: Numbers, Post-Treatment  3/10  -SD       Pain Location - Side  Bilateral  -SD       Pain Location  back  -SD       Pain Intervention(s)  Repositioned;Cold applied ice pack placed in back brace  -SD          Wound 07/20/20 0803 lumbar spine Incision    Wound - Properties Group Date first assessed: 07/20/20  - Time first assessed: 0803  -HM Location: lumbar spine  -HM Primary Wound Type: Incision  -HM       Wound 07/20/20 1140 coccyx    Wound - Properties Group Date first assessed: 07/20/20  - Time first assessed: 1140  -HM Location: coccyx  -HM       Plan of Care Review    Outcome Summary  OT evaluation completed. Pt able to manage bed mobilty, transfers and mobility (175 ft+) with conditional independence (using a rolling walker for support). Pt has activity restrictions following his back surgery (limit bending and twisting) which impact his ability to manage lower body adl's. Rec OT services 3x/week x 1 week for education with compensatory strategies and use of adaptive equipment to manage lower body adl tasks. Pt's other concern for discharge to home is the management of 17 steps to enter his apartment. Stair managment will be addressed by physical therapy.   -SD          Clinical Impression (OT)    Date of Referral to OT  07/25/20  -SD       OT Diagnosis  decreased adl function  -SD       Functional Level at Time of Evaluation (OT Eval)  Pt able to manage bed mobilty, transfers and mobility  (175 ft+) with conditional independence (using a rolling walker for support). Pt was able to don his back brace independently while sitting at EOB. Pt was able to don his shoes indpendently using a LH shoehorn. Pt has activity restrictions following his back surgery (limit bending and twisting) which impact his ability to manage lower body adl's. Rec OT services 3x/week x 1 week for education with compensatory strategies and use of adaptive equipment to manage lower body adl tasks.   -SD       Criteria for Skilled Therapeutic Interventions Met (OT Eval)  yes;treatment indicated  -SD       Rehab Potential (OT Eval)  good, to achieve stated therapy goals  -SD       Therapy Frequency (OT Eval)  3 times/wk  -SD       Care Plan Review (OT)  evaluation/treatment results reviewed;care plan/treatment goals reviewed;risks/benefits reviewed;current/potential barriers reviewed;patient/other agree to care plan  -SD       Anticipated Equipment Needs at Discharge (OT)  bedside commode;front wheeled walker  -SD       Anticipated Discharge Disposition (OT)  home with home health  -SD          Dressing Goal 1 (OT)    Activity/Assistive Device (Dressing Goal 1, OT)  lower body dressing;long handled shoe horn;reacher;sock-aid using AE as needed  -SD       Happy/Cues Needed (Dressing Goal 1, OT)  conditional independence;set-up required  -SD       Time Frame (Dressing Goal 1, OT)  by discharge  -SD       Barriers (Dressing Goal 1, OT)  activity restrictions following sx  -SD       Progress/Outcome (Dressing Goal 1, OT)  other (see comments) new goal  -SD          Patient Education Goal (OT)    Activity (Patient Education Goal, OT)  pt to verbalize strategies to manage toilet hygiene while adhering to activtiy restrictions following back surgery.   -SD       Happy/Cues/Accuracy (Memory Goal 2, OT)  verbalizes understanding  -SD       Time Frame (Patient Education Goal, OT)  by discharge  -SD       Barriers (Patient  Education Goal, OT)  activity restrictions (pt may need a toilet hygiene aide)  -SD       Progress/Outcome (Patient Education Goal, OT)  other (see comments) new goal  -SD         User Key  (r) = Recorded By, (t) = Taken By, (c) = Cosigned By    Initials Name Effective Dates    Nehemias Harper OTANTIONETTE 07/05/20 -      Georgiana Ortiz RN 06/16/16 -          Occupational Therapy Education                 Title: PT OT SLP Therapies (Done)     Topic: Occupational Therapy (Done)     Point: ADL training (Done)     Description:   Instruct learner(s) on proper safety adaptation and remediation techniques during self care or transfers.   Instruct in proper use of assistive devices.              Learning Progress Summary           Patient Acceptance, E, VU by SD at 7/26/2020 0916    Comment:  Education regarding OT services and compensatory strategies for adl management due to activity restrictions following back surgery.                               User Key     Initials Effective Dates Name Provider Type Discipline    SD 07/05/20 -  Nehemias Worrell OTANTIONETTE Occupational Therapist OT                  OT Recommendation and Plan  Outcome Summary/Treatment Plan (OT)  Anticipated Equipment Needs at Discharge (OT): bedside commode, front wheeled walker  Anticipated Discharge Disposition (OT): home with home health  Therapy Frequency (OT Eval): 3 times/wk  Outcome Summary: OT evaluation completed. Pt able to manage bed mobilty, transfers and mobility (175 ft+) with conditional independence (using a rolling walker for support). Pt has activity restrictions following his back surgery (limit bending and twisting) which impact his ability to manage lower body adl's. Rec OT services 3x/week x 1 week for education with compensatory strategies and use of adaptive equipment to manage lower body adl tasks. Pt's other concern for discharge to home is the management of 17 steps to enter his apartment. Stair managment will be addressed  by physical therapy.         Time Calculation:   Time Calculation- OT     Row Name 07/26/20 0927             Time Calculation- OT    OT Start Time  0821  -SD      OT Stop Time  0851  -SD      OT Time Calculation (min)  30 min  -SD      OT Non-Billable Time (min)  15 min evaluation  -SD      TCU Minutes- OT  15 min  -SD         Timed Charges    74071 - OT Self Care/Mgmt Minutes  15  -SD        User Key  (r) = Recorded By, (t) = Taken By, (c) = Cosigned By    Initials Name Provider Type    SD Nehemias Worrell OTR Occupational Therapist        Therapy Charges for Today     Code Description Service Date Service Provider Modifiers Qty    24191726465 HC OT SELF CARE/MGMT/TRAIN EA 15 MIN 7/26/2020 Nehemias Worrell OTR GO 1    46768346876 HC OT EVAL LOW COMPLEXITY 1 7/26/2020 Nehemias Worrell OTR GO 1               KEYLA Pemberton  7/26/2020

## 2020-07-26 NOTE — PLAN OF CARE
Problem: Patient Care Overview  Goal: Plan of Care Review  Outcome: Ongoing (interventions implemented as appropriate)  Goal: Individualization and Mutuality  Outcome: Ongoing (interventions implemented as appropriate)  Goal: Discharge Needs Assessment  Outcome: Ongoing (interventions implemented as appropriate)  Goal: Interprofessional Rounds/Family Conf  Outcome: Ongoing (interventions implemented as appropriate)     Problem: Pain, Chronic (Adult)  Goal: Identify Related Risk Factors and Signs and Symptoms  Outcome: Ongoing (interventions implemented as appropriate)  Goal: Acceptable Pain/Comfort Level and Functional Ability  Outcome: Ongoing (interventions implemented as appropriate)

## 2020-07-26 NOTE — PLAN OF CARE
Problem: Patient Care Overview  Goal: Plan of Care Review  Outcome: Ongoing (interventions implemented as appropriate)  Flowsheets (Taken 7/26/2020 0730 by Marlon Canales, PT)  Plan of Care Reviewed With: patient

## 2020-07-26 NOTE — PLAN OF CARE
Problem: Patient Care Overview  Goal: Plan of Care Review  Flowsheets (Taken 7/26/2020 0909)  Plan of Care Reviewed With: patient  Outcome Summary: PT evaluation completed: Pt manages bed mobility and transfers with conditional independence of usning bed rails and having head of bed elevated. He ambulates with front wheeled walker for distance of 177' with supervision. Pt will be seen daily for 5 days for work on bed mobility, transfers, and gait with emphasis on stairs as we prepare him for discharge home.

## 2020-07-26 NOTE — PLAN OF CARE
Problem: Patient Care Overview  Goal: Plan of Care Review  Flowsheets (Taken 7/26/2020 0700)  Outcome Summary: OT evaluation completed. Pt able to manage bed mobilty, transfers and mobility (175 ft+) with conditional independence (using a rolling walker for support). Pt has activity restrictions following his back surgery (limit bending and twisting) which impact his ability to manage lower body adl's. Rec OT services 3x/week x 1 week for education with compensatory strategies and use of adaptive equipment to manage lower body adl tasks. Pt's other concern for discharge to home is the management of 17 steps to enter his apartment. Stair managment will be addressed by physical therapy.

## 2020-07-26 NOTE — H&P
"       HOSPITALIST SERVICES     @ [facility], [place]        [facility] Hospitalist Team    ADMISSION HISTORY AND PHYSICAL    PATIENT:        Patient Care Team:  Donell Diggs MD as PCP - General (Family Medicine)  Mary Morton APRN as PCP - Claims Attributed    PATIENT IS A RESIDENT OF:  Lives at Home      PATIENT ACCOMPANIED BY:  Alone      CHIEF COMPLAINT:     PostOp Rehab after back surgery at [facility] on 07/20/2020    HISTORY OF PRESENT ILLNESS:    Mr. Lloyd Greene is a 60 year old  male with hx of HTN, HLD, COPD, Type II DM, Hx of TIA and sleep apnea. He has hx of chronic backache with radiculopathy. He was diagnosed to have Lumbar stenosis with lumbar radiculopathy and neurogenic claudication L2-3, L3-4 and L4-5 and Dr John Tran did Bilateral L2-3, L3-4 and L4-5 laminectomy, medial facetectomy and foraminotomies using microsurgical technique microsurgical instrumentation om 07/20/2020 at [facility]. Patient has jayce transferred here to [facility] for PT/OT.    Patient denies any sx of fever, headache, chest pain, shortness of breath, abdominal pain, nausea/ vomiting, dysuria, urgency/ frequency or any recent hx of blood in stool. No other medical history available.        Past Medical History:   Diagnosis Date   • Anesthesia complication     PATIENT STATES \"STOPPED BREATHING DURING COLONOSCOPY APPROX 4-5 YRS AGO\".   • Anxiety    • Arthritis    • Colon polyp    • COPD (chronic obstructive pulmonary disease) (CMS/[facility])    • Depression    • Diabetes mellitus (CMS/[facility])    • Diverticulitis    • Elevated cholesterol    • History of TIA (transient ischemic attack)     3 yr ago   • Hyperlipidemia    • Hypertension    • Low back pain    • Numbness and tingling     BILATERAL LEGS RIGHT LEG WORSE   • Sleep apnea     uses cpap occasionally     Past Surgical History:   Procedure Laterality Date   • COLON SURGERY     • COLONOSCOPY  2014   • COLONOSCOPY N/A 4/12/2019  "    Procedure: COLONOSCOPY w/ polypectomy;  Surgeon: Tin Giang MD;  Location: [facility] OR;  Service: Gastroenterology   • COLOSTOMY      Dr. Gupta   • COLOSTOMY REVISION  2006    Diverticulitis-Dr. Gupta   • INCISIONAL HERNIA REPAIR     • LUMBAR LAMINECTOMY WITH FUSION N/A 2020    Procedure: Lumbar 2 to lumbar 3, lumbar 3 to lumbar 4 and lumbar 4 lumbar 5 laminectomy with a fusion by orthopedics;  Surgeon: John Tran MD;  Location: [facility] OR;  Service: Neurosurgery;  Laterality: N/A;   • LUMBAR LAMINECTOMY WITH FUSION N/A 2020    Procedure: Lumbar 2 to lumbar 5 fusion with instrumentation and laminectomy by Dr. Hutchinson;  Surgeon: Melvin Ya MD;  Location: [facility] OR;  Service: Neurosurgery;  Laterality: N/A;   • ORIF ANKLE FRACTURE Right      Family History   Problem Relation Age of Onset   • Depression Mother    • Depression Father    • Kidney disease Father    • COPD Father    • Hypertension Father    • Colon cancer Neg Hx    • Colon polyps Neg Hx    • Malig Hyperthermia Neg Hx        Family History as documented in the Problem List.    Social History     Tobacco Use   • Smoking status: Former Smoker     Packs/day: 2.00     Years: 35.00     Pack years: 70.00     Types: Electronic Cigarette     Last attempt to quit: 2014     Years since quittin.5   • Smokeless tobacco: Never Used   Substance Use Topics   • Alcohol use: Not Currently     Comment: rarely   • Drug use: No     Medications Prior to Admission   Medication Sig Dispense Refill Last Dose   • ALPRAZolam (XANAX) 0.25 MG tablet Take 1 tablet by mouth Daily. (Patient taking differently: Take 0.25 mg by mouth At Night As Needed.) 30 tablet 0 2020 at Unknown time   • amLODIPine (NORVASC) 2.5 MG tablet TAKE 1 TABLET EVERY DAY (Patient taking differently: Take 2.5 mg by mouth Daily.) 90 tablet 3 2020 at Unknown time   • ARIPiprazole (ABILIFY) 5 MG tablet TAKE 1 TABLET EVERY DAY (Patient taking  differently: Take 5 mg by mouth Daily.) 90 tablet 3 7/25/2020 at Unknown time   • budesonide-formoterol (SYMBICORT) 160-4.5 MCG/ACT inhaler Inhale 2 puffs 2 (Two) Times a Day. 1 inhaler 11 7/25/2020 at Unknown time   • Cholecalciferol (VITAMIN D3) 125 MCG (5000 UT) capsule capsule Take 5,000 Units by mouth Daily.   7/25/2020 at Unknown time   • docusate sodium 100 MG capsule Take 1 capsule by mouth 2 (Two) Times a Day. 60 each 0 7/25/2020 at Unknown time   • fenofibrate (TRICOR) 145 MG tablet TAKE 1 TABLET EVERY DAY (Patient taking differently: Take 145 mg by mouth Daily.) 90 tablet 3 7/25/2020 at Unknown time   • Flaxseed, Linseed, (FLAX SEED OIL) 1000 MG capsule Take 1,000 mg by mouth Daily. HOLD PRIOR TO SURG   7/25/2020 at Unknown time   • gabapentin (NEURONTIN) 400 MG capsule Take 1 capsule by mouth 3 (Three) Times a Day. 270 capsule 0 7/25/2020 at Unknown time   • HYDROcodone-acetaminophen (NORCO) 5-325 MG per tablet Take 1 tablet by mouth Every 4 (Four) Hours As Needed for Moderate Pain  for up to 9 days. 50 tablet 0 7/25/2020 at Unknown time   • insulin glargine (LANTUS) 100 UNIT/ML injection Inject 30 Units under the skin into the appropriate area as directed Every Night.  12 7/24/2020 at Unknown time   • insulin lispro (humaLOG) 100 UNIT/ML injection Inject 0-14 Units under the skin into the appropriate area as directed 3 (Three) Times a Day Before Meals.  12 7/25/2020 at Unknown time   • insulin lispro (humaLOG) 100 UNIT/ML injection Inject 5 Units under the skin into the appropriate area as directed 3 (Three) Times a Day With Meals.  12 7/25/2020 at Unknown time   • neomycin-bacitracin-polymyxin (NEOSPORIN) 5-400-5000 ointment Apply  topically to the appropriate area as directed Every 8 (Eight) Hours.   7/25/2020 at Unknown time   • simvastatin (ZOCOR) 40 MG tablet TAKE 1 TABLET EVERY DAY (Patient taking differently: Take 40 mg by mouth Every Night.) 90 tablet 3 7/25/2020 at Unknown time   • traZODone  (DESYREL) 100 MG tablet Take 1 tablet by mouth Every Night. (Patient taking differently: Take 100 mg by mouth At Night As Needed.) 90 tablet 3 7/24/2020 at Unknown time   • TRUEPLUS LANCETS 28G misc CHECK BLOOD SUGAR TWICE DAILY 100 each 5 7/25/2020 at Unknown time   • venlafaxine (EFFEXOR) 75 MG tablet TAKE 2 TABLETS EVERY DAY (Patient taking differently: Take 75 mg by mouth 2 (Two) Times a Day.) 180 tablet 3 7/25/2020 at Unknown time   • JANUMET XR  MG tablet Take 2 tablets by mouth Daily. 180 tablet 3 Unknown at Unknown time   • Multiple Vitamin (MULTI-VITAMIN DAILY PO) Take 1 tablet by mouth Daily. HOLD PRIOR TO SURG   Unknown at Unknown time   • polyethylene glycol (MIRALAX) 17 GM/SCOOP powder Mix one capful in liquid and take by mouth Daily for 10 days. Indications: Constipation 238 g 0 Unknown at Unknown time   • promethazine (PHENERGAN) 25 MG tablet Take 1 tablet by mouth Every 6 (Six) Hours As Needed for Nausea or Vomiting for up to 10 doses. 10 tablet 0 Unknown at Unknown time   • TRUE METRIX BLOOD GLUCOSE TEST test strip CHECK BLOOD SUGAR TWICE DAILY 100 each 0 7/9/2020     Allergies:  Lisinopril and Tamiflu [oseltamivir phosphate]    REVIEW OF SYSTEMS (ROS):  Please see the above history of present illness for pertinent positives and negatives.  The remainder of the patient's systems have been reviewed and are negative.     ROS negative except current and past medical and surgical history as noted in the Problem List.    14 SYSTEM REVIEW OF SYSTEM (Per CMS):    Constitutional Symptoms:  No hx of weight loss, fatigue, fever, or headache      Allergic/Immunologic:  Drug allergies: As per Notes. No hx  HIV/AIDS     Integumentary:  Skin color changes: NONE, No hx of itching, rash, hives, moles     Eyes:  No hx of blurriness, photophobia, dryness, pain, or cataracts/ glaucoma     ENT:  No hx of sore throat, Sinusitis, tinnitus, hearing loss or epistaxis      Cardiovascular:  Positive hx of HTN, HLD. No  "hx of chest pain, angina, MI, murmurs, palpitations, PVD or DVT     Respiratory:  No hx of dyspnea, cough, sputum production, wheezing or hemoptysis      Musculoskeletal:  No hx of weakness, joint pain, swelling or arthritis     Gastrointestinal:  No hx of nausea, vomiting, heartburn, diarrhea, constipation, abdominal pain or hematemesis or blood in stool     Neurological:  Hx of Lower backache with radiculopathy. No hx of syncope, seizures, vertigo, memory loss, paralysis, tremors      Genitourinary:  No hx of dysuria, urgency/frequency, hematuria, renal stones, STD     Endocrine:  Positive hx of diabetes. No hx of thyroid disease or goiter     Hematologic/Lymphatic:  No hx of bleeding disorders, anemia, tenderness of lymph nodes     Psychiatric:  Positive hx of depression, anxiety. No hx of hallucinations, suicidal ideation, substance abuse                   Vital Signs  Temp:  [97.7 °F (36.5 °C)-100.8 °F (38.2 °C)] 100.8 °F (38.2 °C)  Heart Rate:  [82-88] 88  Resp:  [16-18] 16  BP: (117-149)/(70-78) 149/74    Flowsheet Rows      First Filed Value   Admission Height  165.1 cm (65\") Documented at 07/25/2020 1500   Admission Weight  111 kg (245 lb 12.8 oz) [PT brace on] Documented at 07/25/2020 1500           Physical Exam:      BMI:  40.90 (Morbid Obesity)      Physical Exam   Constitutional: Patient appears well-developed and well-nourished and in no acute distress   HEENT:   Head: Normocephalic and atraumatic.   Eyes:  Pupils are equal, round, and reactive to light. EOM are intact. Sclera are anicteric and non-injected.  Nose: No congestion, septal deviation or trouble breathing  Mouth and Throat: Patient has moist mucous membranes. Oropharynx is clear of any erythema or exudate.     Neck: Neck supple. No JVD present. No thyromegaly present. No lymphadenopathy present.  Cardiovascular: Regular rate, regular rhythm, S1 normal and S2 normal.  Exam reveals no gallop and no friction rub.  No murmur " heard.  Pulmonary/Chest: Lungs are clear to auscultation bilaterally. No respiratory distress. No wheezes. No rhonchi. No rales.   Abdominal: Soft. Bowel sounds are normal. No distension and no mass. There is no hepatosplenomegaly. There is no tenderness/  rigidity/ rebound/ guarding.  Musculoskeletal: S/P Bilateral L2-3, L3-4 and L4-5 laminectomy, medial facetectomy and foraminotomies using microsurgical technique microsurgical instrumentationNormal Muscle tone and muscle strength  Extremities: No edema, cyanosis or clubbing. Pulses are palpable in all 4 extremities.  Neurological: Patient is alert and oriented to person, place, and time. Cranial nerves II-XII are grossly intact with no focal deficits.  Skin: Skin is warm. No rash noted. Nails show no clubbing.  No cyanosis or erythema.          Emotional Behavior:    Judgement and Insight:     JUDGEMENT: Assessment of real life problem-solving skills is not possible but it appears patient has good judgement.     INSIGHT: Patient has understanding of medical illness and is compliant with treatments.     Mental Status:  Alertness  Patient is fully alert   Memory:  Seems intact for both recent and remote events   Mood and Affect:         Depression  None seen               Anxiety  None seen    Debilities:   Physical Weakness  Present   Handicaps  S/P Laminectomy   Disabilities  Ch Backache with radiculopathy      Agitation  None seen         Results Review:    I reviewed the patient's new clinical results.  Lab Results (most recent)     Procedure Component Value Units Date/Time    POC Glucose Once [[id number]]  (Abnormal) Collected:  07/25/20 2020    Specimen:  Blood Updated:  07/25/20 2026     Glucose 234 mg/dL     POC Glucose Once [[id number]]  (Abnormal) Collected:  07/25/20 1728    Specimen:  Blood Updated:  07/25/20 1740     Glucose 263 mg/dL           Imaging Results (Most Recent)     None        reviewed    ECG/EMG Results (most recent)     None         reviewed        ASSESSMENT AND PLAN:     PLAN:    Labs and diagnostic tests reviewed: YES    Diagnostic tests reviewed: YES    Patient is clinically and hemodynamically stable    Consultation: Dr Tran (Neurosrgery)    Any new recommendations: As per consultant    New Labs ordered: CBC, CMP (Periodic)    New Diagnostic tests ordered: N/A    Any changes in Medications:   Drugs added: N/A   Drugs deleted: N/A   Drug dose changed: N/A    To continue current management and supportive care    Discharge planning issues: NONE. Patient should be able to go back home once ready for discharge.    Will follow patient closely    Nothing new to add for right now    DIAGNOSES:      PRIMARY DIAGNOSES:      S/P Bilateral L2-3, L3-4 and L4-5 laminectomy, medial facetectomy and foraminotomies using microsurgical technique microsurgical instrumentation on 07/20/2020/ Surgeon: John Tran M.D. Patient is here for PT/OT    HTN: Last /74. Will monitor    HLD: On Simvastatin.    Type II DM: Last blood glucose 234. On home meds and SSI. Will monitor    COPD: On Budesonide Formoterol    Hx of TIA: Hx noted    Hx of Sleep apnea: Hx noted    Anthropometric Analysis:     BMI:  40.90 (Morbid Obesity)    CODE Status: FULL CODE    Tobacco use: Past. Quit 2014    Alcohol intake: Not currently    Illegal Drug use: N/A    DVT Prophylaxis: N/A. Patient is on PT  ?     SECONDARY DIAGNOSES:  ?     As above        SURGICAL DIAGNOSES:      As per Problem List      I discussed the patients findings and my recommendations with patient and patient is agreeable to current treatment and management plan.     Mario Longo MD  07/25/20  21:39          Mario Longo M.D.; FACP; MPH; [facility]  Internal Medicine/ Hospitalist        Time:  60  minutes

## 2020-07-27 LAB
BACTERIA SPEC AEROBE CULT: NORMAL
BACTERIA SPEC AEROBE CULT: NORMAL
GLUCOSE BLDC GLUCOMTR-MCNC: 157 MG/DL (ref 70–130)
GLUCOSE BLDC GLUCOMTR-MCNC: 163 MG/DL (ref 70–130)
GLUCOSE BLDC GLUCOMTR-MCNC: 167 MG/DL (ref 70–130)
GLUCOSE BLDC GLUCOMTR-MCNC: 207 MG/DL (ref 70–130)

## 2020-07-27 PROCEDURE — 97116 GAIT TRAINING THERAPY: CPT | Performed by: PHYSICAL THERAPIST

## 2020-07-27 PROCEDURE — 63710000001 INSULIN ASPART PER 5 UNITS: Performed by: HOSPITALIST

## 2020-07-27 PROCEDURE — 97535 SELF CARE MNGMENT TRAINING: CPT

## 2020-07-27 PROCEDURE — 63710000001 INSULIN DETEMIR PER 5 UNITS: Performed by: HOSPITALIST

## 2020-07-27 PROCEDURE — 82962 GLUCOSE BLOOD TEST: CPT

## 2020-07-27 RX ORDER — DIMENHYDRINATE 50 MG
1000 TABLET ORAL DAILY
Status: DISCONTINUED | OUTPATIENT
Start: 2020-07-27 | End: 2020-07-29

## 2020-07-27 RX ADMIN — GABAPENTIN 400 MG: 400 CAPSULE ORAL at 08:45

## 2020-07-27 RX ADMIN — GABAPENTIN 400 MG: 400 CAPSULE ORAL at 16:37

## 2020-07-27 RX ADMIN — VENLAFAXINE 150 MG: 37.5 TABLET ORAL at 08:45

## 2020-07-27 RX ADMIN — INSULIN ASPART 2 UNITS: 100 INJECTION, SOLUTION INTRAVENOUS; SUBCUTANEOUS at 11:52

## 2020-07-27 RX ADMIN — INSULIN DETEMIR 30 UNITS: 100 INJECTION, SOLUTION SUBCUTANEOUS at 21:05

## 2020-07-27 RX ADMIN — INSULIN ASPART 2 UNITS: 100 INJECTION, SOLUTION INTRAVENOUS; SUBCUTANEOUS at 08:00

## 2020-07-27 RX ADMIN — BUDESONIDE AND FORMOTEROL FUMARATE DIHYDRATE 2 PUFF: 160; 4.5 AEROSOL RESPIRATORY (INHALATION) at 21:00

## 2020-07-27 RX ADMIN — INSULIN ASPART 5 UNITS: 100 INJECTION, SOLUTION INTRAVENOUS; SUBCUTANEOUS at 08:50

## 2020-07-27 RX ADMIN — MELATONIN 1000 UNITS: at 08:45

## 2020-07-27 RX ADMIN — AMLODIPINE BESYLATE 2.5 MG: 5 TABLET ORAL at 08:45

## 2020-07-27 RX ADMIN — INSULIN ASPART 5 UNITS: 100 INJECTION, SOLUTION INTRAVENOUS; SUBCUTANEOUS at 16:37

## 2020-07-27 RX ADMIN — BACITRACIN ZINC NEOMYCIN SULFATE POLYMYXIN B SULFATE: 400; 3.5; 5 OINTMENT TOPICAL at 14:00

## 2020-07-27 RX ADMIN — HYDROCODONE BITARTRATE AND ACETAMINOPHEN 1 TABLET: 5; 325 TABLET ORAL at 16:38

## 2020-07-27 RX ADMIN — POLYETHYLENE GLYCOL 3350 17 G: 17 POWDER, FOR SOLUTION ORAL at 08:45

## 2020-07-27 RX ADMIN — INSULIN ASPART 2 UNITS: 100 INJECTION, SOLUTION INTRAVENOUS; SUBCUTANEOUS at 16:36

## 2020-07-27 RX ADMIN — BACITRACIN ZINC NEOMYCIN SULFATE POLYMYXIN B SULFATE: 400; 3.5; 5 OINTMENT TOPICAL at 06:28

## 2020-07-27 RX ADMIN — GABAPENTIN 400 MG: 400 CAPSULE ORAL at 20:59

## 2020-07-27 RX ADMIN — ARIPIPRAZOLE 5 MG: 2 TABLET ORAL at 08:45

## 2020-07-27 RX ADMIN — HYDROCODONE BITARTRATE AND ACETAMINOPHEN 1 TABLET: 5; 325 TABLET ORAL at 21:00

## 2020-07-27 RX ADMIN — HYDROCODONE BITARTRATE AND ACETAMINOPHEN 1 TABLET: 5; 325 TABLET ORAL at 06:28

## 2020-07-27 RX ADMIN — DOCUSATE SODIUM 100 MG: 100 CAPSULE, LIQUID FILLED ORAL at 20:59

## 2020-07-27 RX ADMIN — BACITRACIN ZINC NEOMYCIN SULFATE POLYMYXIN B SULFATE: 400; 3.5; 5 OINTMENT TOPICAL at 21:15

## 2020-07-27 RX ADMIN — ATORVASTATIN CALCIUM 20 MG: 20 TABLET, FILM COATED ORAL at 08:45

## 2020-07-27 RX ADMIN — DOCUSATE SODIUM 100 MG: 100 CAPSULE, LIQUID FILLED ORAL at 08:45

## 2020-07-27 RX ADMIN — BUDESONIDE AND FORMOTEROL FUMARATE DIHYDRATE 2 PUFF: 160; 4.5 AEROSOL RESPIRATORY (INHALATION) at 08:49

## 2020-07-27 RX ADMIN — INSULIN ASPART 5 UNITS: 100 INJECTION, SOLUTION INTRAVENOUS; SUBCUTANEOUS at 11:54

## 2020-07-27 RX ADMIN — HYDROCODONE BITARTRATE AND ACETAMINOPHEN 1 TABLET: 5; 325 TABLET ORAL at 11:53

## 2020-07-27 NOTE — PLAN OF CARE
"  Problem: Patient Care Overview  Goal: Plan of Care Review  Flowsheets (Taken 7/27/2020 0902)  Progress: improving  Plan of Care Reviewed With: patient  Outcome Summary: PT: Pt is still managing bed mobility and transfers with conditional independence suing bed rails and having the head of the bed elevated. He ambulated 225' with front wheeled walker and supervision only. Started stairs today with pt able to ascend/descend a total of 12 4\" steps using both handrails.      "

## 2020-07-27 NOTE — PLAN OF CARE
Problem: Patient Care Overview  Goal: Plan of Care Review  Flowsheets (Taken 7/27/2020 6805)  Outcome Summary: OT:  Education regarding LH AE to manage lower body adl's following back surgery/activity restritions. Pt simulated use of a lh sponge. Pt demonstrated use of a lH reacher and sock aid. Pt's only concern for basic adl management is with hygiene after toileting due to pain and limited flexibilty. Rec use of a toilet hygiene aide for use at home. Pt was able to view an image of the assistive device on his phone. Verbal instruction provided regarding use of a toilet hygiene aide. Pt states he can order one himself online.

## 2020-07-27 NOTE — NURSING NOTE
Pt. Denies SOA, new or worsened cough, sore throat, and loss of taste or smell.   Patient tolerated procedure well.

## 2020-07-27 NOTE — THERAPY TREATMENT NOTE
SNF - Physical Therapy Treatment Note   Francesca Cardozo     Patient Name: Lloyd Greene  : 1960  MRN: [id number]  Today's Date: 2020  Onset of Illness/Injury or Date of Surgery: 20  Date of Referral to PT: 20  Referring Physician: Dr. Jerome    Admit Date: 2020    Visit Dx:  No diagnosis found.  Patient Active Problem List   Diagnosis   • Other chronic pain   • Partial small bowel obstruction (CMS/HCC)   • Angio-edema   • Adenomatous polyp of colon   • Type 2 diabetes mellitus without complication, without long-term current use of insulin (CMS/HCC)   • Familial hypercholesterolemia   • Hypertension, essential   • Type 2 diabetes mellitus with diabetic polyneuropathy, without long-term current use of insulin (CMS/HCC)   • DDD (degenerative disc disease), lumbar   • Lumbar stenosis with neurogenic claudication   • Spinal stenosis of lumbar region with neurogenic claudication   • Sleep apnea   • Hyponatremia   • Encounter for rehabilitation       Therapy Treatment    Rehabilitation Treatment Summary     Row Name 20 0927             Treatment Time/Intention    Discipline  physical therapist  -GC      Document Type  therapy note (daily note)  -GC      Subjective Information  complains of;pain  -GC      Mode of Treatment  physical therapy  -GC      Patient/Family Observations  Pt initially seen supine in bed. He states he has to go to the bathroom before he gets started.  -GC      Therapy Frequency (PT Clinical Impression)  daily  -GC      Patient Effort  good  -GC      Existing Precautions/Restrictions  brace worn when out of bed no lifting > 5#, no bending and no twisting  -GC      Recorded by [GC] Marlon Canales, PT 20 1026      Row Name 20 0927             Cognitive Assessment/Intervention- PT/OT    Orientation Status (Cognition)  oriented x 4  -GC      Personal Safety Interventions  fall prevention program maintained;gait belt;nonskid shoes/slippers when out of bed  -GC    "   Recorded by [GC] Marlon Canales, PT 07/27/20 1026      Row Name 07/27/20 0927             Bed Mobility Assessment/Treatment    Supine-Sit [place] (Bed Mobility)  conditional independence  -GC      Assistive Device (Bed Mobility)  bed rails;head of bed elevated  -GC      Recorded by [GC] Marlon Canales, PT 07/27/20 1026      Row Name 07/27/20 0927             Sit-Stand Transfer    Sit-Stand [place] (Transfers)  conditional independence  -GC      Assistive Device (Sit-Stand Transfers)  walker, front-wheeled  -GC      Recorded by [GC] Marlon Canales, PT 07/27/20 1026      Row Name 07/27/20 0927             Stand-Sit Transfer    Stand-Sit [place] (Transfers)  independent  -GC      Assistive Device (Stand-Sit Transfers)  walker, front-wheeled  -GC      Recorded by [GC] Marlon Canales, PT 07/27/20 1026      Row Name 07/27/20 0927             Gait/Stairs Assessment/Training    [place] Level (Gait)  supervision  -GC      Assistive Device (Gait)  walker, front-wheeled  -GC      Distance in Feet (Gait)  225'  -GC      Pattern (Gait)  step-through  -GC      Deviations/Abnormal Patterns (Gait)  gait speed decreased  -GC      [place] Level (Stairs)  contact guard;verbal cues  -GC      Handrail Location (Stairs)  both sides  -GC      Number of Steps (Stairs)  3 steps (ascending/descending) then rest then another 9 steps (ascending and descending) on 4\" step  -GC      Ascending Technique (Stairs)  step-to-step  -GC      Descending Technique (Stairs)  step-to-step  -GC      Comment (Gait/Stairs)  Pt in back brace for all functional mobility  -GC      Recorded by [GC] Marlon Canales, PT 07/27/20 1026      Row Name 07/27/20 0927             Toileting Assessment/Training    Comment (Toileting)  Pt went to bathroom with supervision only. He did require assistance of this therapist to wipe his bottom after as he is unable to do so at this time  -GC      Recorded by [GC] Marlon Canales, PT 07/27/20 1026      " "Row Name 07/27/20 0927             Positioning and Restraints    Pre-Treatment Position  in bed  -GC      Post Treatment Position  chair  -GC      In Chair  sitting;call light within reach;encouraged to call for assist  -GC      Recorded by [GC] Marlon Canales, ELIZABETH 07/27/20 1026      Row Name 07/27/20 0927             Pain Scale: Numbers Pre/Post-Treatment    Pain Scale: Numbers, Pretreatment  3/10  -GC      Pain Scale: Numbers, Post-Treatment  3/10  -GC      Pain Location  back  -GC      Recorded by [GC] Marlon Canales, PT 07/27/20 1026      Row Name                Wound 07/20/20 0803 lumbar spine Incision    Wound - Properties Group Date first assessed: 07/20/20 [HM] Time first assessed: 0803 [HM] Location: lumbar spine [HM] Primary Wound Type: Incision [HM] Recorded by:  [HM] Georgiana Ortiz RN 07/20/20 0819    Row Name                Wound 07/20/20 1140 coccyx    Wound - Properties Group Date first assessed: 07/20/20 [HM] Time first assessed: 1140 [HM] Location: coccyx [HM] Recorded by:  [HM] Georgiana Ortiz RN 07/20/20 1142    Row Name 07/27/20 0927             Plan of Care Review    Plan of Care Reviewed With  patient  -GC      Progress  improving  -GC      Outcome Summary  PT: Pt is still managing bed mobility and transfers with conditional independence suing bed rails and having the head of the bed elevated. He ambulated 225' with front wheeled walker and supervision only. Started stairs today with pt able to ascend/descend a total of 12 4\" steps using both handrails.   -GC      Recorded by [GC] Marlon Canales, PT 07/27/20 1026        User Key  (r) = Recorded By, (t) = Taken By, (c) = Cosigned By    Initials Name Effective Dates Discipline    GC Marlon Canales, PT 04/06/17 -  PT     Georgiana Ortiz, RN 06/16/16 -  Nurse          Wound 07/20/20 0803 lumbar spine Incision (Active)   Dressing Appearance dry;intact;no drainage 7/26/2020  9:20 PM   Closure FREIDA 7/26/2020  9:20 PM   Base dressing in place, " unable to visualize 7/26/2020  9:20 PM   Drainage Amount none 7/26/2020  9:20 PM       Wound 07/20/20 1140 coccyx (Active)   Dressing Appearance dry;intact;no drainage 7/26/2020  9:20 PM   Closure FREIDA 7/26/2020  9:20 PM   Base dressing in place, unable to visualize 7/26/2020  9:20 PM   Drainage Amount scant 7/26/2020  9:20 PM           Physical Therapy Education                 Title: PT OT SLP Therapies (Done)     Topic: Physical Therapy (Done)     Point: Mobility training (Done)     Description:   Instruct learner(s) on safety and technique for assisting patient out of bed, chair or wheelchair.  Instruct in the proper use of assistive devices, such as walker, crutches, cane or brace.              Patient Friendly Description:   It's important to get you on your feet again, but we need to do so in a way that is safe for you. Falling has serious consequences, and your personal safety is the most important thing of all.        When it's time to get out of bed, one of us or a family member will sit next to you on the bed to give you support.     If your doctor or nurse tells you to use a walker, crutches, a cane, or a brace, be sure you use it every time you get out of bed, even if you think you don't need it.    Learning Progress Summary           Patient Acceptance, E,TB, VU by  at 7/27/2020 1026    Comment:  reviewed precautions/restrictions of no lifting > 5#, no bending, and no twisting    Acceptance, E,TB, VU by  at 7/26/2020 1201    Comment:  instructed pt on importance of maintaining restrictions of no bending or twisting                   Point: Precautions (Done)     Description:   Instruct learner(s) on prescribed precautions during mobility and gait tasks              Learning Progress Summary           Patient Acceptance, E,TB, VU by  at 7/27/2020 1026    Comment:  reviewed precautions/restrictions of no lifting > 5#, no bending, and no twisting    Acceptance, E,TB, VU by  at 7/26/2020 1201     "Comment:  instructed pt on importance of maintaining restrictions of no bending or twisting                               User Key     Initials Effective Dates Name Provider Type Discipline     04/06/17 -  Marlon Canales, PT Physical Therapist PT                PT Recommendation and Plan  Anticipated Discharge Disposition (PT): home  Therapy Frequency (PT Clinical Impression): daily  Outcome Summary/Treatment Plan (PT)  Anticipated Discharge Disposition (PT): home  Plan of Care Reviewed With: patient  Progress: improving  Outcome Summary: PT: Pt is still managing bed mobility and transfers with conditional independence suing bed rails and having the head of the bed elevated. He ambulated 225' with front wheeled walker and supervision only. Started stairs today with pt able to ascend/descend a total of 12 4\" steps using both handrails.      Time Calculation:   PT Charges     Row Name 07/27/20 1028             Time Calculation    Start Time  0927  -      Stop Time  0957  -      Time Calculation (min)  30 min  -        User Key  (r) = Recorded By, (t) = Taken By, (c) = Cosigned By    Initials Name Provider Type     Marlon Canales, PT Physical Therapist        Therapy Charges for Today     Code Description Service Date Service Provider Modifiers Qty    28687386045 HC PT EVAL LOW COMPLEXITY 2 7/26/2020 Marlon Canales, PT GP 1    22438046187 HC GAIT TRAINING EA 15 MIN 7/27/2020 Marlon Canales, PT GP 2               Marlon Canales PT  7/27/2020       "

## 2020-07-27 NOTE — THERAPY TREATMENT NOTE
"SNF - Occupational Therapy Treatment Note   Francesca Cardozo     Patient Name: Lloyd Greene  : 1960  MRN: [id number]  Today's Date: 2020  Onset of Illness/Injury or Date of Surgery: 20  Date of Referral to OT: 20  Referring Physician: Dr. Jerome    Admit Date: 2020     No diagnosis found.  Patient Active Problem List   Diagnosis   • Other chronic pain   • Partial small bowel obstruction (CMS/HCC)   • Angio-edema   • Adenomatous polyp of colon   • Type 2 diabetes mellitus without complication, without long-term current use of insulin (CMS/HCC)   • Familial hypercholesterolemia   • Hypertension, essential   • Type 2 diabetes mellitus with diabetic polyneuropathy, without long-term current use of insulin (CMS/HCC)   • DDD (degenerative disc disease), lumbar   • Lumbar stenosis with neurogenic claudication   • Spinal stenosis of lumbar region with neurogenic claudication   • Sleep apnea   • Hyponatremia   • Encounter for rehabilitation     Past Medical History:   Diagnosis Date   • Anesthesia complication     PATIENT STATES \"STOPPED BREATHING DURING COLONOSCOPY APPROX 4-5 YRS AGO\".   • Anxiety    • Arthritis    • Colon polyp    • COPD (chronic obstructive pulmonary disease) (CMS/HCC)    • Depression    • Diabetes mellitus (CMS/HCC)    • Diverticulitis    • Elevated cholesterol    • History of TIA (transient ischemic attack)     3 yr ago   • Hyperlipidemia    • Hypertension    • Low back pain    • Numbness and tingling     BILATERAL LEGS RIGHT LEG WORSE   • Sleep apnea     uses cpap occasionally     Past Surgical History:   Procedure Laterality Date   • COLON SURGERY     • COLONOSCOPY     • COLONOSCOPY N/A 2019    Procedure: COLONOSCOPY w/ polypectomy;  Surgeon: Tin Giang MD;  Location: [facility];  Service: Gastroenterology   • COLOSTOMY      Dr. Gupta   • COLOSTOMY REVISION  2006    Diverticulitis-Dr. Gupta   • INCISIONAL HERNIA REPAIR     • LUMBAR LAMINECTOMY " WITH FUSION N/A 7/20/2020    Procedure: Lumbar 2 to lumbar 3, lumbar 3 to lumbar 4 and lumbar 4 lumbar 5 laminectomy with a fusion by orthopedics;  Surgeon: John Tran MD;  Location: [facility];  Service: Neurosurgery;  Laterality: N/A;   • LUMBAR LAMINECTOMY WITH FUSION N/A 7/20/2020    Procedure: Lumbar 2 to lumbar 5 fusion with instrumentation and laminectomy by Dr. Hutchinson;  Surgeon: Melvin Ya MD;  Location: [facility];  Service: Neurosurgery;  Laterality: N/A;   • ORIF ANKLE FRACTURE Right 1995       Therapy Treatment    Rehabilitation Treatment Summary     Row Name 07/27/20 1305          Treatment Time/Intention    Discipline  occupational therapist  -SD     Document Type  --     Subjective Information  --     Mode of Treatment  --     Patient/Family Observations  Pt in bed. Pt agreeable to therapy.  -SD2     Care Plan Review  evaluation/treatment results reviewed;care plan/treatment goals reviewed;risks/benefits reviewed;current/potential barriers reviewed;patient/other agree to care plan  -SD2     Therapy Frequency (PT Clinical Impression)  --     Patient Effort  --     Existing Precautions/Restrictions  brace worn when out of bed;other (see comments) no lifting >5 # and limit bending/twisting  -SD3     Treatment Considerations/Comments  Education regarding LH AE to manage lower body adl's following back surgery/activity restrictions. Pt simulated use of a lh sponge. Pt demonstrated use of a lH reacher and sock aid. Verbal instruction regarding use of a toilet hygiene aide.   -SD4     Equipment Issued to Patient  long handled sponge;sock aid  -SD4     Recorded by [SD] Nehemias Worrell OTR 07/27/20 1500  [SD2] Nehemias Worrell OTR 07/27/20 1502  [SD3] Nehemias Worrell OTR 07/27/20 1459  [SD4] Nehemias Worrell OTR 07/27/20 1457     Row Name 07/27/20 1303          Cognitive Assessment/Intervention- PT/OT    Orientation Status (Cognition)  --     Personal Safety Interventions  fall  prevention program maintained;nonskid shoes/slippers when out of bed;supervised activity  -SD     Recorded by [SD] Nehemias Worrell, OTR 07/27/20 1457     Row Name 07/27/20 1305          Bed Mobility Assessment/Treatment    Supine-Sit [place] (Bed Mobility)  conditional independence  -SD     Assistive Device (Bed Mobility)  bed rails;head of bed elevated  -SD     Recorded by [SD] Nehemias Worrell OTR 07/27/20 1457     Row Name 07/27/20 1305             Functional Mobility    Functional Mobility- Ind. Level  conditional independence  -SD      Functional Mobility- Device  rolling walker  -SD      Functional Mobility-Distance (Feet)  30  -SD      Recorded by [SD] Nehemias Worrell OTR 07/27/20 1457      Row Name 07/27/20 1305          Sit-Stand Transfer    Sit-Stand [place] (Transfers)  conditional independence  -SD     Assistive Device (Sit-Stand Transfers)  walker, front-wheeled  -SD     Recorded by [SD] Nehemias Worrell OTR 07/27/20 1457     Row Name 07/27/20 1305          Stand-Sit Transfer    Stand-Sit [place] (Transfers)  conditional independence  -SD     Assistive Device (Stand-Sit Transfers)  walker, front-wheeled  -SD     Recorded by [SD] Nehemias Worrell, OTR 07/27/20 1457        Row Name 07/27/20 1305             Bathing Assessment/Intervention    Bathing [place] Level  bathing skills;lower body  -SD      Assistive Devices (Bathing)  long-handled sponge  -SD      Bathing Position  supported sitting  -SD      Comment (Bathing)  Pt simulated LB bathing using LH sponge.  -SD      Recorded by [SD] Nehemias Worrell OTR 07/27/20 1457      Row Name 07/27/20 1305             Upper Body Dressing Assessment/Training    Upper Body Dressing [place] Level  upper body dressing skills;independent;set up  -SD      Upper Body Dressing Position  edge of bed sitting  -SD      Comment (Upper Body Dressing)  Pt donned back brace independently  -SD      Recorded by [SD] Nehemias Worrell, OTR  07/27/20 1457      Row Name 07/27/20 1305             Lower Body Dressing Assessment/Training    Lower Body Dressing [place] Level  lower body dressing skills;doff;don;socks  -SD      Assistive Devices (Lower Body Dressing)  reacher;sock-aid  -SD      Lower Body Dressing Position  supported sitting  -SD      Recorded by [SD] Nehemias Worrell, OTR 07/27/20 1457         Row Name 07/27/20 1305             BADL Safety/Performance    Impairments, BADL Safety/Performance  pain;range of motion;other (see comments) acitivty restrictions following surgery  -SD      Skilled BADL Treatment/Intervention  adaptive equipment training;compensatory training  -SD      Progress in BADL Status  improvement noted;independence level;use of compensatory strategies;use of adaptive equipment  -SD      Recorded by [SD] Nehemias Worrell, OTR 07/27/20 1457      Row Name 07/27/20 1304          Positioning and Restraints    Pre-Treatment Position  in bed  -SD     Post Treatment Position  chair  -SD     In Chair  call light within reach;encouraged to call for assist  -SD     Recorded by [SD] Nehemias Worrell, OTR 07/27/20 1457     Row Name 07/27/20 1300          Pain Scale: Numbers Pre/Post-Treatment    Pain Scale: Numbers, Pretreatment  5/10  -SD     Pain Scale: Numbers, Post-Treatment  5/10  -SD     Pain Location  back  -SD     Pain Intervention(s)  Repositioned  -SD2     Recorded by [SD] Nehemias Worrell, OTR 07/27/20 1457  [SD2] Nehemias Worrell, OTR 07/27/20 1502     Row Name 07/27/20 1305          Plan of Care Review    Plan of Care Reviewed With  --     Progress  --     Outcome Summary  OT:  Education regarding LH AE to manage lower body adl's following back surgery/activity restritions. Pt simulated use of a lh sponge. Pt demonstrated use of a lH reacher and sock aid. Pt's only concern for basic adl management is with hygiene after toileting due to pain and limited flexibilty. Rec use of a toilet hygiene aide for use at home.  Pt was able to view an image of the assistive device on his phone. Verbal instruction provided regarding use of a toilet hygiene aide. Pt states he can order one himself online.   -SD     Recorded by [SD] Nehemias Worrell, OTR 07/27/20 9657     Row Name 07/27/20 1308             Outcome Summary/Treatment Plan (OT)    Daily Summary of Progress (OT)  progress toward functional goals as expected;prepare for discharge  -SD      Plan for Continued Treatment (OT)  anticipate one more treatment session in OT for education with home safety and to address impact of surgical restrictions with IADL tasks.   -SD      Anticipated Discharge Disposition (OT)  home with home health;home with assist Rec assist with heavy IADL tasks due to activity restriction  -SD      Recorded by [SD] Nehemias Worrell, OTR 07/27/20 7317        User Key  (r) = Recorded By, (t) = Taken By, (c) = Cosigned By    Initials Name Effective Dates Discipline     Marlon Canales, ELIZABETH 04/06/17 -  PT    Nehemias Harper, OTR 07/05/20 -  OT     Georgiana Ortiz RN 06/16/16 -  Nurse            Occupational Therapy Education                 Title: PT OT SLP Therapies (Done)     Topic: Occupational Therapy (Done)     Point: ADL training (Done)     Description:   Instruct learner(s) on proper safety adaptation and remediation techniques during self care or transfers.   Instruct in proper use of assistive devices.              Learning Progress Summary           Patient Acceptance, E,TB,D, VU,DU by SD at 7/27/2020 1440    Comment:  Education regarding LH AE to manage lower body adl's following back surgery/activity restritions. Pt simulated use of a lh sponge. Pt demonstrated use of a lH reacher and sock aid. Verbal instruction regarding use of a toilet hygiene aide.    Acceptance, E, VU by SD at 7/26/2020 0929    Comment:  Education regarding OT services and compensatory strategies for adl management due to activity restrictions following back surgery.                                User Key     Initials Effective Dates Name Provider Type Discipline    SD 07/05/20 -  Nehemias Worrell OT[employer]ION[employer]TE Occupational Therapist OT                OT Recommendation and Plan  Outcome Summary/Treatment Plan (OT)  Daily Summary of Progress (OT): progress toward functional goals as expected, prepare for discharge  Plan for Continued Treatment (OT): anticipate one more treatment session in OT for education with home safety and to address impact of surgical restrictions with IADL tasks.   Anticipated Equipment Needs at Discharge (OT): bedside commode, front wheeled walker  Anticipated Discharge Disposition (OT): home with home health, home with assist(Rec assist with heavy IADL tasks due to activity restriction)  Therapy Frequency (OT Eval): 3 times/wk  Daily Summary of Progress (OT): progress toward functional goals as expected, prepare for discharge  Outcome Summary: OT:  Education regarding LH AE to manage lower body adl's following back surgery/activity restritions. Pt simulated use of a lh sponge. Pt demonstrated use of a lH reacher and sock aid. Pt's only concern for basic adl management is with hygiene after toileting due to pain and limited flexibilty. Rec use of a toilet hygiene aide for use at home. Pt was able to view an image of the assistive device on his phone. Verbal instruction provided regarding use of a toilet hygiene aide. Pt states he can order one himself online.        Time Calculation:   Time Calculation- OT     Row Name 07/27/20 1444 07/27/20 1439          Time Calculation- OT    OT Start Time  1249  -SD  1140  -SD     OT Stop Time  1305  -SD  1155  -SD     OT Time Calculation (min)  16 min  -SD  15 min  -SD     TCU Minutes- OT  16 min  -SD  15 min  -SD        Timed Charges    37612 - OT Self Care/Mgmt Minutes  16  -SD  15  -SD       User Key  (r) = Recorded By, (t) = Taken By, (c) = Cosigned By    Initials Name Provider Type    Nehemias Harper OTR Occupational  Therapist        Therapy Charges for Today     Code Description Service Date Service Provider Modifiers Qty    68851941976 HC OT SELF CARE/MGMT/TRAIN EA 15 MIN 7/27/2020 Nehemias Worrell, OTR GO 2               KEYLA Pemberton  7/27/2020

## 2020-07-27 NOTE — PLAN OF CARE
Problem: Patient Care Overview  Goal: Plan of Care Review  Outcome: Ongoing (interventions implemented as appropriate)     Problem: Pain, Chronic (Adult)  Goal: Identify Related Risk Factors and Signs and Symptoms  Outcome: Ongoing (interventions implemented as appropriate)     Problem: Pain, Chronic (Adult)  Goal: Acceptable Pain/Comfort Level and Functional Ability  Outcome: Ongoing (interventions implemented as appropriate)

## 2020-07-27 NOTE — PLAN OF CARE
Problem: Patient Care Overview  Goal: Plan of Care Review  Outcome: Ongoing (interventions implemented as appropriate)  Goal: Individualization and Mutuality  Outcome: Ongoing (interventions implemented as appropriate)  Goal: Discharge Needs Assessment  Outcome: Ongoing (interventions implemented as appropriate)  Goal: Interprofessional Rounds/Family Conf  Outcome: Ongoing (interventions implemented as appropriate)     Problem: Pain, Chronic (Adult)  Goal: Identify Related Risk Factors and Signs and Symptoms  Outcome: Ongoing (interventions implemented as appropriate)  Goal: Acceptable Pain/Comfort Level and Functional Ability  Outcome: Ongoing (interventions implemented as appropriate)     Problem: Skin Injury Risk (Adult)  Goal: Identify Related Risk Factors and Signs and Symptoms  Outcome: Ongoing (interventions implemented as appropriate)  Goal: Skin Health and Integrity  Outcome: Ongoing (interventions implemented as appropriate)

## 2020-07-28 LAB
GLUCOSE BLDC GLUCOMTR-MCNC: 136 MG/DL (ref 70–130)
GLUCOSE BLDC GLUCOMTR-MCNC: 144 MG/DL (ref 70–130)
GLUCOSE BLDC GLUCOMTR-MCNC: 190 MG/DL (ref 70–130)

## 2020-07-28 PROCEDURE — 97535 SELF CARE MNGMENT TRAINING: CPT

## 2020-07-28 PROCEDURE — 63710000001 INSULIN ASPART PER 5 UNITS: Performed by: HOSPITALIST

## 2020-07-28 PROCEDURE — 94799 UNLISTED PULMONARY SVC/PX: CPT

## 2020-07-28 PROCEDURE — 82962 GLUCOSE BLOOD TEST: CPT

## 2020-07-28 PROCEDURE — 63710000001 INSULIN DETEMIR PER 5 UNITS: Performed by: HOSPITALIST

## 2020-07-28 PROCEDURE — 97110 THERAPEUTIC EXERCISES: CPT

## 2020-07-28 RX ADMIN — HYDROCODONE BITARTRATE AND ACETAMINOPHEN 1 TABLET: 5; 325 TABLET ORAL at 13:44

## 2020-07-28 RX ADMIN — INSULIN ASPART 5 UNITS: 100 INJECTION, SOLUTION INTRAVENOUS; SUBCUTANEOUS at 09:00

## 2020-07-28 RX ADMIN — BACITRACIN ZINC NEOMYCIN SULFATE POLYMYXIN B SULFATE: 400; 3.5; 5 OINTMENT TOPICAL at 06:21

## 2020-07-28 RX ADMIN — ATORVASTATIN CALCIUM 20 MG: 20 TABLET, FILM COATED ORAL at 08:59

## 2020-07-28 RX ADMIN — INSULIN ASPART 2 UNITS: 100 INJECTION, SOLUTION INTRAVENOUS; SUBCUTANEOUS at 12:23

## 2020-07-28 RX ADMIN — HYDROCODONE BITARTRATE AND ACETAMINOPHEN 1 TABLET: 5; 325 TABLET ORAL at 17:58

## 2020-07-28 RX ADMIN — ARIPIPRAZOLE 5 MG: 2 TABLET ORAL at 08:59

## 2020-07-28 RX ADMIN — GABAPENTIN 400 MG: 400 CAPSULE ORAL at 21:51

## 2020-07-28 RX ADMIN — BUDESONIDE AND FORMOTEROL FUMARATE DIHYDRATE 2 PUFF: 160; 4.5 AEROSOL RESPIRATORY (INHALATION) at 09:04

## 2020-07-28 RX ADMIN — BUDESONIDE AND FORMOTEROL FUMARATE DIHYDRATE 2 PUFF: 160; 4.5 AEROSOL RESPIRATORY (INHALATION) at 21:51

## 2020-07-28 RX ADMIN — HYDROCODONE BITARTRATE AND ACETAMINOPHEN 1 TABLET: 5; 325 TABLET ORAL at 08:59

## 2020-07-28 RX ADMIN — GABAPENTIN 400 MG: 400 CAPSULE ORAL at 09:00

## 2020-07-28 RX ADMIN — MELATONIN 1000 UNITS: at 09:00

## 2020-07-28 RX ADMIN — HYDROCODONE BITARTRATE AND ACETAMINOPHEN 1 TABLET: 5; 325 TABLET ORAL at 03:48

## 2020-07-28 RX ADMIN — GABAPENTIN 400 MG: 400 CAPSULE ORAL at 17:58

## 2020-07-28 RX ADMIN — HYDROCODONE BITARTRATE AND ACETAMINOPHEN 1 TABLET: 5; 325 TABLET ORAL at 21:51

## 2020-07-28 RX ADMIN — BACITRACIN ZINC NEOMYCIN SULFATE POLYMYXIN B SULFATE: 400; 3.5; 5 OINTMENT TOPICAL at 21:51

## 2020-07-28 RX ADMIN — INSULIN DETEMIR 30 UNITS: 100 INJECTION, SOLUTION SUBCUTANEOUS at 21:15

## 2020-07-28 RX ADMIN — INSULIN ASPART 5 UNITS: 100 INJECTION, SOLUTION INTRAVENOUS; SUBCUTANEOUS at 17:58

## 2020-07-28 RX ADMIN — INSULIN ASPART 5 UNITS: 100 INJECTION, SOLUTION INTRAVENOUS; SUBCUTANEOUS at 12:24

## 2020-07-28 RX ADMIN — VENLAFAXINE 150 MG: 37.5 TABLET ORAL at 08:59

## 2020-07-28 RX ADMIN — AMLODIPINE BESYLATE 2.5 MG: 5 TABLET ORAL at 09:00

## 2020-07-28 NOTE — PLAN OF CARE
Problem: Patient Care Overview  Goal: Plan of Care Review  7/28/2020 1417 by Dominique Metz, PT  Flowsheets (Taken 7/28/2020 1338)  Outcome Summary: PT:  Patient ascended/descended 18 steps this afternoon with bilateral hand rails and stand by assist.  He requires increased time, with a step to pattern and demonstrated shortness of breath which he reports is at baseline.  Patient reports he would like to improve stamina and work on toileting prior to d/c home.  PT anticipates treatment  2-3 more days to ensure safety with gait/stairs prior to d/c home.  7/28/2020 0940 by Dominique Metz, PT  Flowsheets (Taken 7/28/2020 0905)  Outcome Summary: PT:  Patient performs sit to/from stand transfers with conditional independence, using rolling walker.  He was able to don back brace with min A from PT.  Patient ambulated 200 feet with rolling walker with supervision.  Patient with increased c/o back pain today, with decreased tolerance to mobility, requesting PT return in PM to possibly perform stairs if pain improved.  PT in agreement - will follow up with patient this afternoon.

## 2020-07-28 NOTE — NURSING NOTE
0 complaints of new or worsening cough, shortness of breath, sore throat, loss of smell or taste noted. Safety measures in place.

## 2020-07-28 NOTE — THERAPY TREATMENT NOTE
"SNF - Physical Therapy Treatment Note   [place]     Patient Name: Lloyd Greene  : 1960  MRN: [id number]  Today's Date: 2020  Onset of Illness/Injury or Date of Surgery: 20  Date of Referral to PT: 20  Referring Physician: Dr. Jerome    Admit Date: 2020    Visit Dx:  No diagnosis found.  Patient Active Problem List   Diagnosis   • Other chronic pain   • Partial small bowel obstruction (CMS/HCC)   • Angio-edema   • Adenomatous polyp of colon   • Type 2 diabetes mellitus without complication, without long-term current use of insulin (CMS/HCC)   • Familial hypercholesterolemia   • Hypertension, essential   • Type 2 diabetes mellitus with diabetic polyneuropathy, without long-term current use of insulin (CMS/HCC)   • DDD (degenerative disc disease), lumbar   • Lumbar stenosis with neurogenic claudication   • Spinal stenosis of lumbar region with neurogenic claudication   • Sleep apnea   • Hyponatremia   • Encounter for rehabilitation       Therapy Treatment    Rehabilitation Treatment Summary     Row Name 20 1338 20 0905          Treatment Time/Intention    Discipline  physical therapist  -  physical therapist  -     Document Type  therapy note (daily note)  -  therapy note (daily note)  -     Subjective Information  complains of;pain  -  complains of;pain  -     Mode of Treatment  physical therapy  -  physical therapy  -     Patient/Family Observations  Patient supine in bed, agrees to do stairs with PT  -  Patient seated in chair with ice pack in place.  Patient tearful this morning, embarrased by \"issues with incontinence and making a mess of myself.\"  Patient reports increased pain but would like to try to walk with therapy, declines steps this AM.  -     Patient Effort  good  -  good  -     Existing Precautions/Restrictions  --  brace worn when out of bed;other (see comments) no lifting over 5 lb, no bending or twisting  -     Patient " Response to Treatment  Patient short of breath with stairclimbing - he reports this is baseline  -  Patient with increased c/o pain today, decreased tolerance to mobility.  Patient requests PT return in PM to check on patient and if pain improved, possibly try stairs again.    -LH     Recorded by [] Dominique Metz, PT 07/28/20 1416 [LH] Dominique Metz, PT 07/28/20 0939     Row Name 07/28/20 1338 07/28/20 0905          Bed Mobility Assessment/Treatment    Bed Mobility Assessment/Treatment  sit-supine  -  --     Supine-Sit [place] (Bed Mobility)  conditional independence  -  --     Sit-Supine [place] (Bed Mobility)  independent  -  --     Assistive Device (Bed Mobility)  head of bed elevated;bed rails  -  --     Comment (Bed Mobility)  --  deferred - up in chair  -LH     Recorded by [] Dominique Metz, PT 07/28/20 1416 [LH] Dominique Metz, PT 07/28/20 0939     Row Name 07/28/20 1338 07/28/20 0905          Sit-Stand Transfer    Sit-Stand [place] (Transfers)  conditional independence  -  conditional independence  -     Assistive Device (Sit-Stand Transfers)  walker, front-wheeled  -  walker, front-wheeled  -     Recorded by [] Dominique Metz, PT 07/28/20 1416 [LH] Dominique Metz, PT 07/28/20 0939     Row Name 07/28/20 1338 07/28/20 0905          Stand-Sit Transfer    Stand-Sit [place] (Transfers)  conditional independence  -  conditional independence  -     Assistive Device (Stand-Sit Transfers)  walker, front-wheeled  -  walker, front-wheeled  -     Recorded by [] Dominique Metz, PT 07/28/20 1416 [LH] Dominique Metz, PT 07/28/20 0939     Row Name 07/28/20 1338 07/28/20 0905          Gait/Stairs Assessment/Training    [place] Level (Gait)  supervision  -  supervision  -     Assistive Device (Gait)  walker, front-wheeled  -  walker, front-wheeled  -     Distance in Feet (Gait)  50 feet x 2  -  200  -      Deviations/Abnormal Patterns (Gait)  gait speed decreased;stride length decreased  -  gait speed decreased;stride length decreased  -     Bilateral Gait Deviations  forward flexed posture  -  --     [place] Level (Stairs)  stand by assist  -  --     Handrail Location (Stairs)  both sides  -  --     Number of Steps (Stairs)  18 combination of 4 inch and 6 inch steps  -  --     Ascending Technique (Stairs)  step-to-step  -  --     Descending Technique (Stairs)  step-to-step  -  --     Comment (Gait/Stairs)  Patient required min A to don back brace prior to standing and ambulating.  -  Patient unable to maintain/progress ambulation distance due to pain.  Requests to possibly try stairs in afternoon if pain improved.  Patient able to don back brace prior to mobility with min A from PT to position brace appropriately.  -     Recorded by [] Dominique Metz, PT 07/28/20 1416 [] Dominique Metz, PT 07/28/20 0939     Row Name 07/28/20 1338             Toileting Assessment/Training    Comment (Toileting)  Patient requesting to use restroom upon returning to room, declines to don back brace, reporting he does not have to wear it to restroom.  PT spoke to nurse who verified MD wrote in DC summary that patient does not have to wear back brace when going to restroom.  Patient dependent for toilet hygiene due to inability to reach/twist.  -      Recorded by [] Dominique Metz, PT 07/28/20 1416      Row Name 07/28/20 1338 07/28/20 0905          Positioning and Restraints    Pre-Treatment Position  in bed  -  sitting in chair/recliner  -     Post Treatment Position  bed  -  chair  -     In Bed  supine;call light within reach;encouraged to call for assist;with nsg  -  --     In Chair  --  sitting;call light within reach;encouraged to call for assist;with other staff aide present to clean bed, assist with bath  -LH     Recorded by [] Dominique Metz, PT 07/28/20 1416 []  Dominique Metz, PT 07/28/20 0939     Row Name 07/28/20 1338 07/28/20 0905          Pain Scale: Numbers Pre/Post-Treatment    Pain Scale: Numbers, Pretreatment  3/10  -LH  7/10  -     Pain Scale: Numbers, Post-Treatment  6/10 during mobility  -  7/10  -     Pain Location  back  -LH  back  -LH     Pain Intervention(s)  -- RN gave meds  midsession  -  Cold applied;Repositioned  -LH     Recorded by [LH] Dominique Metz, PT 07/28/20 1416 [LH] Dominique Metz, PT 07/28/20 0939     Row Name                Wound 07/20/20 0803 lumbar spine Incision    Wound - Properties Group Date first assessed: 07/20/20 [HM] Time first assessed: 0803 [HM] Location: lumbar spine [HM] Primary Wound Type: Incision [HM] Recorded by:  [HM] Georgiana Ortiz RN 07/20/20 0819    Row Name                Wound 07/20/20 1140 coccyx    Wound - Properties Group Date first assessed: 07/20/20 [HM] Time first assessed: 1140 [HM] Location: coccyx [HM] Recorded by:  [HM] Georgiana Ortiz RN 07/20/20 1142    Row Name 07/28/20 1338 07/28/20 0905          Plan of Care Review    Plan of Care Reviewed With  patient  -LH  patient  -     Progress  improving  -  --     Outcome Summary  PT:  Patient ascended/descended 18 steps this afternoon with bilateral hand rails and stand by assist.  He requires increased time, with a step to pattern and demonstrated shortness of breath which he reports is at baseline.  Patient reports he would like to improve stamina and work on toileting prior to d/c home.  PT anticipates treatment  2-3 more days to ensure safety with gait/stairs prior to d/c home.  -  PT:  Patient performs sit to/from stand transfers with conditional independence, using rolling walker.  He was able to don back brace with min A from PT.  Patient ambulated 200 feet with rolling walker with supervision.  Patient with increased c/o back pain today, with decreased tolerance to mobility, requesting PT return in PM to possibly perform  stairs if pain improved.  PT in agreement - will follow up with patient this afternoon.  -     Recorded by [] Dominique Metz, PT 07/28/20 1416 [] Dominique Metz, PT 07/28/20 0939     Row Name 07/28/20 1338 07/28/20 0905          Outcome Summary/Treatment Plan (PT)    Daily Summary of Progress (PT)  progress toward functional goals is good  -  progress towards functional goals is fair  -     Barriers to Overall Progress (PT)  --  increased pain today  -     Plan for Continued Treatment (PT)  --  check back with patient this PM, practice stairs if pain improved  -     Anticipated Discharge Disposition (PT)  home with assist;home with home health  -  home with home health;home with assist  -     Recorded by [] Dominique Metz, PT 07/28/20 1416 [LH] Dominique Metz, PT 07/28/20 0939       User Key  (r) = Recorded By, (t) = Taken By, (c) = Cosigned By    Initials Name Effective Dates Discipline     Dominique Mezt, PT 06/08/18 -  PT     Georgiana Ortiz RN 06/16/16 -  Nurse          Wound 07/20/20 0803 lumbar spine Incision (Active)   Dressing Appearance dry;intact;no drainage 7/27/2020  8:59 PM   Closure FREIDA 7/27/2020  8:59 PM   Base dressing in place, unable to visualize 7/27/2020  8:59 PM       Wound 07/20/20 1140 coccyx (Active)   Dressing Appearance dry;intact;no drainage 7/27/2020  8:59 PM   Closure FREIDA 7/27/2020  8:59 PM   Base dressing in place, unable to visualize 7/27/2020  8:59 PM       Rehab Goal Summary     Row Name 07/28/20 0840             Dressing Goal 1 (OT)    Activity/Assistive Device (Dressing Goal 1, OT)  lower body dressing;long handled shoe horn;reacher;sock-aid  -SD      [place]/Cues Needed (Dressing Goal 1, OT)  conditional independence;set-up required  -SD      Time Frame (Dressing Goal 1, OT)  by discharge  -SD      Progress/Outcome (Dressing Goal 1, OT)  goal met  -SD         Patient Education Goal (OT)    Activity (Patient Education Goal, OT)   pt to verbalize strategies to manage toilet hygiene while adhering to activtiy restrictions following back surgery.   -SD      [place]/Cues/Accuracy (Memory Goal 2, OT)  verbalizes understanding  -SD      Progress/Outcome (Patient Education Goal, OT)  goal met  -SD        User Key  (r) = Recorded By, (t) = Taken By, (c) = Cosigned By    Initials Name Provider Type Discipline    Nehemias Harper OTR Occupational Therapist OT          Physical Therapy Education                 Title: PT OT SLP Therapies (Done)     Topic: Physical Therapy (Done)     Point: Mobility training (Done)     Description:   Instruct learner(s) on safety and technique for assisting patient out of bed, chair or wheelchair.  Instruct in the proper use of assistive devices, such as walker, crutches, cane or brace.              Patient Friendly Description:   It's important to get you on your feet again, but we need to do so in a way that is safe for you. Falling has serious consequences, and your personal safety is the most important thing of all.        When it's time to get out of bed, one of us or a family member will sit next to you on the bed to give you support.     If your doctor or nurse tells you to use a walker, crutches, a cane, or a brace, be sure you use it every time you get out of bed, even if you think you don't need it.    Learning Progress Summary           Patient Acceptance, E,TB, VU by  at 7/28/2020 1417    Acceptance, E,TB, VU by  at 7/28/2020 0939    Acceptance, E,TB, VU by  at 7/27/2020 1026    Comment:  reviewed precautions/restrictions of no lifting > 5#, no bending, and no twisting    Acceptance, E,TB, VU by  at 7/26/2020 1201    Comment:  instructed pt on importance of maintaining restrictions of no bending or twisting                   Point: Precautions (Done)     Description:   Instruct learner(s) on prescribed precautions during mobility and gait tasks              Learning Progress Summary            Patient Acceptance, E,TB, VU by  at 7/28/2020 1417    Acceptance, E,TB, VU by  at 7/27/2020 1026    Comment:  reviewed precautions/restrictions of no lifting > 5#, no bending, and no twisting    Acceptance, E,TB, VU by  at 7/26/2020 1201    Comment:  instructed pt on importance of maintaining restrictions of no bending or twisting                               User Key     Initials Effective Dates Name Provider Type Discipline     06/08/18 -  Dominique Metz, PT Physical Therapist PT     04/06/17 -  Marlon Canales, PT Physical Therapist PT                PT Recommendation and Plan  Anticipated Discharge Disposition (PT): home with assist, home with home health  Outcome Summary/Treatment Plan (PT)  Daily Summary of Progress (PT): progress toward functional goals is good  Barriers to Overall Progress (PT): increased pain today  Plan for Continued Treatment (PT): check back with patient this PM, practice stairs if pain improved  Anticipated Discharge Disposition (PT): home with assist, home with home health  Plan of Care Reviewed With: patient  Progress: improving  Outcome Summary: PT:  Patient ascended/descended 18 steps this afternoon with bilateral hand rails and stand by assist.  He requires increased time, with a step to pattern and demonstrated shortness of breath which he reports is at baseline.  Patient reports he would like to improve stamina and work on toileting prior to d/c home.  PT anticipates treatment  2-3 more days to ensure safety with gait/stairs prior to d/c home.     Time Calculation:   PT Charges     Row Name 07/28/20 1417 07/28/20 0940          Time Calculation    Start Time  1338  -  0905  -     Stop Time  1404  -  0924  -     Time Calculation (min)  26 min  -  19 min  -     PT Received On  07/28/20  -  --     PT - Next Appointment  07/29/20  -  --        Time Calculation- PT    Total Timed Code Minutes- PT  26 minute(s)  -  19 minute(s)  -        Timed Charges     53993 - PT Therapeutic Exercise Minutes  26  -LH  19  -LH       User Key  (r) = Recorded By, (t) = Taken By, (c) = Cosigned By    Initials Name Provider Type     Dominique Metz, PT Physical Therapist        Therapy Charges for Today     Code Description Service Date Service Provider Modifiers Qty    [id number]  PT THER PROC EA 15 MIN 7/28/2020 Dominique Metz, PT GP 1    [id number]  PT THER PROC EA 15 MIN 7/28/2020 Dominique Metz, PT GP 2               Dominique Metz, PT  7/28/2020

## 2020-07-28 NOTE — PLAN OF CARE
Problem: Patient Care Overview  Goal: Plan of Care Review  Flowsheets (Taken 7/28/2020 0905)  Outcome Summary: PT:  Patient performs sit to/from stand transfers with conditional independence, using rolling walker.  He was able to don back brace with min A from PT.  Patient ambulated 200 feet with rolling walker with supervision.  Patient with increased c/o back pain today, with decreased tolerance to mobility, requesting PT return in PM to possibly perform stairs if pain improved.  PT in agreement - will follow up with patient this afternoon.

## 2020-07-28 NOTE — PLAN OF CARE
Problem: Patient Care Overview  Goal: Plan of Care Review  Outcome: Ongoing (interventions implemented as appropriate)  Flowsheets (Taken 7/27/2020 3215)  Progress: improving  Plan of Care Reviewed With: patient  Note:   Polite and cooperative with care. Continues with therapy as ordered. Safety measures in place.

## 2020-07-28 NOTE — PLAN OF CARE
Problem: Patient Care Overview  Goal: Plan of Care Review  Flowsheets (Taken 7/28/2020 2566)  Outcome Summary: OT:  Pt managed transfers from EOB, BSC and recliner with conditional independence. Pt was independent with mobility in room using a rolling walker for support. Pt was able to independently don his back brace. Education provided regarding home safety and compensatory strategies for daily tasks due to activity restrictions following his back surgery. Pt states he will have family support as needed for IADL tasks at home. Pt can benefit from a toilet hygiene aide to extend his reach and allow for independence with hygiene after toileting due to pain and limited rom/flexibility following back surgery. Rec a BSC for use at home to increase safety. Rec HH services upon discharge from SNF.

## 2020-07-28 NOTE — THERAPY DISCHARGE NOTE
SNF - Occupational Therapy Treatment Note/Discharge   Francesca Cardozo     Patient Name: Lloyd Greene  : 1960  MRN: [id number]  Today's Date: 2020  Onset of Illness/Injury or Date of Surgery: 20  Date of Referral to OT: 20  Referring Physician: Dr. Jerome      Admit Date: 2020    Visit Dx:   No diagnosis found.  Patient Active Problem List   Diagnosis   • Other chronic pain   • Partial small bowel obstruction (CMS/HCC)   • Angio-edema   • Adenomatous polyp of colon   • Type 2 diabetes mellitus without complication, without long-term current use of insulin (CMS/HCC)   • Familial hypercholesterolemia   • Hypertension, essential   • Type 2 diabetes mellitus with diabetic polyneuropathy, without long-term current use of insulin (CMS/HCC)   • DDD (degenerative disc disease), lumbar   • Lumbar stenosis with neurogenic claudication   • Spinal stenosis of lumbar region with neurogenic claudication   • Sleep apnea   • Hyponatremia   • Encounter for rehabilitation       Therapy Treatment    Rehabilitation Treatment Summary     Row Name 20 0820       Treatment Time/Intention    Discipline  occupational therapist  -SD    Document Type  discharge treatment  -SD    Subjective Information  complains of;pain  -SD    Mode of Treatment  occupational therapy  -SD    Patient/Family Observations  Pt agreeable to therapy.  -SD    Patient Effort  --    Existing Precautions/Restrictions  brace worn when out of bed  -SD    Treatment Considerations/Comments  Education provided regarding home safety and compensatory strategies for daily tasks due to activity restrictions following his back surgery. Pt states he will have family support as needed for IADL tasks at home.  -SD    Patient Response to Treatment  --    Recorded by [SD] Nehemias Worrell, OTR 20 1511    Row Name 20 0820             Cognitive Assessment/Intervention- PT/OT    Personal Safety Interventions  fall prevention program  maintained;nonskid shoes/slippers when out of bed;supervised activity  -SD      Recorded by [SD] Nehemias Worrell, OTR 07/28/20 1511         Row Name 07/28/20 0820             Functional Mobility    Functional Mobility- Ind. Level  conditional independence  -SD      Functional Mobility- Device  rolling walker  -SD      Functional Mobility-Distance (Feet)  30  -SD      Recorded by [SD] Nehemias Worrell, OTR 07/28/20 1511      Row Name 07/28/20 0820             Transfer Assessment/Treatment    Comment (Transfers)  Conditional independence from transfers from BSC, EOB and from recliner.  -SD      Recorded by [SD] Nehemias Worrell, OTR 07/28/20 1511      Row Name 07/28/20 0820       Sit-Stand Transfer    Sit-Stand [place] (Transfers)  conditional independence  -SD    Assistive Device (Sit-Stand Transfers)  walker, front-wheeled  -SD    Recorded by [SD] Nehemias Worrell, OTR 07/28/20 1511    Row Name 07/28/20 0820       Stand-Sit Transfer    Stand-Sit [place] (Transfers)  conditional independence  -SD    Assistive Device (Stand-Sit Transfers)  walker, front-wheeled  -SD    Recorded by [SD] Nehemias Worrell, OTR 07/28/20 1511       Upper Body Dressing Assessment/Training    Upper Body Dressing [place] Level  upper body dressing skills;don;other (see comments);independent;set up back brace  -SD      Upper Body Dressing Position  edge of bed sitting  -SD      Recorded by [SD] Nehemias Worrell, OTR 07/28/20 1511      Row Name 07/28/20 0820          Toileting Assessment/Training    [place] Level (Toileting)  toileting skills;adjust/manage clothing;independent;perform perineal hygiene;maximum assist (25% patient effort)  -SD     Assistive Devices (Toileting)  commode, 3-in-1  -SD     Toileting Position  supported sitting  -SD     Comment (Toileting)  Pt can benefit from a toilet hygiene aide to extend his reach and allow for independence with hygiene after toileting due to pain and limited  rom/flexibility following back surgery.  -SD     Recorded by [SD] Nehemias Worrell, OTR 07/28/20 1511     Row Name 07/28/20 0820             BADL Safety/Performance    Impairments, BADL Safety/Performance  pain;range of motion rom limited due to activity restrictions following sx  -SD      Skilled BADL Treatment/Intervention  adaptive equipment training;compensatory training  -SD      Progress in BADL Status  improvement noted  -SD      Recorded by [SD] Nehemias Worrell OTR 07/28/20 1511      Row Name 07/28/20 0820             Static Sitting Balance    Level of [place] (Unsupported Sitting, Static Balance)  independent  -SD      Recorded by [SD] Nehemias Worrell, OTR 07/28/20 1511      Row Name 07/28/20 0820       Positioning and Restraints    Pre-Treatment Position  bathroom  -SD    Post Treatment Position  chair  -SD    In Bed  --    In Chair  sitting;call light within reach;encouraged to call for assist  -SD    Recorded by [SD] Nehemias Worrell, OTR 07/28/20 1511    Row Name 07/28/20 0820       Pain Scale: Numbers Pre/Post-Treatment    Pain Scale: Numbers, Pretreatment  7/10  -SD    Pain Scale: Numbers, Post-Treatment  7/10  -SD    Pain Location  back  -SD    Pain Intervention(s)  Repositioned  -SD    Recorded by [SD] Nehemias Worrell, OTR 07/28/20 1511    Row Name 07/28/20 0820       Plan of Care Review    Plan of Care Reviewed With  --    Progress  --    Outcome Summary  OT:  Pt managed transfers and mobility with conditional independence. Pt was able to independently don his back brace. Education provided regarding home safety and compensatory strategies for daily tasks due to activity restrictions following his back surgery. Pt states he will have family support as needed for IADL tasks at home. Pt can benefit from a toilet hygiene aide to extend his reach and allow for independence with hygiene after toileting due to pain and limited rom/flexibility following back surgery. Rec a BSC for use at  home to increase safety.   -SD    Recorded by [SD] Nehemias Worrell, OTR 07/28/20 1511    Row Name 07/28/20 0820             Outcome Summary/Treatment Plan (OT)    Daily Summary of Progress (OT)  progress toward functional goals as expected;prepare for discharge  -SD      Plan for Continued Treatment (OT)  Discharge from OT services. Pt has achieved max functional independence level with basic daily tasks at this time. Pt needs to use adaptive equipment to limit bending/twisting during adl's due to activity restrictions.   -SD      Anticipated Equipment Needs at Discharge (OT)  bedside commode rec toilet hygiene aide  -SD      Anticipated Discharge Disposition (OT)  home with home health;home with assist  -SD      Reason for Discharge (OT Discharge Summary)  patient met all goals and outcomes  -SD      Recorded by [SD] Nehemias Worrell, OTR 07/28/20 1511           User Key  (r) = Recorded By, (t) = Taken By, (c) = Cosigned By    Initials Name Effective Dates Discipline    LH Dominique Metz, PT 06/08/18 -  PT    SD Nehemias Worrell, OTR 07/05/20 -  OT     Georgiana Ortiz, RN 06/16/16 -  Nurse        Wound 07/20/20 0803 lumbar spine Incision (Active)   Dressing Appearance dry;intact;no drainage 7/27/2020  8:59 PM   Closure FREIDA 7/27/2020  8:59 PM   Base dressing in place, unable to visualize 7/27/2020  8:59 PM       Wound 07/20/20 1140 coccyx (Active)   Dressing Appearance dry;intact;no drainage 7/27/2020  8:59 PM   Closure FREIDA 7/27/2020  8:59 PM   Base dressing in place, unable to visualize 7/27/2020  8:59 PM       Rehab Goal Summary     Row Name 07/28/20 0840             Dressing Goal 1 (OT)    Activity/Assistive Device (Dressing Goal 1, OT)  lower body dressing;long handled shoe horn;reacher;sock-aid  -SD      [place]/Cues Needed (Dressing Goal 1, OT)  conditional independence;set-up required  -SD      Time Frame (Dressing Goal 1, OT)  by discharge  -SD      Progress/Outcome (Dressing Goal 1, OT)   goal met  -SD         Patient Education Goal (OT)    Activity (Patient Education Goal, OT)  pt to verbalize strategies to manage toilet hygiene while adhering to activtiy restrictions following back surgery.   -SD      [place]/Cues/Accuracy (Memory Goal 2, OT)  verbalizes understanding  -SD      Progress/Outcome (Patient Education Goal, OT)  goal met  -SD        User Key  (r) = Recorded By, (t) = Taken By, (c) = Cosigned By    Initials Name Provider Type Discipline    SD Nehemias Worrell OTR Occupational Therapist OT          Occupational Therapy Education                 Title: PT OT SLP Therapies (Done)     Topic: Occupational Therapy (Resolved)     Point: ADL training (Resolved)     Description:   Instruct learner(s) on proper safety adaptation and remediation techniques during self care or transfers.   Instruct in proper use of assistive devices.              Learning Progress Summary           Patient Acceptance, E, VU by SD at 7/28/2020 0953    Comment:  Education regarding home safety and compensatory strategies for daily tasks due to activity restrictions following his back surgery. Pt states he will have family support as needed at home.    Acceptance, E,TB,D, VU,DU by SD at 7/27/2020 1440    Comment:  Education regarding LH AE to manage lower body adl's following back surgery/activity restritions. Pt simulated use of a lh sponge. Pt demonstrated use of a lH reacher and sock aid. Verbal instruction regarding use of a toilet hygiene aide.    Acceptance, E, VU by SD at 7/26/2020 0929    Comment:  Education regarding OT services and compensatory strategies for adl management due to activity restrictions following back surgery.                               User Key     Initials Effective Dates Name Provider Type Discipline    SD 07/05/20 -  Nehemias Worrell OTR Occupational Therapist OT                OT Recommendation and Plan  Outcome Summary/Treatment Plan (OT)  Daily Summary of Progress (OT):  progress toward functional goals as expected, prepare for discharge  Plan for Continued Treatment (OT): Discharge from OT services. Pt has achieved max functional independence level with basic daily tasks at this time. Pt needs to use adaptive equipment to limit bending/twisting during adl's due to activity restrictions.   Anticipated Equipment Needs at Discharge (OT): bedside commode(rec toilet hygiene aide)  Anticipated Discharge Disposition (OT): home with home health, home with assist  Reason for Discharge (OT Discharge Summary): patient met all goals and outcomes  Therapy Frequency (OT Eval): 3 times/wk  Daily Summary of Progress (OT): progress toward functional goals as expected, prepare for discharge  Outcome Summary: OT:  Pt managed transfers and mobility with conditional independence. Pt was able to independently don his back brace. Education provided regarding home safety and compensatory strategies for daily tasks due to activity restrictions following his back surgery. Pt states he will have family support as needed for IADL tasks at home. Pt can benefit from a toilet hygiene aide to extend his reach and allow for independence with hygiene after toileting due to pain and limited rom/flexibility following back surgery. Rec a BSC for use at home to increase safety.          Time Calculation:    Time Calculation- OT     Row Name 07/28/20 0946             Time Calculation- OT    OT Start Time  0820  -SD      OT Stop Time  0847  -SD      OT Time Calculation (min)  27 min  -SD      TCU Minutes- OT  27 min  -SD         Timed Charges    76038 - OT Self Care/Mgmt Minutes  27  -SD        User Key  (r) = Recorded By, (t) = Taken By, (c) = Cosigned By    Initials Name Provider Type    Nehemias Harper, OTR Occupational Therapist        Therapy Suggested Charges     Code   Minutes Charges    42695 (CPT®) Hc Ot Neuromusc Re Education Ea 15 Min      72563 (CPT®) Hc Ot Ther Proc Ea 15 Min      55584 (CPT®) Hc Ot  Therapeutic Act Ea 15 Min      43911 (CPT®) Hc Ot Manual Therapy Ea 15 Min      25316 (CPT®) Hc Ot Iontophoresis Ea 15 Min      47718 (CPT®) Hc Ot Elec Stim Ea-Per 15 Min      02121 (CPT®) Hc Ot Ultrasound Ea 15 Min      85999 (CPT®) Hc Ot Self Care/Mgmt/Train Ea 15 Min 27 2    Total  27 2        Therapy Charges for Today     Code Description Service Date Service Provider Modifiers Qty    07725545890 HC OT SELF CARE/MGMT/TRAIN EA 15 MIN 7/28/2020 Nehemias Worrell, OTR GO 2               OT Discharge Summary  Anticipated Discharge Disposition (OT): home with home health, home with assist    KEYLA Pemberton  7/28/2020

## 2020-07-29 LAB
GLUCOSE BLDC GLUCOMTR-MCNC: 134 MG/DL (ref 70–130)
GLUCOSE BLDC GLUCOMTR-MCNC: 170 MG/DL (ref 70–130)
GLUCOSE BLDC GLUCOMTR-MCNC: 174 MG/DL (ref 70–130)

## 2020-07-29 PROCEDURE — 63710000001 INSULIN DETEMIR PER 5 UNITS: Performed by: HOSPITALIST

## 2020-07-29 PROCEDURE — 63710000001 INSULIN ASPART PER 5 UNITS: Performed by: HOSPITALIST

## 2020-07-29 PROCEDURE — 97116 GAIT TRAINING THERAPY: CPT

## 2020-07-29 PROCEDURE — 82962 GLUCOSE BLOOD TEST: CPT

## 2020-07-29 RX ORDER — HYDROCODONE BITARTRATE AND ACETAMINOPHEN 5; 325 MG/1; MG/1
2 TABLET ORAL DAILY PRN
Status: DISCONTINUED | OUTPATIENT
Start: 2020-07-29 | End: 2020-07-31

## 2020-07-29 RX ADMIN — INSULIN ASPART 2 UNITS: 100 INJECTION, SOLUTION INTRAVENOUS; SUBCUTANEOUS at 08:06

## 2020-07-29 RX ADMIN — HYDROCODONE BITARTRATE AND ACETAMINOPHEN 1 TABLET: 5; 325 TABLET ORAL at 21:06

## 2020-07-29 RX ADMIN — BACITRACIN ZINC NEOMYCIN SULFATE POLYMYXIN B SULFATE: 400; 3.5; 5 OINTMENT TOPICAL at 06:14

## 2020-07-29 RX ADMIN — INSULIN DETEMIR 30 UNITS: 100 INJECTION, SOLUTION SUBCUTANEOUS at 20:27

## 2020-07-29 RX ADMIN — BUDESONIDE AND FORMOTEROL FUMARATE DIHYDRATE 2 PUFF: 160; 4.5 AEROSOL RESPIRATORY (INHALATION) at 08:09

## 2020-07-29 RX ADMIN — GABAPENTIN 400 MG: 400 CAPSULE ORAL at 08:02

## 2020-07-29 RX ADMIN — INSULIN ASPART 5 UNITS: 100 INJECTION, SOLUTION INTRAVENOUS; SUBCUTANEOUS at 17:33

## 2020-07-29 RX ADMIN — INSULIN ASPART 5 UNITS: 100 INJECTION, SOLUTION INTRAVENOUS; SUBCUTANEOUS at 08:06

## 2020-07-29 RX ADMIN — BACITRACIN ZINC NEOMYCIN SULFATE POLYMYXIN B SULFATE: 400; 3.5; 5 OINTMENT TOPICAL at 17:38

## 2020-07-29 RX ADMIN — ARIPIPRAZOLE 5 MG: 2 TABLET ORAL at 08:02

## 2020-07-29 RX ADMIN — HYDROCODONE BITARTRATE AND ACETAMINOPHEN 1 TABLET: 5; 325 TABLET ORAL at 03:11

## 2020-07-29 RX ADMIN — BUDESONIDE AND FORMOTEROL FUMARATE DIHYDRATE 2 PUFF: 160; 4.5 AEROSOL RESPIRATORY (INHALATION) at 21:02

## 2020-07-29 RX ADMIN — HYDROCODONE BITARTRATE AND ACETAMINOPHEN 2 TABLET: 5; 325 TABLET ORAL at 08:47

## 2020-07-29 RX ADMIN — MELATONIN 1000 UNITS: at 08:02

## 2020-07-29 RX ADMIN — INSULIN ASPART 2 UNITS: 100 INJECTION, SOLUTION INTRAVENOUS; SUBCUTANEOUS at 17:33

## 2020-07-29 RX ADMIN — GABAPENTIN 400 MG: 400 CAPSULE ORAL at 17:34

## 2020-07-29 RX ADMIN — HYDROCODONE BITARTRATE AND ACETAMINOPHEN 1 TABLET: 5; 325 TABLET ORAL at 17:36

## 2020-07-29 RX ADMIN — VENLAFAXINE 150 MG: 37.5 TABLET ORAL at 08:02

## 2020-07-29 RX ADMIN — AMLODIPINE BESYLATE 2.5 MG: 5 TABLET ORAL at 08:02

## 2020-07-29 RX ADMIN — GABAPENTIN 400 MG: 400 CAPSULE ORAL at 21:02

## 2020-07-29 RX ADMIN — HYDROCODONE BITARTRATE AND ACETAMINOPHEN 1 TABLET: 5; 325 TABLET ORAL at 13:26

## 2020-07-29 RX ADMIN — BACITRACIN ZINC NEOMYCIN SULFATE POLYMYXIN B SULFATE: 400; 3.5; 5 OINTMENT TOPICAL at 21:02

## 2020-07-29 RX ADMIN — ATORVASTATIN CALCIUM 20 MG: 20 TABLET, FILM COATED ORAL at 08:03

## 2020-07-29 RX ADMIN — INSULIN ASPART 5 UNITS: 100 INJECTION, SOLUTION INTRAVENOUS; SUBCUTANEOUS at 13:07

## 2020-07-29 NOTE — NURSING NOTE
No new or worsened cough, no new SOA, no sore throat, loss of taste or loss of smell noticed upon assessment or reported by patient.

## 2020-07-29 NOTE — THERAPY TREATMENT NOTE
SNF - Physical Therapy Treatment Note  ROHAN Gil     Patient Name: Lloyd Greene  : 1960  MRN: [id number]  Today's Date: 2020  Onset of Illness/Injury or Date of Surgery: 20  Date of Referral to PT: 20  Referring Physician: Dr. Jerome    Admit Date: 2020    Visit Dx:  No diagnosis found.  Patient Active Problem List   Diagnosis   • Other chronic pain   • Partial small bowel obstruction (CMS/HCC)   • Angio-edema   • Adenomatous polyp of colon   • Type 2 diabetes mellitus without complication, without long-term current use of insulin (CMS/HCC)   • Familial hypercholesterolemia   • Hypertension, essential   • Type 2 diabetes mellitus with diabetic polyneuropathy, without long-term current use of insulin (CMS/HCC)   • DDD (degenerative disc disease), lumbar   • Lumbar stenosis with neurogenic claudication   • Spinal stenosis of lumbar region with neurogenic claudication   • Sleep apnea   • Hyponatremia   • Encounter for rehabilitation       Therapy Treatment    Rehabilitation Treatment Summary     Row Name 20 1014             Treatment Time/Intention    Discipline  physical therapist  -AS      Document Type  therapy note (daily note)  -AS      Subjective Information  complains of;pain  -AS      Mode of Treatment  physical therapy  -AS      Care Plan Review  evaluation/treatment results reviewed;care plan/treatment goals reviewed;risks/benefits reviewed;current/potential barriers reviewed;patient/other agree to care plan  -AS      Patient Effort  good  -AS      Recorded by [AS] Kalia Coles, PT 20 1021      Row Name 20 1014             Cognitive Assessment/Intervention- PT/OT    Orientation Status (Cognition)  oriented x 4  -AS      Follows Commands (Cognition)  WNL  -AS      Recorded by [AS] Kalia Coles, PT 20 1021      Row Name 20 1014             Bed Mobility Assessment/Treatment    Supine-Sit [place] (Bed Mobility)  supervision   -AS      Sit-Supine [place] (Bed Mobility)  supervision  -AS      Recorded by [AS] Kalia Coles, PT 07/29/20 1021      Row Name 07/29/20 1014             Bed-Chair Transfer    Bed-Chair [place] (Transfers)  supervision  -AS      Recorded by [AS] Kalia Coles, PT 07/29/20 1021      Row Name 07/29/20 1014             Sit-Stand Transfer    Sit-Stand [place] (Transfers)  supervision  -AS      Recorded by [AS] Kalia Coles, PT 07/29/20 1021      Row Name 07/29/20 1014             Stand-Sit Transfer    Stand-Sit [place] (Transfers)  independent  -AS      Recorded by [AS] Kalia Coles, PT 07/29/20 1021      Row Name 07/29/20 1014             Gait/Stairs Assessment/Training    [place] Level (Gait)  supervision  -AS      Assistive Device (Gait)  walker, front-wheeled  -AS      Distance in Feet (Gait)  150 feet  -AS      [place] Level (Stairs)  supervision  -AS      Handrail Location (Stairs)  both sides  -AS      Number of Steps (Stairs)  20  -AS      Recorded by [AS] Kalia Coles, PT 07/29/20 1021      Row Name 07/29/20 1014             Positioning and Restraints    Pre-Treatment Position  in bed  -AS      Post Treatment Position  bed  -AS      In Bed  supine;call light within reach;encouraged to call for assist  -AS      Recorded by [AS] Kalia Coles, PT 07/29/20 1021      Row Name                Wound 07/20/20 0803 lumbar spine Incision    Wound - Properties Group Date first assessed: 07/20/20 [HM] Time first assessed: 0803 [HM] Location: lumbar spine [HM] Primary Wound Type: Incision [HM] Recorded by:  [HM] Georgiana Ortiz RN 07/20/20 0819    Row Name                Wound 07/20/20 1140 coccyx    Wound - Properties Group Date first assessed: 07/20/20 [HM] Time first assessed: 1140 [HM] Location: coccyx [HM] Recorded by:  [HM] Georgiana Ortiz RN 07/20/20 1142    Row Name 07/29/20 1014             Plan of Care Review    Plan of Care  Reviewed With  patient  -AS      Progress  improving  -AS      Outcome Summary  Patient supine in bed upon entry to room. He was able to don his back brace with min assist from PT. He performed all bed mobility and gait/stairs with supervision. He performed sit to stand transfers with independence. He ambulated 150 feet with rolling walker and supervision. He ambulated 20 steps with handrail on both sides. atient did report increased fatigue and back pain with treatment today.  -AS      Recorded by [AS] Kalia Coles, PT 07/29/20 1021      Row Name 07/29/20 1014             Outcome Summary/Treatment Plan (PT)    Daily Summary of Progress (PT)  progress toward functional goals as expected  -AS      Anticipated Equipment Needs at Discharge (PT)  front wheeled walker  -AS      Anticipated Discharge Disposition (PT)  home  -AS      Recorded by [AS] Kalia Coles, PT 07/29/20 1021        User Key  (r) = Recorded By, (t) = Taken By, (c) = Cosigned By    Initials Name Effective Dates Discipline     Georgiana Ortiz RN 06/16/16 -  Nurse    AS Kalia Coles, PT 12/11/15 -  PT          Wound 07/20/20 0803 lumbar spine Incision (Active)   Dressing Appearance dry;intact;no drainage 7/28/2020  9:46 PM   Closure FREIDA 7/28/2020  9:46 PM   Base dressing in place, unable to visualize 7/28/2020  9:46 PM   Drainage Amount none 7/28/2020  9:46 PM   Dressing Care, Wound other (see comments) 7/28/2020  9:46 PM       Wound 07/20/20 1140 coccyx (Active)   Dressing Appearance dry;intact;no drainage 7/28/2020  9:46 PM   Closure FREIDA 7/28/2020  9:46 PM   Base dressing in place, unable to visualize 7/28/2020  9:46 PM           Physical Therapy Education                 Title: PT OT SLP Therapies (Done)     Topic: Physical Therapy (Done)     Point: Mobility training (Done)     Description:   Instruct learner(s) on safety and technique for assisting patient out of bed, chair or wheelchair.  Instruct in the proper use of  assistive devices, such as walker, crutches, cane or brace.              Patient Friendly Description:   It's important to get you on your feet again, but we need to do so in a way that is safe for you. Falling has serious consequences, and your personal safety is the most important thing of all.        When it's time to get out of bed, one of us or a family member will sit next to you on the bed to give you support.     If your doctor or nurse tells you to use a walker, crutches, a cane, or a brace, be sure you use it every time you get out of bed, even if you think you don't need it.    Learning Progress Summary           Patient Acceptance, E,TB, VU by AS at 7/29/2020 1021    Acceptance, E,TB, VU by  at 7/28/2020 1417    Acceptance, E,TB, VU by  at 7/28/2020 0939    Acceptance, E,TB, VU by  at 7/27/2020 1026    Comment:  reviewed precautions/restrictions of no lifting > 5#, no bending, and no twisting    Acceptance, E,TB, VU by  at 7/26/2020 1201    Comment:  instructed pt on importance of maintaining restrictions of no bending or twisting                   Point: Precautions (Done)     Description:   Instruct learner(s) on prescribed precautions during mobility and gait tasks              Learning Progress Summary           Patient Acceptance, E,TB, VU by AS at 7/29/2020 1021    Acceptance, E,TB, VU by  at 7/28/2020 1417    Acceptance, E,TB, VU by  at 7/27/2020 1026    Comment:  reviewed precautions/restrictions of no lifting > 5#, no bending, and no twisting    Acceptance, E,TB, VU by  at 7/26/2020 1201    Comment:  instructed pt on importance of maintaining restrictions of no bending or twisting                               User Key     Initials Effective Dates Name Provider Type Discipline     06/08/18 -  Dominique Metz, PT Physical Therapist PT     04/06/17 -  Marlon Canales, PT Physical Therapist PT    AS 12/11/15 -  Kalia Coles, PT Physical Therapist PT                PT  Recommendation and Plan  Anticipated Discharge Disposition (PT): home  Outcome Summary/Treatment Plan (PT)  Daily Summary of Progress (PT): progress toward functional goals as expected  Anticipated Equipment Needs at Discharge (PT): front wheeled walker  Anticipated Discharge Disposition (PT): home  Plan of Care Reviewed With: patient  Progress: improving  Outcome Summary: Patient supine in bed upon entry to room. He was able to don his back brace with min assist from PT. He performed all bed mobility and gait/stairs with supervision. He performed sit to stand transfers with independence. He ambulated 150 feet with rolling walker and supervision. He ambulated 20 steps with handrail on both sides. atient did report increased fatigue and back pain with treatment today.  Outcome Measures     Row Name 07/29/20 1000             How much help from another person do you currently need...    Turning from your back to your side while in flat bed without using bedrails?  4  -AS      Moving from lying on back to sitting on the side of a flat bed without bedrails?  4  -AS      Moving to and from a bed to a chair (including a wheelchair)?  4  -AS      Standing up from a chair using your arms (e.g., wheelchair, bedside chair)?  4  -AS      Climbing 3-5 steps with a railing?  3  -AS      To walk in hospital room?  4  -AS      AM-PAC 6 Clicks Score (PT)  23  -AS         Functional Assessment    Outcome Measure Options  AM-PAC 6 Clicks Basic Mobility (PT)  -AS        User Key  (r) = Recorded By, (t) = Taken By, (c) = Cosigned By    Initials Name Provider Type    AS Kalia Coles, PT Physical Therapist         Time Calculation:   PT Charges     Row Name 07/29/20 1022             Time Calculation    Start Time  0924  -AS      Stop Time  0941  -AS      Time Calculation (min)  17 min  -AS        User Key  (r) = Recorded By, (t) = Taken By, (c) = Cosigned By    Initials Name Provider Type    AS Kalia Coles, PT Physical  Therapist        Therapy Charges for Today     Code Description Service Date Service Provider Modifiers Qty    74841445537 HC GAIT TRAINING EA 15 MIN 7/29/2020 Kalia Coles, PT GP 1          PT G-Codes  Outcome Measure Options: AM-PAC 6 Clicks Basic Mobility (PT)  AM-PAC 6 Clicks Score (PT): 23    Kalia Coles, PT  7/29/2020

## 2020-07-29 NOTE — PLAN OF CARE
Problem: Patient Care Overview  Goal: Plan of Care Review  Flowsheets (Taken 7/29/2020 1014)  Progress: improving  Plan of Care Reviewed With: patient  Outcome Summary: Patient supine in bed upon entry to room. He was able to don his back brace with min assist from PT. He performed all bed mobility and gait/stairs with supervision. He performed sit to stand transfers with independence. He ambulated 150 feet with rolling walker and supervision. He ambulated 20 steps with handrail on both sides. atient did report increased fatigue and back pain with treatment today.

## 2020-07-29 NOTE — NURSING NOTE
Careplan reviewed-ongoing  Patient w/o cough- no c/o SOA- no /co Sore throat- no c/o loss of taste or smell

## 2020-07-30 LAB
GLUCOSE BLDC GLUCOMTR-MCNC: 130 MG/DL (ref 70–130)
GLUCOSE BLDC GLUCOMTR-MCNC: 164 MG/DL (ref 70–130)
GLUCOSE BLDC GLUCOMTR-MCNC: 185 MG/DL (ref 70–130)

## 2020-07-30 PROCEDURE — 63710000001 INSULIN ASPART PER 5 UNITS: Performed by: HOSPITALIST

## 2020-07-30 PROCEDURE — 97116 GAIT TRAINING THERAPY: CPT

## 2020-07-30 PROCEDURE — 63710000001 INSULIN DETEMIR PER 5 UNITS: Performed by: HOSPITALIST

## 2020-07-30 PROCEDURE — 82962 GLUCOSE BLOOD TEST: CPT

## 2020-07-30 RX ADMIN — INSULIN ASPART 2 UNITS: 100 INJECTION, SOLUTION INTRAVENOUS; SUBCUTANEOUS at 11:51

## 2020-07-30 RX ADMIN — BACITRACIN ZINC NEOMYCIN SULFATE POLYMYXIN B SULFATE: 400; 3.5; 5 OINTMENT TOPICAL at 14:00

## 2020-07-30 RX ADMIN — BACITRACIN ZINC NEOMYCIN SULFATE POLYMYXIN B SULFATE: 400; 3.5; 5 OINTMENT TOPICAL at 06:05

## 2020-07-30 RX ADMIN — HYDROCODONE BITARTRATE AND ACETAMINOPHEN 1 TABLET: 5; 325 TABLET ORAL at 08:58

## 2020-07-30 RX ADMIN — GABAPENTIN 400 MG: 400 CAPSULE ORAL at 16:51

## 2020-07-30 RX ADMIN — INSULIN ASPART 2 UNITS: 100 INJECTION, SOLUTION INTRAVENOUS; SUBCUTANEOUS at 16:50

## 2020-07-30 RX ADMIN — ATORVASTATIN CALCIUM 20 MG: 20 TABLET, FILM COATED ORAL at 09:03

## 2020-07-30 RX ADMIN — ARIPIPRAZOLE 5 MG: 2 TABLET ORAL at 09:03

## 2020-07-30 RX ADMIN — INSULIN DETEMIR 30 UNITS: 100 INJECTION, SOLUTION SUBCUTANEOUS at 20:12

## 2020-07-30 RX ADMIN — GABAPENTIN 400 MG: 400 CAPSULE ORAL at 19:57

## 2020-07-30 RX ADMIN — MELATONIN 1000 UNITS: at 09:02

## 2020-07-30 RX ADMIN — GABAPENTIN 400 MG: 400 CAPSULE ORAL at 08:58

## 2020-07-30 RX ADMIN — BACITRACIN ZINC NEOMYCIN SULFATE POLYMYXIN B SULFATE: 400; 3.5; 5 OINTMENT TOPICAL at 21:40

## 2020-07-30 RX ADMIN — VENLAFAXINE 150 MG: 37.5 TABLET ORAL at 09:03

## 2020-07-30 RX ADMIN — INSULIN ASPART 5 UNITS: 100 INJECTION, SOLUTION INTRAVENOUS; SUBCUTANEOUS at 16:50

## 2020-07-30 RX ADMIN — BUDESONIDE AND FORMOTEROL FUMARATE DIHYDRATE 2 PUFF: 160; 4.5 AEROSOL RESPIRATORY (INHALATION) at 09:07

## 2020-07-30 RX ADMIN — INSULIN ASPART 5 UNITS: 100 INJECTION, SOLUTION INTRAVENOUS; SUBCUTANEOUS at 11:51

## 2020-07-30 RX ADMIN — AMLODIPINE BESYLATE 2.5 MG: 5 TABLET ORAL at 09:03

## 2020-07-30 RX ADMIN — HYDROCODONE BITARTRATE AND ACETAMINOPHEN 2 TABLET: 5; 325 TABLET ORAL at 13:45

## 2020-07-30 RX ADMIN — HYDROCODONE BITARTRATE AND ACETAMINOPHEN 1 TABLET: 5; 325 TABLET ORAL at 01:42

## 2020-07-30 RX ADMIN — BUDESONIDE AND FORMOTEROL FUMARATE DIHYDRATE 2 PUFF: 160; 4.5 AEROSOL RESPIRATORY (INHALATION) at 20:10

## 2020-07-30 RX ADMIN — HYDROCODONE BITARTRATE AND ACETAMINOPHEN 1 TABLET: 5; 325 TABLET ORAL at 19:56

## 2020-07-30 RX ADMIN — ALPRAZOLAM 0.25 MG: 0.25 TABLET ORAL at 11:41

## 2020-07-30 NOTE — THERAPY DISCHARGE NOTE
SNF - Physical Therapy Treatment Note/Discharge  ROHAN MaLisle     Patient Name: Lloyd Greene  : 1960  MRN: [id number]  Today's Date: 2020  Onset of Illness/Injury or Date of Surgery: 20  Date of Referral to PT: 20  Referring Physician: Dr. Jerome    Admit Date: 2020    Visit Dx:  No diagnosis found.  Patient Active Problem List   Diagnosis   • Other chronic pain   • Partial small bowel obstruction (CMS/HCC)   • Angio-edema   • Adenomatous polyp of colon   • Type 2 diabetes mellitus without complication, without long-term current use of insulin (CMS/HCC)   • Familial hypercholesterolemia   • Hypertension, essential   • Type 2 diabetes mellitus with diabetic polyneuropathy, without long-term current use of insulin (CMS/HCC)   • DDD (degenerative disc disease), lumbar   • Lumbar stenosis with neurogenic claudication   • Spinal stenosis of lumbar region with neurogenic claudication   • Sleep apnea   • Hyponatremia   • Encounter for rehabilitation       Physical Therapy Education                 Title: PT OT SLP Therapies (Resolved)     Topic: Physical Therapy (Resolved)     Point: Mobility training (Resolved)     Description:   Instruct learner(s) on safety and technique for assisting patient out of bed, chair or wheelchair.  Instruct in the proper use of assistive devices, such as walker, crutches, cane or brace.              Patient Friendly Description:   It's important to get you on your feet again, but we need to do so in a way that is safe for you. Falling has serious consequences, and your personal safety is the most important thing of all.        When it's time to get out of bed, one of us or a family member will sit next to you on the bed to give you support.     If your doctor or nurse tells you to use a walker, crutches, a cane, or a brace, be sure you use it every time you get out of bed, even if you think you don't need it.    Learning Progress Summary           Patient  Acceptance, E,TB, VU by AS at 7/29/2020 1021    Acceptance, E,TB, VU by  at 7/28/2020 1417    Acceptance, E,TB, VU by  at 7/28/2020 0939    Acceptance, E,TB, VU by  at 7/27/2020 1026    Comment:  reviewed precautions/restrictions of no lifting > 5#, no bending, and no twisting    Acceptance, E,TB, VU by  at 7/26/2020 1201    Comment:  instructed pt on importance of maintaining restrictions of no bending or twisting                   Point: Precautions (Resolved)     Description:   Instruct learner(s) on prescribed precautions during mobility and gait tasks              Learning Progress Summary           Patient Acceptance, E,TB, VU by AS at 7/29/2020 1021    Acceptance, E,TB, VU by  at 7/28/2020 1417    Acceptance, E,TB, VU by  at 7/27/2020 1026    Comment:  reviewed precautions/restrictions of no lifting > 5#, no bending, and no twisting    Acceptance, E,TB, VU by  at 7/26/2020 1201    Comment:  instructed pt on importance of maintaining restrictions of no bending or twisting                               User Key     Initials Effective Dates Name Provider Type Discipline     06/08/18 -  Dominique Metz, PT Physical Therapist PT     04/06/17 -  Marlon Canales, PT Physical Therapist PT    AS 12/11/15 -  Kalia Coles, PT Physical Therapist PT              Rehab Goal Summary     Row Name 07/30/20 1332             Bed Mobility Goal 1 (PT)    Activity/Assistive Device (Bed Mobility Goal 1, PT)  bed mobility activities, all  -MH      [place] Level/Cues Needed (Bed Mobility Goal 1, PT)  independent  -MH      Progress/Outcomes (Bed Mobility Goal 1, PT)  goal met  -MH         Transfer Goal 1 (PT)    Activity/Assistive Device (Transfer Goal 1, PT)  transfers, all  -MH      [place] Level/Cues Needed (Transfer Goal 1, PT)  independent  -MH      Progress/Outcome (Transfer Goal 1, PT)  goal met supervision  -MH         Gait Training Goal 1 (PT)    Activity/Assistive Device (Gait Training  Goal 1, PT)  gait (walking locomotion);assistive device use;walker, rolling  -      [place] Level (Gait Training Goal 1, PT)  independent  -      Distance (Gait Goal 1, PT)  300'  -      Progress/Outcome (Gait Training Goal 1, PT)  goal partially met supervision  -         Stairs Goal 1 (PT)    Activity/Assistive Device (Stairs Goal 1, PT)  stairs, all skills  -      [place] Level/Cues Needed (Stairs Goal 1, PT)  independent  -      Number of Stairs (Stairs Goal 1, PT)  17  -      Progress/Outcome (Stairs Goal 1, PT)  goal partially met supervision  -        User Key  (r) = Recorded By, (t) = Taken By, (c) = Cosigned By    Initials Name Provider Type Discipline     Torin Martinez PTA Physical Therapy Assistant PT        Therapy Treatment  Rehabilitation Treatment Summary     Row Name 07/30/20 1332             Treatment Time/Intention    Discipline  physical therapy assistant  -      Document Type  discharge treatment  -      Subjective Information  complains of;pain  -      Mode of Treatment  physical therapy  -      Patient/Family Observations  Pt supine in bed, agree to therapy prior to receiving pain meds - has been waiting over 30 minutes  -      Patient Effort  good  -      Existing Precautions/Restrictions  brace worn when out of bed no lifting over 5 lb, no bending or twisting  -      Patient Response to Treatment  fatigue, no change in level of pain  -      Recorded by [] Torin Martinez PTA 07/30/20 1418      Row Name 07/30/20 1332             Cognitive Assessment/Intervention- PT/OT    Personal Safety Interventions  gait belt;nonskid shoes/slippers when out of bed;supervised activity  -      Recorded by [] Torin Martinez PTA 07/30/20 1418      Row Name 07/30/20 1332             Bed Mobility Assessment/Treatment    Supine-Sit [place] (Bed Mobility)  conditional independence  -      Sit-Supine [place] (Bed Mobility)  conditional independence   -      Assistive Device (Bed Mobility)  bed rails;head of bed elevated  -      Recorded by [] Torin Martinez, [facility] 07/30/20 1418      Row Name 07/30/20 1332             Transfer Assessment/Treatment    Comment (Transfers)  Cues for hand placement  -      Recorded by [] Torin Martinez, [facility] 07/30/20 1418      Row Name 07/30/20 1332             Sit-Stand Transfer    Sit-Stand [place] (Transfers)  conditional independence  -      Assistive Device (Sit-Stand Transfers)  walker, front-wheeled  -      Recorded by [] Torin Martinez, [facility] 07/30/20 1418      Row Name 07/30/20 1332             Stand-Sit Transfer    Stand-Sit [place] (Transfers)  conditional independence  -      Assistive Device (Stand-Sit Transfers)  walker, front-wheeled  -      Recorded by [] Torin Martinez, [facility] 07/30/20 1418      Row Name 07/30/20 1332             Gait/Stairs Assessment/Training    [place] Level (Gait)  supervision  -      Assistive Device (Gait)  walker, front-wheeled  -      Distance in Feet (Gait)  75  -      Pattern (Gait)  step-through  -      Bilateral Gait Deviations  forward flexed posture  -      [place] Level (Stairs)  supervision  -      Handrail Location (Stairs)  both sides  -      Number of Steps (Stairs)  30  -      Ascending Technique (Stairs)  step-over-step  -      Descending Technique (Stairs)  step-to-step  -      Comment (Gait/Stairs)  Min (A) for donning brace prior to ambulation  -      Recorded by [] Torin Martinez, [facility] 07/30/20 1418      Row Name 07/30/20 1332             Positioning and Restraints    Pre-Treatment Position  in bed  -      Post Treatment Position  bed  -      In Bed  supine;call light within reach;encouraged to call for assist  -MH      Recorded by [] Torin Martinez, [facility] 07/30/20 1418      Row Name 07/30/20 1332             Pain Scale: Numbers Pre/Post-Treatment    Pain Scale: Numbers, Pretreatment  7/10  -      Pain  Scale: Numbers, Post-Treatment  7/10  -      Pain Location - Side  Bilateral  -      Pain Location - Orientation  incisional  -      Pain Location  back  -      Pain Intervention(s)  Medication (See MAR);Cold applied Nsg gave meds mid session  -      Recorded by [] Torin Martinez PTA 07/30/20 1418      Row Name                Wound 07/20/20 0803 lumbar spine Incision    Wound - Properties Group Date first assessed: 07/20/20 [HM] Time first assessed: 0803 [HM] Location: lumbar spine [HM] Primary Wound Type: Incision [HM] Recorded by:  [] Georgiana Ortiz RN 07/20/20 0819    Row Name                Wound 07/20/20 1140 coccyx    Wound - Properties Group Date first assessed: 07/20/20 [HM] Time first assessed: 1140 [HM] Location: coccyx [HM] Recorded by:  [] Georgiana Ortiz RN 07/20/20 1142    Row Name 07/30/20 1332             Plan of Care Review    Outcome Summary  PT: Pt performs bed mobility with conditional (I) using bed rail and HOB elevated; sit to/from stand transfers and gait x75 ft with FWW and supervision; ambulated up/down 30 stairs with (B) hand rails and supervision.  Discharge from PT at this time  -      Recorded by [] Torin Martinez PTA 07/30/20 1418        User Key  (r) = Recorded By, (t) = Taken By, (c) = Cosigned By    Initials Name Effective Dates Discipline     Torin Martinez, BHUPENDRA 08/11/15 -  PT     Georgiana Ortiz RN 06/16/16 -  Nurse        Wound 07/20/20 0803 lumbar spine Incision (Active)   Dressing Appearance dry;intact;no drainage 7/29/2020  8:59 PM   Closure FREIDA 7/29/2020  8:59 PM   Base dressing in place, unable to visualize 7/29/2020  8:59 PM       Wound 07/20/20 1140 coccyx (Active)   Dressing Appearance dry;intact;no drainage 7/29/2020  8:59 PM       PT Recommendation and Plan     Outcome Summary: PT: Pt performs bed mobility with conditional (I) using bed rail and HOB elevated; sit to/from stand transfers and gait x75 ft with FWW and supervision;  ambulated up/down 30 stairs with (B) hand rails and supervision.  Discharge from PT at this time    Outcome Measures     Row Name 07/29/20 1000             How much help from another person do you currently need...    Turning from your back to your side while in flat bed without using bedrails?  4  -AS      Moving from lying on back to sitting on the side of a flat bed without bedrails?  4  -AS      Moving to and from a bed to a chair (including a wheelchair)?  4  -AS      Standing up from a chair using your arms (e.g., wheelchair, bedside chair)?  4  -AS      Climbing 3-5 steps with a railing?  3  -AS      To walk in hospital room?  4  -AS      AM-PAC 6 Clicks Score (PT)  23  -AS         Functional Assessment    Outcome Measure Options  AM-PAC 6 Clicks Basic Mobility (PT)  -AS        User Key  (r) = Recorded By, (t) = Taken By, (c) = Cosigned By    Initials Name Provider Type    AS Kalia Coles, PT Physical Therapist           Time Calculation:   PT Charges     Row Name 07/30/20 1428             Time Calculation    Start Time  1332  -MH      Stop Time  1355  -      Time Calculation (min)  23 min  -MH         Time Calculation- PT    TCU Minutes- PT  23 min  -MH        User Key  (r) = Recorded By, (t) = Taken By, (c) = Cosigned By    Initials Name Provider Type     Torin Martinez PTA Physical Therapy Assistant        Therapy Charges for Today     Code Description Service Date Service Provider Modifiers Qty    93646804633 HC GAIT TRAINING EA 15 MIN 7/30/2020 Torin Martinez PTA GP 1          PT G-Codes  Outcome Measure Options: AM-PAC 6 Clicks Basic Mobility (PT)  AM-PAC 6 Clicks Score (PT): 23    PT Discharge Summary  Outcomes Achieved: Patient able to partially acheive established goals  Discharge Destination: Home with home health    Torin Martinez PTA  7/30/2020

## 2020-07-30 NOTE — NURSING NOTE
No new or worsened cough,sore throat,shortness of breath,shortness of breath,or loss of taste or smell.

## 2020-07-30 NOTE — PLAN OF CARE
Problem: Patient Care Overview  Goal: Plan of Care Review  Flowsheets (Taken 7/30/2020 4576)  Outcome Summary: PT: Pt performs bed mobility with conditional (I) using bed rail and HOB elevated; sit to/from stand transfers and gait x75 ft with FWW and supervision; ambulated up/down 30 stairs with (B) hand rails and supervision.  Discharge from PT at this time

## 2020-07-30 NOTE — NURSING NOTE
Scott reviewed-ongoing  Patient w/o cough-no c/o SOA- no c/o Sore throat-no c/o loss of taste or smell

## 2020-07-30 NOTE — PROGRESS NOTES
"Adult Nutrition  Assessment/PES    Patient Name:  Lloyd Greene  YOB: 1960  MRN: [id number]  Admit Date:  7/25/2020    Assessment Date:  7/30/2020    Recommend: Clarify Consistent CHO for diet order hx of DM noted   Cont good po intake, will follow for edu needs.   This note completed with aid of nursing and review of medical record. Pt didn't answer phone for interview at time of call.     Reason for Assessment     Row Name 07/30/20 0858          Reason for Assessment    Reason For Assessment  per organizational policy admission assessment     Diagnosis  surgery/postoperative complications rehab post spinal surgery hx DM HTN COPD         Nutrition/Diet History     Row Name 07/30/20 0859          Nutrition/Diet History    Typical Food/Fluid Intake  called to pt room no answer         Anthropometrics     Row Name 07/30/20 0859          Anthropometrics    Height  165.1 cm (65\")     Weight  -- 245.8#        Ideal Body Weight (IBW)    Ideal Body Weight (IBW) (kg)  62.51        Body Mass Index (BMI)    BMI Assessment  BMI 40 or greater: obesity grade III         Labs/Tests/Procedures/Meds     Row Name 07/30/20 0900          Labs/Procedures/Meds    Lab Results Reviewed  reviewed     Lab Results Comments  -190         Diagnostic Tests/Procedures    Diagnostic Test/Procedure Reviewed  reviewed        Medications    Pertinent Medications Reviewed  reviewed     Pertinent Medications Comments  insulin, vitamin D         Physical Findings     Row Name 07/30/20 0901          Physical Findings    Skin  surgical incision         Estimated/Assessed Needs     Row Name 07/30/20 0902 07/30/20 0859       Calculation Measurements    Height  --  165.1 cm (65\")       Estimated/Assessed Needs    Additional Documentation  Calorie Requirements (Group);Fluid Requirements (Group);Protein Requirements (Group)  --       Calorie Requirements    Estimated Calorie Need Method  [place] [place]  --    Estimated Calorie " Requirement Comment  1846-2236 kcal ( mifflin 0-1.2 obese, surgery) 230-251 gm CHO, 45% kcal   --       Protein Requirements    Est Protein Requirement Amount (gms/kg)  0.8 gm protein 89 gm pro   --       Fluid Requirements    Estimated Fluid Requirement Method  RDA Method 1846-2236  --        Nutrition Prescription Ordered     Row Name 07/30/20 0904          Nutrition Prescription PO    Common Modifiers  Cardiac         Evaluation of Received Nutrient/Fluid Intake     Row Name 07/30/20 0904          Fluid Intake Evaluation    Oral Fluid (mL)  [address] x 3, 51%        PO Evaluation    Number of Meals  9     % PO Intake  78               Problem/Interventions:  Problem 1     Row Name 07/30/20 0905          Nutrition Diagnoses Problem 1    Problem 1  Overweight/Obesity     Etiology (related to)  Functional Diagnosis;Medical Diagnosis     Signs/Symptoms (evidenced by)  BMI     BMI  Greater than 40               Intervention Goal     Row Name 07/30/20 0906          Intervention Goal    General  Maintain nutrition     PO  PO intake (%)     PO Intake %  75 % or greater         Nutrition Intervention     Row Name 07/30/20 0906          Nutrition Intervention    RD/Tech Action  Follow Tx progress           Education/Evaluation     Row Name 07/30/20 0907          Education    Education  Education not appropriate at this time        Monitor/Evaluation    Monitor  Per protocol;I&O;PO intake;Pertinent labs;Skin status           Electronically signed by:  Rhonda Theodore RD  07/30/20 09:07

## 2020-07-30 NOTE — PLAN OF CARE
Problem: Patient Care Overview  Goal: Plan of Care Review  Outcome: Ongoing (interventions implemented as appropriate)  Flowsheets  Taken 7/30/2020 1814  Progress: improving  Taken 7/30/2020 0858  Plan of Care Reviewed With: patient  Note:   Pain management     Problem: Pain, Chronic (Adult)  Goal: Identify Related Risk Factors and Signs and Symptoms  Outcome: Ongoing (interventions implemented as appropriate)

## 2020-07-31 ENCOUNTER — NURSE TRIAGE (OUTPATIENT)
Dept: CALL CENTER | Facility: HOSPITAL | Age: 60
End: 2020-07-31

## 2020-07-31 VITALS
TEMPERATURE: 96.7 F | SYSTOLIC BLOOD PRESSURE: 100 MMHG | OXYGEN SATURATION: 97 % | WEIGHT: 245.8 LBS | HEART RATE: 74 BPM | RESPIRATION RATE: 18 BRPM | DIASTOLIC BLOOD PRESSURE: 39 MMHG | BODY MASS INDEX: 40.95 KG/M2 | HEIGHT: 65 IN

## 2020-07-31 LAB
ALBUMIN SERPL-MCNC: 2.9 G/DL (ref 3.5–5.2)
ALBUMIN/GLOB SERPL: 0.7 G/DL
ALP SERPL-CCNC: 98 U/L (ref 39–117)
ALT SERPL W P-5'-P-CCNC: 31 U/L (ref 1–41)
ANION GAP SERPL CALCULATED.3IONS-SCNC: 15.2 MMOL/L (ref 5–15)
AST SERPL-CCNC: 32 U/L (ref 1–40)
BASOPHILS # BLD AUTO: 0.06 10*3/MM3 (ref 0–0.2)
BASOPHILS NFR BLD AUTO: 0.6 % (ref 0–1.5)
BILIRUB SERPL-MCNC: 0.2 MG/DL (ref 0–1.2)
BUN SERPL-MCNC: 18 MG/DL (ref 6–20)
BUN/CREAT SERPL: 24 (ref 7–25)
CALCIUM SPEC-SCNC: 8.7 MG/DL (ref 8.6–10.5)
CHLORIDE SERPL-SCNC: 103 MMOL/L (ref 98–107)
CO2 SERPL-SCNC: 19.8 MMOL/L (ref 22–29)
CREAT SERPL-MCNC: 0.75 MG/DL (ref 0.76–1.27)
DEPRECATED RDW RBC AUTO: 50 FL (ref 37–54)
EOSINOPHIL # BLD AUTO: 0.18 10*3/MM3 (ref 0–0.4)
EOSINOPHIL NFR BLD AUTO: 1.8 % (ref 0.3–6.2)
ERYTHROCYTE [DISTWIDTH] IN BLOOD BY AUTOMATED COUNT: 13.9 % (ref 12.3–15.4)
GFR SERPL CREATININE-BSD FRML MDRD: 107 ML/MIN/1.73
GLOBULIN UR ELPH-MCNC: 4.2 GM/DL
GLUCOSE BLDC GLUCOMTR-MCNC: 132 MG/DL (ref 70–130)
GLUCOSE BLDC GLUCOMTR-MCNC: 151 MG/DL (ref 70–130)
GLUCOSE SERPL-MCNC: 143 MG/DL (ref 65–99)
HCT VFR BLD AUTO: 36.1 % (ref 37.5–51)
HGB BLD-MCNC: 10.6 G/DL (ref 13–17.7)
IMM GRANULOCYTES # BLD AUTO: 0.11 10*3/MM3 (ref 0–0.05)
IMM GRANULOCYTES NFR BLD AUTO: 1.1 % (ref 0–0.5)
LYMPHOCYTES # BLD AUTO: 2.58 10*3/MM3 (ref 0.7–3.1)
LYMPHOCYTES NFR BLD AUTO: 26.1 % (ref 19.6–45.3)
MCH RBC QN AUTO: 29 PG (ref 26.6–33)
MCHC RBC AUTO-ENTMCNC: 29.4 G/DL (ref 31.5–35.7)
MCV RBC AUTO: 98.6 FL (ref 79–97)
MONOCYTES # BLD AUTO: 0.61 10*3/MM3 (ref 0.1–0.9)
MONOCYTES NFR BLD AUTO: 6.2 % (ref 5–12)
NEUTROPHILS NFR BLD AUTO: 6.34 10*3/MM3 (ref 1.7–7)
NEUTROPHILS NFR BLD AUTO: 64.2 % (ref 42.7–76)
NRBC BLD AUTO-RTO: 0 /100 WBC (ref 0–0.2)
PLATELET # BLD AUTO: 519 10*3/MM3 (ref 140–450)
PMV BLD AUTO: 10.5 FL (ref 6–12)
POTASSIUM SERPL-SCNC: 4.7 MMOL/L (ref 3.5–5.2)
PROT SERPL-MCNC: 7.1 G/DL (ref 6–8.5)
RBC # BLD AUTO: 3.66 10*6/MM3 (ref 4.14–5.8)
SODIUM SERPL-SCNC: 138 MMOL/L (ref 136–145)
WBC # BLD AUTO: 9.88 10*3/MM3 (ref 3.4–10.8)

## 2020-07-31 PROCEDURE — 85025 COMPLETE CBC W/AUTO DIFF WBC: CPT | Performed by: NURSE PRACTITIONER

## 2020-07-31 PROCEDURE — 99315 NF DSCHRG MGMT 30 MIN/LESS: CPT | Performed by: HOSPITALIST

## 2020-07-31 PROCEDURE — 80053 COMPREHEN METABOLIC PANEL: CPT | Performed by: NURSE PRACTITIONER

## 2020-07-31 PROCEDURE — 63710000001 INSULIN ASPART PER 5 UNITS: Performed by: HOSPITALIST

## 2020-07-31 PROCEDURE — 94799 UNLISTED PULMONARY SVC/PX: CPT

## 2020-07-31 PROCEDURE — 82962 GLUCOSE BLOOD TEST: CPT

## 2020-07-31 PROCEDURE — 99316 NF DSCHRG MGMT 30 MIN+: CPT | Performed by: NURSE PRACTITIONER

## 2020-07-31 PROCEDURE — 94640 AIRWAY INHALATION TREATMENT: CPT

## 2020-07-31 RX ORDER — HYDROCODONE BITARTRATE AND ACETAMINOPHEN 10; 325 MG/1; MG/1
1 TABLET ORAL EVERY 4 HOURS PRN
Qty: 20 TABLET | Refills: 0 | Status: SHIPPED | OUTPATIENT
Start: 2020-07-31 | End: 2020-08-07

## 2020-07-31 RX ORDER — HYDROCODONE BITARTRATE AND ACETAMINOPHEN 10; 325 MG/1; MG/1
1 TABLET ORAL EVERY 4 HOURS PRN
Status: DISCONTINUED | OUTPATIENT
Start: 2020-07-31 | End: 2020-07-31 | Stop reason: HOSPADM

## 2020-07-31 RX ORDER — HYDROCODONE BITARTRATE AND ACETAMINOPHEN 10; 325 MG/1; MG/1
1 TABLET ORAL EVERY 4 HOURS PRN
Qty: 18 TABLET | Refills: 0 | Status: SHIPPED | OUTPATIENT
Start: 2020-07-31 | End: 2020-07-31

## 2020-07-31 RX ORDER — IBUPROFEN 200 MG
TABLET ORAL EVERY 8 HOURS SCHEDULED
Start: 2020-07-31 | End: 2020-11-23

## 2020-07-31 RX ADMIN — HYDROCODONE BITARTRATE AND ACETAMINOPHEN 1 TABLET: 5; 325 TABLET ORAL at 00:05

## 2020-07-31 RX ADMIN — BACITRACIN ZINC NEOMYCIN SULFATE POLYMYXIN B SULFATE: 400; 3.5; 5 OINTMENT TOPICAL at 14:04

## 2020-07-31 RX ADMIN — VENLAFAXINE 150 MG: 37.5 TABLET ORAL at 10:05

## 2020-07-31 RX ADMIN — HYDROCODONE BITARTRATE AND ACETAMINOPHEN 1 TABLET: 5; 325 TABLET ORAL at 06:07

## 2020-07-31 RX ADMIN — MELATONIN 1000 UNITS: at 10:06

## 2020-07-31 RX ADMIN — HYDROCODONE BITARTRATE AND ACETAMINOPHEN 1 TABLET: 10; 325 TABLET ORAL at 14:03

## 2020-07-31 RX ADMIN — ATORVASTATIN CALCIUM 20 MG: 20 TABLET, FILM COATED ORAL at 10:06

## 2020-07-31 RX ADMIN — HYDROCODONE BITARTRATE AND ACETAMINOPHEN 1 TABLET: 10; 325 TABLET ORAL at 10:16

## 2020-07-31 RX ADMIN — BACITRACIN ZINC NEOMYCIN SULFATE POLYMYXIN B SULFATE: 400; 3.5; 5 OINTMENT TOPICAL at 06:07

## 2020-07-31 RX ADMIN — BUDESONIDE AND FORMOTEROL FUMARATE DIHYDRATE 2 PUFF: 160; 4.5 AEROSOL RESPIRATORY (INHALATION) at 10:04

## 2020-07-31 RX ADMIN — ARIPIPRAZOLE 5 MG: 2 TABLET ORAL at 10:05

## 2020-07-31 RX ADMIN — INSULIN ASPART 5 UNITS: 100 INJECTION, SOLUTION INTRAVENOUS; SUBCUTANEOUS at 11:30

## 2020-07-31 RX ADMIN — GABAPENTIN 400 MG: 400 CAPSULE ORAL at 10:06

## 2020-07-31 NOTE — DISCHARGE SUMMARY
"Lloyd Greene  1960  [id number]    Hospitalists Discharge Summary    Date of Admission: 7/25/2020  Date of Discharge:  7/31/2020    History of Present Illness from [facility] on admit:   \"Mr. Lloyd Greene is a 60 year old  male with hx of HTN, HLD, COPD, Type II DM, Hx of TIA and sleep apnea. He has hx of chronic backache with radiculopathy. He was diagnosed to have Lumbar stenosis with lumbar radiculopathy and neurogenic claudication L2-3, L3-4 and L4-5 and Dr John Tran did Bilateral L2-3, L3-4 and L4-5 laminectomy, medial facetectomy and foraminotomies using microsurgical technique microsurgical instrumentation om 07/20/2020 at [facility]. Patient has jayce transferred here to [facility] for PT/OT.     Patient denies any sx of fever, headache, chest pain, shortness of breath, abdominal pain, nausea/ vomiting, dysuria, urgency/ frequency or any recent hx of blood in stool. No other medical history available.\"  Primary Discharge diagnoses/Hospital Course Summary:   Aftercare 2/2 s/p bilateral L2-3, L3-4, L4-5 laminectomies, foramenectomy's, medial facetectomies 2/2 lumbar spinal stenosis per Dr. Tin Tran:   PT/OT completed  Pain was not well controlled throughout admission on dose of Norco discharged on.  Norco has been increased to 10/325 mg every 4 hours  F/U Dr. Ya 8/5/2020 at 12:50 pm  F/U Dr. Victor 8/11/20 at 0930 am  F/U Dr. Tran 8/20/20 at 115 am    Secondary Discharge Diagnoses:  DM2 in morbidly obese: A1c 10.5% on 7/21/2020  Body mass index is 40.9 kg/m².   Janumet XR discontinued, patient started on insulins  Glucose overall at goal on Levemir 30 units nightly, NovoLog 5 units 3 times daily with meals and low-dose sliding scale insulin  Follow-up PCP    COPD: No current acute issues on Symbicort    Hypertension:  Blood pressure overall at goal on amlodipine 2.5 mg daily    Hyperlipidemia:  Remained on home Lipitor 20 mg daily    Anxiety/depression: No current acute issues " on Abilify 5 mg daily, trazodone 100 mg nightly as needed, Effexor 150 mg daily, Xanax 0.25 mg twice daily as needed    GREGORY: remained on home CPAP    H/O TIA: no current acute issues    H/O angioedema with lisinopril    PCP  Patient Care Team:  Donell Diggs MD as PCP - General (Family Medicine)  Kassidy Zuleta APRN as PCP - Claims Attributed    Consults:   Consults     Date and Time Order Name Status Description    7/24/2020 0989 Inpatient General Surgery Consult Completed     7/20/2020 1339 Inpatient Hospitalist Consult Completed         Operations and Procedures Performed:     Xr Spine Lumbar 2 Or 3 View    Result Date: 7/20/2020  Narrative: THREE-VIEW PORTABLE LUMBAR SPINE IN OR  HISTORY: Localization imaging for multilevel laminectomy and fusion.  FINDINGS: Imaging was performed in the operating room at the time of multilevel laminectomy and fusion including plates and pedicle screws extending from L2 to L5. 4 images were obtained and the fluoroscopy time measures 13 seconds.  This report was finalized on 7/20/2020 1:28 PM by Dr. Ministerio Owens M.D.      Ct Head Without Contrast    Result Date: 7/23/2020  Narrative: HEAD CT WITHOUT CONTRAST  HISTORY: Confusion and delirium.  TECHNIQUE: Noncontrast head CT is provided. There is no previous imaging for correlation.  Radiation dose reduction techniques were utilized, including automated exposure control and exposure modulation based on body size.  FINDINGS: The ventricles are normal in caliber. Some mildly diminished attenuation is observed in the frontal white matter and around the basal ganglia bilaterally, likely representing small vessel change. The brain parenchyma otherwise appears normal. Extra-axial spaces and bones of the skull appear normal.      Impression: Small vessel white matter change. Otherwise negative head CT.  This report was finalized on 7/23/2020 8:29 PM by Dr. Hao Macias M.D.      Fl C Arm During Surgery    Result Date:  7/20/2020  Narrative: This procedure was auto-finalized with no dictation required.    Xr Chest Pa & Lateral    Result Date: 7/22/2020  Narrative: TWO-VIEW CHEST  HISTORY: Recent lumbar spine surgery. Fever.  FINDINGS: The examination is somewhat limited by the patient's marked obesity. The lungs are moderately expanded and clear and there is no evidence of pneumonia. The heart size is normal.  This report was finalized on 7/22/2020 3:14 PM by Dr. Ministerio Owens M.D.      Allergies:  is allergic to lisinopril and tamiflu [oseltamivir phosphate].    Landon  Gabapentin 7/2020 per report of 7/25/2020, reviewed by me    Discharge Medications:     Discharge Medications      New Medications      Instructions Start Date   HYDROcodone-acetaminophen  MG per tablet  Commonly known as:  NORCO  Replaces:  HYDROcodone-acetaminophen 5-325 MG per tablet   1 tablet, Oral, Every 4 Hours PRN      insulin aspart 100 UNIT/ML injection  Commonly known as:  novoLOG  Replaces:  insulin lispro 100 UNIT/ML injection   5 Units, Subcutaneous, 3 Times Daily With Meals      insulin detemir 100 UNIT/ML injection  Commonly known as:  Levemir FlexTouch   30 Units, Subcutaneous, Nightly         Changes to Medications      Instructions Start Date   ALPRAZolam 0.25 MG tablet  Commonly known as:  XANAX  What changed:    · when to take this  · reasons to take this   0.25 mg, Oral, Daily      simvastatin 40 MG tablet  Commonly known as:  ZOCOR  What changed:  when to take this   TAKE 1 TABLET EVERY DAY      traZODone 100 MG tablet  Commonly known as:  DESYREL  What changed:    · when to take this  · reasons to take this   100 mg, Oral, Nightly      venlafaxine 75 MG tablet  Commonly known as:  EFFEXOR  What changed:    · how much to take  · when to take this   TAKE 2 TABLETS EVERY DAY         Continue These Medications      Instructions Start Date   amLODIPine 2.5 MG tablet  Commonly known as:  NORVASC   TAKE 1 TABLET EVERY DAY      ARIPiprazole 5  MG tablet  Commonly known as:  ABILIFY   TAKE 1 TABLET EVERY DAY      budesonide-formoterol 160-4.5 MCG/ACT inhaler  Commonly known as:  Symbicort   2 puffs, Inhalation, 2 Times Daily - RT      ClearLax 17 GM/SCOOP powder  Generic drug:  polyethylene glycol   17 g, Oral, Daily      fenofibrate 145 MG tablet  Commonly known as:  TRICOR   TAKE 1 TABLET EVERY DAY      gabapentin 400 MG capsule  Commonly known as:  NEURONTIN   400 mg, Oral, 3 Times Daily      neomycin-bacitracin-polymyxin 5-400-5000 ointment   Topical, Every 8 Hours Scheduled      Stool Softener 100 MG capsule  Generic drug:  docusate sodium   100 mg, Oral, 2 Times Daily      True Metrix Blood Glucose Test test strip  Generic drug:  glucose blood   CHECK BLOOD SUGAR TWICE DAILY      TRUEplus Lancets 28G misc   CHECK BLOOD SUGAR TWICE DAILY      vitamin D3 125 MCG (5000 UT) capsule capsule   5,000 Units, Oral, Daily         Stop These Medications    Flax Seed Oil 1000 MG capsule     HYDROcodone-acetaminophen 5-325 MG per tablet  Commonly known as:  NORCO  Replaced by:  HYDROcodone-acetaminophen  MG per tablet     insulin glargine 100 UNIT/ML injection  Commonly known as:  LANTUS     insulin lispro 100 UNIT/ML injection  Commonly known as:  humaLOG  Replaced by:  insulin aspart 100 UNIT/ML injection     Janumet XR  MG tablet  Generic drug:  SITagliptin-metFORMIN HCl ER     MULTI-VITAMIN DAILY PO     promethazine 25 MG tablet  Commonly known as:  PHENERGAN            Last Lab Results:   Lab Results (most recent)     Procedure Component Value Units Date/Time    POC Glucose Once [[id number]]  (Abnormal) Collected:  07/30/20 1650    Specimen:  Blood Updated:  07/30/20 1705     Glucose 164 mg/dL     POC Glucose Once [[id number]]  (Abnormal) Collected:  07/30/20 1149    Specimen:  Blood Updated:  07/30/20 1221     Glucose 185 mg/dL         Imaging Results (Most Recent)     None          PROCEDURES: NONE    Condition on Discharge:  Stable    Physical  Exam at Discharge  Vital Signs  Temp:  [97.4 °F (36.3 °C)-97.9 °F (36.6 °C)] 97.4 °F (36.3 °C)  Heart Rate:  [73-74] 73  Resp:  [18-20] 18  BP: ()/(48-57) 105/57  Body mass index is 40.9 kg/m².    Physical Exam   Constitutional: He appears well-developed and well-nourished.   Obese   HENT:   Head: Normocephalic and atraumatic.   Horizontal forehead abrasion mid forehead approximately 12 cm length   Cardiovascular: Normal rate and regular rhythm.   Pulmonary/Chest: Effort normal and breath sounds normal. No stridor. No respiratory distress. He has no wheezes.   Abdominal: Soft. Bowel sounds are normal. He exhibits no distension. There is no tenderness. There is no guarding.   Obese   Musculoskeletal: He exhibits no edema.   Skin: Skin is warm and dry.   Back and coccyx dressings clean, dry, intact   Psychiatric: He has a normal mood and affect. His behavior is normal.   Vitals reviewed.    Discharge Disposition  Home    Visiting Nurse:    Yes     Home PT/OT:  Yes     Home Safety Evaluation:  Yes     DME  arranged    Discharge Diet:      Dietary Orders (From admission, onward)     Start     Ordered    07/31/20 0539  Diet Regular; Cardiac, Consistent Carbohydrate  Diet Effective Now     Question Answer Comment   Diet Texture / Consistency Regular    Common Modifiers Cardiac    Common Modifiers Consistent Carbohydrate        07/31/20 0538                Activity at Discharge:  As per Dr. Ya    Pre-discharge education  Diabetic, Wound Care, Injectables, medications, follow up    Follow-up Appointments  Future Appointments   Date Time Provider Department Center   8/5/2020 12:50 PM Melvin Ya MD [facility] [address] [place]   8/11/2020  9:30 AM Lennie Victor MD [facility] None   8/20/2020 11:15 AM John Tran MD [facility]       Test Results Pending at Discharge: NONE   Order Current Status    Comprehensive Metabolic Panel In process           SOFIA Granda  07/31/20  06:53

## 2020-07-31 NOTE — PROGRESS NOTES
To be discharged home today with [facility] for PT, OT, and nursing. Will be seeing a new PCP,  on August 11, 2020 at 9:30AM. Follow up with joint/bone specialist,  on August 5, 2020 at 12:50PM. Appointment with neurosurgeon,  on August 20, 2020 at 11:15AM. He was concerned about rhe price of his new orders for insulin. Checked with [facility]. The 2 new insulins will cost around $180 total for a 2 month supply. Mr. Greene said he would be able to afford this without any issues. He received his extended toilet hygiene aid to be used to help with aric care. [place] has provided him with a rolling walker and a bedside commode. He has an accu check machine. No other DME needed.

## 2020-07-31 NOTE — PLAN OF CARE
Problem: Patient Care Overview  Goal: Plan of Care Review  Outcome: Ongoing (interventions implemented as appropriate)  Flowsheets  Taken 7/30/2020 1814  Progress: improving  Taken 7/31/2020 1021  Plan of Care Reviewed With: patient     Problem: Pain, Chronic (Adult)  Goal: Identify Related Risk Factors and Signs and Symptoms  Outcome: Ongoing (interventions implemented as appropriate)

## 2020-07-31 NOTE — DISCHARGE INSTRUCTIONS
Laminectomy, Care After  This sheet gives you information about how to care for yourself after your procedure. Your health care provider may also give you more specific instructions. If you have problems or questions, contact your health care provider.  What can I expect after the procedure?  After the procedure, it is common to have:  · Some pain around your incision area.  · Muscle tightening (spasms) across the back.  Follow these instructions at home:  Incision care    · Follow instructions from your health care provider about how to take care of your incision area. Make sure you:  ? Wash your hands with soap and water before and after you apply medicine to the area or change your bandage (dressing). If soap and water are not available, use hand .  ? Change your dressing as told by your health care provider.  ? Leave stitches (sutures), skin glue, or adhesive strips in place. These skin closures may need to stay in place for 2 weeks or longer. If adhesive strip edges start to loosen and curl up, you may trim the loose edges. Do not remove adhesive strips completely unless your health care provider tells you to do that.  · Check your incision area every day for signs of infection. Check for:  ? More redness, swelling, or pain.  ? More fluid or blood.  ? Warmth.  ? Pus or a bad smell.  Medicines  · Take over-the-counter and prescription medicines only as told by your health care provider.  · If you were prescribed an antibiotic medicine, use it as told by your health care provider. Do not stop using the antibiotic even if you start to feel better.  Bathing  · Do not take baths, swim, or use a hot tub for 2 weeks, or until your incision has healed completely.  · If your health care provider approves, you may take showers after your dressing has been removed.  Activity    · Return to your normal activities as told by your health care provider. Ask your health care provider what activities are safe for  you.  · Avoid bending or twisting at your waist. Always bend at your knees.  · Do not sit for more than 20-30 minutes at a time. Lie down or walk between periods of sitting.  · Do not lift anything that is heavier than 10 lb (4.5 kg) or the limit that your health care provider tells you, until he or she says that it is safe.  · Do not drive for 2 weeks after your procedure or for as long as your health care provider tells you.  · Do not drive or use heavy machinery while taking prescription pain medicine.  General instructions  · To prevent or treat constipation while you are taking prescription pain medicine, your health care provider may recommend that you:  ? Drink enough fluid to keep your urine clear or pale yellow.  ? Take over-the-counter or prescription medicines.  ? Eat foods that are high in fiber, such as fresh fruits and vegetables, whole grains, and beans.  ? Limit foods that are high in fat and processed sugars, such as fried and sweet foods.  · Do breathing exercises as told.  · Keep all follow-up visits as told by your health care provider. This is important.  Contact a health care provider if:  · You have more redness, swelling, or pain around your incision area.  · Your incision feels warm to the touch.  · You are not able to return to activities or do exercises as told by your health care provider.  Get help right away if:  · You have:  ? More fluid or blood coming from your incision area.  ? Pus or a bad smell coming from your incision area.  ? Chills or a fever.  ? Episodes of dizziness or fainting while standing.  · You develop a rash.  · You develop shortness of breath or you have difficulty breathing.  · You cannot control when you urinate or have a bowel movement.  · You become weak.  · You are not able to use your legs.  Summary  · After the procedure, it is common to have some pain around your incision area. You may also have muscle tightening (spasms) across the back.  · Follow  instructions from your health care provider about how to care for your incision.  · Do not lift anything that is heavier than 10 lb (4.5 kg) or the limit that your health care provider tells you, until he or she says that it is safe.  · Contact your health care provider if you have more redness, swelling, or pain around your incision area or if your incision feels warm to the touch. These can be signs of infection.  This information is not intended to replace advice given to you by your health care provider. Make sure you discuss any questions you have with your health care provider.  Document Released: 07/07/2006 Document Revised: 11/30/2018 Document Reviewed: 06/04/2017  Elsevier Patient Education © 2020 [employer]

## 2020-07-31 NOTE — TELEPHONE ENCOUNTER
"Called Sophia RN on duty on skilled floor, she will take care of this request for the patient.      Reason for Disposition  • [1] Request for URGENT new prescription or refill of \"essential\" medication (i.e., likelihood of harm to patient if not taken) AND [2] triager unable to fill per unit policy    Additional Information  • Negative: Drug overdose and triager unable to answer question  • Negative: Caller requesting information unrelated to medicine  • Negative: Caller requesting a prescription for Strep throat and has a positive culture result  • Negative: Rash while taking a medication or within 3 days of stopping it  • Negative: Immunization reaction suspected  • Negative: [1] Asthma and [2] having symptoms of asthma (cough, wheezing, etc.)  • Negative: [1] Influenza symptoms AND [2] anti-viral med prescription request, such as Tamiflu  • Negative: [1] Symptom of illness (e.g., headache, abdominal pain, earache, vomiting) AND [2] more than mild  • Negative: MORE THAN A DOUBLE DOSE of a prescription or over-the-counter (OTC) drug  • Negative: [1] DOUBLE DOSE (an extra dose or lesser amount) of over-the-counter (OTC) drug AND [2] any symptoms (e.g., dizziness, nausea, pain, sleepiness)  • Negative: [1] DOUBLE DOSE (an extra dose or lesser amount) of prescription drug AND [2] any symptoms (e.g., dizziness, nausea, pain, sleepiness)  • Negative: Took another person's prescription drug  • Negative: [1] DOUBLE DOSE (an extra dose or lesser amount) of prescription drug AND [2] NO symptoms (Exception: a double dose of antibiotics)  • Negative: Diabetes drug error or overdose (e.g., took wrong type of insulin or took extra dose)    Answer Assessment - Initial Assessment Questions  1.   NAME of MEDICATION: \"What medicine are you calling about?\"      Needs needles for flex pen and insulin syringes.  His [facility] pharmacy is close. Please sent to [facility] [phone].   2.   QUESTION: \"What is your question?\"      " "See above  3.   PRESCRIBING HCP: \"Who prescribed it?\" Reason: if prescribed by specialist, call should be referred to that group.      I called and spoke with RN, Sophia on the Skilled nursing unit, was d/c from room 1421 today.    4. SYMPTOMS: \"Do you have any symptoms?\"      Diabetes  5. SEVERITY: If symptoms are present, ask \"Are they mild, moderate or severe?\"      NA  6.  PREGNANCY:  \"Is there any chance that you are pregnant?\" \"When was your last menstrual period?\"      NA    Protocols used: MEDICATION QUESTION CALL-ADULT-      "

## 2020-08-01 ENCOUNTER — READMISSION MANAGEMENT (OUTPATIENT)
Dept: CALL CENTER | Facility: HOSPITAL | Age: 60
End: 2020-08-01

## 2020-08-01 NOTE — OUTREACH NOTE
Prep Survey      Responses   [facility] patient discharged from?  Non-BH   Is LACE score < 7 ?  Non-BH Discharge   Eligibility  [facility]   Date of Admission  07/25/20   Date of Discharge  07/31/20   Does the patient have one of the following disease processes/diagnoses(primary or secondary)?  General Surgery   Does the patient have Home health ordered?  Yes   What is the Home health agency?   plan [facility] post rehab   Prep survey completed?  Yes          Darlene Davidson RN

## 2020-08-03 ENCOUNTER — TRANSITIONAL CARE MANAGEMENT TELEPHONE ENCOUNTER (OUTPATIENT)
Dept: CALL CENTER | Facility: HOSPITAL | Age: 60
End: 2020-08-03

## 2020-08-05 ENCOUNTER — OFFICE VISIT (OUTPATIENT)
Dept: ORTHOPEDIC SURGERY | Facility: CLINIC | Age: 60
End: 2020-08-05

## 2020-08-05 VITALS — TEMPERATURE: 98.3 F | WEIGHT: 233 LBS | HEIGHT: 65 IN | BODY MASS INDEX: 38.82 KG/M2

## 2020-08-05 DIAGNOSIS — Z98.1 S/P LUMBAR FUSION: Primary | ICD-10-CM

## 2020-08-05 PROCEDURE — 72100 X-RAY EXAM L-S SPINE 2/3 VWS: CPT | Performed by: ORTHOPAEDIC SURGERY

## 2020-08-05 PROCEDURE — 99024 POSTOP FOLLOW-UP VISIT: CPT | Performed by: ORTHOPAEDIC SURGERY

## 2020-08-05 NOTE — PROGRESS NOTES
Postoperatively the patient expresses normal incisional pain.  There is little or no radiating pain.  Good strength on exam.  The wound is intact.  The draining pilonidal cyst is unchanged.  2 view x-rays of the lumbar spine obtained to evaluate hardware position and fusion bone show good position thereof and are unchanged from intraoperative images.  Doing well so far.  We appear to have avoided infections so far.  General surgery recommended continued observation of the pilonidal cyst and hopefully the wound is protected from it at this point and certainly looks good.  Instructions given.  We'll need lumbar x-rays on return visit.  Answers for HPI/ROS submitted by the patient on 8/5/2020   What is the primary reason for your visit?: Other  Please describe your symptoms.: follow up from surgery on 3/20/20  Have you had these symptoms before?: No  How long have you been having these symptoms?: Greater than 2 weeks

## 2020-08-10 ENCOUNTER — TELEPHONE (OUTPATIENT)
Dept: ORTHOPEDIC SURGERY | Facility: CLINIC | Age: 60
End: 2020-08-10

## 2020-08-10 DIAGNOSIS — M48.062 LUMBAR STENOSIS WITH NEUROGENIC CLAUDICATION: Primary | ICD-10-CM

## 2020-08-10 RX ORDER — HYDROCODONE BITARTRATE AND ACETAMINOPHEN 10; 325 MG/1; MG/1
1 TABLET ORAL EVERY 6 HOURS PRN
Qty: 42 TABLET | Refills: 0 | Status: SHIPPED | OUTPATIENT
Start: 2020-08-10 | End: 2020-09-16

## 2020-08-10 NOTE — TELEPHONE ENCOUNTER
Spoke with pt informed him that if the pharmacys ays they cant fill it until the 12th unfortunately that is when they will be filled. He gave verb al understand and stated he would pick them up on the 12th. I assured him the pharmacy has the order so it shouldn't be an issue when he goes to pick them up on Wednesday.       ----- Message from Lloyd Greene sent at 8/10/2020  3:22 PM EDT -----  Regarding: RE: Visit Follow-Up Question  Contact: [phone]  I called the pharmacy and they said I am not able to  the medication until Aug 12th. I am out of pain medication. I have been taking them as prescribed, however the second script I picked up was for the 5-325 and the pharmacist said I could take them two at a time and that is what I did.  ----- Message -----  From: Florence MORSE  Sent: 8/10/2020  1:23 PM EDT  To: Lloyd Greene  Subject: RE: Visit Follow-Up Question  Lloyd,  You let us know when you need a refill and Dr. Ya send this to the pharmacy. The pharmacy contacts you when this is ready to .  Thanks     ----- Message -----     From: Lloyd Greene     Sent: 8/10/2020  1:09 PM EDT       To: Melvin Ya MD  Subject: RE: Visit Follow-Up Question    Could you please tell me what the procedure is for picking up the pain medication prescription, when will it be ready, where do I pick it up?  ----- Message -----  From: DAYDAY TORO  Sent: 8/10/2020 10:47 AM EDT  To: Lloyd Greene  Subject: RE: Visit Follow-Up Question  Dr. Felton Desai said it is okay to drive.     Georgiana Perez    ----- Message -----     From: Lloyd Greene     Sent: 8/9/2020  2:25 PM EDT       To: Melvin Ya MD  Subject: Visit Follow-Up Question    Francisco Javier Ya  I couldn't remember when you said I was cleared to drive again? I was also wondering how long I need to wear the back brace and use the ice pack brace? I am also out of the pain medication entirely and wondered if you'd be able  to re-fill the Norco 10/325? Thank you so much.  Lloyd Greene

## 2020-08-10 NOTE — TELEPHONE ENCOUNTER
----- Message from Lloyd Greene sent at 8/9/2020  2:25 PM EDT -----  Regarding: Visit Follow-Up Question  Contact: [phone]  Hi Dr. Ya  I couldn't remember when you said I was cleared to drive again? I was also wondering how long I need to wear the back brace and use the ice pack brace? I am also out of the pain medication entirely and wondered if you'd be able to re-fill the Norco 10/325? Thank you so much.  Lloyd Greene

## 2020-08-11 ENCOUNTER — OFFICE VISIT (OUTPATIENT)
Dept: FAMILY MEDICINE CLINIC | Facility: CLINIC | Age: 60
End: 2020-08-11

## 2020-08-11 VITALS
WEIGHT: 225.7 LBS | DIASTOLIC BLOOD PRESSURE: 80 MMHG | OXYGEN SATURATION: 99 % | HEIGHT: 65 IN | BODY MASS INDEX: 37.61 KG/M2 | SYSTOLIC BLOOD PRESSURE: 130 MMHG | TEMPERATURE: 97.6 F | HEART RATE: 78 BPM

## 2020-08-11 DIAGNOSIS — E11.65 UNCONTROLLED TYPE 2 DIABETES MELLITUS WITH HYPERGLYCEMIA (HCC): ICD-10-CM

## 2020-08-11 DIAGNOSIS — F33.41 RECURRENT MAJOR DEPRESSIVE DISORDER, IN PARTIAL REMISSION (HCC): Primary | ICD-10-CM

## 2020-08-11 PROCEDURE — 99214 OFFICE O/P EST MOD 30 MIN: CPT | Performed by: FAMILY MEDICINE

## 2020-08-11 RX ORDER — VENLAFAXINE 100 MG/1
100 TABLET ORAL 2 TIMES DAILY
Qty: 60 TABLET | Refills: 0 | Status: SHIPPED | OUTPATIENT
Start: 2020-08-11 | End: 2021-03-09 | Stop reason: SDUPTHER

## 2020-08-11 NOTE — PROGRESS NOTES
"    Chief Complaint   Patient presents with   • Establish Care   • Diabetes     medication; diet       Subjective   Lloyd Greene is a 60 y.o. male.     History of Present Illness     60-year-old male here to establish care as a new patient transfer from Dr. Diggs    [facility] of HTN, HLD, COPD, Type II DM, Hx of TIA and sleep apnea. He has hx of chronic backache with radiculopathy.    Recent back surgery on 7/30 due to lumbar spinal stenosis.    DM2 in morbidly obese: A1c 10.5% on 7/21/2020.  Blood glucose have been very well controlled at around 6.46.5 for the past year.  3 weeks ago before his surgery A1c was checked and it was 10.5.  He does not really know that there is any big change in his diet or lifestyle aside from the stress of the surgery.  He had been on the max dose of Janumet.  He was taken off of Janumet and put on Levemir 30 units nightly.  He will do 5 units of short acting for anything over a blood glucose of 150 at mealtimes.    His blood sugars in the morning have all been less than 200 but have been around 120s to 150s.  He is now doing 31 units of Levemir at night.  He wants to get off of insulin.  His daughter is an APRN and has been helping him manage his blood sugars.    Depression: Has been on Effexor sore and Abilify 5 mg.  Said when he started this combination it really helped him but lately he has been feeling much more depressed.  No SI HI thoughts of self-harm.  He is interested in potentially increasing 1 of the medication slightly to see if it makes a difference.  He tries to go out on walks with his dog daily.  Overall just the dysthymic type mood.    The following portions of the patient's history were reviewed and updated as appropriate: allergies, current medications, past family history, past medical history, past social history, past surgical history and problem list.      Past Medical History:   Diagnosis Date   • Anesthesia complication     PATIENT STATES \"STOPPED BREATHING " "DURING COLONOSCOPY APPROX 4-5 YRS AGO\".   • Anxiety    • Arthritis    • Colon polyp    • COPD (chronic obstructive pulmonary disease) (CMS/HCC)    • Depression    • Diabetes mellitus (CMS/HCC)    • Diverticulitis    • Elevated cholesterol    • History of TIA (transient ischemic attack)     3 yr ago   • Hyperlipidemia    • Hypertension    • Low back pain    • Numbness and tingling     BILATERAL LEGS RIGHT LEG WORSE   • Sleep apnea     uses cpap occasionally       Past Surgical History:   Procedure Laterality Date   • COLON SURGERY     • COLONOSCOPY  2014   • COLONOSCOPY N/A 4/12/2019    Procedure: COLONOSCOPY w/ polypectomy;  Surgeon: Tin Giang MD;  Location: [facility];  Service: Gastroenterology   • COLOSTOMY      Dr. Gupta   • COLOSTOMY REVISION  07/2006    Diverticulitis-Dr. Gupta   • INCISIONAL HERNIA REPAIR  2011   • LUMBAR LAMINECTOMY WITH FUSION N/A 7/20/2020    Procedure: Lumbar 2 to lumbar 3, lumbar 3 to lumbar 4 and lumbar 4 lumbar 5 laminectomy with a fusion by orthopedics;  Surgeon: John Tran MD;  Location: [facility];  Service: Neurosurgery;  Laterality: N/A;   • LUMBAR LAMINECTOMY WITH FUSION N/A 7/20/2020    Procedure: Lumbar 2 to lumbar 5 fusion with instrumentation and laminectomy by Dr. Hutchinson;  Surgeon: Melvin Ya MD;  Location: [facility];  Service: Neurosurgery;  Laterality: N/A;   • ORIF ANKLE FRACTURE Right 1995       Family History   Problem Relation Age of Onset   • Depression Mother    • Depression Father    • Kidney disease Father    • COPD Father    • Hypertension Father    • Colon cancer Neg Hx    • Colon polyps Neg Hx    • Malig Hyperthermia Neg Hx        Social History     Socioeconomic History   • Marital status:      Spouse name: Not on file   • Number of children: 2   • Years of education: Not on file   • Highest education level: Not on file   Occupational History   • Occupation:    Tobacco Use   • Smoking status: Former " Smoker     Packs/day: 2.00     Years: 35.00     Pack years: 70.00     Types: Electronic Cigarette     Last attempt to quit: 2014     Years since quittin.6   • Smokeless tobacco: Never Used   Substance and Sexual Activity   • Alcohol use: Not Currently     Comment: rarely   • Drug use: No   • Sexual activity: Yes     Partners: Female       Current Outpatient Medications on File Prior to Visit   Medication Sig Dispense Refill   • ALPRAZolam (XANAX) 0.25 MG tablet Take 1 tablet by mouth Daily. (Patient taking differently: Take 0.25 mg by mouth At Night As Needed.) 30 tablet 0   • amLODIPine (NORVASC) 2.5 MG tablet TAKE 1 TABLET EVERY DAY (Patient taking differently: Take 2.5 mg by mouth Daily.) 90 tablet 3   • ARIPiprazole (ABILIFY) 5 MG tablet TAKE 1 TABLET EVERY DAY (Patient taking differently: Take 5 mg by mouth Daily.) 90 tablet 3   • budesonide-formoterol (SYMBICORT) 160-4.5 MCG/ACT inhaler Inhale 2 puffs 2 (Two) Times a Day. 1 inhaler 11   • Cholecalciferol (VITAMIN D3) 125 MCG (5000 UT) capsule capsule Take 5,000 Units by mouth Daily.     • fenofibrate (TRICOR) 145 MG tablet TAKE 1 TABLET EVERY DAY (Patient taking differently: Take 145 mg by mouth Daily.) 90 tablet 3   • gabapentin (NEURONTIN) 400 MG capsule Take 1 capsule by mouth 3 (Three) Times a Day. 270 capsule 0   • HYDROcodone-acetaminophen (NORCO)  MG per tablet Take 1 tablet by mouth Every 6 (Six) Hours As Needed for Moderate Pain . 42 tablet 0   • insulin aspart (novoLOG) 100 UNIT/ML injection Inject 5 Units under the skin into the appropriate area as directed 3 (Three) Times a Day With Meals. 2 each 2   • insulin detemir (Levemir FlexTouch) 100 UNIT/ML injection Inject 30 Units under the skin into the appropriate area as directed Every Night. 10 pen 2   • neomycin-bacitracin-polymyxin (NEOSPORIN) 5-400-5000 ointment Apply  topically to the appropriate area as directed Every 8 (Eight) Hours.     • simvastatin (ZOCOR) 40 MG tablet TAKE 1  TABLET EVERY DAY (Patient taking differently: Take 40 mg by mouth Every Night.) 90 tablet 3   • traZODone (DESYREL) 100 MG tablet Take 1 tablet by mouth Every Night. (Patient taking differently: Take 100 mg by mouth At Night As Needed.) 90 tablet 3   • TRUE METRIX BLOOD GLUCOSE TEST test strip CHECK BLOOD SUGAR TWICE DAILY 100 each 0   • TRUEPLUS LANCETS 28G misc CHECK BLOOD SUGAR TWICE DAILY 100 each 5   • [DISCONTINUED] venlafaxine (EFFEXOR) 75 MG tablet TAKE 2 TABLETS EVERY DAY (Patient taking differently: Take 75 mg by mouth 2 (Two) Times a Day.) 180 tablet 3   • [DISCONTINUED] docusate sodium 100 MG capsule Take 1 capsule by mouth 2 (Two) Times a Day. 60 each 0     No current facility-administered medications on file prior to visit.        Review of Systems   Constitutional: Negative for activity change, chills and fever.   HENT: Negative for congestion, postnasal drip and rhinorrhea.    Eyes: Negative for blurred vision and pain.   Respiratory: Negative for cough, chest tightness and shortness of breath.    Cardiovascular: Negative for chest pain.   Gastrointestinal: Negative for abdominal pain, constipation, diarrhea, nausea and vomiting.   Endocrine: Negative for cold intolerance and heat intolerance.   Genitourinary: Negative for decreased urine volume, dysuria and frequency.   Musculoskeletal: Negative for arthralgias, back pain and myalgias.   Skin: Negative for rash and skin lesions.   Neurological: Negative for dizziness and confusion.   Psychiatric/Behavioral: Negative for agitation, behavioral problems and depressed mood.           Vitals:    08/11/20 0924   BP: 130/80   Pulse: 78   Temp: 97.6 °F (36.4 °C)   SpO2: 99%      Objective   Physical Exam   Constitutional: He is oriented to person, place, and time. He appears well-developed.   Obese walks with cane   HENT:   Head: Normocephalic and atraumatic.   Eyes: Pupils are equal, round, and reactive to light. Conjunctivae and EOM are normal.   Neck:  Neck supple.   Cardiovascular: Normal rate, regular rhythm and normal heart sounds.   No murmur heard.  Pulmonary/Chest: Effort normal and breath sounds normal. No respiratory distress.   Abdominal: Soft. Bowel sounds are normal.   Musculoskeletal: Normal range of motion.   Neurological: He is alert and oriented to person, place, and time.   Skin: Skin is warm and dry.   Psychiatric: He has a normal mood and affect.   Nursing note and vitals reviewed.        Assessment/Plan   Problem List Items Addressed This Visit     None      Visit Diagnoses     Recurrent major depressive disorder, in partial remission (CMS/HCC)    -  Primary    Relevant Medications    venlafaxine (EFFEXOR) 100 MG tablet    Uncontrolled type 2 diabetes mellitus with hyperglycemia (CMS/HCC)        Relevant Medications    metFORMIN (GLUCOPHAGE) 500 MG tablet          -A1c was 10.5 three weeks ago prior to that he was well controlled with an A1c around 6 for the past year  -Had been on max dose Janumet  -Was placed on long-acting insulin Levemir 30 units at night in the hospital.  He has been doing this.  -We will re-add back his metformin.  We will have him follow-up in 2 weeks with a blood glucose log.  We will have him back off on the long-acting if his morning levels are low      He is on a lower dose Abilify.  Wanting to know about an increase for depression.  Will avoid at this time due to potential side effects.  We will increase his Effexor    Follow-up in 2 weeks with BG log        Lennie Victor MD          Answers for HPI/ROS submitted by the patient on 8/5/2020   Diabetes problem  What is the primary reason for your visit?: Diabetes

## 2020-08-18 NOTE — PROGRESS NOTES
Subjective   History of Present Illness: Lloyd Greene is a 60 y.o. male is here today via [employer] for post-op follow-up. Mr. Greene had Bilateral L2-3, L3-4 and L4-5 laminectomy, medial facetectomy and foraminotomies using microsurgical technique microsurgical instrumentation done on 7/20/2020.    You have chosen to receive care through a telehealth visit.  Do you consent to use a video/audio connection for your medical care today? Yes    We did a video visit today.  The patient was at home and I was in the office.  We talked for 3 minutes.    History of Present Illness     The patient is feeling pretty good.  He still has a little tenderness and stiffness in his back at the area of the incision but he has no leg pain at all.    The following portions of the patient's history were reviewed and updated as appropriate: allergies, current medications, past family history, past medical history, past social history, past surgical history and problem list.    Review of Systems    I have reviewed the review of systems as documented by my MA.      Objective         Physical Exam   Constitutional: He is oriented to person, place, and time. He appears well-developed and well-nourished.   Neurological: He is oriented to person, place, and time.     Neurologic Exam     Mental Status   Oriented to person, place, and time.           Assessment/Plan   Independent Review of Radiographic Studies:      I personally reviewed the images from the following studies.    There are no new images to review    Medical Decision Making:      I told the patient that as long as his legs are feeling okay I really do not need to see him back.  If there are any problems with pain in the legs then he should call me or if he has any questions.  Otherwise he will follow-up with Dr. Reynolds as scheduled.    There are no diagnoses linked to this encounter.  Return if symptoms worsen or fail to improve.

## 2020-08-19 ENCOUNTER — TELEMEDICINE (OUTPATIENT)
Dept: NEUROSURGERY | Facility: CLINIC | Age: 60
End: 2020-08-19

## 2020-08-19 DIAGNOSIS — Z09 FOLLOW-UP EXAMINATION FOLLOWING SURGERY: Primary | ICD-10-CM

## 2020-08-19 PROCEDURE — 99024 POSTOP FOLLOW-UP VISIT: CPT | Performed by: NEUROLOGICAL SURGERY

## 2020-08-19 RX ORDER — ISOPROPYL ALCOHOL 0.75 G/1
SWAB TOPICAL
Qty: 100 EACH | Refills: 5 | Status: SHIPPED | OUTPATIENT
Start: 2020-08-19 | End: 2020-08-21 | Stop reason: SDUPTHER

## 2020-08-19 RX ORDER — BLOOD-GLUCOSE METER
1 EACH MISCELLANEOUS DAILY
Qty: 1 DEVICE | Refills: 0 | Status: SHIPPED | OUTPATIENT
Start: 2020-08-19 | End: 2020-08-21 | Stop reason: SDUPTHER

## 2020-08-21 DIAGNOSIS — E11.9 TYPE 2 DIABETES MELLITUS WITHOUT COMPLICATION, WITHOUT LONG-TERM CURRENT USE OF INSULIN (HCC): ICD-10-CM

## 2020-08-21 DIAGNOSIS — E11.42 TYPE 2 DIABETES MELLITUS WITH DIABETIC POLYNEUROPATHY, WITHOUT LONG-TERM CURRENT USE OF INSULIN (HCC): Primary | ICD-10-CM

## 2020-08-21 RX ORDER — BLOOD-GLUCOSE METER
1 EACH MISCELLANEOUS DAILY
Qty: 1 DEVICE | Refills: 0 | Status: SHIPPED | OUTPATIENT
Start: 2020-08-21

## 2020-08-21 RX ORDER — ISOPROPYL ALCOHOL 0.75 G/1
SWAB TOPICAL
Qty: 100 EACH | Refills: 5 | Status: SHIPPED | OUTPATIENT
Start: 2020-08-21 | End: 2021-09-01 | Stop reason: SDUPTHER

## 2020-08-25 ENCOUNTER — OFFICE VISIT (OUTPATIENT)
Dept: FAMILY MEDICINE CLINIC | Facility: CLINIC | Age: 60
End: 2020-08-25

## 2020-08-25 VITALS
DIASTOLIC BLOOD PRESSURE: 70 MMHG | OXYGEN SATURATION: 98 % | SYSTOLIC BLOOD PRESSURE: 120 MMHG | WEIGHT: 226.3 LBS | BODY MASS INDEX: 37.7 KG/M2 | HEIGHT: 65 IN | TEMPERATURE: 98.6 F | HEART RATE: 93 BPM

## 2020-08-25 DIAGNOSIS — F32.9 REACTIVE DEPRESSION: ICD-10-CM

## 2020-08-25 DIAGNOSIS — M48.062 LUMBAR STENOSIS WITH NEUROGENIC CLAUDICATION: ICD-10-CM

## 2020-08-25 DIAGNOSIS — E11.65 UNCONTROLLED TYPE 2 DIABETES MELLITUS WITH HYPERGLYCEMIA (HCC): Primary | ICD-10-CM

## 2020-08-25 DIAGNOSIS — I10 ESSENTIAL HYPERTENSION: ICD-10-CM

## 2020-08-25 PROCEDURE — 99214 OFFICE O/P EST MOD 30 MIN: CPT | Performed by: FAMILY MEDICINE

## 2020-08-25 NOTE — PROGRESS NOTES
"    Chief Complaint   Patient presents with   • Diabetes       Subjective   Lloyd Greene is a 60 y.o. male.     History of Present Illness   60-year-old male here for type 2 diabetes follow-up    He had recent lumbar spinal surgery and his sugars became poorly controlled around this time.  He was placed on insulin.  He returns with a blood glucose log.    He has been taking Levemir 30 units nightly.  Also doing 5 units short acting 3 times daily with meals.  We started him on metformin thousand milligrams twice daily.  -Prior to this he was only on Janumet  -He has still had some mild elevations in his morning fasting blood sugars.  They tend to be around 150 or 130.  -Because of this he has not been able to wean down on his nightly Levemir  -He feels well and has no difficulty taking the insulin.  -He denies any numbness tingling or neuropathy symptoms  -He is due for a foot exam     Depression: The increase in the Effexor has really helped his mood as has his improvements in mental mobility.  He has been walking better walking without a cane since his surgery.  Overall notes some decrease in pain. No SI HI thoughts of self-harm      The following portions of the patient's history were reviewed and updated as appropriate: allergies, current medications, past family history, past medical history, past social history, past surgical history and problem list.      Past Medical History:   Diagnosis Date   • Anesthesia complication     PATIENT STATES \"STOPPED BREATHING DURING COLONOSCOPY APPROX 4-5 YRS AGO\".   • Anxiety    • Arthritis    • Colon polyp    • COPD (chronic obstructive pulmonary disease) (CMS/[facility])    • Depression    • Diabetes mellitus (CMS/[facility])    • Diverticulitis    • Elevated cholesterol    • History of TIA (transient ischemic attack)     3 yr ago   • Hyperlipidemia    • Hypertension    • Low back pain    • Numbness and tingling     BILATERAL LEGS RIGHT LEG WORSE   • Sleep apnea     uses cpap " occasionally       Past Surgical History:   Procedure Laterality Date   • COLON SURGERY     • COLONOSCOPY     • COLONOSCOPY N/A 2019    Procedure: COLONOSCOPY w/ polypectomy;  Surgeon: Tin Giang MD;  Location: [facility] OR;  Service: Gastroenterology   • COLOSTOMY      Dr. Gupta   • COLOSTOMY REVISION  2006    Diverticulitis-Dr. Gupta   • INCISIONAL HERNIA REPAIR     • LUMBAR LAMINECTOMY WITH FUSION N/A 2020    Procedure: Lumbar 2 to lumbar 3, lumbar 3 to lumbar 4 and lumbar 4 lumbar 5 laminectomy with a fusion by orthopedics;  Surgeon: John Tran MD;  Location: [facility] OR;  Service: Neurosurgery;  Laterality: N/A;   • LUMBAR LAMINECTOMY WITH FUSION N/A 2020    Procedure: Lumbar 2 to lumbar 5 fusion with instrumentation and laminectomy by Dr. Hutchinson;  Surgeon: Melvin Ya MD;  Location: [facility] OR;  Service: Neurosurgery;  Laterality: N/A;   • ORIF ANKLE FRACTURE Right        Family History   Problem Relation Age of Onset   • Depression Mother    • Depression Father    • Kidney disease Father    • COPD Father    • Hypertension Father    • Colon cancer Neg Hx    • Colon polyps Neg Hx    • Malig Hyperthermia Neg Hx        Social History     Socioeconomic History   • Marital status:      Spouse name: Not on file   • Number of children: 2   • Years of education: Not on file   • Highest education level: Not on file   Occupational History   • Occupation:    Tobacco Use   • Smoking status: Former Smoker     Packs/day: 2.00     Years: 35.00     Pack years: 70.00     Types: Electronic Cigarette     Last attempt to quit:      Years since quittin.6   • Smokeless tobacco: Never Used   Substance and Sexual Activity   • Alcohol use: Not Currently     Comment: rarely   • Drug use: No   • Sexual activity: Yes     Partners: Female       Current Outpatient Medications on File Prior to Visit   Medication Sig Dispense Refill   • Alcohol  Swabs (B-D SINGLE USE SWABS REGULAR) pads Use daily for BG checks 100 each 5   • ALPRAZolam (XANAX) 0.25 MG tablet Take 1 tablet by mouth Daily. (Patient taking differently: Take 0.25 mg by mouth At Night As Needed.) 30 tablet 0   • amLODIPine (NORVASC) 2.5 MG tablet TAKE 1 TABLET EVERY DAY (Patient taking differently: Take 2.5 mg by mouth Daily.) 90 tablet 3   • ARIPiprazole (ABILIFY) 5 MG tablet TAKE 1 TABLET EVERY DAY (Patient taking differently: Take 5 mg by mouth Daily.) 90 tablet 3   • Blood Glucose Monitoring Suppl (TRUE METRIX AIR GLUCOSE METER) device 1 Device Daily. 1 Device 0   • budesonide-formoterol (SYMBICORT) 160-4.5 MCG/ACT inhaler Inhale 2 puffs 2 (Two) Times a Day. 1 inhaler 11   • Cholecalciferol (VITAMIN D3) 125 MCG (5000 UT) capsule capsule Take 5,000 Units by mouth Daily.     • fenofibrate (TRICOR) 145 MG tablet TAKE 1 TABLET EVERY DAY (Patient taking differently: Take 145 mg by mouth Daily.) 90 tablet 3   • gabapentin (NEURONTIN) 400 MG capsule Take 1 capsule by mouth 3 (Three) Times a Day. 270 capsule 0   • glucose blood (True Metrix Blood Glucose Test) test strip 1 each by Other route 2 (Two) Times a Day. Use as instructed 100 each 0   • HYDROcodone-acetaminophen (NORCO)  MG per tablet Take 1 tablet by mouth Every 6 (Six) Hours As Needed for Moderate Pain . 42 tablet 0   • insulin aspart (novoLOG) 100 UNIT/ML injection Inject 5 Units under the skin into the appropriate area as directed 3 (Three) Times a Day With Meals. 2 each 2   • insulin detemir (Levemir FlexTouch) 100 UNIT/ML injection Inject 30 Units under the skin into the appropriate area as directed Every Night. 10 pen 2   • neomycin-bacitracin-polymyxin (NEOSPORIN) 5-400-5000 ointment Apply  topically to the appropriate area as directed Every 8 (Eight) Hours.     • simvastatin (ZOCOR) 40 MG tablet TAKE 1 TABLET EVERY DAY (Patient taking differently: Take 40 mg by mouth Every Night.) 90 tablet 3   • traZODone (DESYREL) 100 MG  tablet Take 1 tablet by mouth Every Night. (Patient taking differently: Take 100 mg by mouth At Night As Needed.) 90 tablet 3   • TRUEPLUS LANCETS 28G misc CHECK BLOOD SUGAR TWICE DAILY 100 each 5   • venlafaxine (EFFEXOR) 100 MG tablet Take 1 tablet by mouth 2 (Two) Times a Day for 60 days. 60 tablet 0   • [DISCONTINUED] metFORMIN (GLUCOPHAGE) 500 MG tablet Take 2 tablets by mouth 2 (Two) Times a Day With Meals for 30 days. 120 tablet 1     No current facility-administered medications on file prior to visit.        Review of Systems   Constitutional: Negative for activity change, chills, fatigue, fever and unexpected weight loss.   HENT: Negative for congestion.    Eyes: Negative for blurred vision and pain.   Respiratory: Negative for cough, chest tightness and shortness of breath.    Cardiovascular: Negative for chest pain.   Gastrointestinal: Negative for abdominal pain, constipation, diarrhea, nausea and vomiting.   Endocrine: Negative for cold intolerance, heat intolerance, polydipsia, polyphagia and polyuria.   Genitourinary: Negative for decreased urine volume, dysuria and frequency.   Musculoskeletal: Negative for arthralgias, back pain and myalgias.   Skin: Negative for pallor, rash and skin lesions.   Neurological: Negative for dizziness, tremors, seizures, speech difficulty, weakness and confusion.   Psychiatric/Behavioral: Positive for dysphoric mood. Negative for agitation, behavioral problems and depressed mood. The patient is not nervous/anxious.            Vitals:    08/25/20 1017   BP: 120/70   Pulse: 93   Temp: 98.6 °F (37 °C)   SpO2: 98%      Objective   Physical Exam   Constitutional: He is oriented to person, place, and time. He appears well-developed and well-nourished.   HENT:   Head: Normocephalic and atraumatic.   Eyes: Pupils are equal, round, and reactive to light. EOM are normal.   Cardiovascular: Normal rate, regular rhythm and normal heart sounds.   No murmur heard.  Pulmonary/Chest:  Effort normal and breath sounds normal. No respiratory distress.   Abdominal: Soft. Bowel sounds are normal.   Musculoskeletal: Normal range of motion.   Wearing back brace   Neurological: He is alert and oriented to person, place, and time.   Skin: Skin is warm and dry.   Psychiatric: He has a normal mood and affect.   Nursing note and vitals reviewed.        Assessment/Plan   Problem List Items Addressed This Visit        Nervous and Auditory    Lumbar stenosis with neurogenic claudication      Other Visit Diagnoses     Uncontrolled type 2 diabetes mellitus with hyperglycemia (CMS/[facility])    -  Primary    Relevant Medications    metFORMIN (Glucophage) 1000 MG tablet    Reactive depression        Essential hypertension            Discussed decreasing nightly levelmir depending on AM fasting BG levels  Continue Metformin 1000mg BID and will evaluate in 3 months    HTN-stable      F/u 3 mo for A1c and diabetes check    Mood has improved with increase in his Effexor.  He is also doing better with his mobility which has greatly helped s/p lumbar spinal surgery. Denies any neuropathic symptoms or weakness.  Denies any SI/HI/thoughts of self-harm.       Lennie Victor MD            Answers for HPI/ROS submitted by the patient on 8/18/2020   Diabetes problem  What is the primary reason for your visit?: Diabetes

## 2020-09-04 ENCOUNTER — EPISODE CHANGES (OUTPATIENT)
Dept: CASE MANAGEMENT | Facility: OTHER | Age: 60
End: 2020-09-04

## 2020-09-16 ENCOUNTER — OFFICE VISIT (OUTPATIENT)
Dept: ORTHOPEDIC SURGERY | Facility: CLINIC | Age: 60
End: 2020-09-16

## 2020-09-16 VITALS — HEIGHT: 65 IN | BODY MASS INDEX: 37.49 KG/M2 | WEIGHT: 225 LBS | TEMPERATURE: 97.7 F

## 2020-09-16 DIAGNOSIS — M54.50 LUMBAR PAIN: Primary | ICD-10-CM

## 2020-09-16 PROCEDURE — 72100 X-RAY EXAM L-S SPINE 2/3 VWS: CPT | Performed by: ORTHOPAEDIC SURGERY

## 2020-09-16 PROCEDURE — 99024 POSTOP FOLLOW-UP VISIT: CPT | Performed by: ORTHOPAEDIC SURGERY

## 2020-09-16 NOTE — PROGRESS NOTES
Back pain is improved.  No significant leg pain.  Two-view x-rays of the lumbar spine obtained to evaluate hardware and  fusion bone suggest further healing since prior x-ray.  We will need an AP and lateral lumbar x-ray on return visit.  Instructions were given.  Answers for HPI/ROS submitted by the patient on 9/9/2020   What is the primary reason for your visit?: Other  Please describe your symptoms.: Post-op visit  Have you had these symptoms before?: No  How long have you been having these symptoms?: Greater than 2 weeks

## 2020-09-21 ENCOUNTER — TELEPHONE (OUTPATIENT)
Dept: FAMILY MEDICINE CLINIC | Facility: CLINIC | Age: 60
End: 2020-09-21

## 2020-09-21 DIAGNOSIS — E11.65 UNCONTROLLED TYPE 2 DIABETES MELLITUS WITH HYPERGLYCEMIA (HCC): Primary | ICD-10-CM

## 2020-09-21 RX ORDER — IBUPROFEN 200 MG
1 TABLET ORAL 3 TIMES DAILY
Qty: 100 EACH | Refills: 0 | Status: SHIPPED | OUTPATIENT
Start: 2020-09-21 | End: 2020-09-22 | Stop reason: SDUPTHER

## 2020-09-21 NOTE — TELEPHONE ENCOUNTER
PATIENT REQUESTS SYRINGES FOR HIS INSULIN BE CALLED INTO:     [facility] [place] [facility] [address] [facility] [address] - [phone]  - [phone]   [phone]    PATIENT: [phone]    HE DOESN'T BELIEVE WE HAVE ORDERED FOR HIM BEFORE-HE BELIEVES HOSPITAL DID LAST    HE SAYS PHARMACY HAS REQUESTED FROM US 2X

## 2020-09-22 DIAGNOSIS — E11.65 UNCONTROLLED TYPE 2 DIABETES MELLITUS WITH HYPERGLYCEMIA (HCC): ICD-10-CM

## 2020-09-22 RX ORDER — IBUPROFEN 200 MG
1 TABLET ORAL 3 TIMES DAILY
Qty: 100 EACH | Refills: 0 | Status: SHIPPED | OUTPATIENT
Start: 2020-09-22 | End: 2020-11-19 | Stop reason: SDUPTHER

## 2020-09-22 NOTE — TELEPHONE ENCOUNTER
Patient calling back in today regarding the Rx for syringes. He says the [facility] in [place] at [address]  [facility] [address] has NOT received the script.    Best call back # [phone]

## 2020-10-19 ENCOUNTER — EPISODE CHANGES (OUTPATIENT)
Dept: CASE MANAGEMENT | Facility: OTHER | Age: 60
End: 2020-10-19

## 2020-10-19 DIAGNOSIS — E11.9 TYPE 2 DIABETES MELLITUS WITHOUT COMPLICATION, WITHOUT LONG-TERM CURRENT USE OF INSULIN (HCC): ICD-10-CM

## 2020-10-19 DIAGNOSIS — E11.42 TYPE 2 DIABETES MELLITUS WITH DIABETIC POLYNEUROPATHY, WITHOUT LONG-TERM CURRENT USE OF INSULIN (HCC): ICD-10-CM

## 2020-10-19 RX ORDER — CALCIUM CITRATE/VITAMIN D3 200MG-6.25
TABLET ORAL
Qty: 300 EACH | Refills: 0 | Status: SHIPPED | OUTPATIENT
Start: 2020-10-19 | End: 2021-01-05

## 2020-10-22 ENCOUNTER — PATIENT OUTREACH (OUTPATIENT)
Dept: CASE MANAGEMENT | Facility: OTHER | Age: 60
End: 2020-10-22

## 2020-10-22 ENCOUNTER — EPISODE CHANGES (OUTPATIENT)
Dept: CASE MANAGEMENT | Facility: OTHER | Age: 60
End: 2020-10-22

## 2020-10-22 NOTE — OUTREACH NOTE
Patient Outreach Note    Attempted to reach pt x4 for care advising program due to elevated HgbA1C. No answer or return call.     Lea Owens RN  Ambulatory     10/22/2020, 12:49 EDT

## 2020-11-03 DIAGNOSIS — E11.9 TYPE 2 DIABETES MELLITUS WITHOUT COMPLICATION, WITHOUT LONG-TERM CURRENT USE OF INSULIN (HCC): Primary | ICD-10-CM

## 2020-11-09 DIAGNOSIS — E11.9 TYPE 2 DIABETES MELLITUS WITHOUT COMPLICATION, WITHOUT LONG-TERM CURRENT USE OF INSULIN (HCC): ICD-10-CM

## 2020-11-09 NOTE — TELEPHONE ENCOUNTER
Caller: Lloyd Greene    Relationship: Self    Best call back number: [phone]    Medication needed:   Requested Prescriptions     Pending Prescriptions Disp Refills   • Insulin Pen Needle 31G X 8 MM misc 100 each 1     Sig: USE WITH LEVEMIR ONCE DAILY       When do you need the refill by: ASAP    What details did the patient provide when requesting the medication: Patient said has 2 left. Said that [facility] sent request but we sent this to [facility]. Wants this re-sent to [facility] in [place].    Does the patient have less than a 3 day supply:  [x] Yes  [] No    What is the patient's preferred pharmacy: [address] [facility] [address] - [phone] [facility] [phone]

## 2020-11-13 DIAGNOSIS — E11.42 TYPE 2 DIABETES MELLITUS WITH DIABETIC POLYNEUROPATHY, WITHOUT LONG-TERM CURRENT USE OF INSULIN (HCC): ICD-10-CM

## 2020-11-13 DIAGNOSIS — E11.42 TYPE 2 DIABETES MELLITUS WITH DIABETIC POLYNEUROPATHY, WITHOUT LONG-TERM CURRENT USE OF INSULIN (HCC): Primary | ICD-10-CM

## 2020-11-13 DIAGNOSIS — E11.9 TYPE 2 DIABETES MELLITUS WITHOUT COMPLICATION, WITHOUT LONG-TERM CURRENT USE OF INSULIN (HCC): ICD-10-CM

## 2020-11-16 LAB
ALBUMIN SERPL-MCNC: 4.9 G/DL (ref 3.5–5.2)
ALBUMIN/GLOB SERPL: 2 G/DL
ALP SERPL-CCNC: 89 U/L (ref 39–117)
ALT SERPL-CCNC: 44 U/L (ref 1–41)
AST SERPL-CCNC: 30 U/L (ref 1–40)
BILIRUB SERPL-MCNC: 0.2 MG/DL (ref 0–1.2)
BUN SERPL-MCNC: 16 MG/DL (ref 6–20)
BUN/CREAT SERPL: 18 (ref 7–25)
CALCIUM SERPL-MCNC: 10.5 MG/DL (ref 8.6–10.5)
CHLORIDE SERPL-SCNC: 96 MMOL/L (ref 98–107)
CO2 SERPL-SCNC: 26.3 MMOL/L (ref 22–29)
CREAT SERPL-MCNC: 0.89 MG/DL (ref 0.76–1.27)
GLOBULIN SER CALC-MCNC: 2.5 GM/DL
GLUCOSE SERPL-MCNC: 209 MG/DL (ref 65–99)
HBA1C MFR BLD: 8.5 % (ref 4.8–5.6)
POTASSIUM SERPL-SCNC: 5.4 MMOL/L (ref 3.5–5.2)
PROT SERPL-MCNC: 7.4 G/DL (ref 6–8.5)
SODIUM SERPL-SCNC: 134 MMOL/L (ref 136–145)

## 2020-11-17 ENCOUNTER — OFFICE VISIT (OUTPATIENT)
Dept: ORTHOPEDIC SURGERY | Facility: CLINIC | Age: 60
End: 2020-11-17

## 2020-11-17 VITALS — TEMPERATURE: 98 F | BODY MASS INDEX: 37.99 KG/M2 | HEIGHT: 65 IN | WEIGHT: 228 LBS

## 2020-11-17 DIAGNOSIS — M54.50 LUMBAR PAIN: Primary | ICD-10-CM

## 2020-11-17 PROCEDURE — 99213 OFFICE O/P EST LOW 20 MIN: CPT | Performed by: ORTHOPAEDIC SURGERY

## 2020-11-17 PROCEDURE — 72100 X-RAY EXAM L-S SPINE 2/3 VWS: CPT | Performed by: ORTHOPAEDIC SURGERY

## 2020-11-17 NOTE — PROGRESS NOTES
He is 4 months out and doing very well from his lumbar laminectomy and fusion with instrumentation.  No leg pain numbness tingling only occasional back pain.  Moves the legs well and they are strong.  2 view x-rays of the lumbar spine show good position of graft and implants further healing since prior films.  Continue to stimulator, instructions given and follow-up as needed

## 2020-11-19 DIAGNOSIS — E11.65 UNCONTROLLED TYPE 2 DIABETES MELLITUS WITH HYPERGLYCEMIA (HCC): ICD-10-CM

## 2020-11-19 RX ORDER — IBUPROFEN 200 MG
1 TABLET ORAL 3 TIMES DAILY
Qty: 100 EACH | Refills: 0 | Status: SHIPPED | OUTPATIENT
Start: 2020-11-19 | End: 2021-03-09 | Stop reason: SDUPTHER

## 2020-11-23 ENCOUNTER — OFFICE VISIT (OUTPATIENT)
Dept: FAMILY MEDICINE CLINIC | Facility: CLINIC | Age: 60
End: 2020-11-23

## 2020-11-23 VITALS
RESPIRATION RATE: 18 BRPM | SYSTOLIC BLOOD PRESSURE: 144 MMHG | HEART RATE: 80 BPM | WEIGHT: 233.5 LBS | DIASTOLIC BLOOD PRESSURE: 86 MMHG | HEIGHT: 65 IN | BODY MASS INDEX: 38.9 KG/M2

## 2020-11-23 DIAGNOSIS — M54.31 BILATERAL SCIATICA: ICD-10-CM

## 2020-11-23 DIAGNOSIS — M54.32 BILATERAL SCIATICA: ICD-10-CM

## 2020-11-23 DIAGNOSIS — Z13.29 SCREENING FOR THYROID DISORDER: ICD-10-CM

## 2020-11-23 DIAGNOSIS — G89.29 CHRONIC BILATERAL LOW BACK PAIN WITHOUT SCIATICA: ICD-10-CM

## 2020-11-23 DIAGNOSIS — E66.01 MORBIDLY OBESE (HCC): ICD-10-CM

## 2020-11-23 DIAGNOSIS — M54.50 CHRONIC BILATERAL LOW BACK PAIN WITHOUT SCIATICA: ICD-10-CM

## 2020-11-23 DIAGNOSIS — E11.65 UNCONTROLLED TYPE 2 DIABETES MELLITUS WITH HYPERGLYCEMIA (HCC): Primary | ICD-10-CM

## 2020-11-23 DIAGNOSIS — Z12.5 PROSTATE CANCER SCREENING: ICD-10-CM

## 2020-11-23 DIAGNOSIS — M48.062 SPINAL STENOSIS OF LUMBAR REGION WITH NEUROGENIC CLAUDICATION: ICD-10-CM

## 2020-11-23 DIAGNOSIS — M47.816 LUMBAR FACET ARTHROPATHY: ICD-10-CM

## 2020-11-23 DIAGNOSIS — I10 ESSENTIAL HYPERTENSION: ICD-10-CM

## 2020-11-23 DIAGNOSIS — F32.9 REACTIVE DEPRESSION: ICD-10-CM

## 2020-11-23 PROCEDURE — 99214 OFFICE O/P EST MOD 30 MIN: CPT | Performed by: NURSE PRACTITIONER

## 2020-11-23 RX ORDER — GABAPENTIN 400 MG/1
400 CAPSULE ORAL 3 TIMES DAILY
Qty: 90 CAPSULE | Refills: 0 | Status: SHIPPED | OUTPATIENT
Start: 2020-11-23 | End: 2020-12-22

## 2020-11-23 RX ORDER — BUPROPION HYDROCHLORIDE 150 MG/1
150 TABLET ORAL DAILY
Qty: 30 TABLET | Refills: 2 | Status: SHIPPED | OUTPATIENT
Start: 2020-11-23 | End: 2021-04-27

## 2020-11-23 RX ORDER — BUPROPION HYDROCHLORIDE 150 MG/1
150 TABLET ORAL DAILY
Qty: 30 TABLET | Refills: 2 | Status: SHIPPED | OUTPATIENT
Start: 2020-11-23 | End: 2020-11-23

## 2020-11-23 NOTE — PROGRESS NOTES
Answers for HPI/ROS submitted by the patient on 11/21/2020   Diabetes problem  What is the primary reason for your visit?: Diabetes    Patient ID: Lloyd Greene is a 60 y.o. male     Patient Care Team:  Lennie Victor MD as PCP - General (Family Medicine)    Subjective     Chief Complaint   Patient presents with   • Diabetes   • Depression       History of Present Illness    Lloyd Greene presents to the office today for continued management concerning diabetes and depression.  Since he was last seen, he feels his depression has worsened especially over the last month or 2.  He is unsure if related to pandemic or recent back surgery.  Unable to exercise due to pain from surgery.  He has been taking Effexor 100 mg twice a day.  Denies any suicidal thoughts or ideation.  He has excessive sleep and also feels he overeats due to the depressed mood.  Concerning his diabetes, he feels he has been watching his diet like he should by limiting his sugar and carb intake.  He used to eat fruit frequently however has decreased due to the amount of sugar and fruit.  He also switched from soft drinks to flavored water.  He does admit to drinking 1-2 vitamin krueger a day.  Fasting blood sugars have been staying around 1 82-80 in the morning and 1 45-3 20 3 in the evening.  When he was last seen by Dr. Victor, he is metformin was increased to 1000 mg twice a day.  Prior to his back surgery he states he was on Janumet.  He is unsure why this was discontinued.  He denies any problems with liver kidney function test.  Janumet was not stopped due to cost of medication.  States was affordable.  Diabetes is also managed with Levemir 30 units nightly along with NovoLog 5 units 3 times daily with meals.  States he also needs refill on gabapentin 400 mg which he takes 3 times a day for management of his chronic back pain with lumbar stenosis.  Along with diabetic neuropathy.    Health Habits:  Dental Exam: Up to date  Eye Exam:  Scheduled tomorrow.    Diet: Decreased sugar and carb intake.  Stopped soft drinks and now drinks flavored water.    Exercise: None  Current exercise activities include: N/A      He denies any complaints of fever, chills, cough, chest pain, shortness of air, abdominal pain, nausea, or any other concerns.     The following portions of the patient's history were reviewed and updated as appropriate: allergies, current medications, past family history, past medical history, past social history, past surgical history and problem list.       Review of Systems   Constitution: Negative.   HENT: Negative.    Eyes: Negative.    Cardiovascular: Negative.    Respiratory: Negative.    Endocrine: Positive for polyphagia.        Uncontrolled blood sugars   Hematologic/Lymphatic: Negative.    Skin: Negative.    Musculoskeletal: Negative.    Gastrointestinal: Negative.    Genitourinary: Negative.    Neurological: Negative.    Psychiatric/Behavioral: Positive for depression.       Vitals:    11/23/20 0904   BP: 144/86   Pulse: 80   Resp: 18       Documented weights    11/23/20 0904   Weight: 106 kg (233 lb 8 oz)     Body mass index is 38.86 kg/m².    Results for orders placed or performed in visit on 11/13/20   Comprehensive metabolic panel    Specimen: Blood   Result Value Ref Range    Glucose 209 (H) 65 - 99 mg/dL    BUN 16 6 - 20 mg/dL    Creatinine 0.89 0.76 - 1.27 mg/dL    eGFR Non African Am 88 >60 mL/min/1.73    eGFR African Am 106 >60 mL/min/1.73    BUN/Creatinine Ratio 18.0 7.0 - 25.0    Sodium 134 (L) 136 - 145 mmol/L    Potassium 5.4 (H) 3.5 - 5.2 mmol/L    Chloride 96 (L) 98 - 107 mmol/L    Total CO2 26.3 22.0 - 29.0 mmol/L    Calcium 10.5 8.6 - 10.5 mg/dL    Total Protein 7.4 6.0 - 8.5 g/dL    Albumin 4.90 3.50 - 5.20 g/dL    Globulin 2.5 gm/dL    A/G Ratio 2.0 g/dL    Total Bilirubin 0.2 0.0 - 1.2 mg/dL    Alkaline Phosphatase 89 39 - 117 U/L    AST (SGOT) 30 1 - 40 U/L    ALT (SGPT) 44 (H) 1 - 41 U/L   Hemoglobin A1c     Specimen: Blood   Result Value Ref Range    Hemoglobin A1C 8.50 (H) 4.80 - 5.60 %       Objective     Physical Exam  Vitals signs reviewed.   Constitutional:       General: He is not in acute distress.     Comments: Morbidly obese with BMI of 38.86 and comorbidities of diabetes and hypertension.   HENT:      Head: Normocephalic and atraumatic.   Eyes:      Extraocular Movements: Extraocular movements intact.      Pupils: Pupils are equal, round, and reactive to light.   Neck:      Musculoskeletal: Normal range of motion.   Cardiovascular:      Rate and Rhythm: Normal rate and regular rhythm.      Heart sounds: No murmur.   Pulmonary:      Effort: Pulmonary effort is normal.      Breath sounds: Normal breath sounds. No wheezing.   Abdominal:      General: There is no distension.      Palpations: Abdomen is soft.   Musculoskeletal: Normal range of motion.      Right lower leg: No edema.      Left lower leg: No edema.   Skin:     General: Skin is warm and dry.   Neurological:      Mental Status: He is alert and oriented to person, place, and time.   Psychiatric:         Mood and Affect: Mood normal.       Patient screened positive for depression based on a PHQ-9 score of 15 on 11/23/2020. Follow-up recommendations include: Prescribed antidepressant medication treatment.       Assessment/Plan     Assessment/Plan     Diagnoses and all orders for this visit:    1. Uncontrolled type 2 diabetes mellitus with hyperglycemia (CMS/[facility]) (Primary)  -     SITagliptin (Januvia) 100 MG tablet; Take 1 tablet by mouth Daily.  Dispense: 28 tablet; Refill: 0  -     Microalbumin / Creatinine Urine Ratio - Urine, Clean Catch; Future  -     Hemoglobin A1c; Future  -     Comprehensive Metabolic Panel; Future    2. Reactive depression  -     Discontinue: buPROPion XL (Wellbutrin XL) 150 MG 24 hr tablet; Take 1 tablet by mouth Daily.  Dispense: 30 tablet; Refill: 2  -     buPROPion XL (Wellbutrin XL) 150 MG 24 hr tablet; Take 1 tablet by  mouth Daily.  Dispense: 30 tablet; Refill: 2  -     Comprehensive Metabolic Panel; Future  -     TSH; Future    3. Spinal stenosis of lumbar region with neurogenic claudication    4. Chronic bilateral low back pain without sciatica  -     gabapentin (NEURONTIN) 400 MG capsule; Take 1 capsule by mouth 3 (Three) Times a Day.  Dispense: 90 capsule; Refill: 0    5. Bilateral sciatica  -     gabapentin (NEURONTIN) 400 MG capsule; Take 1 capsule by mouth 3 (Three) Times a Day.  Dispense: 90 capsule; Refill: 0    6. Lumbar facet arthropathy  -     gabapentin (NEURONTIN) 400 MG capsule; Take 1 capsule by mouth 3 (Three) Times a Day.  Dispense: 90 capsule; Refill: 0    7. Morbidly obese (CMS/HCC)    8. Prostate cancer screening  -     PSA Screen; Future    9. Essential hypertension  -     CBC & Differential; Future  -     Lipid Panel; Future    10. Screening for thyroid disorder  -     TSH; Future          Summary:  Lloyd KOSTA Greene present office today for follow-up concerning his diabetes.  He is becoming discouraged due to watching his diet and limiting his sugar and carb intake however has not noticed improvement in fasting blood sugars at home.  States blood sugars are all over the place and range usually fasting 200s to 300s.  He is unsure why Janumet was stopped however states this was done around the time of his back surgery.  Currently on metformin 1000 mg twice a day.  He recently had this filled.  Instructed lets add Januvia back into medication regimen to see if helps.  Samples given for 100 mg to be taken once a day.  He is to call us before before he runs out for prescription to be sent to his pharmacy if he tolerates without any problems and notices improvements in fasting blood sugars.  Goal is to have fasting blood sugars less than 130.  We will continue Levemir and NovoLog at current dosage.  Continue to watch diet and limit sugar and carb intake.  Hopefully will be able to advance his exercise and  activity level up to 150 minutes a week.  Continue to monitor fasting blood sugars twice a day at home and keep a log to bring with him to next appointment.  He also scored a 15 on depression screening which is considered moderate to severe.  Currently taking Effexor 100 mg twice a day.  This previously helped control his symptoms however worsened.  He feels this is likely related to his recent surgery.  The current pandemic may also have contributing factors to this.  He was on Wellbutrin at one time.  Stated he tolerated in the past without any adverse effects.  We will add Wellbutrin  mg to the current medication regimen to see if helps.  He knows to contact us if symptoms worsen or seek help immediately if he has any suicidal thoughts.  We will have him return in 3 months for next recheck appointment with fasting labs with Dr. Victor.    In the meantime, instructed to contact us sooner for any problems or concerns.    Patient Instructions   Living With Depression  Everyone experiences occasional disappointment, sadness, and loss in their lives. When you are feeling down, blue, or sad for at least 2 weeks in a row, it may mean that you have depression. Depression can affect your thoughts and feelings, relationships, daily activities, and physical health. It is caused by changes in the way your brain functions. If you receive a diagnosis of depression, your health care provider will tell you which type of depression you have and what treatment options are available to you.  If you are living with depression, there are ways to help you recover from it and also ways to prevent it from coming back.  How to cope with lifestyle changes  Coping with stress         Stress is your body’s reaction to life changes and events, both good and bad. Stressful situations may include:  · Getting .  · The death of a spouse.  · Losing a job.  · Retiring.  · Having a baby.  Stress can last just a few hours or it can be  ongoing. Stress can play a major role in depression, so it is important to learn both how to cope with stress and how to think about it differently.  Talk with your health care provider or a counselor if you would like to learn more about stress reduction. He or she may suggest some stress reduction techniques, such as:  · Music therapy. This can include creating music or listening to music. Choose music that you enjoy and that inspires you.  · Mindfulness-based meditation. This kind of meditation can be done while sitting or walking. It involves being aware of your normal breaths, rather than trying to control your breathing.  · Centering prayer. This is a kind of meditation that involves focusing on a spiritual word or phrase. Choose a word, phrase, or sacred image that is meaningful to you and that brings you peace.  · Deep breathing. To do this, expand your stomach and inhale slowly through your nose. Hold your breath for 3-5 seconds, then exhale slowly, allowing your stomach muscles to relax.  · Muscle relaxation. This involves intentionally tensing muscles then relaxing them.  Choose a stress reduction technique that fits your lifestyle and personality. Stress reduction techniques take time and practice to develop. Set aside 5-15 minutes a day to do them. Therapists can offer training in these techniques. The training may be covered by some insurance plans. Other things you can do to manage stress include:  · Keeping a stress diary. This can help you learn what triggers your stress and ways to control your response.  · Understanding what your limits are and saying no to requests or events that lead to a schedule that is too full.  · Thinking about how you respond to certain situations. You may not be able to control everything, but you can control how you react.  · Adding humor to your life by watching funny films or TV shows.  · Making time for activities that help you relax and not feeling guilty about  spending your time this way.    Medicines  Your health care provider may suggest certain medicines if he or she feels that they will help improve your condition. Avoid using alcohol and other substances that may prevent your medicines from working properly (may interact). It is also important to:  · Talk with your pharmacist or health care provider about all the medicines that you take, their possible side effects, and what medicines are safe to take together.  · Make it your goal to take part in all treatment decisions (shared decision-making). This includes giving input on the side effects of medicines. It is best if shared decision-making with your health care provider is part of your total treatment plan.  If your health care provider prescribes a medicine, you may not notice the full benefits of it for 4-8 weeks. Most people who are treated for depression need to be on medicine for at least 6-12 months after they feel better. If you are taking medicines as part of your treatment, do not stop taking medicines without first talking to your health care provider. You may need to have the medicine slowly decreased (tapered) over time to decrease the risk of harmful side effects.  Relationships  Your health care provider may suggest family therapy along with individual therapy and drug therapy. While there may not be family problems that are causing you to feel depressed, it is still important to make sure your family learns as much as they can about your mental health. Having your family’s support can help make your treatment successful.  How to recognize changes in your condition  Everyone has a different response to treatment for depression. Recovery from major depression happens when you have not had signs of major depression for two months. This may mean that you will start to:  · Have more interest in doing activities.  · Feel less hopeless than you did 2 months ago.  · Have more energy.  · Overeat less often,  or have better or improving appetite.  · Have better concentration.  Your health care provider will work with you to decide the next steps in your recovery. It is also important to recognize when your condition is getting worse. Watch for these signs:  · Having fatigue or low energy.  · Eating too much or too little.  · Sleeping too much or too little.  · Feeling restless, agitated, or hopeless.  · Having trouble concentrating or making decisions.  · Having unexplained physical complaints.  · Feeling irritable, angry, or aggressive.  Get help as soon as you or your family members notice these symptoms coming back.  How to get support and help from others  How to talk with friends and family members about your condition    Talking to friends and family members about your condition can provide you with one way to get support and guidance. Reach out to trusted friends or family members, explain your symptoms to them, and let them know that you are working with a health care provider to treat your depression.  Financial resources  Not all insurance plans cover mental health care, so it is important to check with your insurance carrier. If paying for co-pays or counseling services is a problem, search for a [facility] or [facility] mental health care center. They may be able to offer public mental health care services at low or no cost when you are not able to see a private health care provider.  If you are taking medicine for depression, you may be able to get the generic form, which may be less expensive. Some makers of prescription medicines also offer help to patients who cannot afford the medicines they need.  Follow these instructions at home:    · Get the right amount and quality of sleep.  · Cut down on using caffeine, tobacco, alcohol, and other potentially harmful substances.  · Try to exercise, such as walking or lifting small weights.  · Take over-the-counter and prescription medicines only as told by your health care  provider.  · Eat a healthy diet that includes plenty of vegetables, fruits, whole grains, low-fat dairy products, and lean protein. Do not eat a lot of foods that are high in solid fats, added sugars, or salt.  · Keep all follow-up visits as told by your health care provider. This is important.  Contact a health care provider if:  · You stop taking your antidepressant medicines, and you have any of these symptoms:  ? Nausea.  ? Headache.  ? Feeling lightheaded.  ? Chills and body aches.  ? Not being able to sleep (insomnia).  · You or your friends and family think your depression is getting worse.  Get help right away if:  · You have thoughts of hurting yourself or others.  If you ever feel like you may hurt yourself or others, or have thoughts about taking your own life, get help right away. You can go to your nearest emergency department or call:  · Your local emergency services (911 in the [place]  · A suicide crisis helpline, such as the National Suicide Prevention Lifeline at 1-553.124.5998. This is open 24-hours a day.  Summary  · If you are living with depression, there are ways to help you recover from it and also ways to prevent it from coming back.  · Work with your health care team to create a management plan that includes counseling, stress management techniques, and healthy lifestyle habits.  This information is not intended to replace advice given to you by your health care provider. Make sure you discuss any questions you have with your health care provider.  Document Released: 11/20/2017 Document Revised: 04/10/2020 Document Reviewed: 11/20/2017  Elsevier Patient Education © 2020 [employer]    Diabetes Mellitus and Nutrition, Adult  When you have diabetes (diabetes mellitus), it is very important to have healthy eating habits because your blood sugar (glucose) levels are greatly affected by what you eat and drink. Eating healthy foods in the appropriate amounts, at about the same times every day,  can help you:  · Control your blood glucose.  · Lower your risk of heart disease.  · Improve your blood pressure.  · Reach or maintain a healthy weight.  Every person with diabetes is different, and each person has different needs for a meal plan. Your health care provider may recommend that you work with a diet and nutrition specialist (dietitian) to make a meal plan that is best for you. Your meal plan may vary depending on factors such as:  · The calories you need.  · The medicines you take.  · Your weight.  · Your blood glucose, blood pressure, and cholesterol levels.  · Your activity level.  · Other health conditions you have, such as heart or kidney disease.  How do carbohydrates affect me?  Carbohydrates, also called carbs, affect your blood glucose level more than any other type of food. Eating carbs naturally raises the amount of glucose in your blood. Carb counting is a method for keeping track of how many carbs you eat. Counting carbs is important to keep your blood glucose at a healthy level, especially if you use insulin or take certain oral diabetes medicines.  It is important to know how many carbs you can safely have in each meal. This is different for every person. Your dietitian can help you calculate how many carbs you should have at each meal and for each snack.  Foods that contain carbs include:  · Bread, cereal, rice, pasta, and crackers.  · Potatoes and corn.  · Peas, beans, and lentils.  · Milk and yogurt.  · Fruit and juice.  · Desserts, such as cakes, cookies, ice cream, and candy.  How does alcohol affect me?  Alcohol can cause a sudden decrease in blood glucose (hypoglycemia), especially if you use insulin or take certain oral diabetes medicines. Hypoglycemia can be a life-threatening condition. Symptoms of hypoglycemia (sleepiness, dizziness, and confusion) are similar to symptoms of having too much alcohol.  If your health care provider says that alcohol is safe for you, follow these  "guidelines:  · Limit alcohol intake to no more than 1 drink per day for nonpregnant women and 2 drinks per day for men. One drink equals 12 oz of beer, 5 oz of wine, or 1½ oz of hard liquor.  · Do not drink on an empty stomach.  · Keep yourself hydrated with water, diet soda, or unsweetened iced tea.  · Keep in mind that regular soda, juice, and other mixers may contain a lot of sugar and must be counted as carbs.  What are tips for following this plan?    Reading food labels  · Start by checking the serving size on the \"Nutrition Facts\" label of packaged foods and drinks. The amount of calories, carbs, fats, and other nutrients listed on the label is based on one serving of the item. Many items contain more than one serving per package.  · Check the total grams (g) of carbs in one serving. You can calculate the number of servings of carbs in one serving by dividing the total carbs by 15. For example, if a food has 30 g of total carbs, it would be equal to 2 servings of carbs.  · Check the number of grams (g) of saturated and trans fats in one serving. Choose foods that have low or no amount of these fats.  · Check the number of milligrams (mg) of salt (sodium) in one serving. Most people should limit total sodium intake to less than 2,300 mg per day.  · Always check the nutrition information of foods labeled as \"low-fat\" or \"nonfat\". These foods may be higher in added sugar or refined carbs and should be avoided.  · Talk to your dietitian to identify your daily goals for nutrients listed on the label.  Shopping  · Avoid buying canned, premade, or processed foods. These foods tend to be high in fat, sodium, and added sugar.  · Shop around the outside edge of the grocery store. This includes fresh fruits and vegetables, bulk grains, fresh meats, and fresh dairy.  Cooking  · Use low-heat cooking methods, such as baking, instead of high-heat cooking methods like deep frying.  · Cook using healthy oils, such as olive, " canola, or sunflower oil.  · Avoid cooking with butter, cream, or high-fat meats.  Meal planning  · Eat meals and snacks regularly, preferably at the same times every day. Avoid going long periods of time without eating.  · Eat foods high in fiber, such as fresh fruits, vegetables, beans, and whole grains. Talk to your dietitian about how many servings of carbs you can eat at each meal.  · Eat 4-6 ounces (oz) of lean protein each day, such as lean meat, chicken, fish, eggs, or tofu. One oz of lean protein is equal to:  ? 1 oz of meat, chicken, or fish.  ? 1 egg.  ? ¼ cup of tofu.  · Eat some foods each day that contain healthy fats, such as avocado, nuts, seeds, and fish.  Lifestyle  · Check your blood glucose regularly.  · Exercise regularly as told by your health care provider. This may include:  ? 150 minutes of moderate-intensity or vigorous-intensity exercise each week. This could be brisk walking, biking, or water aerobics.  ? Stretching and doing strength exercises, such as yoga or weightlifting, at least 2 times a week.  · Take medicines as told by your health care provider.  · Do not use any products that contain nicotine or tobacco, such as cigarettes and e-cigarettes. If you need help quitting, ask your health care provider.  · Work with a counselor or diabetes educator to identify strategies to manage stress and any emotional and social challenges.  Questions to ask a health care provider  · Do I need to meet with a diabetes educator?  · Do I need to meet with a dietitian?  · What number can I call if I have questions?  · When are the best times to check my blood glucose?  Where to find more information:  · American Diabetes Association: diabetes.org  · Academy of Nutrition and Dietetics: www.eatright.org  · [place] of Diabetes and Digestive and Kidney Diseases (NIH): www.niddk.nih.gov  Summary  · A healthy meal plan will help you control your blood glucose and maintain a healthy  lifestyle.  · Working with a diet and nutrition specialist (dietitian) can help you make a meal plan that is best for you.  · Keep in mind that carbohydrates (carbs) and alcohol have immediate effects on your blood glucose levels. It is important to count carbs and to use alcohol carefully.  This information is not intended to replace advice given to you by your health care provider. Make sure you discuss any questions you have with your health care provider.  Document Released: 09/14/2006 Document Revised: 11/30/2018 Document Reviewed: 01/22/2018  [employer] Patient Education © 2020 [employer]        Patient was wearing facemask when I entered the room and throughout our encounter. Protective equipment was worn throughout this patient encounter including a face mask and gloves. Hand hygiene was performed before donning protective equipment and after removal when leaving the room.     Geno Roy, SOFIA  Family Medicine  [facility] [place]  11/23/20  10:20 EST

## 2020-11-23 NOTE — PATIENT INSTRUCTIONS
Living With Depression  Everyone experiences occasional disappointment, sadness, and loss in their lives. When you are feeling down, blue, or sad for at least 2 weeks in a row, it may mean that you have depression. Depression can affect your thoughts and feelings, relationships, daily activities, and physical health. It is caused by changes in the way your brain functions. If you receive a diagnosis of depression, your health care provider will tell you which type of depression you have and what treatment options are available to you.  If you are living with depression, there are ways to help you recover from it and also ways to prevent it from coming back.  How to cope with lifestyle changes  Coping with stress         Stress is your body’s reaction to life changes and events, both good and bad. Stressful situations may include:  · Getting .  · The death of a spouse.  · Losing a job.  · Retiring.  · Having a baby.  Stress can last just a few hours or it can be ongoing. Stress can play a major role in depression, so it is important to learn both how to cope with stress and how to think about it differently.  Talk with your health care provider or a counselor if you would like to learn more about stress reduction. He or she may suggest some stress reduction techniques, such as:  · Music therapy. This can include creating music or listening to music. Choose music that you enjoy and that inspires you.  · Mindfulness-based meditation. This kind of meditation can be done while sitting or walking. It involves being aware of your normal breaths, rather than trying to control your breathing.  · Centering prayer. This is a kind of meditation that involves focusing on a spiritual word or phrase. Choose a word, phrase, or sacred image that is meaningful to you and that brings you peace.  · Deep breathing. To do this, expand your stomach and inhale slowly through your nose. Hold your breath for 3-5 seconds, then exhale  slowly, allowing your stomach muscles to relax.  · Muscle relaxation. This involves intentionally tensing muscles then relaxing them.  Choose a stress reduction technique that fits your lifestyle and personality. Stress reduction techniques take time and practice to develop. Set aside 5-15 minutes a day to do them. Therapists can offer training in these techniques. The training may be covered by some insurance plans. Other things you can do to manage stress include:  · Keeping a stress diary. This can help you learn what triggers your stress and ways to control your response.  · Understanding what your limits are and saying no to requests or events that lead to a schedule that is too full.  · Thinking about how you respond to certain situations. You may not be able to control everything, but you can control how you react.  · Adding humor to your life by watching funny films or TV shows.  · Making time for activities that help you relax and not feeling guilty about spending your time this way.    Medicines  Your health care provider may suggest certain medicines if he or she feels that they will help improve your condition. Avoid using alcohol and other substances that may prevent your medicines from working properly (may interact). It is also important to:  · Talk with your pharmacist or health care provider about all the medicines that you take, their possible side effects, and what medicines are safe to take together.  · Make it your goal to take part in all treatment decisions (shared decision-making). This includes giving input on the side effects of medicines. It is best if shared decision-making with your health care provider is part of your total treatment plan.  If your health care provider prescribes a medicine, you may not notice the full benefits of it for 4-8 weeks. Most people who are treated for depression need to be on medicine for at least 6-12 months after they feel better. If you are taking  medicines as part of your treatment, do not stop taking medicines without first talking to your health care provider. You may need to have the medicine slowly decreased (tapered) over time to decrease the risk of harmful side effects.  Relationships  Your health care provider may suggest family therapy along with individual therapy and drug therapy. While there may not be family problems that are causing you to feel depressed, it is still important to make sure your family learns as much as they can about your mental health. Having your family’s support can help make your treatment successful.  How to recognize changes in your condition  Everyone has a different response to treatment for depression. Recovery from major depression happens when you have not had signs of major depression for two months. This may mean that you will start to:  · Have more interest in doing activities.  · Feel less hopeless than you did 2 months ago.  · Have more energy.  · Overeat less often, or have better or improving appetite.  · Have better concentration.  Your health care provider will work with you to decide the next steps in your recovery. It is also important to recognize when your condition is getting worse. Watch for these signs:  · Having fatigue or low energy.  · Eating too much or too little.  · Sleeping too much or too little.  · Feeling restless, agitated, or hopeless.  · Having trouble concentrating or making decisions.  · Having unexplained physical complaints.  · Feeling irritable, angry, or aggressive.  Get help as soon as you or your family members notice these symptoms coming back.  How to get support and help from others  How to talk with friends and family members about your condition    Talking to friends and family members about your condition can provide you with one way to get support and guidance. Reach out to trusted friends or family members, explain your symptoms to them, and let them know that you are  working with a health care provider to treat your depression.  Financial resources  Not all insurance plans cover mental health care, so it is important to check with your insurance carrier. If paying for co-pays or counseling services is a problem, search for a local or [facility] mental [facility] care center. They may be able to offer public mental health care services at low or no cost when you are not able to see a private health care provider.  If you are taking medicine for depression, you may be able to get the generic form, which may be less expensive. Some makers of prescription medicines also offer help to patients who cannot afford the medicines they need.  Follow these instructions at home:    · Get the right amount and quality of sleep.  · Cut down on using caffeine, tobacco, alcohol, and other potentially harmful substances.  · Try to exercise, such as walking or lifting small weights.  · Take over-the-counter and prescription medicines only as told by your health care provider.  · Eat a healthy diet that includes plenty of vegetables, fruits, whole grains, low-fat dairy products, and lean protein. Do not eat a lot of foods that are high in solid fats, added sugars, or salt.  · Keep all follow-up visits as told by your health care provider. This is important.  Contact a health care provider if:  · You stop taking your antidepressant medicines, and you have any of these symptoms:  ? Nausea.  ? Headache.  ? Feeling lightheaded.  ? Chills and body aches.  ? Not being able to sleep (insomnia).  · You or your friends and family think your depression is getting worse.  Get help right away if:  · You have thoughts of hurting yourself or others.  If you ever feel like you may hurt yourself or others, or have thoughts about taking your own life, get help right away. You can go to your nearest emergency department or call:  · Your local emergency services (911 in the [place]  · A suicide crisis helpline, such as the  National Suicide Prevention Lifeline at 1-666.125.9021. This is open 24-hours a day.  Summary  · If you are living with depression, there are ways to help you recover from it and also ways to prevent it from coming back.  · Work with your health care team to create a management plan that includes counseling, stress management techniques, and healthy lifestyle habits.  This information is not intended to replace advice given to you by your health care provider. Make sure you discuss any questions you have with your health care provider.  Document Released: 11/20/2017 Document Revised: 04/10/2020 Document Reviewed: 11/20/2017  [employer] Patient Education © 2020 [employer]    Diabetes Mellitus and Nutrition, Adult  When you have diabetes (diabetes mellitus), it is very important to have healthy eating habits because your blood sugar (glucose) levels are greatly affected by what you eat and drink. Eating healthy foods in the appropriate amounts, at about the same times every day, can help you:  · Control your blood glucose.  · Lower your risk of heart disease.  · Improve your blood pressure.  · Reach or maintain a healthy weight.  Every person with diabetes is different, and each person has different needs for a meal plan. Your health care provider may recommend that you work with a diet and nutrition specialist (dietitian) to make a meal plan that is best for you. Your meal plan may vary depending on factors such as:  · The calories you need.  · The medicines you take.  · Your weight.  · Your blood glucose, blood pressure, and cholesterol levels.  · Your activity level.  · Other health conditions you have, such as heart or kidney disease.  How do carbohydrates affect me?  Carbohydrates, also called carbs, affect your blood glucose level more than any other type of food. Eating carbs naturally raises the amount of glucose in your blood. Carb counting is a method for keeping track of how many carbs you eat. Counting  "carbs is important to keep your blood glucose at a healthy level, especially if you use insulin or take certain oral diabetes medicines.  It is important to know how many carbs you can safely have in each meal. This is different for every person. Your dietitian can help you calculate how many carbs you should have at each meal and for each snack.  Foods that contain carbs include:  · Bread, cereal, rice, pasta, and crackers.  · Potatoes and corn.  · Peas, beans, and lentils.  · Milk and yogurt.  · Fruit and juice.  · Desserts, such as cakes, cookies, ice cream, and candy.  How does alcohol affect me?  Alcohol can cause a sudden decrease in blood glucose (hypoglycemia), especially if you use insulin or take certain oral diabetes medicines. Hypoglycemia can be a life-threatening condition. Symptoms of hypoglycemia (sleepiness, dizziness, and confusion) are similar to symptoms of having too much alcohol.  If your health care provider says that alcohol is safe for you, follow these guidelines:  · Limit alcohol intake to no more than 1 drink per day for nonpregnant women and 2 drinks per day for men. One drink equals 12 oz of beer, 5 oz of wine, or 1½ oz of hard liquor.  · Do not drink on an empty stomach.  · Keep yourself hydrated with water, diet soda, or unsweetened iced tea.  · Keep in mind that regular soda, juice, and other mixers may contain a lot of sugar and must be counted as carbs.  What are tips for following this plan?    Reading food labels  · Start by checking the serving size on the \"Nutrition Facts\" label of packaged foods and drinks. The amount of calories, carbs, fats, and other nutrients listed on the label is based on one serving of the item. Many items contain more than one serving per package.  · Check the total grams (g) of carbs in one serving. You can calculate the number of servings of carbs in one serving by dividing the total carbs by 15. For example, if a food has 30 g of total carbs, it " "would be equal to 2 servings of carbs.  · Check the number of grams (g) of saturated and trans fats in one serving. Choose foods that have low or no amount of these fats.  · Check the number of milligrams (mg) of salt (sodium) in one serving. Most people should limit total sodium intake to less than 2,300 mg per day.  · Always check the nutrition information of foods labeled as \"low-fat\" or \"nonfat\". These foods may be higher in added sugar or refined carbs and should be avoided.  · Talk to your dietitian to identify your daily goals for nutrients listed on the label.  Shopping  · Avoid buying canned, premade, or processed foods. These foods tend to be high in fat, sodium, and added sugar.  · Shop around the outside edge of the grocery store. This includes fresh fruits and vegetables, bulk grains, fresh meats, and fresh dairy.  Cooking  · Use low-heat cooking methods, such as baking, instead of high-heat cooking methods like deep frying.  · Cook using healthy oils, such as olive, canola, or sunflower oil.  · Avoid cooking with butter, cream, or high-fat meats.  Meal planning  · Eat meals and snacks regularly, preferably at the same times every day. Avoid going long periods of time without eating.  · Eat foods high in fiber, such as fresh fruits, vegetables, beans, and whole grains. Talk to your dietitian about how many servings of carbs you can eat at each meal.  · Eat 4-6 ounces (oz) of lean protein each day, such as lean meat, chicken, fish, eggs, or tofu. One oz of lean protein is equal to:  ? 1 oz of meat, chicken, or fish.  ? 1 egg.  ? ¼ cup of tofu.  · Eat some foods each day that contain healthy fats, such as avocado, nuts, seeds, and fish.  Lifestyle  · Check your blood glucose regularly.  · Exercise regularly as told by your health care provider. This may include:  ? 150 minutes of moderate-intensity or vigorous-intensity exercise each week. This could be brisk walking, biking, or water " aerobics.  ? Stretching and doing strength exercises, such as yoga or weightlifting, at least 2 times a week.  · Take medicines as told by your health care provider.  · Do not use any products that contain nicotine or tobacco, such as cigarettes and e-cigarettes. If you need help quitting, ask your health care provider.  · Work with a counselor or diabetes educator to identify strategies to manage stress and any emotional and social challenges.  Questions to ask a health care provider  · Do I need to meet with a diabetes educator?  · Do I need to meet with a dietitian?  · What number can I call if I have questions?  · When are the best times to check my blood glucose?  Where to find more information:  · American Diabetes Association: diabetes.org  · Academy of Nutrition and Dietetics: www.eatright.org  · National [place] of Diabetes and Digestive and Kidney Diseases (NIH): www.niddk.nih.gov  Summary  · A healthy meal plan will help you control your blood glucose and maintain a healthy lifestyle.  · Working with a diet and nutrition specialist (dietitian) can help you make a meal plan that is best for you.  · Keep in mind that carbohydrates (carbs) and alcohol have immediate effects on your blood glucose levels. It is important to count carbs and to use alcohol carefully.  This information is not intended to replace advice given to you by your health care provider. Make sure you discuss any questions you have with your health care provider.  Document Released: 09/14/2006 Document Revised: 11/30/2018 Document Reviewed: 01/22/2018  [employer] Patient Education © 2020 [employer]

## 2020-12-20 DIAGNOSIS — M47.816 LUMBAR FACET ARTHROPATHY: ICD-10-CM

## 2020-12-20 DIAGNOSIS — G89.29 CHRONIC BILATERAL LOW BACK PAIN WITHOUT SCIATICA: ICD-10-CM

## 2020-12-20 DIAGNOSIS — M54.31 BILATERAL SCIATICA: ICD-10-CM

## 2020-12-20 DIAGNOSIS — M54.50 CHRONIC BILATERAL LOW BACK PAIN WITHOUT SCIATICA: ICD-10-CM

## 2020-12-20 DIAGNOSIS — M54.32 BILATERAL SCIATICA: ICD-10-CM

## 2020-12-22 RX ORDER — GABAPENTIN 400 MG/1
CAPSULE ORAL
Qty: 90 CAPSULE | Refills: 0 | Status: SHIPPED | OUTPATIENT
Start: 2020-12-22 | End: 2021-01-26

## 2020-12-31 DIAGNOSIS — E11.65 UNCONTROLLED TYPE 2 DIABETES MELLITUS WITH HYPERGLYCEMIA (HCC): ICD-10-CM

## 2020-12-31 DIAGNOSIS — E11.9 TYPE 2 DIABETES MELLITUS WITHOUT COMPLICATION, WITHOUT LONG-TERM CURRENT USE OF INSULIN (HCC): ICD-10-CM

## 2021-01-01 DIAGNOSIS — E11.9 TYPE 2 DIABETES MELLITUS WITHOUT COMPLICATION, WITHOUT LONG-TERM CURRENT USE OF INSULIN (HCC): ICD-10-CM

## 2021-01-01 DIAGNOSIS — E11.42 TYPE 2 DIABETES MELLITUS WITH DIABETIC POLYNEUROPATHY, WITHOUT LONG-TERM CURRENT USE OF INSULIN (HCC): ICD-10-CM

## 2021-01-05 RX ORDER — CALCIUM CITRATE/VITAMIN D3 200MG-6.25
TABLET ORAL
Qty: 100 EACH | Refills: 0 | Status: SHIPPED | OUTPATIENT
Start: 2021-01-05 | End: 2021-02-22

## 2021-01-11 DIAGNOSIS — E11.65 UNCONTROLLED TYPE 2 DIABETES MELLITUS WITH HYPERGLYCEMIA (HCC): ICD-10-CM

## 2021-01-24 DIAGNOSIS — M47.816 LUMBAR FACET ARTHROPATHY: ICD-10-CM

## 2021-01-24 DIAGNOSIS — M54.50 CHRONIC BILATERAL LOW BACK PAIN WITHOUT SCIATICA: ICD-10-CM

## 2021-01-24 DIAGNOSIS — M54.31 BILATERAL SCIATICA: ICD-10-CM

## 2021-01-24 DIAGNOSIS — M54.32 BILATERAL SCIATICA: ICD-10-CM

## 2021-01-24 DIAGNOSIS — G89.29 CHRONIC BILATERAL LOW BACK PAIN WITHOUT SCIATICA: ICD-10-CM

## 2021-01-26 DIAGNOSIS — E11.65 UNCONTROLLED TYPE 2 DIABETES MELLITUS WITH HYPERGLYCEMIA (HCC): ICD-10-CM

## 2021-01-26 RX ORDER — PEN NEEDLE, DIABETIC 29 G X1/2"
NEEDLE, DISPOSABLE MISCELLANEOUS
Qty: 100 EACH | Refills: 0 | Status: SHIPPED | OUTPATIENT
Start: 2021-01-26 | End: 2021-02-26

## 2021-01-26 RX ORDER — GABAPENTIN 400 MG/1
CAPSULE ORAL
Qty: 90 CAPSULE | Refills: 0 | Status: SHIPPED | OUTPATIENT
Start: 2021-01-26 | End: 2021-02-22

## 2021-01-31 DIAGNOSIS — E11.65 UNCONTROLLED TYPE 2 DIABETES MELLITUS WITH HYPERGLYCEMIA (HCC): ICD-10-CM

## 2021-02-05 DIAGNOSIS — E11.65 UNCONTROLLED TYPE 2 DIABETES MELLITUS WITH HYPERGLYCEMIA (HCC): ICD-10-CM

## 2021-02-05 RX ORDER — SITAGLIPTIN 100 MG/1
TABLET, FILM COATED ORAL
Qty: 30 TABLET | Refills: 0 | Status: SHIPPED | OUTPATIENT
Start: 2021-02-05 | End: 2021-03-04

## 2021-02-17 DIAGNOSIS — Z13.29 SCREENING FOR THYROID DISORDER: ICD-10-CM

## 2021-02-17 DIAGNOSIS — E11.65 UNCONTROLLED TYPE 2 DIABETES MELLITUS WITH HYPERGLYCEMIA (HCC): ICD-10-CM

## 2021-02-17 DIAGNOSIS — F32.9 REACTIVE DEPRESSION: ICD-10-CM

## 2021-02-17 DIAGNOSIS — Z12.5 PROSTATE CANCER SCREENING: ICD-10-CM

## 2021-02-17 DIAGNOSIS — I10 ESSENTIAL HYPERTENSION: ICD-10-CM

## 2021-02-20 DIAGNOSIS — E11.9 TYPE 2 DIABETES MELLITUS WITHOUT COMPLICATION, WITHOUT LONG-TERM CURRENT USE OF INSULIN (HCC): ICD-10-CM

## 2021-02-20 DIAGNOSIS — E11.42 TYPE 2 DIABETES MELLITUS WITH DIABETIC POLYNEUROPATHY, WITHOUT LONG-TERM CURRENT USE OF INSULIN (HCC): ICD-10-CM

## 2021-02-21 DIAGNOSIS — M54.32 BILATERAL SCIATICA: ICD-10-CM

## 2021-02-21 DIAGNOSIS — G89.29 CHRONIC BILATERAL LOW BACK PAIN WITHOUT SCIATICA: ICD-10-CM

## 2021-02-21 DIAGNOSIS — M54.31 BILATERAL SCIATICA: ICD-10-CM

## 2021-02-21 DIAGNOSIS — M47.816 LUMBAR FACET ARTHROPATHY: ICD-10-CM

## 2021-02-21 DIAGNOSIS — M54.50 CHRONIC BILATERAL LOW BACK PAIN WITHOUT SCIATICA: ICD-10-CM

## 2021-02-22 RX ORDER — CALCIUM CITRATE/VITAMIN D3 200MG-6.25
TABLET ORAL
Qty: 100 EACH | Refills: 2 | Status: SHIPPED | OUTPATIENT
Start: 2021-02-22 | End: 2021-05-11

## 2021-02-22 RX ORDER — GABAPENTIN 400 MG/1
CAPSULE ORAL
Qty: 90 CAPSULE | Refills: 0 | Status: SHIPPED | OUTPATIENT
Start: 2021-02-22 | End: 2021-03-30

## 2021-02-26 DIAGNOSIS — I10 HYPERTENSION, ESSENTIAL: ICD-10-CM

## 2021-02-26 DIAGNOSIS — E11.42 TYPE 2 DIABETES MELLITUS WITH DIABETIC POLYNEUROPATHY, WITHOUT LONG-TERM CURRENT USE OF INSULIN (HCC): Primary | ICD-10-CM

## 2021-02-26 RX ORDER — PEN NEEDLE, DIABETIC 29 G X1/2"
NEEDLE, DISPOSABLE MISCELLANEOUS
Qty: 100 EACH | Refills: 0 | Status: SHIPPED | OUTPATIENT
Start: 2021-02-26 | End: 2021-03-09 | Stop reason: SDUPTHER

## 2021-03-03 LAB
ALBUMIN SERPL-MCNC: 4.5 G/DL (ref 3.5–5.2)
ALBUMIN/GLOB SERPL: 1.8 G/DL
ALP SERPL-CCNC: 78 U/L (ref 39–117)
ALT SERPL-CCNC: 37 U/L (ref 1–41)
AST SERPL-CCNC: 31 U/L (ref 1–40)
BASOPHILS # BLD AUTO: 0.07 10*3/MM3 (ref 0–0.2)
BASOPHILS NFR BLD AUTO: 0.7 % (ref 0–1.5)
BILIRUB SERPL-MCNC: 0.3 MG/DL (ref 0–1.2)
BUN SERPL-MCNC: 17 MG/DL (ref 6–20)
BUN/CREAT SERPL: 17.9 (ref 7–25)
CALCIUM SERPL-MCNC: 9.8 MG/DL (ref 8.6–10.5)
CHLORIDE SERPL-SCNC: 99 MMOL/L (ref 98–107)
CHOLEST SERPL-MCNC: 141 MG/DL (ref 0–200)
CO2 SERPL-SCNC: 23.6 MMOL/L (ref 22–29)
CREAT SERPL-MCNC: 0.95 MG/DL (ref 0.76–1.27)
EOSINOPHIL # BLD AUTO: 0.26 10*3/MM3 (ref 0–0.4)
EOSINOPHIL NFR BLD AUTO: 2.6 % (ref 0.3–6.2)
ERYTHROCYTE [DISTWIDTH] IN BLOOD BY AUTOMATED COUNT: 12.8 % (ref 12.3–15.4)
GLOBULIN SER CALC-MCNC: 2.5 GM/DL
GLUCOSE SERPL-MCNC: 158 MG/DL (ref 65–99)
HBA1C MFR BLD: 8.1 % (ref 4.8–5.6)
HCT VFR BLD AUTO: 44.7 % (ref 37.5–51)
HDLC SERPL-MCNC: 37 MG/DL (ref 40–60)
HGB BLD-MCNC: 15 G/DL (ref 13–17.7)
IMM GRANULOCYTES # BLD AUTO: 0.02 10*3/MM3 (ref 0–0.05)
IMM GRANULOCYTES NFR BLD AUTO: 0.2 % (ref 0–0.5)
LDLC SERPL CALC-MCNC: 75 MG/DL (ref 0–100)
LYMPHOCYTES # BLD AUTO: 3.15 10*3/MM3 (ref 0.7–3.1)
LYMPHOCYTES NFR BLD AUTO: 31.4 % (ref 19.6–45.3)
MCH RBC QN AUTO: 29.5 PG (ref 26.6–33)
MCHC RBC AUTO-ENTMCNC: 33.6 G/DL (ref 31.5–35.7)
MCV RBC AUTO: 87.8 FL (ref 79–97)
MONOCYTES # BLD AUTO: 0.84 10*3/MM3 (ref 0.1–0.9)
MONOCYTES NFR BLD AUTO: 8.4 % (ref 5–12)
NEUTROPHILS # BLD AUTO: 5.7 10*3/MM3 (ref 1.7–7)
NEUTROPHILS NFR BLD AUTO: 56.7 % (ref 42.7–76)
NRBC BLD AUTO-RTO: 0 /100 WBC (ref 0–0.2)
PLATELET # BLD AUTO: 359 10*3/MM3 (ref 140–450)
POTASSIUM SERPL-SCNC: 4.3 MMOL/L (ref 3.5–5.2)
PROT SERPL-MCNC: 7 G/DL (ref 6–8.5)
RBC # BLD AUTO: 5.09 10*6/MM3 (ref 4.14–5.8)
SODIUM SERPL-SCNC: 138 MMOL/L (ref 136–145)
TRIGL SERPL-MCNC: 171 MG/DL (ref 0–150)
UNABLE TO VOID: NORMAL
VLDLC SERPL CALC-MCNC: 29 MG/DL (ref 5–40)
WBC # BLD AUTO: 10.04 10*3/MM3 (ref 3.4–10.8)

## 2021-03-04 DIAGNOSIS — E11.65 UNCONTROLLED TYPE 2 DIABETES MELLITUS WITH HYPERGLYCEMIA (HCC): ICD-10-CM

## 2021-03-04 RX ORDER — SITAGLIPTIN 100 MG/1
TABLET, FILM COATED ORAL
Qty: 28 TABLET | Refills: 0 | Status: SHIPPED | OUTPATIENT
Start: 2021-03-04 | End: 2021-03-09 | Stop reason: SDUPTHER

## 2021-03-09 ENCOUNTER — OFFICE VISIT (OUTPATIENT)
Dept: FAMILY MEDICINE CLINIC | Facility: CLINIC | Age: 61
End: 2021-03-09

## 2021-03-09 VITALS
HEART RATE: 80 BPM | HEIGHT: 65 IN | SYSTOLIC BLOOD PRESSURE: 124 MMHG | OXYGEN SATURATION: 97 % | TEMPERATURE: 97.5 F | DIASTOLIC BLOOD PRESSURE: 80 MMHG | WEIGHT: 236 LBS | BODY MASS INDEX: 39.32 KG/M2

## 2021-03-09 DIAGNOSIS — F41.9 ANXIETY AND DEPRESSION: ICD-10-CM

## 2021-03-09 DIAGNOSIS — F32.A ANXIETY AND DEPRESSION: ICD-10-CM

## 2021-03-09 DIAGNOSIS — Z00.00 ENCOUNTER FOR ANNUAL WELLNESS EXAM IN MEDICARE PATIENT: Primary | ICD-10-CM

## 2021-03-09 DIAGNOSIS — F33.41 RECURRENT MAJOR DEPRESSIVE DISORDER, IN PARTIAL REMISSION (HCC): ICD-10-CM

## 2021-03-09 DIAGNOSIS — E11.65 UNCONTROLLED TYPE 2 DIABETES MELLITUS WITH HYPERGLYCEMIA (HCC): ICD-10-CM

## 2021-03-09 DIAGNOSIS — E11.9 TYPE 2 DIABETES MELLITUS WITHOUT COMPLICATION, WITHOUT LONG-TERM CURRENT USE OF INSULIN (HCC): ICD-10-CM

## 2021-03-09 DIAGNOSIS — I10 HYPERTENSION, ESSENTIAL: ICD-10-CM

## 2021-03-09 DIAGNOSIS — E78.5 HYPERLIPIDEMIA, UNSPECIFIED HYPERLIPIDEMIA TYPE: ICD-10-CM

## 2021-03-09 PROCEDURE — 1170F FXNL STATUS ASSESSED: CPT | Performed by: FAMILY MEDICINE

## 2021-03-09 PROCEDURE — 1160F RVW MEDS BY RX/DR IN RCRD: CPT | Performed by: FAMILY MEDICINE

## 2021-03-09 PROCEDURE — G0439 PPPS, SUBSEQ VISIT: HCPCS | Performed by: FAMILY MEDICINE

## 2021-03-09 PROCEDURE — 96160 PT-FOCUSED HLTH RISK ASSMT: CPT | Performed by: FAMILY MEDICINE

## 2021-03-09 PROCEDURE — 1125F AMNT PAIN NOTED PAIN PRSNT: CPT | Performed by: FAMILY MEDICINE

## 2021-03-09 RX ORDER — INSULIN DETEMIR 100 [IU]/ML
30 INJECTION, SOLUTION SUBCUTANEOUS NIGHTLY
Qty: 10 PEN | Refills: 2 | Status: CANCELLED | OUTPATIENT
Start: 2021-03-09

## 2021-03-09 RX ORDER — FENOFIBRATE 160 MG/1
160 TABLET ORAL DAILY
Qty: 90 TABLET | Refills: 1 | Status: SHIPPED | OUTPATIENT
Start: 2021-03-09 | End: 2021-09-01 | Stop reason: SDUPTHER

## 2021-03-09 RX ORDER — INSULIN DETEMIR 100 [IU]/ML
35 INJECTION, SOLUTION SUBCUTANEOUS NIGHTLY
Qty: 10 PEN | Refills: 2 | Status: SHIPPED | OUTPATIENT
Start: 2021-03-09 | End: 2021-06-16

## 2021-03-09 RX ORDER — IBUPROFEN 200 MG
1 TABLET ORAL 3 TIMES DAILY
Qty: 100 EACH | Refills: 3 | Status: SHIPPED | OUTPATIENT
Start: 2021-03-09 | End: 2021-06-16 | Stop reason: SDUPTHER

## 2021-03-09 RX ORDER — AMLODIPINE BESYLATE 2.5 MG/1
2.5 TABLET ORAL DAILY
Qty: 90 TABLET | Refills: 1 | Status: SHIPPED | OUTPATIENT
Start: 2021-03-09 | End: 2021-09-01 | Stop reason: SDUPTHER

## 2021-03-09 RX ORDER — BLOOD SUGAR DIAGNOSTIC
STRIP MISCELLANEOUS
Qty: 100 EACH | Refills: 3 | Status: SHIPPED | OUTPATIENT
Start: 2021-03-09 | End: 2021-06-16 | Stop reason: SDUPTHER

## 2021-03-09 RX ORDER — SIMVASTATIN 40 MG
40 TABLET ORAL DAILY
Qty: 90 TABLET | Refills: 1 | Status: SHIPPED | OUTPATIENT
Start: 2021-03-09 | End: 2021-09-01 | Stop reason: SDUPTHER

## 2021-03-09 RX ORDER — VENLAFAXINE 100 MG/1
100 TABLET ORAL 2 TIMES DAILY
Qty: 180 TABLET | Refills: 1 | Status: SHIPPED | OUTPATIENT
Start: 2021-03-09 | End: 2021-09-01 | Stop reason: SDUPTHER

## 2021-03-09 RX ORDER — ARIPIPRAZOLE 5 MG/1
5 TABLET ORAL DAILY
Qty: 90 TABLET | Refills: 1 | Status: SHIPPED | OUTPATIENT
Start: 2021-03-09 | End: 2021-09-01 | Stop reason: SDUPTHER

## 2021-03-09 NOTE — PROGRESS NOTES
The ABCs of the Annual Wellness Visit  Subsequent Medicare Wellness Visit    Chief Complaint   Patient presents with   • Medicare Wellness-subsequent        Subjective   History of Present Illness:  Lloyd Greene is a 60 y.o. male who presents for a Subsequent Medicare Wellness Visit.    A1c is 8, was 8 three months prior as well. Was ten 6 months ago  Lipid panel within range  CMP normal   CBC normal      HEALTH RISK ASSESSMENT    Recent Hospitalizations: 2020 Back surgery     Uncontrolled T2DM: Diabetes managed with Levemir 30 units nightly along with NovoLog 5 units 3 times daily with meals. Also on Januvia and meformin       Current Medical Providers:  Patient Care Team:  Lennie Victor MD as PCP - General (Family Medicine)  John Tran MD as Surgeon (Neurosurgery)    Smoking Status:  Social History     Tobacco Use   Smoking Status Former Smoker   • Packs/day: 2.00   • Years: 35.00   • Pack years: 70.00   • Types: Electronic Cigarette   • Quit date:    • Years since quittin.1   Smokeless Tobacco Never Used       Alcohol Consumption:  Social History     Substance and Sexual Activity   Alcohol Use Not Currently    Comment: rarely       Depression Screen:   PHQ-2/PHQ-9 Depression Screening 3/9/2021   Little interest or pleasure in doing things 0   Feeling down, depressed, or hopeless 0   Trouble falling or staying asleep, or sleeping too much 0   Feeling tired or having little energy 0   Poor appetite or overeating 0   Feeling bad about yourself - or that you are a failure or have let yourself or your family down 0   Trouble concentrating on things, such as reading the newspaper or watching television 0   Moving or speaking so slowly that other people could have noticed. Or the opposite - being so fidgety or restless that you have been moving around a lot more than usual 0   Thoughts that you would be better off dead, or of hurting yourself in some way 0   Total Score 0   If you checked  off any problems, how difficult have these problems made it for you to do your work, take care of things at home, or get along with other people? -       Fall Risk Screen:  JAVED Fall Risk Assessment has not been completed.    Health Habits and Functional and Cognitive Screening:  Functional & Cognitive Status 3/9/2021   Do you have difficulty preparing food and eating? No   Do you have difficulty bathing yourself, getting dressed or grooming yourself? No   Do you have difficulty using the toilet? No   Do you have difficulty moving around from place to place? No   Do you have trouble with steps or getting out of a bed or a chair? No   Current Diet Well Balanced Diet   Dental Exam Not up to date   Eye Exam Up to date   Exercise (times per week) 0 times per week   Do you need help using the phone?  No   Are you deaf or do you have serious difficulty hearing?  No   Do you need help with transportation? No   Do you need help shopping? No   Do you need help preparing meals?  No   Do you need help with housework?  Yes   Do you need help with laundry? No   Do you need help taking your medications? No   Do you need help managing money? No   Do you ever drive or ride in a car without wearing a seat belt? No   Have you felt unusual stress, anger or loneliness in the last month? Yes   Who do you live with? Alone   If you need help, do you have trouble finding someone available to you? No   Have you been bothered in the last four weeks by sexual problems? -   Do you have difficulty concentrating, remembering or making decisions? No         Does the patient have evidence of cognitive impairment? NO    Asprin use counseling:Taking ASA appropriately as indicated    Age-appropriate Screening Schedule:  Refer to the list below for future screening recommendations based on patient's age, sex and/or medical conditions. Orders for these recommended tests are listed in the plan section. The patient has been provided with a written  "plan.    Health Maintenance   Topic Date Due   • URINE MICROALBUMIN  Never done   • DIABETIC EYE EXAM  11/24/2021   • LIPID PANEL  03/02/2022   • HEMOGLOBIN A1C  03/02/2022   • DIABETIC FOOT EXAM  03/09/2022   • COLONOSCOPY  04/12/2022   • TDAP/TD VACCINES (2 - Td) 09/11/2030   • INFLUENZA VACCINE  Completed   • ZOSTER VACCINE  Completed          The following portions of the patient's history were reviewed and updated as appropriate: problem list.    Outpatient Medications Prior to Visit   Medication Sig Dispense Refill   • Alcohol Swabs (B-D SINGLE USE SWABS REGULAR) pads Use daily for BG checks 100 each 5   • Blood Glucose Monitoring Suppl (TRUE METRIX AIR GLUCOSE METER) device 1 Device Daily. 1 Device 0   • budesonide-formoterol (SYMBICORT) 160-4.5 MCG/ACT inhaler Inhale 2 puffs 2 (Two) Times a Day. 1 inhaler 11   • buPROPion XL (Wellbutrin XL) 150 MG 24 hr tablet Take 1 tablet by mouth Daily. 30 tablet 2   • Cholecalciferol (VITAMIN D3) 125 MCG (5000 UT) capsule capsule Take 5,000 Units by mouth Daily.     • gabapentin (NEURONTIN) 400 MG capsule TAKE ONE CAPSULE BY MOUTH THREE TIMES A DAY 90 capsule 0   • True Metrix Blood Glucose Test test strip USE  AS  INSTRUCTED  TO TEST TWO TIMES DAILY 100 each 2   • TRUEPLUS LANCETS 28G misc CHECK BLOOD SUGAR TWICE DAILY 100 each 5   • amLODIPine (NORVASC) 2.5 MG tablet TAKE 1 TABLET EVERY DAY (Patient taking differently: Take 2.5 mg by mouth Daily.) 90 tablet 3   • ARIPiprazole (ABILIFY) 5 MG tablet TAKE 1 TABLET EVERY DAY (Patient taking differently: Take 5 mg by mouth Daily.) 90 tablet 3   • BD Insulin Syringe U/F 31G X 5/16\" 0.3 ML misc USE WITH NOVOLOG THREE TIMES A  each 0   • fenofibrate (TRICOR) 145 MG tablet TAKE 1 TABLET EVERY DAY (Patient taking differently: Take 145 mg by mouth Daily.) 90 tablet 3   • insulin aspart (novoLOG) 100 UNIT/ML injection Inject 5 Units under the skin into the appropriate area as directed 3 (Three) Times a Day With Meals. 2 " "each 2   • insulin detemir (Levemir FlexTouch) 100 UNIT/ML injection Inject 30 Units under the skin into the appropriate area as directed Every Night. 10 pen 2   • Insulin Pen Needle 31G X 8 MM misc USE WITH NOVOLOG THREE TIMES A  each 3   • Insulin Syringe (B-D INSULIN SYRINGE) 29G X 1/2\" 1 ML misc 1 syringe 3 (Three) Times a Day. 100 each 0   • Januvia 100 MG tablet TAKE ONE TABLET BY MOUTH DAILY 28 tablet 0   • metFORMIN (GLUCOPHAGE) 1000 MG tablet TAKE 1 TABLET BY MOUTH 2 (TWO) TIMES A DAY WITH MEALS 180 tablet 1   • simvastatin (ZOCOR) 40 MG tablet TAKE 1 TABLET EVERY DAY (Patient taking differently: Take 40 mg by mouth Every Night.) 90 tablet 3   • venlafaxine (EFFEXOR) 100 MG tablet Take 1 tablet by mouth 2 (Two) Times a Day for 60 days. 60 tablet 0     No facility-administered medications prior to visit.       Patient Active Problem List   Diagnosis   • Other chronic pain   • Partial small bowel obstruction (CMS/HCC)   • Angio-edema   • Adenomatous polyp of colon   • Type 2 diabetes mellitus without complication, without long-term current use of insulin (CMS/HCC)   • Familial hypercholesterolemia   • Hypertension, essential   • Type 2 diabetes mellitus with diabetic polyneuropathy, without long-term current use of insulin (CMS/HCC)   • DDD (degenerative disc disease), lumbar   • Lumbar stenosis with neurogenic claudication   • Spinal stenosis of lumbar region with neurogenic claudication   • Sleep apnea   • Hyponatremia   • Encounter for rehabilitation   • Morbidly obese (CMS/HCC)       Advanced Care Planning:  ACP discussion was held with the patient during this visit. Patient does not have an advance directive, information provided.    Review of Systems   Constitutional: Negative for chills and fever.   HENT: Negative for congestion and facial swelling.    Eyes: Negative for pain and redness.   Respiratory: Negative for chest tightness.    Cardiovascular: Negative for chest pain.   Gastrointestinal: " "Negative for abdominal pain.   Genitourinary: Negative for decreased urine volume and genital sores.   Musculoskeletal: Negative for arthralgias and neck pain.   Skin: Negative for rash.   Neurological: Negative for dizziness and weakness.       Compared to one year ago, the patient feels his physical health is the same.  Compared to one year ago, the patient feels his mental health is the same.    Reviewed chart for potential of high risk medication in the elderly: yes  Reviewed chart for potential of harmful drug interactions in the elderly:yes    Objective         Vitals:    03/09/21 0959   BP: 124/80   BP Location: Right arm   Patient Position: Sitting   Cuff Size: Adult   Pulse: 80   Temp: 97.5 °F (36.4 °C)   SpO2: 97%   Weight: 107 kg (236 lb)   Height: 165.1 cm (65\")       Body mass index is 39.27 kg/m².  Discussed the patient's BMI with him. The BMI is above average; BMI management plan is completed.    Physical Exam  Vitals and nursing note reviewed.   Constitutional:       Appearance: He is well-developed. He is obese.   HENT:      Head: Normocephalic and atraumatic.   Eyes:      Conjunctiva/sclera: Conjunctivae normal.      Pupils: Pupils are equal, round, and reactive to light.   Cardiovascular:      Rate and Rhythm: Normal rate and regular rhythm.      Heart sounds: Normal heart sounds. No murmur.   Pulmonary:      Effort: Pulmonary effort is normal. No respiratory distress.      Breath sounds: Normal breath sounds.   Abdominal:      General: Bowel sounds are normal.      Palpations: Abdomen is soft.   Musculoskeletal:         General: Normal range of motion.      Cervical back: Neck supple.   Skin:     General: Skin is warm and dry.   Neurological:      Mental Status: He is alert and oriented to person, place, and time.         Lab Results   Component Value Date     (H) 03/02/2021    CHLPL 141 03/02/2021    TRIG 171 (H) 03/02/2021    HDL 37 (L) 03/02/2021    LDL 75 03/02/2021    VLDL 29 " 03/02/2021    HGBA1C 8.10 (H) 03/02/2021        Assessment/Plan   Medicare Risks and Personalized Health Plan  CMS Preventative Services Quick Reference  Advance Directive Discussion  Chronic Pain   Colon Cancer Screening  Depression/Dysphoria  Fall Risk  Immunizations Discussed/Encouraged (specific immunizations; Influenza )  Inactivity/Sedentary  Lung Cancer Risk  Obesity/Overweight   Polypharmacy  Prostate Cancer Screening     The above risks/problems have been discussed with the patient.  Pertinent information has been shared with the patient in the After Visit Summary.  Follow up plans and orders are seen below in the Assessment/Plan Section.    Diagnoses and all orders for this visit:    1. Encounter for annual wellness exam in Medicare patient (Primary)    2. Recurrent major depressive disorder, in partial remission (CMS/[facility])  -     venlafaxine (EFFEXOR) 100 MG tablet; Take 1 tablet by mouth 2 (Two) Times a Day for 60 days.  Dispense: 180 tablet; Refill: 1    3. Anxiety and depression  -     ARIPiprazole (ABILIFY) 5 MG tablet; Take 1 tablet by mouth Daily.  Dispense: 90 tablet; Refill: 1    4. Hypertension, essential  -     amLODIPine (NORVASC) 2.5 MG tablet; Take 1 tablet by mouth Daily.  Dispense: 90 tablet; Refill: 1    5. Hyperlipidemia, unspecified hyperlipidemia type  -     simvastatin (ZOCOR) 40 MG tablet; Take 1 tablet by mouth Daily.  Dispense: 90 tablet; Refill: 1  -     fenofibrate 160 MG tablet; Take 1 tablet by mouth Daily.  Dispense: 90 tablet; Refill: 1    6. Uncontrolled type 2 diabetes mellitus with hyperglycemia (CMS/[facility])  -     SITagliptin (Januvia) 100 MG tablet; Take 1 tablet by mouth Daily for 90 days.  Dispense: 90 tablet; Refill: 1  -     metFORMIN (GLUCOPHAGE) 1000 MG tablet; Take 1 tablet by mouth 2 (Two) Times a Day With Meals.  Dispense: 180 tablet; Refill: 1  -     insulin detemir (Levemir FlexTouch) 100 UNIT/ML injection; Inject 35 Units under the skin into the appropriate area  "as directed Every Night.  Dispense: 10 pen; Refill: 2  -     insulin aspart (novoLOG) 100 UNIT/ML injection; Inject 5 Units under the skin into the appropriate area as directed 3 (Three) Times a Day With Meals.  Dispense: 2 each; Refill: 2  -     Insulin Syringe-Needle U-100 (BD Insulin Syringe U/F) 31G X 5/16\" 0.3 ML misc; Use TID as directed  Dispense: 100 each; Refill: 3  -     Insulin Syringe (B-D INSULIN SYRINGE) 29G X 1/2\" 1 ML misc; 1 syringe 3 (Three) Times a Day.  Dispense: 100 each; Refill: 3    7. Type 2 diabetes mellitus without complication, without long-term current use of insulin (CMS/[facility])  -     Insulin Pen Needle 31G X 8 MM misc; USE WITH NOVOLOG THREE TIMES A DAY  Dispense: 100 each; Refill: 3    Other orders  -     Cancel: insulin detemir (Levemir FlexTouch) 100 UNIT/ML injection; Inject 30 Units under the skin into the appropriate area as directed Every Night.  Dispense: 10 pen; Refill: 2    Increase levemir to 35 units nightly, continue metformin and januvia  Mood stable  Walks daily   3mo A1c check       An After Visit Summary and PPPS were given to the patient.             "

## 2021-03-28 DIAGNOSIS — M54.31 BILATERAL SCIATICA: ICD-10-CM

## 2021-03-28 DIAGNOSIS — G89.29 CHRONIC BILATERAL LOW BACK PAIN WITHOUT SCIATICA: ICD-10-CM

## 2021-03-28 DIAGNOSIS — M54.50 CHRONIC BILATERAL LOW BACK PAIN WITHOUT SCIATICA: ICD-10-CM

## 2021-03-28 DIAGNOSIS — M54.32 BILATERAL SCIATICA: ICD-10-CM

## 2021-03-28 DIAGNOSIS — M47.816 LUMBAR FACET ARTHROPATHY: ICD-10-CM

## 2021-03-30 RX ORDER — GABAPENTIN 400 MG/1
CAPSULE ORAL
Qty: 90 CAPSULE | Refills: 0 | Status: SHIPPED | OUTPATIENT
Start: 2021-03-30 | End: 2021-04-27

## 2021-04-03 DIAGNOSIS — E11.65 UNCONTROLLED TYPE 2 DIABETES MELLITUS WITH HYPERGLYCEMIA (HCC): ICD-10-CM

## 2021-04-05 DIAGNOSIS — E11.65 UNCONTROLLED TYPE 2 DIABETES MELLITUS WITH HYPERGLYCEMIA (HCC): ICD-10-CM

## 2021-04-05 RX ORDER — SITAGLIPTIN 100 MG/1
TABLET, FILM COATED ORAL
Qty: 28 TABLET | Refills: 0 | OUTPATIENT
Start: 2021-04-05

## 2021-04-09 RX ORDER — GLUCOSAM/CHON-MSM1/C/MANG/BOSW 500-416.6
1 TABLET ORAL 2 TIMES DAILY
Qty: 100 EACH | Refills: 5 | Status: SHIPPED | OUTPATIENT
Start: 2021-04-09 | End: 2021-09-01 | Stop reason: SDUPTHER

## 2021-04-25 DIAGNOSIS — G89.29 CHRONIC BILATERAL LOW BACK PAIN WITHOUT SCIATICA: ICD-10-CM

## 2021-04-25 DIAGNOSIS — M47.816 LUMBAR FACET ARTHROPATHY: ICD-10-CM

## 2021-04-25 DIAGNOSIS — M54.31 BILATERAL SCIATICA: ICD-10-CM

## 2021-04-25 DIAGNOSIS — M54.32 BILATERAL SCIATICA: ICD-10-CM

## 2021-04-25 DIAGNOSIS — M54.50 CHRONIC BILATERAL LOW BACK PAIN WITHOUT SCIATICA: ICD-10-CM

## 2021-04-26 DIAGNOSIS — F32.9 REACTIVE DEPRESSION: ICD-10-CM

## 2021-04-26 NOTE — TELEPHONE ENCOUNTER
LS 3-9  NV 6-16  LF 3-30 #90 0 refills   Due for uds and contract   Last [facility] 11-23

## 2021-04-27 RX ORDER — GABAPENTIN 400 MG/1
CAPSULE ORAL
Qty: 90 CAPSULE | Refills: 0 | Status: SHIPPED | OUTPATIENT
Start: 2021-04-27 | End: 2021-06-01

## 2021-04-27 RX ORDER — BUPROPION HYDROCHLORIDE 150 MG/1
TABLET ORAL
Qty: 90 TABLET | Refills: 0 | Status: SHIPPED | OUTPATIENT
Start: 2021-04-27 | End: 2021-06-16 | Stop reason: SDUPTHER

## 2021-05-09 DIAGNOSIS — E11.42 TYPE 2 DIABETES MELLITUS WITH DIABETIC POLYNEUROPATHY, WITHOUT LONG-TERM CURRENT USE OF INSULIN (HCC): ICD-10-CM

## 2021-05-09 DIAGNOSIS — E11.9 TYPE 2 DIABETES MELLITUS WITHOUT COMPLICATION, WITHOUT LONG-TERM CURRENT USE OF INSULIN (HCC): ICD-10-CM

## 2021-05-11 RX ORDER — CALCIUM CITRATE/VITAMIN D3 200MG-6.25
TABLET ORAL
Qty: 100 EACH | Refills: 6 | Status: SHIPPED | OUTPATIENT
Start: 2021-05-11 | End: 2021-09-01 | Stop reason: SDUPTHER

## 2021-05-29 DIAGNOSIS — M54.50 CHRONIC BILATERAL LOW BACK PAIN WITHOUT SCIATICA: ICD-10-CM

## 2021-05-29 DIAGNOSIS — M54.31 BILATERAL SCIATICA: ICD-10-CM

## 2021-05-29 DIAGNOSIS — M54.32 BILATERAL SCIATICA: ICD-10-CM

## 2021-05-29 DIAGNOSIS — G89.29 CHRONIC BILATERAL LOW BACK PAIN WITHOUT SCIATICA: ICD-10-CM

## 2021-05-29 DIAGNOSIS — M47.816 LUMBAR FACET ARTHROPATHY: ICD-10-CM

## 2021-06-01 RX ORDER — GABAPENTIN 400 MG/1
CAPSULE ORAL
Qty: 90 CAPSULE | Refills: 0 | Status: SHIPPED | OUTPATIENT
Start: 2021-06-01 | End: 2021-07-01

## 2021-06-08 DIAGNOSIS — E11.9 TYPE 2 DIABETES MELLITUS WITHOUT COMPLICATION, WITHOUT LONG-TERM CURRENT USE OF INSULIN (HCC): Primary | ICD-10-CM

## 2021-06-10 LAB — HBA1C MFR BLD: 9.3 % (ref 4.8–5.6)

## 2021-06-16 ENCOUNTER — OFFICE VISIT (OUTPATIENT)
Dept: FAMILY MEDICINE CLINIC | Facility: CLINIC | Age: 61
End: 2021-06-16

## 2021-06-16 VITALS
HEIGHT: 65 IN | BODY MASS INDEX: 38.59 KG/M2 | SYSTOLIC BLOOD PRESSURE: 126 MMHG | HEART RATE: 68 BPM | OXYGEN SATURATION: 95 % | TEMPERATURE: 97.1 F | DIASTOLIC BLOOD PRESSURE: 82 MMHG | WEIGHT: 231.6 LBS

## 2021-06-16 DIAGNOSIS — F41.9 ANXIETY AND DEPRESSION: ICD-10-CM

## 2021-06-16 DIAGNOSIS — I10 HYPERTENSION, ESSENTIAL: ICD-10-CM

## 2021-06-16 DIAGNOSIS — E11.65 UNCONTROLLED TYPE 2 DIABETES MELLITUS WITH HYPERGLYCEMIA (HCC): ICD-10-CM

## 2021-06-16 DIAGNOSIS — F32.9 REACTIVE DEPRESSION: ICD-10-CM

## 2021-06-16 DIAGNOSIS — E78.5 HYPERLIPIDEMIA, UNSPECIFIED HYPERLIPIDEMIA TYPE: ICD-10-CM

## 2021-06-16 DIAGNOSIS — F33.41 RECURRENT MAJOR DEPRESSIVE DISORDER, IN PARTIAL REMISSION (HCC): ICD-10-CM

## 2021-06-16 DIAGNOSIS — F32.A ANXIETY AND DEPRESSION: ICD-10-CM

## 2021-06-16 PROCEDURE — 99214 OFFICE O/P EST MOD 30 MIN: CPT | Performed by: FAMILY MEDICINE

## 2021-06-16 RX ORDER — BLOOD SUGAR DIAGNOSTIC
STRIP MISCELLANEOUS
Qty: 100 EACH | Refills: 3 | Status: SHIPPED | OUTPATIENT
Start: 2021-06-16 | End: 2022-11-11

## 2021-06-16 RX ORDER — BUPROPION HYDROCHLORIDE 150 MG/1
150 TABLET ORAL DAILY
Qty: 90 TABLET | Refills: 1 | Status: SHIPPED | OUTPATIENT
Start: 2021-06-16 | End: 2021-09-01 | Stop reason: SDUPTHER

## 2021-06-16 RX ORDER — IBUPROFEN 200 MG
1 TABLET ORAL 3 TIMES DAILY
Qty: 100 EACH | Refills: 3 | Status: SHIPPED | OUTPATIENT
Start: 2021-06-16 | End: 2022-11-11

## 2021-06-16 RX ORDER — INSULIN DETEMIR 100 [IU]/ML
50 INJECTION, SOLUTION SUBCUTANEOUS NIGHTLY
Qty: 15 PEN | Refills: 1 | Status: SHIPPED | OUTPATIENT
Start: 2021-06-16 | End: 2022-02-15

## 2021-06-16 NOTE — PROGRESS NOTES
"Chief Complaint   Patient presents with   • Diabetes       Subjective   Lloyd Greene is a 60 y.o. male.     History of Present Illness   60-year-old male here for uncontrolled diabetes follow-up    -A1c is 9, was 8 three months prior.  Has been increasing and worsening  -Has been doing Levemir 35 units nightly.  States his a.m. blood sugars are often in the 250s which is consistent with his A1c increase   -Not very active given back issues.  Eats 3 meals a day.  Has cut out soft drinks aside from the occasional diet soda.  Eats a sandwich for lunch.  Tries to avoid excess carbohydrates and processed foods.  Blood sugars continue to remain out of control which he says he has trouble understanding as his diet has improved      The following portions of the patient's history were reviewed and updated as appropriate: allergies, current medications, past family history, past medical history, past social history, past surgical history and problem list.      Past Medical History:   Diagnosis Date   • Anesthesia complication     PATIENT STATES \"STOPPED BREATHING DURING COLONOSCOPY APPROX 4-5 YRS AGO\".   • Anxiety    • Arthritis    • Colon polyp    • COPD (chronic obstructive pulmonary disease) (CMS/HCC)    • Depression    • Diabetes mellitus (CMS/[facility])    • Diverticulitis    • Elevated cholesterol    • History of TIA (transient ischemic attack)     3 yr ago   • Hyperlipidemia    • Hypertension    • Low back pain    • Numbness and tingling     BILATERAL LEGS RIGHT LEG WORSE   • Sleep apnea     uses cpap occasionally       Past Surgical History:   Procedure Laterality Date   • COLON SURGERY     • COLONOSCOPY  2014   • COLONOSCOPY N/A 4/12/2019    Procedure: COLONOSCOPY w/ polypectomy;  Surgeon: Tin Giang MD;  Location: [facility];  Service: Gastroenterology   • COLOSTOMY      Dr. Gupta   • COLOSTOMY REVISION  07/2006    Diverticulitis-Dr. Gupta   • INCISIONAL HERNIA REPAIR  2011   • LUMBAR LAMINECTOMY WITH " FUSION N/A 2020    Procedure: Lumbar 2 to lumbar 3, lumbar 3 to lumbar 4 and lumbar 4 lumbar 5 laminectomy with a fusion by orthopedics;  Surgeon: John Tran MD;  Location: [facility];  Service: Neurosurgery;  Laterality: N/A;   • LUMBAR LAMINECTOMY WITH FUSION N/A 2020    Procedure: Lumbar 2 to lumbar 5 fusion with instrumentation and laminectomy by Dr. Hutchinson;  Surgeon: Melvin Ya MD;  Location: [facility];  Service: Neurosurgery;  Laterality: N/A;   • ORIF ANKLE FRACTURE Right        Family History   Problem Relation Age of Onset   • Depression Mother    • Depression Father    • Kidney disease Father    • COPD Father    • Hypertension Father    • Colon cancer Neg Hx    • Colon polyps Neg Hx    • Malig Hyperthermia Neg Hx        Social History     Socioeconomic History   • Marital status:      Spouse name: Not on file   • Number of children: 2   • Years of education: Not on file   • Highest education level: Not on file   Tobacco Use   • Smoking status: Former Smoker     Packs/day: 2.00     Years: 35.00     Pack years: 70.00     Types: Electronic Cigarette     Quit date:      Years since quittin.4   • Smokeless tobacco: Never Used   Substance and Sexual Activity   • Alcohol use: Not Currently     Comment: rarely   • Drug use: No   • Sexual activity: Yes     Partners: Female       Current Outpatient Medications on File Prior to Visit   Medication Sig Dispense Refill   • Alcohol Swabs (B-D SINGLE USE SWABS REGULAR) pads Use daily for BG checks 100 each 5   • amLODIPine (NORVASC) 2.5 MG tablet Take 1 tablet by mouth Daily. 90 tablet 1   • ARIPiprazole (ABILIFY) 5 MG tablet Take 1 tablet by mouth Daily. 90 tablet 1   • Blood Glucose Monitoring Suppl (TRUE METRIX AIR GLUCOSE METER) device 1 Device Daily. 1 Device 0   • budesonide-formoterol (SYMBICORT) 160-4.5 MCG/ACT inhaler Inhale 2 puffs 2 (Two) Times a Day. 1 inhaler 11   • Cholecalciferol (VITAMIN D3) 125 MCG (5000  "UT) capsule capsule Take 5,000 Units by mouth Daily.     • fenofibrate 160 MG tablet Take 1 tablet by mouth Daily. 90 tablet 1   • gabapentin (NEURONTIN) 400 MG capsule TAKE ONE CAPSULE BY MOUTH THREE TIMES A DAY 90 capsule 0   • metFORMIN (GLUCOPHAGE) 1000 MG tablet Take 1 tablet by mouth 2 (Two) Times a Day With Meals. 180 tablet 1   • simvastatin (ZOCOR) 40 MG tablet Take 1 tablet by mouth Daily. 90 tablet 1   • True Metrix Blood Glucose Test test strip USE AS INSTRUCTED TO TEST TWO TIMES DAILY 100 each 6   • TRUEplus Lancets 28G misc 1 each by Other route 2 (Two) Times a Day. 100 each 5   • [DISCONTINUED] buPROPion XL (WELLBUTRIN XL) 150 MG 24 hr tablet TAKE ONE TABLET BY MOUTH DAILY 90 tablet 0   • [DISCONTINUED] insulin aspart (novoLOG) 100 UNIT/ML injection Inject 5 Units under the skin into the appropriate area as directed 3 (Three) Times a Day With Meals. 2 each 2   • [DISCONTINUED] insulin detemir (Levemir FlexTouch) 100 UNIT/ML injection Inject 35 Units under the skin into the appropriate area as directed Every Night. 10 pen 2   • [DISCONTINUED] Insulin Syringe (B-D INSULIN SYRINGE) 29G X 1/2\" 1 ML misc 1 syringe 3 (Three) Times a Day. 100 each 3   • [DISCONTINUED] Insulin Syringe-Needle U-100 (BD Insulin Syringe U/F) 31G X 5/16\" 0.3 ML misc Use TID as directed 100 each 3   • SITagliptin (Januvia) 100 MG tablet Take 1 tablet by mouth Daily for 90 days. 90 tablet 1   • venlafaxine (EFFEXOR) 100 MG tablet Take 1 tablet by mouth 2 (Two) Times a Day for 60 days. 180 tablet 1   • [DISCONTINUED] Insulin Pen Needle 31G X 8 MM misc USE WITH NOVOLOG THREE TIMES A  each 3     No current facility-administered medications on file prior to visit.       Review of Systems   Constitutional: Negative for fever.   Respiratory: Negative for cough and wheezing.    Cardiovascular: Negative for chest pain.   Gastrointestinal: Negative for abdominal pain.   Genitourinary: Negative for decreased urine volume. " "  Musculoskeletal: Negative for arthralgias.   Neurological: Negative for weakness and confusion.   Psychiatric/Behavioral: Negative for depressed mood.           Vitals:    06/16/21 1056   BP: 126/82   Pulse: 68   Temp: 97.1 °F (36.2 °C)   SpO2: 95%      Objective   Physical Exam  Vitals and nursing note reviewed.   Constitutional:       Appearance: He is well-developed.   HENT:      Head: Normocephalic and atraumatic.   Eyes:      Conjunctiva/sclera: Conjunctivae normal.      Pupils: Pupils are equal, round, and reactive to light.   Cardiovascular:      Rate and Rhythm: Normal rate and regular rhythm.      Heart sounds: Normal heart sounds. No murmur heard.     Pulmonary:      Effort: Pulmonary effort is normal. No respiratory distress.      Breath sounds: Normal breath sounds.   Abdominal:      General: Bowel sounds are normal.      Palpations: Abdomen is soft.   Musculoskeletal:         General: Normal range of motion.      Cervical back: Neck supple.   Skin:     General: Skin is warm and dry.   Neurological:      Mental Status: He is alert and oriented to person, place, and time.           Assessment/Plan   Problems Addressed this Visit        Cardiac and Vasculature    Hypertension, essential      Other Visit Diagnoses     Uncontrolled type 2 diabetes mellitus with hyperglycemia (CMS/[facility])        Relevant Medications    insulin detemir (Levemir FlexTouch) 100 UNIT/ML injection    insulin aspart (novoLOG) 100 UNIT/ML injection    Insulin Syringe (B-D INSULIN SYRINGE) 29G X 1/2\" 1 ML misc    Insulin Syringe-Needle U-100 (BD Insulin Syringe U/F) 31G X 5/16\" 0.3 ML misc    Other Relevant Orders    Ambulatory Referral to Endocrinology (Completed)    Hyperlipidemia, unspecified hyperlipidemia type        Relevant Medications    insulin detemir (Levemir FlexTouch) 100 UNIT/ML injection    Other Relevant Orders    Ambulatory Referral to Endocrinology (Completed)    Reactive depression        Relevant Medications    " buPROPion XL (WELLBUTRIN XL) 150 MG 24 hr tablet    Recurrent major depressive disorder, in partial remission (CMS/[facility])        Relevant Medications    buPROPion XL (WELLBUTRIN XL) 150 MG 24 hr tablet    Anxiety and depression        Relevant Medications    buPROPion XL (WELLBUTRIN XL) 150 MG 24 hr tablet      Diagnoses       Codes Comments    Uncontrolled type 2 diabetes mellitus with hyperglycemia (CMS/[facility])     ICD-10-CM: E11.65  ICD-9-CM: 250.02     Hyperlipidemia, unspecified hyperlipidemia type     ICD-10-CM: E78.5  ICD-9-CM: 272.4     Hypertension, essential     ICD-10-CM: I10  ICD-9-CM: 401.9     Reactive depression     ICD-10-CM: F32.9  ICD-9-CM: 300.4     Recurrent major depressive disorder, in partial remission (CMS/[facility])     ICD-10-CM: F33.41  ICD-9-CM: 296.35     Anxiety and depression     ICD-10-CM: F41.9, F32.9  ICD-9-CM: 300.00, 311           -Referring to endocrinology for further assistance with uncontrolled diabetes  -We will have him increase his nightly Levemir by 3 units every 5 days with goal a.m. sugars of the 120s       Return in about 3 months (around 9/16/2021) for Recheck.      Lennie Victor MD

## 2021-07-01 DIAGNOSIS — G89.29 CHRONIC BILATERAL LOW BACK PAIN WITHOUT SCIATICA: ICD-10-CM

## 2021-07-01 DIAGNOSIS — M47.816 LUMBAR FACET ARTHROPATHY: ICD-10-CM

## 2021-07-01 DIAGNOSIS — M54.50 CHRONIC BILATERAL LOW BACK PAIN WITHOUT SCIATICA: ICD-10-CM

## 2021-07-01 DIAGNOSIS — M54.31 BILATERAL SCIATICA: ICD-10-CM

## 2021-07-01 DIAGNOSIS — M54.32 BILATERAL SCIATICA: ICD-10-CM

## 2021-07-01 RX ORDER — GABAPENTIN 400 MG/1
CAPSULE ORAL
Qty: 90 CAPSULE | Refills: 0 | Status: SHIPPED | OUTPATIENT
Start: 2021-07-01 | End: 2021-08-26

## 2021-08-04 DIAGNOSIS — F41.9 ANXIETY AND DEPRESSION: ICD-10-CM

## 2021-08-04 DIAGNOSIS — E78.5 HYPERLIPIDEMIA, UNSPECIFIED HYPERLIPIDEMIA TYPE: ICD-10-CM

## 2021-08-04 DIAGNOSIS — I10 HYPERTENSION, ESSENTIAL: ICD-10-CM

## 2021-08-04 DIAGNOSIS — F32.A ANXIETY AND DEPRESSION: ICD-10-CM

## 2021-08-04 RX ORDER — ARIPIPRAZOLE 5 MG/1
TABLET ORAL
Qty: 90 TABLET | Refills: 1 | OUTPATIENT
Start: 2021-08-04

## 2021-08-04 RX ORDER — FENOFIBRATE 160 MG/1
TABLET ORAL
Qty: 90 TABLET | Refills: 1 | OUTPATIENT
Start: 2021-08-04

## 2021-08-04 RX ORDER — AMLODIPINE BESYLATE 2.5 MG/1
TABLET ORAL
Qty: 90 TABLET | Refills: 1 | OUTPATIENT
Start: 2021-08-04

## 2021-08-26 DIAGNOSIS — M54.31 BILATERAL SCIATICA: ICD-10-CM

## 2021-08-26 DIAGNOSIS — M54.32 BILATERAL SCIATICA: ICD-10-CM

## 2021-08-26 DIAGNOSIS — M47.816 LUMBAR FACET ARTHROPATHY: ICD-10-CM

## 2021-08-26 DIAGNOSIS — M54.50 CHRONIC BILATERAL LOW BACK PAIN WITHOUT SCIATICA: ICD-10-CM

## 2021-08-26 DIAGNOSIS — G89.29 CHRONIC BILATERAL LOW BACK PAIN WITHOUT SCIATICA: ICD-10-CM

## 2021-08-26 RX ORDER — GABAPENTIN 400 MG/1
CAPSULE ORAL
Qty: 90 CAPSULE | Refills: 0 | Status: SHIPPED | OUTPATIENT
Start: 2021-08-26 | End: 2022-02-15 | Stop reason: SDUPTHER

## 2021-09-01 ENCOUNTER — TELEPHONE (OUTPATIENT)
Dept: FAMILY MEDICINE CLINIC | Facility: CLINIC | Age: 61
End: 2021-09-01

## 2021-09-01 DIAGNOSIS — F33.41 RECURRENT MAJOR DEPRESSIVE DISORDER, IN PARTIAL REMISSION (HCC): ICD-10-CM

## 2021-09-01 DIAGNOSIS — F32.A ANXIETY AND DEPRESSION: ICD-10-CM

## 2021-09-01 DIAGNOSIS — E11.42 TYPE 2 DIABETES MELLITUS WITH DIABETIC POLYNEUROPATHY, WITHOUT LONG-TERM CURRENT USE OF INSULIN (HCC): ICD-10-CM

## 2021-09-01 DIAGNOSIS — E11.65 UNCONTROLLED TYPE 2 DIABETES MELLITUS WITH HYPERGLYCEMIA (HCC): ICD-10-CM

## 2021-09-01 DIAGNOSIS — E11.9 TYPE 2 DIABETES MELLITUS WITHOUT COMPLICATION, WITHOUT LONG-TERM CURRENT USE OF INSULIN (HCC): ICD-10-CM

## 2021-09-01 DIAGNOSIS — F41.9 ANXIETY AND DEPRESSION: ICD-10-CM

## 2021-09-01 DIAGNOSIS — E78.5 HYPERLIPIDEMIA, UNSPECIFIED HYPERLIPIDEMIA TYPE: ICD-10-CM

## 2021-09-01 DIAGNOSIS — F32.9 REACTIVE DEPRESSION: ICD-10-CM

## 2021-09-01 DIAGNOSIS — I10 HYPERTENSION, ESSENTIAL: ICD-10-CM

## 2021-09-01 RX ORDER — BUPROPION HYDROCHLORIDE 150 MG/1
150 TABLET ORAL DAILY
Qty: 90 TABLET | Refills: 1 | Status: SHIPPED | OUTPATIENT
Start: 2021-09-01 | End: 2022-02-15 | Stop reason: SDUPTHER

## 2021-09-01 RX ORDER — SIMVASTATIN 40 MG
40 TABLET ORAL DAILY
Qty: 90 TABLET | Refills: 1 | Status: SHIPPED | OUTPATIENT
Start: 2021-09-01 | End: 2022-02-15 | Stop reason: SDUPTHER

## 2021-09-01 RX ORDER — CALCIUM CITRATE/VITAMIN D3 200MG-6.25
1 TABLET ORAL DAILY
Qty: 100 EACH | Refills: 6 | Status: SHIPPED | OUTPATIENT
Start: 2021-09-01 | End: 2022-01-17

## 2021-09-01 RX ORDER — FENOFIBRATE 160 MG/1
160 TABLET ORAL DAILY
Qty: 90 TABLET | Refills: 1 | Status: SHIPPED | OUTPATIENT
Start: 2021-09-01 | End: 2022-01-26

## 2021-09-01 RX ORDER — GLUCOSAM/CHON-MSM1/C/MANG/BOSW 500-416.6
1 TABLET ORAL 2 TIMES DAILY
Qty: 100 EACH | Refills: 5 | Status: SHIPPED | OUTPATIENT
Start: 2021-09-01 | End: 2022-09-08

## 2021-09-01 RX ORDER — AMLODIPINE BESYLATE 2.5 MG/1
2.5 TABLET ORAL DAILY
Qty: 90 TABLET | Refills: 1 | Status: SHIPPED | OUTPATIENT
Start: 2021-09-01 | End: 2022-01-26

## 2021-09-01 RX ORDER — ARIPIPRAZOLE 5 MG/1
5 TABLET ORAL DAILY
Qty: 90 TABLET | Refills: 1 | Status: SHIPPED | OUTPATIENT
Start: 2021-09-01 | End: 2022-01-26

## 2021-09-01 RX ORDER — ISOPROPYL ALCOHOL 0.75 G/1
SWAB TOPICAL
Qty: 100 EACH | Refills: 5 | Status: SHIPPED | OUTPATIENT
Start: 2021-09-01

## 2021-09-01 RX ORDER — VENLAFAXINE 100 MG/1
100 TABLET ORAL 2 TIMES DAILY
Qty: 180 TABLET | Refills: 1 | Status: SHIPPED | OUTPATIENT
Start: 2021-09-01 | End: 2022-02-15 | Stop reason: SDUPTHER

## 2021-09-01 NOTE — TELEPHONE ENCOUNTER
----- Message from Zara Ochoa MA sent at 9/1/2021  8:54 AM EDT -----  Regarding: FW: MED REFILL  This was set as a reminder for Kayleigh to fill his meds this month. She said he needs an appointment as well. Looks like he cancelled his labs and appointment via [facility].  ----- Message -----  From: Lennie Victor MD  Sent: 9/1/2021   8:29 AM EDT  To: Zara Ochoa MA  Subject: RE: MED REFILL                                   Will do, looks like he needs to schedule an appointment as well   ----- Message -----  From: Zara Ochoa MA  Sent: 9/1/2021  To: Lennie Victor MD  Subject: MED REFILL                                       REFILL ALL MAIL ORDER MEDICATION

## 2021-09-01 NOTE — TELEPHONE ENCOUNTER
DR. VALENCIA HAS ALREADY FILLED NEDICATIONS FOR SIX MONTHS.    PATIENT NEEDS APPOINTMENT FOR UPCOMING REFILLS.

## 2021-09-09 ENCOUNTER — TELEPHONE (OUTPATIENT)
Dept: FAMILY MEDICINE CLINIC | Facility: CLINIC | Age: 61
End: 2021-09-09

## 2021-09-09 NOTE — TELEPHONE ENCOUNTER
FYI PATIENT CANCELED HIS APPOINTMENTS VIA [employer]. HE HAD PREVIOUSLY SCHEDULED FOR LABS AND OFFICE APPOINTMENTS.

## 2021-09-09 NOTE — TELEPHONE ENCOUNTER
----- Message from Zara Ochoa MA sent at 9/1/2021  8:54 AM EDT -----  Regarding: FW: MED REFILL  This was set as a reminder for Kayleigh to fill his meds this month. She said he needs an appointment as well. Looks like he cancelled his labs and appointment via [employer].  ----- Message -----  From: Lennie Victor MD  Sent: 9/1/2021   8:29 AM EDT  To: Zara Ochoa MA  Subject: RE: MED REFILL                                   Will do, looks like he needs to schedule an appointment as well   ----- Message -----  From: Zara Ochoa MA  Sent: 9/1/2021  To: Lennie Victor MD  Subject: MED REFILL                                       REFILL ALL MAIL ORDER MEDICATION

## 2021-09-24 DIAGNOSIS — F32.A ANXIETY AND DEPRESSION: ICD-10-CM

## 2021-09-24 DIAGNOSIS — F41.9 ANXIETY AND DEPRESSION: ICD-10-CM

## 2021-09-24 DIAGNOSIS — F32.9 REACTIVE DEPRESSION: ICD-10-CM

## 2021-09-24 RX ORDER — BUPROPION HYDROCHLORIDE 150 MG/1
TABLET ORAL
Qty: 30 TABLET | OUTPATIENT
Start: 2021-09-24

## 2022-01-15 DIAGNOSIS — E11.9 TYPE 2 DIABETES MELLITUS WITHOUT COMPLICATION, WITHOUT LONG-TERM CURRENT USE OF INSULIN: ICD-10-CM

## 2022-01-15 DIAGNOSIS — E11.42 TYPE 2 DIABETES MELLITUS WITH DIABETIC POLYNEUROPATHY, WITHOUT LONG-TERM CURRENT USE OF INSULIN: ICD-10-CM

## 2022-01-17 RX ORDER — CALCIUM CITRATE/VITAMIN D3 200MG-6.25
TABLET ORAL
Qty: 200 EACH | Refills: 0 | Status: SHIPPED | OUTPATIENT
Start: 2022-01-17 | End: 2022-11-10

## 2022-01-26 DIAGNOSIS — F32.A ANXIETY AND DEPRESSION: ICD-10-CM

## 2022-01-26 DIAGNOSIS — I10 HYPERTENSION, ESSENTIAL: ICD-10-CM

## 2022-01-26 DIAGNOSIS — F41.9 ANXIETY AND DEPRESSION: ICD-10-CM

## 2022-01-26 DIAGNOSIS — E78.5 HYPERLIPIDEMIA, UNSPECIFIED HYPERLIPIDEMIA TYPE: ICD-10-CM

## 2022-01-26 RX ORDER — FENOFIBRATE 160 MG/1
TABLET ORAL
Qty: 30 TABLET | Refills: 0 | Status: SHIPPED | OUTPATIENT
Start: 2022-01-26 | End: 2022-05-10 | Stop reason: SDUPTHER

## 2022-01-26 RX ORDER — ARIPIPRAZOLE 5 MG/1
TABLET ORAL
Qty: 30 TABLET | Refills: 0 | Status: SHIPPED | OUTPATIENT
Start: 2022-01-26 | End: 2022-02-15 | Stop reason: SDUPTHER

## 2022-01-26 RX ORDER — AMLODIPINE BESYLATE 2.5 MG/1
TABLET ORAL
Qty: 30 TABLET | Refills: 0 | Status: SHIPPED | OUTPATIENT
Start: 2022-01-26 | End: 2022-02-15 | Stop reason: SDUPTHER

## 2022-02-14 DIAGNOSIS — M54.32 BILATERAL SCIATICA: ICD-10-CM

## 2022-02-14 DIAGNOSIS — M54.50 CHRONIC BILATERAL LOW BACK PAIN WITHOUT SCIATICA: ICD-10-CM

## 2022-02-14 DIAGNOSIS — M47.816 LUMBAR FACET ARTHROPATHY: ICD-10-CM

## 2022-02-14 DIAGNOSIS — M54.31 BILATERAL SCIATICA: ICD-10-CM

## 2022-02-14 DIAGNOSIS — G89.29 CHRONIC BILATERAL LOW BACK PAIN WITHOUT SCIATICA: ICD-10-CM

## 2022-02-14 RX ORDER — GABAPENTIN 400 MG/1
CAPSULE ORAL
Qty: 90 CAPSULE | OUTPATIENT
Start: 2022-02-14

## 2022-02-15 ENCOUNTER — OFFICE VISIT (OUTPATIENT)
Dept: FAMILY MEDICINE CLINIC | Facility: CLINIC | Age: 62
End: 2022-02-15

## 2022-02-15 VITALS
SYSTOLIC BLOOD PRESSURE: 130 MMHG | TEMPERATURE: 98.4 F | OXYGEN SATURATION: 98 % | DIASTOLIC BLOOD PRESSURE: 90 MMHG | HEART RATE: 78 BPM | HEIGHT: 65 IN | BODY MASS INDEX: 37.82 KG/M2 | WEIGHT: 227 LBS

## 2022-02-15 DIAGNOSIS — Z87.891 PERSONAL HISTORY OF NICOTINE DEPENDENCE: ICD-10-CM

## 2022-02-15 DIAGNOSIS — F32.9 REACTIVE DEPRESSION: ICD-10-CM

## 2022-02-15 DIAGNOSIS — Z79.899 HIGH RISK MEDICATION USE: ICD-10-CM

## 2022-02-15 DIAGNOSIS — F41.9 ANXIETY AND DEPRESSION: ICD-10-CM

## 2022-02-15 DIAGNOSIS — G89.29 CHRONIC BILATERAL LOW BACK PAIN WITHOUT SCIATICA: ICD-10-CM

## 2022-02-15 DIAGNOSIS — E11.9 TYPE 2 DIABETES MELLITUS WITHOUT COMPLICATION, WITHOUT LONG-TERM CURRENT USE OF INSULIN: Primary | ICD-10-CM

## 2022-02-15 DIAGNOSIS — I10 HYPERTENSION, ESSENTIAL: ICD-10-CM

## 2022-02-15 DIAGNOSIS — M54.50 CHRONIC BILATERAL LOW BACK PAIN WITHOUT SCIATICA: ICD-10-CM

## 2022-02-15 DIAGNOSIS — M54.32 BILATERAL SCIATICA: ICD-10-CM

## 2022-02-15 DIAGNOSIS — F17.200 SMOKER: ICD-10-CM

## 2022-02-15 DIAGNOSIS — F32.A ANXIETY AND DEPRESSION: ICD-10-CM

## 2022-02-15 DIAGNOSIS — Z12.2 ENCOUNTER FOR SCREENING FOR LUNG CANCER: ICD-10-CM

## 2022-02-15 DIAGNOSIS — E78.5 HYPERLIPIDEMIA, UNSPECIFIED HYPERLIPIDEMIA TYPE: ICD-10-CM

## 2022-02-15 DIAGNOSIS — F33.41 RECURRENT MAJOR DEPRESSIVE DISORDER, IN PARTIAL REMISSION: ICD-10-CM

## 2022-02-15 DIAGNOSIS — M47.816 LUMBAR FACET ARTHROPATHY: ICD-10-CM

## 2022-02-15 DIAGNOSIS — M54.31 BILATERAL SCIATICA: ICD-10-CM

## 2022-02-15 DIAGNOSIS — Z12.5 SCREENING FOR MALIGNANT NEOPLASM OF PROSTATE: ICD-10-CM

## 2022-02-15 PROCEDURE — 99214 OFFICE O/P EST MOD 30 MIN: CPT | Performed by: FAMILY MEDICINE

## 2022-02-15 RX ORDER — AMLODIPINE BESYLATE 2.5 MG/1
2.5 TABLET ORAL DAILY
Qty: 90 TABLET | Refills: 1 | Status: SHIPPED | OUTPATIENT
Start: 2022-02-15 | End: 2022-11-11 | Stop reason: SDUPTHER

## 2022-02-15 RX ORDER — VENLAFAXINE 100 MG/1
100 TABLET ORAL 2 TIMES DAILY
Qty: 180 TABLET | Refills: 1 | Status: SHIPPED | OUTPATIENT
Start: 2022-02-15 | End: 2022-10-17

## 2022-02-15 RX ORDER — SIMVASTATIN 40 MG
40 TABLET ORAL DAILY
Qty: 90 TABLET | Refills: 1 | Status: SHIPPED | OUTPATIENT
Start: 2022-02-15 | End: 2022-11-11 | Stop reason: SDUPTHER

## 2022-02-15 RX ORDER — ARIPIPRAZOLE 5 MG/1
5 TABLET ORAL DAILY
Qty: 90 TABLET | Refills: 1 | Status: SHIPPED | OUTPATIENT
Start: 2022-02-15 | End: 2022-11-11 | Stop reason: SDUPTHER

## 2022-02-15 RX ORDER — INSULIN GLARGINE 100 [IU]/ML
INJECTION, SOLUTION SUBCUTANEOUS
COMMUNITY
Start: 2022-01-08 | End: 2022-12-05

## 2022-02-15 RX ORDER — VENLAFAXINE 100 MG/1
TABLET ORAL
COMMUNITY
Start: 2021-12-01 | End: 2022-02-15

## 2022-02-15 RX ORDER — EMPAGLIFLOZIN 25 MG/1
TABLET, FILM COATED ORAL
COMMUNITY
Start: 2021-12-06

## 2022-02-15 RX ORDER — INSULIN GLARGINE 100 [IU]/ML
80 INJECTION, SOLUTION SUBCUTANEOUS DAILY
COMMUNITY
Start: 2022-02-14 | End: 2022-05-15

## 2022-02-15 RX ORDER — BUPROPION HYDROCHLORIDE 150 MG/1
150 TABLET ORAL DAILY
Qty: 90 TABLET | Refills: 1 | Status: SHIPPED | OUTPATIENT
Start: 2022-02-15 | End: 2022-10-17

## 2022-02-15 RX ORDER — GABAPENTIN 400 MG/1
400 CAPSULE ORAL 3 TIMES DAILY
Qty: 90 CAPSULE | Refills: 0 | Status: SHIPPED | OUTPATIENT
Start: 2022-02-15 | End: 2022-03-21

## 2022-02-15 RX ORDER — DULAGLUTIDE 1.5 MG/.5ML
INJECTION, SOLUTION SUBCUTANEOUS
COMMUNITY
Start: 2022-01-09

## 2022-02-15 NOTE — PROGRESS NOTES
"Chief Complaint   Patient presents with   • Hypertension   • Diabetes       Subjective   Lloyd Greene is a 61 y.o. male.     History of Present Illness   Past medical history type 2 diabetes, hyperlipidemia, sleep apnea, hypertension, lumbar spinal surgery, Depression, Last seen 6 months prior    History of type 2 diabetes intermittently uncontrolled:   Following with [facility] endocrinology.  Last seen in December 2021.    Type 2 diabetes: Needs labs and to review his medications.  Last A1c was 7.2 Two months ago.  Normal kidney function.  Normal thyroid over the summer.    Hypertension: Is on amlodipine    Mood: He is on Wellbutrin as well as a very low-dose of Abilify at 5 mg and effexor. Stable no SI, improved mood     Hyperlipidemia: On fenofibrate    Due for PSA screen, low dose CT, [facility] in Spring     The following portions of the patient's history were reviewed and updated as appropriate: allergies, current medications, past family history, past medical history, past social history, past surgical history and problem list.      Past Medical History:   Diagnosis Date   • Anesthesia complication     PATIENT STATES \"STOPPED BREATHING DURING COLONOSCOPY APPROX 4-5 YRS AGO\".   • Anxiety    • Arthritis    • Colon polyp    • COPD (chronic obstructive pulmonary disease) (HCC)    • Depression    • Diabetes mellitus (HCC)    • Diverticulitis    • Diverticulosis    • Elevated cholesterol    • History of TIA (transient ischemic attack)     3 yr ago   • Hyperlipidemia    • Hypertension    • Low back pain    • Numbness and tingling     BILATERAL LEGS RIGHT LEG WORSE   • Obesity    • Sleep apnea     uses cpap occasionally   • Stroke (HCC)        Past Surgical History:   Procedure Laterality Date   • COLON SURGERY     • COLONOSCOPY  2014   • COLONOSCOPY N/A 4/12/2019    Procedure: COLONOSCOPY w/ polypectomy;  Surgeon: Tin Giang MD;  Location: [facility];  Service: " Gastroenterology   • COLOSTOMY      Dr. Gupta   • COLOSTOMY REVISION  2006    Diverticulitis-Dr. Gupta   • INCISIONAL HERNIA REPAIR     • LUMBAR LAMINECTOMY WITH FUSION N/A 2020    Procedure: Lumbar 2 to lumbar 3, lumbar 3 to lumbar 4 and lumbar 4 lumbar 5 laminectomy with a fusion by orthopedics;  Surgeon: John Tran MD;  Location: [facility] OR;  Service: Neurosurgery;  Laterality: N/A;   • LUMBAR LAMINECTOMY WITH FUSION N/A 2020    Procedure: Lumbar 2 to lumbar 5 fusion with instrumentation and laminectomy by Dr. Hutchinson;  Surgeon: Melvin Ya MD;  Location: [facility] OR;  Service: Neurosurgery;  Laterality: N/A;   • ORIF ANKLE FRACTURE Right    • SPINE SURGERY         Family History   Problem Relation Age of Onset   • Depression Mother    • Hypertension Mother    • Depression Father    • Kidney disease Father    • COPD Father    • Hypertension Father    • Colon cancer Neg Hx    • Colon polyps Neg Hx    • Malig Hyperthermia Neg Hx        Social History     Socioeconomic History   • Marital status:    • Number of children: 2   Tobacco Use   • Smoking status: Former Smoker     Packs/day: 2.00     Years: 35.00     Pack years: 70.00     Types: Electronic Cigarette     Quit date:      Years since quittin.1   • Smokeless tobacco: Never Used   Substance and Sexual Activity   • Alcohol use: Yes     Alcohol/week: 0.0 standard drinks     Comment: Rarely consume alcohol   • Drug use: No   • Sexual activity: Not Currently     Partners: Female       Current Outpatient Medications on File Prior to Visit   Medication Sig Dispense Refill   • Alcohol Swabs (B-D SINGLE USE SWABS REGULAR) pads Use daily for BG checks 100 each 5   • Blood Glucose Monitoring Suppl (TRUE METRIX AIR GLUCOSE METER) device 1 Device Daily. 1 Device 0   • budesonide-formoterol (SYMBICORT) 160-4.5 MCG/ACT inhaler Inhale 2 puffs 2 (Two) Times a Day. 1 inhaler 11   • Cholecalciferol (VITAMIN D3) 125 MCG  "(5000 UT) capsule capsule Take 5,000 Units by mouth Daily.     • fenofibrate 160 MG tablet TAKE 1 TABLET EVERY DAY 30 tablet 0   • Insulin Glargine (BASAGLAR KWIKPEN) 100 UNIT/ML injection pen Inject 80 Units under the skin into the appropriate area as directed Daily.     • Insulin Syringe (B-D INSULIN SYRINGE) 29G X 1/2\" 1 ML misc 1 syringe 3 (Three) Times a Day. 100 each 3   • Insulin Syringe-Needle U-100 (BD Insulin Syringe U/F) 31G X 5/16\" 0.3 ML misc Use TID as directed 100 each 3   • Jardiance 25 MG tablet tablet      • Lantus SoloStar 100 UNIT/ML injection pen      • metFORMIN (GLUCOPHAGE) 1000 MG tablet Take 1 tablet by mouth 2 (Two) Times a Day With Meals. 180 tablet 1   • True Metrix Blood Glucose Test test strip USE AS INSTRUCTED TO TEST TWO TIMES DAILY 200 each 0   • TRUEplus Lancets 28G misc 1 each by Other route 2 (Two) Times a Day. 100 each 5   • Trulicity 1.5 MG/0.5ML solution pen-injector      • [DISCONTINUED] amLODIPine (NORVASC) 2.5 MG tablet TAKE 1 TABLET EVERY DAY 30 tablet 0   • [DISCONTINUED] ARIPiprazole (ABILIFY) 5 MG tablet TAKE 1 TABLET EVERY DAY 30 tablet 0   • [DISCONTINUED] buPROPion XL (WELLBUTRIN XL) 150 MG 24 hr tablet Take 1 tablet by mouth Daily. 90 tablet 1   • [DISCONTINUED] gabapentin (NEURONTIN) 400 MG capsule TAKE ONE CAPSULE BY MOUTH THREE TIMES A DAY 90 capsule 0   • [DISCONTINUED] simvastatin (ZOCOR) 40 MG tablet Take 1 tablet by mouth Daily. 90 tablet 1   • [DISCONTINUED] venlafaxine (EFFEXOR) 100 MG tablet      • [DISCONTINUED] insulin aspart (novoLOG) 100 UNIT/ML injection Inject 5 Units under the skin into the appropriate area as directed 3 (Three) Times a Day With Meals. 2 each 2   • [DISCONTINUED] insulin detemir (Levemir FlexTouch) 100 UNIT/ML injection Inject 50 Units under the skin into the appropriate area as directed Every Night for 90 days. 15 pen 1   • [DISCONTINUED] SITagliptin (Januvia) 100 MG tablet Take 1 tablet by mouth Daily for 90 days. 90 tablet 1   • " [DISCONTINUED] venlafaxine (EFFEXOR) 100 MG tablet Take 1 tablet by mouth 2 (Two) Times a Day for 60 days. 180 tablet 1     No current facility-administered medications on file prior to visit.       Review of Systems   Constitutional: Negative for activity change, chills and fever.   HENT: Negative for congestion, postnasal drip and rhinorrhea.    Eyes: Negative for blurred vision and pain.   Respiratory: Negative for cough, chest tightness and shortness of breath.    Cardiovascular: Negative for chest pain.   Gastrointestinal: Negative for abdominal pain, constipation, diarrhea, nausea and vomiting.   Endocrine: Negative for cold intolerance and heat intolerance.   Genitourinary: Negative for decreased urine volume, dysuria and frequency.   Musculoskeletal: Negative for arthralgias, back pain and myalgias.   Skin: Negative for rash and skin lesions.   Neurological: Negative for dizziness and confusion.   Psychiatric/Behavioral: Negative for agitation, behavioral problems and depressed mood.           Vitals:    02/15/22 1445   BP: 130/90   Pulse: 78   Temp: 98.4 °F (36.9 °C)   SpO2: 98%      Objective   Physical Exam  Vitals and nursing note reviewed.   Constitutional:       Appearance: He is well-developed.   HENT:      Head: Normocephalic and atraumatic.   Eyes:      Conjunctiva/sclera: Conjunctivae normal.      Pupils: Pupils are equal, round, and reactive to light.   Cardiovascular:      Rate and Rhythm: Normal rate and regular rhythm.      Heart sounds: Normal heart sounds. No murmur heard.      Pulmonary:      Effort: Pulmonary effort is normal. No respiratory distress.      Breath sounds: Normal breath sounds.   Abdominal:      General: Bowel sounds are normal.      Palpations: Abdomen is soft.   Musculoskeletal:         General: Normal range of motion.      Cervical back: Neck supple.   Skin:     General: Skin is warm and dry.   Neurological:      Mental Status: He is alert and oriented to person, place,  and time.           Assessment/Plan   Problems Addressed this Visit        Cardiac and Vasculature    Hypertension, essential    Relevant Medications    amLODIPine (NORVASC) 2.5 MG tablet    ARIPiprazole (ABILIFY) 5 MG tablet    buPROPion XL (WELLBUTRIN XL) 150 MG 24 hr tablet    simvastatin (ZOCOR) 40 MG tablet    venlafaxine (EFFEXOR) 100 MG tablet    Other Relevant Orders    Compliance Drug Analysis, Ur - Urine, Clean Catch    PSA SCREENING    Lipid Panel    Basic metabolic panel    CBC w AUTO Differential       Endocrine and Metabolic    Type 2 diabetes mellitus without complication, without long-term current use of insulin ([facility]) - Primary    Relevant Medications    Jardiance 25 MG tablet tablet    Trulicity 1.5 MG/0.5ML solution pen-injector    Lantus SoloStar 100 UNIT/ML injection pen    Insulin Glargine (BASAGLAR KWIKPEN) 100 UNIT/ML injection pen    Other Relevant Orders    Compliance Drug Analysis, Ur - Urine, Clean Catch    PSA SCREENING    Lipid Panel    Basic metabolic panel    CBC w AUTO Differential      Other Visit Diagnoses     Hyperlipidemia, unspecified hyperlipidemia type        Relevant Medications    amLODIPine (NORVASC) 2.5 MG tablet    ARIPiprazole (ABILIFY) 5 MG tablet    buPROPion XL (WELLBUTRIN XL) 150 MG 24 hr tablet    simvastatin (ZOCOR) 40 MG tablet    venlafaxine (EFFEXOR) 100 MG tablet    Other Relevant Orders    Compliance Drug Analysis, Ur - Urine, Clean Catch    PSA SCREENING    Lipid Panel    Basic metabolic panel    CBC w AUTO Differential    Chronic bilateral low back pain without sciatica        Relevant Medications    gabapentin (NEURONTIN) 400 MG capsule    Bilateral sciatica        Relevant Medications    gabapentin (NEURONTIN) 400 MG capsule    Lumbar facet arthropathy        Relevant Medications    gabapentin (NEURONTIN) 400 MG capsule    Anxiety and depression        Relevant Medications    amLODIPine (NORVASC) 2.5 MG tablet    ARIPiprazole (ABILIFY) 5 MG tablet     buPROPion XL (WELLBUTRIN XL) 150 MG 24 hr tablet    simvastatin (ZOCOR) 40 MG tablet    venlafaxine (EFFEXOR) 100 MG tablet    Reactive depression        Relevant Medications    amLODIPine (NORVASC) 2.5 MG tablet    ARIPiprazole (ABILIFY) 5 MG tablet    buPROPion XL (WELLBUTRIN XL) 150 MG 24 hr tablet    simvastatin (ZOCOR) 40 MG tablet    venlafaxine (EFFEXOR) 100 MG tablet    Recurrent major depressive disorder, in partial remission (HCC)        Relevant Medications    amLODIPine (NORVASC) 2.5 MG tablet    ARIPiprazole (ABILIFY) 5 MG tablet    buPROPion XL (WELLBUTRIN XL) 150 MG 24 hr tablet    simvastatin (ZOCOR) 40 MG tablet    venlafaxine (EFFEXOR) 100 MG tablet    Encounter for screening for lung cancer        Relevant Orders    CT Chest Low Dose Wo    Smoker        Relevant Orders    CT Chest Low Dose Wo    Personal history of nicotine dependence         Relevant Orders    CT Chest Low Dose Wo    High risk medication use        Relevant Orders    Compliance Drug Analysis, Ur - Urine, Clean Catch    Screening for malignant neoplasm of prostate        Relevant Orders    Compliance Drug Analysis, Ur - Urine, Clean Catch    PSA SCREENING    Lipid Panel    Basic metabolic panel    CBC w AUTO Differential      Diagnoses       Codes Comments    Type 2 diabetes mellitus without complication, without long-term current use of insulin (HCC)    -  Primary ICD-10-CM: E11.9  ICD-9-CM: 250.00     Hypertension, essential     ICD-10-CM: I10  ICD-9-CM: 401.9     Hyperlipidemia, unspecified hyperlipidemia type     ICD-10-CM: E78.5  ICD-9-CM: 272.4     Chronic bilateral low back pain without sciatica     ICD-10-CM: M54.50, G89.29  ICD-9-CM: 724.2, 338.29     Bilateral sciatica     ICD-10-CM: M54.31, M54.32  ICD-9-CM: 724.3     Lumbar facet arthropathy     ICD-10-CM: M47.816  ICD-9-CM: 721.3     Anxiety and depression     ICD-10-CM: F41.9, F32.A  ICD-9-CM: 300.00, 311     Reactive depression     ICD-10-CM: F32.9  ICD-9-CM: 300.4      Recurrent major depressive disorder, in partial remission (HCC)     ICD-10-CM: F33.41  ICD-9-CM: 296.35     Encounter for screening for lung cancer     ICD-10-CM: Z12.2  ICD-9-CM: V76.0     Smoker     ICD-10-CM: F17.200  ICD-9-CM: 305.1     Personal history of nicotine dependence      ICD-10-CM: Z87.891  ICD-9-CM: V15.82     High risk medication use     ICD-10-CM: Z79.899  ICD-9-CM: V58.69     Screening for malignant neoplasm of prostate     ICD-10-CM: Z12.5  ICD-9-CM: V76.44         Stable T2DM per Endocrine Increase walking and activity level as well as dietary measures  Refill gabapentin, UDS next visit at [facility] visit with his other labs  Stable HTN  Mood stable        Lennie Victor MD  The patient has read and signed the [facility] Controlled Substance Contract.  I will continue to see patient for regular follow up appointments.  They are well controlled on their medication.  BENI is updated every 3 months. The patient is aware of the potential for addiction and dependence.

## 2022-02-25 ENCOUNTER — HOSPITAL ENCOUNTER (OUTPATIENT)
Dept: CT IMAGING | Facility: HOSPITAL | Age: 62
Discharge: HOME | End: 2022-02-25
Admitting: FAMILY MEDICINE

## 2022-02-25 DIAGNOSIS — Z12.2 ENCOUNTER FOR SCREENING FOR LUNG CANCER: ICD-10-CM

## 2022-02-25 DIAGNOSIS — F17.200 SMOKER: ICD-10-CM

## 2022-02-25 DIAGNOSIS — Z87.891 PERSONAL HISTORY OF NICOTINE DEPENDENCE: ICD-10-CM

## 2022-02-25 PROCEDURE — 71271 CT THORAX LUNG CANCER SCR C-: CPT

## 2022-03-19 DIAGNOSIS — M54.50 CHRONIC BILATERAL LOW BACK PAIN WITHOUT SCIATICA: ICD-10-CM

## 2022-03-19 DIAGNOSIS — M54.32 BILATERAL SCIATICA: ICD-10-CM

## 2022-03-19 DIAGNOSIS — G89.29 CHRONIC BILATERAL LOW BACK PAIN WITHOUT SCIATICA: ICD-10-CM

## 2022-03-19 DIAGNOSIS — M54.31 BILATERAL SCIATICA: ICD-10-CM

## 2022-03-19 DIAGNOSIS — M47.816 LUMBAR FACET ARTHROPATHY: ICD-10-CM

## 2022-03-21 RX ORDER — GABAPENTIN 400 MG/1
CAPSULE ORAL
Qty: 90 CAPSULE | Refills: 0 | Status: SHIPPED | OUTPATIENT
Start: 2022-03-21 | End: 2022-05-02

## 2022-04-30 DIAGNOSIS — G89.29 CHRONIC BILATERAL LOW BACK PAIN WITHOUT SCIATICA: ICD-10-CM

## 2022-04-30 DIAGNOSIS — M54.50 CHRONIC BILATERAL LOW BACK PAIN WITHOUT SCIATICA: ICD-10-CM

## 2022-04-30 DIAGNOSIS — M54.32 BILATERAL SCIATICA: ICD-10-CM

## 2022-04-30 DIAGNOSIS — M54.31 BILATERAL SCIATICA: ICD-10-CM

## 2022-04-30 DIAGNOSIS — M47.816 LUMBAR FACET ARTHROPATHY: ICD-10-CM

## 2022-05-02 RX ORDER — GABAPENTIN 400 MG/1
CAPSULE ORAL
Qty: 90 CAPSULE | Refills: 0 | Status: SHIPPED | OUTPATIENT
Start: 2022-05-02 | End: 2022-11-11

## 2022-05-02 NOTE — TELEPHONE ENCOUNTER
-He is scheduled to get a urine drug screen tomorrow with his labs.     -Can we just make him aware he needs to get that UDS done tomorrow in order for us to write his Gabapentin script long term. It needs to be clear of other controlled substances.     -I will give him a refill for now.

## 2022-05-02 NOTE — TELEPHONE ENCOUNTER
LV - 02/15/22  NOV - 05/10/22  LF - 03/21/22 #90  UDS - NEEDS BEFORE NEXT REFILL  CONTRACT - NONE ON FILE, WILL NEED BEFORE NEXT REFILL

## 2022-05-04 LAB
BASOPHILS # BLD AUTO: 0.1 X10E3/UL (ref 0–0.2)
BASOPHILS NFR BLD AUTO: 1 %
BUN SERPL-MCNC: 15 MG/DL (ref 6–24)
BUN/CREAT SERPL: 17 (ref 9–20)
CALCIUM SERPL-MCNC: 9.7 MG/DL (ref 8.7–10.2)
CHLORIDE SERPL-SCNC: 106 MMOL/L (ref 96–106)
CHOLEST SERPL-MCNC: 143 MG/DL (ref 100–199)
CO2 SERPL-SCNC: 20 MMOL/L (ref 20–29)
CREAT SERPL-MCNC: 0.86 MG/DL (ref 0.76–1.27)
EGFRCR SERPLBLD CKD-EPI 2021: 100 ML/MIN/1.73
EOSINOPHIL # BLD AUTO: 0.2 X10E3/UL (ref 0–0.4)
EOSINOPHIL NFR BLD AUTO: 3 %
ERYTHROCYTE [DISTWIDTH] IN BLOOD BY AUTOMATED COUNT: 13.9 % (ref 11.6–15.4)
GLUCOSE SERPL-MCNC: 91 MG/DL (ref 65–99)
HCT VFR BLD AUTO: 48.4 % (ref 37.5–51)
HDLC SERPL-MCNC: 37 MG/DL
HGB BLD-MCNC: 15.6 G/DL (ref 13–17.7)
IMM GRANULOCYTES # BLD AUTO: 0 X10E3/UL (ref 0–0.1)
IMM GRANULOCYTES NFR BLD AUTO: 0 %
LDLC SERPL CALC-MCNC: 85 MG/DL (ref 0–99)
LYMPHOCYTES # BLD AUTO: 2.6 X10E3/UL (ref 0.7–3.1)
LYMPHOCYTES NFR BLD AUTO: 29 %
MCH RBC QN AUTO: 28.4 PG (ref 26.6–33)
MCHC RBC AUTO-ENTMCNC: 32.2 G/DL (ref 31.5–35.7)
MCV RBC AUTO: 88 FL (ref 79–97)
MONOCYTES # BLD AUTO: 0.9 X10E3/UL (ref 0.1–0.9)
MONOCYTES NFR BLD AUTO: 10 %
NEUTROPHILS # BLD AUTO: 5.2 X10E3/UL (ref 1.4–7)
NEUTROPHILS NFR BLD AUTO: 57 %
PLATELET # BLD AUTO: 367 X10E3/UL (ref 150–450)
POTASSIUM SERPL-SCNC: 4.6 MMOL/L (ref 3.5–5.2)
PSA SERPL-MCNC: 0.4 NG/ML (ref 0–4)
RBC # BLD AUTO: 5.5 X10E6/UL (ref 4.14–5.8)
SODIUM SERPL-SCNC: 145 MMOL/L (ref 134–144)
TRIGL SERPL-MCNC: 113 MG/DL (ref 0–149)
VLDLC SERPL CALC-MCNC: 21 MG/DL (ref 5–40)
WBC # BLD AUTO: 8.9 X10E3/UL (ref 3.4–10.8)

## 2022-05-10 ENCOUNTER — OFFICE VISIT (OUTPATIENT)
Dept: FAMILY MEDICINE CLINIC | Facility: CLINIC | Age: 62
End: 2022-05-10

## 2022-05-10 VITALS
WEIGHT: 231 LBS | SYSTOLIC BLOOD PRESSURE: 112 MMHG | BODY MASS INDEX: 38.49 KG/M2 | TEMPERATURE: 97.8 F | HEIGHT: 65 IN | OXYGEN SATURATION: 97 % | DIASTOLIC BLOOD PRESSURE: 78 MMHG | HEART RATE: 68 BPM

## 2022-05-10 DIAGNOSIS — E78.5 HYPERLIPIDEMIA, UNSPECIFIED HYPERLIPIDEMIA TYPE: ICD-10-CM

## 2022-05-10 DIAGNOSIS — Z00.00 ANNUAL VISIT FOR GENERAL ADULT MEDICAL EXAMINATION WITHOUT ABNORMAL FINDINGS: Primary | ICD-10-CM

## 2022-05-10 PROCEDURE — G0439 PPPS, SUBSEQ VISIT: HCPCS | Performed by: FAMILY MEDICINE

## 2022-05-10 PROCEDURE — 96160 PT-FOCUSED HLTH RISK ASSMT: CPT | Performed by: FAMILY MEDICINE

## 2022-05-10 PROCEDURE — 1170F FXNL STATUS ASSESSED: CPT | Performed by: FAMILY MEDICINE

## 2022-05-10 PROCEDURE — 1159F MED LIST DOCD IN RCRD: CPT | Performed by: FAMILY MEDICINE

## 2022-05-10 RX ORDER — PEN NEEDLE, DIABETIC 31 G X1/4"
NEEDLE, DISPOSABLE MISCELLANEOUS
COMMUNITY
Start: 2022-02-08

## 2022-05-10 RX ORDER — FENOFIBRATE 160 MG/1
160 TABLET ORAL DAILY
Qty: 90 TABLET | Refills: 1 | Status: SHIPPED | OUTPATIENT
Start: 2022-05-10 | End: 2022-11-11 | Stop reason: SDUPTHER

## 2022-05-10 RX ORDER — DULAGLUTIDE 1.5 MG/.5ML
INJECTION, SOLUTION SUBCUTANEOUS
COMMUNITY
Start: 2022-02-07 | End: 2022-05-10 | Stop reason: SDUPTHER

## 2022-05-10 NOTE — PROGRESS NOTES
The ABCs of the Annual Wellness Visit  Subsequent Medicare Wellness Visit    Chief Complaint   Patient presents with   • Medicare Wellness-subsequent      Subjective    History of Present Illness:  Lloyd Greene is a 61 y.o. male who presents for a Subsequent Medicare Wellness Visit.    History of type 2 diabetes now stable   Following with [facility] endocrinology.  Last seen in December 2021.     -lantus 80 units daily.   -Trulicity 1.5 mg weekly-thursdays.   -metformin 1000 mg BID   -Jardiance 25 mg daily.    No hypoglycemic episodes, BG have been within range     Hypertension: Is on amlodipine  Mood: He is on Wellbutrin as well as a very low-dose of Abilify at 5 mg and effexor. Stable no SI, improved mood   Hyperlipidemia: On fenofibrate  GREGORY on cpap    C-scope is coming due soon    The following portions of the patient's history were reviewed and   updated as appropriate: past medical history, past social history, past surgical history and problem list.    Compared to one year ago, the patient feels his physical   health is the same.    Compared to one year ago, the patient feels his mental   health is the same.    Recent Hospitalizations:  He was not admitted to the hospital during the last year.       Current Medical Providers:  Patient Care Team:  Lennie Victor MD as PCP - General (Family Medicine)  John Tran MD as Surgeon (Neurosurgery)    Outpatient Medications Prior to Visit   Medication Sig Dispense Refill   • Alcohol Swabs (B-D SINGLE USE SWABS REGULAR) pads Use daily for BG checks 100 each 5   • amLODIPine (NORVASC) 2.5 MG tablet Take 1 tablet by mouth Daily. 90 tablet 1   • ARIPiprazole (ABILIFY) 5 MG tablet Take 1 tablet by mouth Daily. 90 tablet 1   • Blood Glucose Monitoring Suppl (TRUE METRIX AIR GLUCOSE METER) device 1 Device Daily. 1 Device 0   • budesonide-formoterol (SYMBICORT) 160-4.5 MCG/ACT inhaler Inhale 2 puffs 2 (Two) Times a Day. 1 inhaler 11  "  • buPROPion XL (WELLBUTRIN XL) 150 MG 24 hr tablet Take 1 tablet by mouth Daily. 90 tablet 1   • Cholecalciferol (VITAMIN D3) 125 MCG (5000 UT) capsule capsule Take 5,000 Units by mouth Daily.     • fenofibrate 160 MG tablet TAKE 1 TABLET EVERY DAY 30 tablet 0   • gabapentin (NEURONTIN) 400 MG capsule TAKE ONE CAPSULE BY MOUTH THREE TIMES A DAY 90 capsule 0   • Insulin Glargine (BASAGLAR KWIKPEN) 100 UNIT/ML injection pen Inject 80 Units under the skin into the appropriate area as directed Daily.     • Insulin Syringe (B-D INSULIN SYRINGE) 29G X 1/2\" 1 ML misc 1 syringe 3 (Three) Times a Day. 100 each 3   • Insulin Syringe-Needle U-100 (BD Insulin Syringe U/F) 31G X 5/16\" 0.3 ML misc Use TID as directed 100 each 3   • Jardiance 25 MG tablet tablet      • [employer] Pen Needles 31G X 6 MM misc      • Lantus SoloStar 100 UNIT/ML injection pen      • metFORMIN (GLUCOPHAGE) 1000 MG tablet Take 1 tablet by mouth 2 (Two) Times a Day With Meals. 180 tablet 1   • simvastatin (ZOCOR) 40 MG tablet Take 1 tablet by mouth Daily. 90 tablet 1   • True Metrix Blood Glucose Test test strip USE AS INSTRUCTED TO TEST TWO TIMES DAILY 200 each 0   • TRUEplus Lancets 28G misc 1 each by Other route 2 (Two) Times a Day. 100 each 5   • Trulicity 1.5 MG/0.5ML solution pen-injector      • venlafaxine (EFFEXOR) 100 MG tablet Take 1 tablet by mouth 2 (Two) Times a Day for 60 days. 180 tablet 1     No facility-administered medications prior to visit.       No opioid medication identified on active medication list. I have reviewed chart for other potential  high risk medication/s and harmful drug interactions in the elderly.          Aspirin is not on active medication list.     Patient Active Problem List   Diagnosis   • Other chronic pain   • Partial small bowel obstruction (HCC)   • Angio-edema   • Adenomatous polyp of colon   • Type 2 diabetes mellitus without complication, without long-term current use of insulin (HCC)   • Familial " "hypercholesterolemia   • Hypertension, essential   • Type 2 diabetes mellitus with diabetic polyneuropathy, without long-term current use of insulin ([facility])   • DDD (degenerative disc disease), lumbar   • Lumbar stenosis with neurogenic claudication   • Spinal stenosis of lumbar region with neurogenic claudication   • Sleep apnea   • Hyponatremia   • Encounter for rehabilitation   • Morbidly obese (HCC)     Advance Care Planning  Advance Directive is on file.    Review of Systems   Constitutional: Negative for fatigue.   HENT: Negative for congestion.    Respiratory: Negative for shortness of breath.    Cardiovascular: Negative for chest pain.   Gastrointestinal: Negative for abdominal pain.   Genitourinary: Negative for urgency.   Musculoskeletal: Negative for back pain.   Neurological: Negative for weakness.   Psychiatric/Behavioral: Negative for dysphoric mood.        Objective    Vitals:    05/10/22 1018   BP: 112/78   Pulse: 68   Temp: 97.8 °F (36.6 °C)   SpO2: 97%   Weight: 105 kg (231 lb)   Height: 165.1 cm (65\")     BMI Readings from Last 1 Encounters:   05/10/22 38.44 kg/m²   BMI is above normal parameters. Recommendations include: educational material, exercise counseling and none (medical contraindication)    Does the patient have evidence of cognitive impairment? No    Physical Exam  Vitals and nursing note reviewed.   Constitutional:       Appearance: He is well-developed.   Cardiovascular:      Rate and Rhythm: Normal rate and regular rhythm.      Heart sounds: Normal heart sounds. No murmur heard.  Pulmonary:      Effort: Pulmonary effort is normal. No respiratory distress.      Breath sounds: Normal breath sounds.   Abdominal:      General: Bowel sounds are normal.      Palpations: Abdomen is soft.   Skin:     General: Skin is warm.   Neurological:      Mental Status: He is alert and oriented to person, place, and time.       Lab Results   Component Value Date    CHLPL 143 05/03/2022    TRIG 113 " 2022    HDL 37 (L) 2022    LDL 85 2022    VLDL 21 2022            HEALTH RISK ASSESSMENT    Smoking Status:  Social History     Tobacco Use   Smoking Status Former Smoker   • Packs/day: 2.00   • Years: 35.00   • Pack years: 70.00   • Types: Electronic Cigarette   • Quit date:    • Years since quittin.3   Smokeless Tobacco Never Used     Alcohol Consumption:  Social History     Substance and Sexual Activity   Alcohol Use Yes   • Alcohol/week: 0.0 standard drinks    Comment: Rarely consume alcohol     Fall Risk Screen:    STEADI Fall Risk Assessment has not been completed.    Depression Screening:  PHQ-2/PHQ-9 Depression Screening 5/10/2022   Retired Total Score -   Little Interest or Pleasure in Doing Things 0-->not at all   Feeling Down, Depressed or Hopeless 0-->not at all   PHQ-9: Brief Depression Severity Measure Score 0       Health Habits and Functional and Cognitive Screening:  Functional & Cognitive Status 3/9/2021   Do you have difficulty preparing food and eating? No   Do you have difficulty bathing yourself, getting dressed or grooming yourself? No   Do you have difficulty using the toilet? No   Do you have difficulty moving around from place to place? No   Do you have trouble with steps or getting out of a bed or a chair? No   Current Diet Well Balanced Diet   Dental Exam Not up to date   Eye Exam Up to date   Exercise (times per week) 0 times per week   Do you need help using the phone?  No   Are you deaf or do you have serious difficulty hearing?  No   Do you need help with transportation? No   Do you need help shopping? No   Do you need help preparing meals?  No   Do you need help with housework?  Yes   Do you need help with laundry? No   Do you need help taking your medications? No   Do you need help managing money? No   Do you ever drive or ride in a car without wearing a seat belt? No   Have you felt unusual stress, anger or loneliness in the last month? Yes   Who do  you live with? Alone   If you need help, do you have trouble finding someone available to you? No   Have you been bothered in the last four weeks by sexual problems? -   Do you have difficulty concentrating, remembering or making decisions? No       Age-appropriate Screening Schedule:  Refer to the list below for future screening recommendations based on patient's age, sex and/or medical conditions. Orders for these recommended tests are listed in the plan section. The patient has been provided with a written plan.    Health Maintenance   Topic Date Due   • TDAP/TD VACCINES (1 - Tdap) Never done   • ZOSTER VACCINE (3 of 3) 12/19/2019   • DIABETIC FOOT EXAM  03/09/2022   • INFLUENZA VACCINE  08/01/2022   • URINE MICROALBUMIN  08/31/2022   • DIABETIC EYE EXAM  11/30/2022   • HEMOGLOBIN A1C  03/14/2023   • LIPID PANEL  05/03/2023              [unfilled]  CMS Preventative Services Quick Reference  Risk Factors Identified During Encounter  Cardiovascular Disease  Depression/Dysphoria  Fall Risk-High or Moderate  Immunizations Discussed/Encouraged (specific Immunizations; COVID19  Inactivity/Sedentary  The above risks/problems have been discussed with the patient.  Follow up actions/plans if indicated are seen below in the Assessment/Plan Section.  Pertinent information has been shared with the patient in the After Visit Summary.    Diagnoses and all orders for this visit:    1. Annual visit for general adult medical examination without abnormal findings (Primary)        Follow Up:   6mo or sooner as needed    An After Visit Summary and PPPS were made available to the patient.      Last meds filled in Feb via [employer], notify us when due for refills in July. Diabetic meds via Endocrine

## 2022-05-11 LAB — DRUGS UR: NORMAL

## 2022-09-08 RX ORDER — GLUCOSAM/CHON-MSM1/C/MANG/BOSW 500-416.6
TABLET ORAL
Qty: 100 EACH | Refills: 0 | Status: SHIPPED | OUTPATIENT
Start: 2022-09-08 | End: 2022-10-14

## 2022-10-14 RX ORDER — GLUCOSAM/CHON-MSM1/C/MANG/BOSW 500-416.6
TABLET ORAL
Qty: 200 EACH | Refills: 2 | Status: SHIPPED | OUTPATIENT
Start: 2022-10-14

## 2022-10-16 DIAGNOSIS — F33.41 RECURRENT MAJOR DEPRESSIVE DISORDER, IN PARTIAL REMISSION: ICD-10-CM

## 2022-10-16 DIAGNOSIS — F32.A ANXIETY AND DEPRESSION: ICD-10-CM

## 2022-10-16 DIAGNOSIS — E78.5 HYPERLIPIDEMIA, UNSPECIFIED HYPERLIPIDEMIA TYPE: ICD-10-CM

## 2022-10-16 DIAGNOSIS — F41.9 ANXIETY AND DEPRESSION: ICD-10-CM

## 2022-10-16 DIAGNOSIS — I10 HYPERTENSION, ESSENTIAL: ICD-10-CM

## 2022-10-16 DIAGNOSIS — F32.9 REACTIVE DEPRESSION: ICD-10-CM

## 2022-10-17 RX ORDER — VENLAFAXINE 100 MG/1
TABLET ORAL
Qty: 180 TABLET | Refills: 0 | Status: SHIPPED | OUTPATIENT
Start: 2022-10-17 | End: 2022-11-11 | Stop reason: SDUPTHER

## 2022-10-17 RX ORDER — BUPROPION HYDROCHLORIDE 150 MG/1
TABLET ORAL
Qty: 90 TABLET | Refills: 0 | Status: SHIPPED | OUTPATIENT
Start: 2022-10-17 | End: 2022-11-11 | Stop reason: SDUPTHER

## 2022-11-10 DIAGNOSIS — E11.42 TYPE 2 DIABETES MELLITUS WITH DIABETIC POLYNEUROPATHY, WITHOUT LONG-TERM CURRENT USE OF INSULIN: ICD-10-CM

## 2022-11-10 DIAGNOSIS — E11.9 TYPE 2 DIABETES MELLITUS WITHOUT COMPLICATION, WITHOUT LONG-TERM CURRENT USE OF INSULIN: ICD-10-CM

## 2022-11-10 RX ORDER — CALCIUM CITRATE/VITAMIN D3 200MG-6.25
TABLET ORAL
Qty: 200 EACH | Refills: 0 | Status: SHIPPED | OUTPATIENT
Start: 2022-11-10

## 2022-11-11 ENCOUNTER — OFFICE VISIT (OUTPATIENT)
Dept: FAMILY MEDICINE CLINIC | Facility: CLINIC | Age: 62
End: 2022-11-11

## 2022-11-11 VITALS
HEART RATE: 67 BPM | WEIGHT: 230 LBS | DIASTOLIC BLOOD PRESSURE: 84 MMHG | HEIGHT: 65 IN | SYSTOLIC BLOOD PRESSURE: 128 MMHG | TEMPERATURE: 98 F | OXYGEN SATURATION: 97 % | BODY MASS INDEX: 38.32 KG/M2

## 2022-11-11 DIAGNOSIS — F32.9 REACTIVE DEPRESSION: ICD-10-CM

## 2022-11-11 DIAGNOSIS — I10 HYPERTENSION, ESSENTIAL: ICD-10-CM

## 2022-11-11 DIAGNOSIS — E78.5 HYPERLIPIDEMIA, UNSPECIFIED HYPERLIPIDEMIA TYPE: ICD-10-CM

## 2022-11-11 PROCEDURE — 99214 OFFICE O/P EST MOD 30 MIN: CPT | Performed by: FAMILY MEDICINE

## 2022-11-11 RX ORDER — ARIPIPRAZOLE 5 MG/1
5 TABLET ORAL DAILY
Qty: 90 TABLET | Refills: 1 | Status: SHIPPED | OUTPATIENT
Start: 2022-11-11

## 2022-11-11 RX ORDER — VENLAFAXINE 100 MG/1
100 TABLET ORAL 2 TIMES DAILY
Qty: 180 TABLET | Refills: 1 | Status: SHIPPED | OUTPATIENT
Start: 2022-11-11

## 2022-11-11 RX ORDER — FENOFIBRATE 160 MG/1
160 TABLET ORAL DAILY
Qty: 90 TABLET | Refills: 1 | Status: SHIPPED | OUTPATIENT
Start: 2022-11-11

## 2022-11-11 RX ORDER — BUPROPION HYDROCHLORIDE 150 MG/1
150 TABLET ORAL DAILY
Qty: 90 TABLET | Refills: 1 | Status: SHIPPED | OUTPATIENT
Start: 2022-11-11

## 2022-11-11 RX ORDER — AMLODIPINE BESYLATE 2.5 MG/1
2.5 TABLET ORAL DAILY
Qty: 90 TABLET | Refills: 1 | Status: SHIPPED | OUTPATIENT
Start: 2022-11-11

## 2022-11-11 RX ORDER — SIMVASTATIN 40 MG
40 TABLET ORAL DAILY
Qty: 90 TABLET | Refills: 1 | Status: SHIPPED | OUTPATIENT
Start: 2022-11-11

## 2022-11-11 NOTE — PROGRESS NOTES
"Chief Complaint   Patient presents with   • Hypertension       Subjective   Lloyd Greene is a 62 y.o. male.     History of Present Illness     History of type 2 diabetes now stable   Following with [facility] endocrinology.       -lantus 80 units daily.   -Trulicity 1.5 mg weekly-thursdays.   -metformin 1000 mg BID   -Jardiance 25 mg daily.     No hypoglycemic episodes, BG have been within range     Hypertension: Is on amlodipine/stable   Mood: He is on Wellbutrin as well as a very low-dose of Abilify at 5 mg and effexor. Stable no SI, improved mood   Hyperlipidemia: On fenofibrate  GREGORY on cpap     Doing well, no complaints    The following portions of the patient's history were reviewed and updated as appropriate: allergies, current medications, past family history, past medical history, past social history, past surgical history and problem list.      Past Medical History:   Diagnosis Date   • Anesthesia complication     PATIENT STATES \"STOPPED BREATHING DURING COLONOSCOPY APPROX 4-5 YRS AGO\".   • Anxiety    • Arthritis    • Colon polyp    • COPD (chronic obstructive pulmonary disease) (HCC)    • Depression    • Diabetes mellitus (HCC)    • Diverticulitis    • Diverticulosis    • Elevated cholesterol    • History of TIA (transient ischemic attack)     3 yr ago   • Hyperlipidemia    • Hypertension    • Low back pain    • Numbness and tingling     BILATERAL LEGS RIGHT LEG WORSE   • Obesity    • Sleep apnea     uses cpap occasionally   • Stroke (HCC)        Past Surgical History:   Procedure Laterality Date   • COLON SURGERY     • COLONOSCOPY  2014   • COLONOSCOPY N/A 4/12/2019    Procedure: COLONOSCOPY w/ polypectomy;  Surgeon: Tin Giang MD;  Location: [facility];  Service: Gastroenterology   • COLOSTOMY      Dr. Gupta   • COLOSTOMY REVISION  07/2006    Diverticulitis-Dr. Gupta   • INCISIONAL HERNIA REPAIR  2011   • LUMBAR LAMINECTOMY WITH FUSION N/A 7/20/2020    " Procedure: Lumbar 2 to lumbar 3, lumbar 3 to lumbar 4 and lumbar 4 lumbar 5 laminectomy with a fusion by orthopedics;  Surgeon: John Tran MD;  Location: [facility] OR;  Service: Neurosurgery;  Laterality: N/A;   • LUMBAR LAMINECTOMY WITH FUSION N/A 2020    Procedure: Lumbar 2 to lumbar 5 fusion with instrumentation and laminectomy by Dr. Hutchinson;  Surgeon: Melvin Ya MD;  Location: [facility];  Service: Neurosurgery;  Laterality: N/A;   • ORIF ANKLE FRACTURE Right    • SPINE SURGERY         Family History   Problem Relation Age of Onset   • Depression Mother    • Hypertension Mother    • Depression Father    • Kidney disease Father    • COPD Father    • Hypertension Father    • Colon cancer Neg Hx    • Colon polyps Neg Hx    • Malig Hyperthermia Neg Hx        Social History     Socioeconomic History   • Marital status:    • Number of children: 2   Tobacco Use   • Smoking status: Former     Packs/day: 2.00     Years: 35.00     Pack years: 70.00     Types: Electronic Cigarette, Cigarettes     Quit date:      Years since quittin.8   • Smokeless tobacco: Never   Substance and Sexual Activity   • Alcohol use: Yes     Alcohol/week: 0.0 standard drinks     Comment: Rarely consume alcohol   • Drug use: No   • Sexual activity: Not Currently     Partners: Female       Current Outpatient Medications on File Prior to Visit   Medication Sig Dispense Refill   • Alcohol Swabs (B-D SINGLE USE SWABS REGULAR) pads Use daily for BG checks 100 each 5   • Blood Glucose Monitoring Suppl (TRUE METRIX AIR GLUCOSE METER) device 1 Device Daily. 1 Device 0   • budesonide-formoterol (SYMBICORT) 160-4.5 MCG/ACT inhaler Inhale 2 puffs 2 (Two) Times a Day. 1 inhaler 11   • Cholecalciferol (VITAMIN D3) 125 MCG (5000 UT) capsule capsule Take 5,000 Units by mouth Daily.     • Jardiance 25 MG tablet tablet      • [employer] Pen Needles 31G X 6 MM misc      • Lantus SoloStar 100 UNIT/ML injection pen      •  "metFORMIN (GLUCOPHAGE) 1000 MG tablet Take 1 tablet by mouth 2 (Two) Times a Day With Meals. 180 tablet 1   • True Metrix Blood Glucose Test test strip TEST BLOOD SUGAR EVERY  each 0   • TRUEplus Lancets 28G misc TEST BLOOD SUGAR TWO TIMES DAILY 200 each 2   • Trulicity 1.5 MG/0.5ML solution pen-injector      • [DISCONTINUED] amLODIPine (NORVASC) 2.5 MG tablet Take 1 tablet by mouth Daily. 90 tablet 1   • [DISCONTINUED] ARIPiprazole (ABILIFY) 5 MG tablet Take 1 tablet by mouth Daily. 90 tablet 1   • [DISCONTINUED] buPROPion XL (WELLBUTRIN XL) 150 MG 24 hr tablet TAKE 1 TABLET EVERY DAY 90 tablet 0   • [DISCONTINUED] fenofibrate 160 MG tablet Take 1 tablet by mouth Daily. 90 tablet 1   • [DISCONTINUED] simvastatin (ZOCOR) 40 MG tablet Take 1 tablet by mouth Daily. 90 tablet 1   • [DISCONTINUED] venlafaxine (EFFEXOR) 100 MG tablet TAKE 1 TABLET TWICE DAILY 180 tablet 0   • [DISCONTINUED] gabapentin (NEURONTIN) 400 MG capsule TAKE ONE CAPSULE BY MOUTH THREE TIMES A DAY 90 capsule 0   • [DISCONTINUED] Insulin Syringe (B-D INSULIN SYRINGE) 29G X 1/2\" 1 ML misc 1 syringe 3 (Three) Times a Day. 100 each 3   • [DISCONTINUED] Insulin Syringe-Needle U-100 (BD Insulin Syringe U/F) 31G X 5/16\" 0.3 ML misc Use TID as directed 100 each 3   • [DISCONTINUED] metFORMIN (GLUCOPHAGE) 1000 MG tablet        No current facility-administered medications on file prior to visit.       Review of Systems   Constitutional: Negative for fever.   HENT: Negative for congestion.    Respiratory: Negative for shortness of breath.    Cardiovascular: Negative for chest pain.   Gastrointestinal: Negative for abdominal pain.   Genitourinary: Negative for decreased urine volume.   Musculoskeletal: Negative for back pain.   Neurological: Negative for weakness.   Psychiatric/Behavioral: Negative for depressed mood.           Vitals:    11/11/22 0920   BP: 128/84   Pulse: 67   Temp: 98 °F (36.7 °C)   SpO2: 97%      Objective   Physical Exam  Vitals " reviewed.   Cardiovascular:      Rate and Rhythm: Normal rate and regular rhythm.      Pulses: Normal pulses.   Pulmonary:      Effort: Pulmonary effort is normal.   Abdominal:      General: Abdomen is flat.   Neurological:      General: No focal deficit present.      Mental Status: He is alert.   Psychiatric:         Mood and Affect: Mood normal.           Assessment & Plan   Problems Addressed this Visit        Cardiac and Vasculature    Hypertension, essential    Relevant Medications    amLODIPine (NORVASC) 2.5 MG tablet    ARIPiprazole (ABILIFY) 5 MG tablet    buPROPion XL (WELLBUTRIN XL) 150 MG 24 hr tablet    fenofibrate 160 MG tablet    simvastatin (ZOCOR) 40 MG tablet    venlafaxine (EFFEXOR) 100 MG tablet   Other Visit Diagnoses     Hyperlipidemia, unspecified hyperlipidemia type        Relevant Medications    amLODIPine (NORVASC) 2.5 MG tablet    ARIPiprazole (ABILIFY) 5 MG tablet    buPROPion XL (WELLBUTRIN XL) 150 MG 24 hr tablet    fenofibrate 160 MG tablet    simvastatin (ZOCOR) 40 MG tablet    venlafaxine (EFFEXOR) 100 MG tablet    Reactive depression        Relevant Medications    amLODIPine (NORVASC) 2.5 MG tablet    ARIPiprazole (ABILIFY) 5 MG tablet    buPROPion XL (WELLBUTRIN XL) 150 MG 24 hr tablet    fenofibrate 160 MG tablet    simvastatin (ZOCOR) 40 MG tablet    venlafaxine (EFFEXOR) 100 MG tablet      Diagnoses       Codes Comments    Hypertension, essential     ICD-10-CM: I10  ICD-9-CM: 401.9     Hyperlipidemia, unspecified hyperlipidemia type     ICD-10-CM: E78.5  ICD-9-CM: 272.4     Reactive depression     ICD-10-CM: F32.9  ICD-9-CM: 300.4         Stable htn and mood  Diabetes doing very well          Lennie Victor MD

## 2022-12-05 ENCOUNTER — OFFICE VISIT (OUTPATIENT)
Dept: INTERNAL MEDICINE | Facility: CLINIC | Age: 62
End: 2022-12-05

## 2022-12-05 VITALS
SYSTOLIC BLOOD PRESSURE: 122 MMHG | HEART RATE: 78 BPM | BODY MASS INDEX: 38.82 KG/M2 | HEIGHT: 65 IN | WEIGHT: 233 LBS | DIASTOLIC BLOOD PRESSURE: 64 MMHG | OXYGEN SATURATION: 99 % | TEMPERATURE: 98.2 F

## 2022-12-05 DIAGNOSIS — R25.2 NOCTURNAL FOOT CRAMPS: Primary | ICD-10-CM

## 2022-12-05 DIAGNOSIS — E78.2 MIXED HYPERLIPIDEMIA: ICD-10-CM

## 2022-12-05 DIAGNOSIS — Z79.4 TYPE 2 DIABETES MELLITUS WITHOUT COMPLICATION, WITH LONG-TERM CURRENT USE OF INSULIN: ICD-10-CM

## 2022-12-05 DIAGNOSIS — F51.01 PRIMARY INSOMNIA: ICD-10-CM

## 2022-12-05 DIAGNOSIS — I10 PRIMARY HYPERTENSION: ICD-10-CM

## 2022-12-05 DIAGNOSIS — E11.9 TYPE 2 DIABETES MELLITUS WITHOUT COMPLICATION, WITH LONG-TERM CURRENT USE OF INSULIN: ICD-10-CM

## 2022-12-05 DIAGNOSIS — E66.01 CLASS 2 SEVERE OBESITY WITH SERIOUS COMORBIDITY AND BODY MASS INDEX (BMI) OF 38.0 TO 38.9 IN ADULT, UNSPECIFIED OBESITY TYPE: ICD-10-CM

## 2022-12-05 DIAGNOSIS — Z76.89 ENCOUNTER TO ESTABLISH CARE WITH NEW DOCTOR: ICD-10-CM

## 2022-12-05 DIAGNOSIS — M48.062 LUMBAR STENOSIS WITH NEUROGENIC CLAUDICATION: ICD-10-CM

## 2022-12-05 PROCEDURE — 90471 IMMUNIZATION ADMIN: CPT | Performed by: INTERNAL MEDICINE

## 2022-12-05 PROCEDURE — 90715 TDAP VACCINE 7 YRS/> IM: CPT | Performed by: INTERNAL MEDICINE

## 2022-12-05 PROCEDURE — 90677 PCV20 VACCINE IM: CPT | Performed by: INTERNAL MEDICINE

## 2022-12-05 PROCEDURE — 99214 OFFICE O/P EST MOD 30 MIN: CPT | Performed by: INTERNAL MEDICINE

## 2022-12-05 PROCEDURE — G0009 ADMIN PNEUMOCOCCAL VACCINE: HCPCS | Performed by: INTERNAL MEDICINE

## 2022-12-05 RX ORDER — INSULIN GLARGINE 100 [IU]/ML
INJECTION, SOLUTION SUBCUTANEOUS
COMMUNITY
Start: 2022-01-01

## 2022-12-05 RX ORDER — MULTIPLE VITAMINS W/ MINERALS TAB 9MG-400MCG
1 TAB ORAL DAILY
COMMUNITY

## 2022-12-05 NOTE — ASSESSMENT & PLAN NOTE
- S/p back surgery in 2000.  Had improvement of sciatica after this, but still has back pain daily.  Has previously tried PT.  Went to a gym for a while.  Discussed benefits of stationary bicycle and losing some weight as well as strengthening lower spine muscles and core.

## 2022-12-05 NOTE — ASSESSMENT & PLAN NOTE
- Trazadone not as effective as prior   - Discussed ways to manage back pain that might be helpful for insomnia

## 2022-12-05 NOTE — PROGRESS NOTES
"Chief Complaint  Establish Care (Needing med refills. Pt is having cramps in feet at night. Not sleeping well and trazadone is not helping. )    Subjective        Lloyd Greene presents to [facility]  History of Present Illness     Mr. Greene is a jez 62-year-old male who presents to establish care, discuss nocturnal foot cramps, back pain, hypertension, and hyperlipidemia.    Objective   Vital Signs:  /64 (BP Location: Left arm)   Pulse 78   Temp 98.2 °F (36.8 °C) (Infrared)   Ht 165.1 cm (65\")   Wt 106 kg (233 lb)   SpO2 99%   BMI 38.77 kg/m²   Estimated body mass index is 38.77 kg/m² as calculated from the following:    Height as of this encounter: 165.1 cm (65\").    Weight as of this encounter: 106 kg (233 lb).    Class 2 Severe Obesity (BMI >=35 and <=39.9). Obesity-related health conditions include the following: diabetes mellitus, dyslipidemias and GERD. Obesity is unchanged. BMI is is above average; BMI management plan is completed. We discussed portion control and increasing exercise.      Physical Exam  Vitals reviewed.   Constitutional:       General: He is not in acute distress.     Appearance: Normal appearance.   HENT:      Head: Normocephalic and atraumatic.      Nose: Nose normal.      Mouth/Throat:      Mouth: Mucous membranes are moist.   Eyes:      Conjunctiva/sclera: Conjunctivae normal.   Cardiovascular:      Rate and Rhythm: Normal rate and regular rhythm.      Pulses: Normal pulses.      Heart sounds: Normal heart sounds.   Pulmonary:      Effort: Pulmonary effort is normal.      Breath sounds: Normal breath sounds.   Abdominal:      Palpations: Abdomen is soft.      Tenderness: There is no abdominal tenderness.   Musculoskeletal:      Right lower leg: No edema.      Left lower leg: No edema.   Skin:     General: Skin is warm and dry.   Neurological:      General: No focal deficit present.      Mental Status: He is alert.   Psychiatric:        "  Mood and Affect: Mood normal.        Result Review :  The following data was reviewed by: Monica Cates MD on 12/05/2022:  Common labs    Common Labs 5/3/22 5/3/22 5/3/22 5/3/22 11/4/22 11/4/22 11/4/22    0951 0951 0951 0951 0817 [id number] 0817   Glucose   91   90    BUN   15   17    Creatinine   0.86   0.91    Sodium   145 (A)   139    Potassium   4.6   4.7    Chloride   106   101    Calcium   9.7   10.3    Total Protein      7.2    Albumin      4.50    Total Bilirubin      0.4    Alkaline Phosphatase      62    AST (SGOT)      23    ALT (SGPT)      30    WBC    8.9   9.41   Hemoglobin    15.6   16.1   Hematocrit    48.4   47.9   Platelets    367   359   Total Cholesterol  143        Triglycerides  113        HDL Cholesterol  37 (A)        LDL Cholesterol   85        Hemoglobin A1C     5.90 (A)     PSA 0.4         (A) Abnormal value       Comments are available for some flowsheets but are not being displayed.           Data reviewed: most recent PCP note, labs          Assessment and Plan   Diagnoses and all orders for this visit:    1. Nocturnal foot cramps (Primary)  Assessment & Plan:  Most recent labs reviewed and electrolytes all within normal limits.  Discussed that we could obtain magnesium and B12 at next lab draw, but I am not sure that it is necessary to do a separate lab draw for this.  He is in agreement at this time.  I thought about arterial based claudication, but the fact that the cramps are almost always at night and not during the day or when he is exerting himself, makes me lean away from that.  However, given his list of medical conditions giving him increased risk for arterial claudication, I do not think ABIs would be a bad idea in the future.    Orders:  -     Diclofenac Sodium (VOLTAREN) 1 % gel gel; Apply 4 g topically to the appropriate area as directed At Night As Needed (foot cramps).  Dispense: 50 g; Refill: 2    2. Primary insomnia  Assessment & Plan:  - Trazadone not as effective as  prior   - Discussed ways to manage back pain that might be helpful for insomnia      3. Lumbar stenosis with neurogenic claudication  Assessment & Plan:  - S/p back surgery in 2000.  Had improvement of sciatica after this, but still has back pain daily.  Has previously tried PT.  Went to a gym for a while.  Discussed benefits of stationary bicycle and losing some weight as well as strengthening lower spine muscles and core.       4. Mixed hyperlipidemia  Assessment & Plan:  - Continue statin and fenofibrate      5. Primary hypertension  Assessment & Plan:  - Amlodipine 2.5 mg and well controlled, continue       6. Class 2 severe obesity with serious comorbidity and body mass index (BMI) of 38.0 to 38.9 in adult, unspecified obesity type (HCC)  Assessment & Plan:  We discussed challenges of weight loss and also the difficulties of even discussing weight loss.  I talked about using a stationary bike or even stationary bike pedals to help with both back pain as well as some changes to weight.  We talked about basic dietary measures that could be helpful.       7. Type 2 diabetes mellitus without complication, with long-term current use of insulin ([facility])  Assessment & Plan:  Diabetes regimen: Trulicity once a week, Metformin 1000 mg BID, insulin 70 U, Jardiance 25 mg   Sees endocrinology and happy with that f/u, continue       8. Encounter to establish care with new doctor  Assessment & Plan:  Reviewed all pertinent vaccines for age and discussed healthy weight, exercise.  Patient will be screened with above labs and had physical exam and history taken.  Discussed ways to improve ASCVD health and general mental/physical health.     Relevant lab work reviewed and no need for repeat labs at this time.    Tdap and PCV 20 vaccine given today      Other orders  -     Tdap Vaccine Greater Than or Equal To 6yo IM  -     Pneumococcal Conjugate Vaccine 20-Valent (PCV20)           Follow Up   Return in about 3 months (around  3/5/2023) for f/u diabetes, HTN, HLD.  Patient was given instructions and counseling regarding his condition or for health maintenance advice. Please see specific information pulled into the AVS if appropriate.

## 2022-12-05 NOTE — ASSESSMENT & PLAN NOTE
Diabetes regimen: Trulicity once a week, Metformin 1000 mg BID, insulin 70 U, Jardiance 25 mg   Sees endocrinology and happy with that f/u, continue

## 2022-12-06 PROBLEM — Z76.89 ENCOUNTER TO ESTABLISH CARE WITH NEW DOCTOR: Status: ACTIVE | Noted: 2020-07-25

## 2022-12-06 PROBLEM — E66.812 CLASS 2 SEVERE OBESITY WITH SERIOUS COMORBIDITY AND BODY MASS INDEX (BMI) OF 38.0 TO 38.9 IN ADULT: Status: ACTIVE | Noted: 2020-11-23

## 2022-12-06 NOTE — ASSESSMENT & PLAN NOTE
Reviewed all pertinent vaccines for age and discussed healthy weight, exercise.  Patient will be screened with above labs and had physical exam and history taken.  Discussed ways to improve ASCVD health and general mental/physical health.     Relevant lab work reviewed and no need for repeat labs at this time.    Tdap and PCV 20 vaccine given today

## 2022-12-06 NOTE — ASSESSMENT & PLAN NOTE
Most recent labs reviewed and electrolytes all within normal limits.  Discussed that we could obtain magnesium and B12 at next lab draw, but I am not sure that it is necessary to do a separate lab draw for this.  He is in agreement at this time.  I thought about arterial based claudication, but the fact that the cramps are almost always at night and not during the day or when he is exerting himself, makes me lean away from that.  However, given his list of medical conditions giving him increased risk for arterial claudication, I do not think ABIs would be a bad idea in the future.

## 2022-12-06 NOTE — ASSESSMENT & PLAN NOTE
We discussed challenges of weight loss and also the difficulties of even discussing weight loss.  I talked about using a stationary bike or even stationary bike pedals to help with both back pain as well as some changes to weight.  We talked about basic dietary measures that could be helpful.

## 2022-12-09 ENCOUNTER — PATIENT ROUNDING (BHMG ONLY) (OUTPATIENT)
Dept: INTERNAL MEDICINE | Facility: CLINIC | Age: 62
End: 2022-12-09

## 2022-12-09 NOTE — PROGRESS NOTES
My name is Rupinder Rey and I am the Referral clerk at [facility].     I would like  to officially welcome you to our practice and ask about your recent visit.     Tell me about your visit with us. What things went well?        We're always looking for ways to make our patients' experiences even better. Do you have recommendations on ways we may improve?      Overall were you satisfied with your first visit to our practice?        I appreciate you taking the time to answer these questions. Is there anything else I can do for you?       Thank you, and have a great day.     Rupinder

## 2022-12-28 ENCOUNTER — OFFICE VISIT (OUTPATIENT)
Dept: INTERNAL MEDICINE | Facility: CLINIC | Age: 62
End: 2022-12-28

## 2022-12-28 VITALS
TEMPERATURE: 98 F | BODY MASS INDEX: 39.02 KG/M2 | OXYGEN SATURATION: 97 % | WEIGHT: 234.2 LBS | DIASTOLIC BLOOD PRESSURE: 70 MMHG | HEIGHT: 65 IN | HEART RATE: 75 BPM | SYSTOLIC BLOOD PRESSURE: 116 MMHG

## 2022-12-28 DIAGNOSIS — F51.01 PRIMARY INSOMNIA: ICD-10-CM

## 2022-12-28 DIAGNOSIS — K57.92 DIVERTICULITIS: Primary | ICD-10-CM

## 2022-12-28 PROCEDURE — 99214 OFFICE O/P EST MOD 30 MIN: CPT | Performed by: INTERNAL MEDICINE

## 2022-12-28 RX ORDER — CIPROFLOXACIN 500 MG/1
500 TABLET, FILM COATED ORAL 2 TIMES DAILY
Qty: 14 TABLET | Refills: 0 | Status: SHIPPED | OUTPATIENT
Start: 2022-12-28 | End: 2023-01-04

## 2022-12-28 RX ORDER — METRONIDAZOLE 500 MG/1
500 TABLET ORAL 3 TIMES DAILY
Qty: 21 TABLET | Refills: 0 | Status: SHIPPED | OUTPATIENT
Start: 2022-12-28 | End: 2023-01-04

## 2022-12-28 RX ORDER — HYDROXYZINE HYDROCHLORIDE 25 MG/1
25 TABLET, FILM COATED ORAL NIGHTLY PRN
Qty: 45 TABLET | Refills: 0 | Status: SHIPPED | OUTPATIENT
Start: 2022-12-28 | End: 2023-01-27

## 2022-12-28 NOTE — PROGRESS NOTES
Lloyd Greene is a 62 y.o. male who presents with a chief complaint of   Chief Complaint   Patient presents with   • Diverticulitis     Flared up again, started on Monday.        HPI     Abdominal cramping, +/- nausea.  No fevers, vomiting.  Pain is in center and no blood.   Characteristic of previous flares.  3rd flare in 15 years.      Answers for HPI/ROS submitted by the patient on 12/27/2022  What is the primary reason for your visit?: Abdominal Pain    The following portions of the patient's history were reviewed and updated as appropriate: allergies, current medications, past family history, past medical history, past social history, past surgical history and problem list.      Current Outpatient Medications:   •  Alcohol Swabs (B-D SINGLE USE SWABS REGULAR) pads, Use daily for BG checks, Disp: 100 each, Rfl: 5  •  amLODIPine (NORVASC) 2.5 MG tablet, Take 1 tablet by mouth Daily., Disp: 90 tablet, Rfl: 1  •  ARIPiprazole (ABILIFY) 5 MG tablet, Take 1 tablet by mouth Daily., Disp: 90 tablet, Rfl: 1  •  Blood Glucose Monitoring Suppl (TRUE METRIX AIR GLUCOSE METER) device, 1 Device Daily., Disp: 1 Device, Rfl: 0  •  buPROPion XL (WELLBUTRIN XL) 150 MG 24 hr tablet, Take 1 tablet by mouth Daily., Disp: 90 tablet, Rfl: 1  •  Cholecalciferol (VITAMIN D3) 125 MCG (5000 UT) capsule capsule, Take 5,000 Units by mouth Daily., Disp: , Rfl:   •  fenofibrate 160 MG tablet, Take 1 tablet by mouth Daily., Disp: 90 tablet, Rfl: 1  •  Insulin Glargine (BASAGLAR KWIKPEN) 100 UNIT/ML injection pen, , Disp: , Rfl:   •  Jardiance 25 MG tablet tablet, , Disp: , Rfl:   •  [employer] Pen Needles 31G X 6 MM misc, , Disp: , Rfl:   •  metFORMIN (GLUCOPHAGE) 1000 MG tablet, Take 1 tablet by mouth 2 (Two) Times a Day With Meals., Disp: 180 tablet, Rfl: 1  •  multivitamin with minerals tablet tablet, Take 1 tablet by mouth Daily., Disp: , Rfl:   •  simvastatin (ZOCOR) 40 MG tablet, Take 1 tablet by mouth Daily., Disp: 90 tablet,  "Rfl: 1  •  True Metrix Blood Glucose Test test strip, TEST BLOOD SUGAR EVERY DAY, Disp: 200 each, Rfl: 0  •  TRUEplus Lancets 28G misc, TEST BLOOD SUGAR TWO TIMES DAILY, Disp: 200 each, Rfl: 2  •  Trulicity 1.5 MG/0.5ML solution pen-injector, , Disp: , Rfl:   •  venlafaxine (EFFEXOR) 100 MG tablet, Take 1 tablet by mouth 2 (Two) Times a Day., Disp: 180 tablet, Rfl: 1  •  ciprofloxacin (Cipro) 500 MG tablet, Take 1 tablet by mouth 2 (Two) Times a Day for 7 days., Disp: 14 tablet, Rfl: 0  •  Diclofenac Sodium (VOLTAREN) 1 % gel gel, Apply 4 g topically to the appropriate area as directed At Night As Needed (foot cramps)., Disp: 50 g, Rfl: 2  •  hydrOXYzine (ATARAX) 25 MG tablet, Take 1 tablet by mouth At Night As Needed (insomnia) for up to 30 days., Disp: 45 tablet, Rfl: 0  •  metroNIDAZOLE (Flagyl) 500 MG tablet, Take 1 tablet by mouth 3 (Three) Times a Day for 7 days., Disp: 21 tablet, Rfl: 0            Physical Exam  /70 (BP Location: Left arm)   Pulse 75   Temp 98 °F (36.7 °C)   Ht 165.1 cm (65\")   Wt 106 kg (234 lb 3.2 oz)   SpO2 97%   BMI 38.97 kg/m²     Physical Exam  Vitals reviewed.   Constitutional:       General: He is not in acute distress.     Appearance: Normal appearance.   HENT:      Head: Normocephalic and atraumatic.      Nose: Nose normal.      Mouth/Throat:      Mouth: Mucous membranes are moist.   Eyes:      Conjunctiva/sclera: Conjunctivae normal.   Cardiovascular:      Rate and Rhythm: Normal rate and regular rhythm.      Pulses: Normal pulses.      Heart sounds: Normal heart sounds.   Pulmonary:      Effort: Pulmonary effort is normal.      Breath sounds: Normal breath sounds.   Abdominal:      Palpations: Abdomen is soft.      Tenderness: There is no abdominal tenderness.   Musculoskeletal:      Right lower leg: No edema.      Left lower leg: No edema.   Skin:     General: Skin is warm and dry.   Neurological:      General: No focal deficit present.      Mental Status: He is " alert.   Psychiatric:         Mood and Affect: Mood normal.           Results for orders placed or performed in visit on 11/03/22   Hemoglobin A1c    Specimen: Blood   Result Value Ref Range    Hemoglobin A1C 5.90 (H) 4.80 - 5.60 %   Comprehensive Metabolic Panel    Specimen: Blood   Result Value Ref Range    Glucose 90 65 - 99 mg/dL    BUN 17 8 - 23 mg/dL    Creatinine 0.91 0.76 - 1.27 mg/dL    EGFR Result 96.5 >60.0 mL/min/1.73    BUN/Creatinine Ratio 18.7 7.0 - 25.0    Sodium 139 136 - 145 mmol/L    Potassium 4.7 3.5 - 5.2 mmol/L    Chloride 101 98 - 107 mmol/L    Total CO2 27.4 22.0 - 29.0 mmol/L    Calcium 10.3 8.6 - 10.5 mg/dL    Total Protein 7.2 6.0 - 8.5 g/dL    Albumin 4.50 3.50 - 5.20 g/dL    Globulin 2.7 gm/dL    A/G Ratio 1.7 g/dL    Total Bilirubin 0.4 0.0 - 1.2 mg/dL    Alkaline Phosphatase 62 39 - 117 U/L    AST (SGOT) 23 1 - 40 U/L    ALT (SGPT) 30 1 - 41 U/L   CBC & Differential    Specimen: Blood   Result Value Ref Range    WBC 9.41 3.40 - 10.80 10*3/mm3    RBC 5.41 4.14 - 5.80 10*6/mm3    Hemoglobin 16.1 13.0 - 17.7 g/dL    Hematocrit 47.9 37.5 - 51.0 %    MCV 88.5 79.0 - 97.0 fL    MCH 29.8 26.6 - 33.0 pg    MCHC 33.6 31.5 - 35.7 g/dL    RDW 13.0 12.3 - 15.4 %    Platelets 359 140 - 450 10*3/mm3    Neutrophil Rel % 52.2 42.7 - 76.0 %    Lymphocyte Rel % 35.1 19.6 - 45.3 %    Monocyte Rel % 9.1 5.0 - 12.0 %    Eosinophil Rel % 2.4 0.3 - 6.2 %    Basophil Rel % 0.9 0.0 - 1.5 %    Neutrophils Absolute 4.91 1.70 - 7.00 10*3/mm3    Lymphocytes Absolute 3.30 (H) 0.70 - 3.10 10*3/mm3    Monocytes Absolute 0.86 0.10 - 0.90 10*3/mm3    Eosinophils Absolute 0.23 0.00 - 0.40 10*3/mm3    Basophils Absolute 0.08 0.00 - 0.20 10*3/mm3    Immature Granulocyte Rel % 0.3 0.0 - 0.5 %    Immature Grans Absolute 0.03 0.00 - 0.05 10*3/mm3    nRBC 0.0 0.0 - 0.2 /100 WBC           Diagnoses and all orders for this visit:    1. Diverticulitis (Primary)  Assessment & Plan:  - Benign abdominal exam today  - Symptoms are  consistent with his previous bouts of diverticulitis.  This is his third episode in 15 years.  We talked a little bit about that current medical practice has moved towards less antibiotics and more dietary change for initial management.  He is still interested in antibiotics at this time as they have been very effective for him in the past.  I am not opposed to this today, but we did talk about what dietary changes he should make to ensure that he is getting the benefit of that therapy.  We talked about reasons to go straight to the ER or return to clinic.  I do shared decision making today to decide to not obtain a CT of the abdomen.  We have decided that if he were to worsen instead of improve, we would likely obtain a CAT scan.    Orders:  -     ciprofloxacin (Cipro) 500 MG tablet; Take 1 tablet by mouth 2 (Two) Times a Day for 7 days.  Dispense: 14 tablet; Refill: 0  -     metroNIDAZOLE (Flagyl) 500 MG tablet; Take 1 tablet by mouth 3 (Three) Times a Day for 7 days.  Dispense: 21 tablet; Refill: 0    2. Primary insomnia  Assessment & Plan:  Still struggling with insomnia.  We talked about several options.  We will try hydroxyzine to try and avoid a controlled substance.  I also talked a little bit about my concerns with Ambien in general, but I did put the name of Ambien in his paperwork so that he could read a little bit more about it.    Orders:  -     hydrOXYzine (ATARAX) 25 MG tablet; Take 1 tablet by mouth At Night As Needed (insomnia) for up to 30 days.  Dispense: 45 tablet; Refill: 0

## 2022-12-28 NOTE — ASSESSMENT & PLAN NOTE
Still struggling with insomnia.  We talked about several options.  We will try hydroxyzine to try and avoid a controlled substance.  I also talked a little bit about my concerns with Ambien in general, but I did put the name of Ambien in his paperwork so that he could read a little bit more about it.

## 2022-12-28 NOTE — ASSESSMENT & PLAN NOTE
- Benign abdominal exam today  - Symptoms are consistent with his previous bouts of diverticulitis.  This is his third episode in 15 years.  We talked a little bit about that current medical practice has moved towards less antibiotics and more dietary change for initial management.  He is still interested in antibiotics at this time as they have been very effective for him in the past.  I am not opposed to this today, but we did talk about what dietary changes he should make to ensure that he is getting the benefit of that therapy.  We talked about reasons to go straight to the ER or return to clinic.  I do shared decision making today to decide to not obtain a CT of the abdomen.  We have decided that if he were to worsen instead of improve, we would likely obtain a CAT scan.

## 2023-02-16 ENCOUNTER — TELEPHONE (OUTPATIENT)
Dept: INTERNAL MEDICINE | Facility: CLINIC | Age: 63
End: 2023-02-16
Payer: MEDICARE

## 2023-02-16 ENCOUNTER — APPOINTMENT (OUTPATIENT)
Dept: CT IMAGING | Facility: HOSPITAL | Age: 63
End: 2023-02-16
Payer: MEDICARE

## 2023-02-16 ENCOUNTER — HOSPITAL ENCOUNTER (EMERGENCY)
Facility: HOSPITAL | Age: 63
Discharge: HOME | End: 2023-02-16
Attending: EMERGENCY MEDICINE | Admitting: EMERGENCY MEDICINE
Payer: MEDICARE

## 2023-02-16 VITALS
HEIGHT: 65 IN | BODY MASS INDEX: 37.99 KG/M2 | SYSTOLIC BLOOD PRESSURE: 151 MMHG | TEMPERATURE: 99 F | WEIGHT: 228 LBS | RESPIRATION RATE: 18 BRPM | OXYGEN SATURATION: 91 % | DIASTOLIC BLOOD PRESSURE: 83 MMHG | HEART RATE: 73 BPM

## 2023-02-16 DIAGNOSIS — N20.1 URETEROLITHIASIS: ICD-10-CM

## 2023-02-16 DIAGNOSIS — R10.9 FLANK PAIN: Primary | ICD-10-CM

## 2023-02-16 LAB
ALBUMIN SERPL-MCNC: 4.4 G/DL (ref 3.5–5.2)
ALBUMIN/GLOB SERPL: 1.4 G/DL
ALP SERPL-CCNC: 74 U/L (ref 39–117)
ALT SERPL W P-5'-P-CCNC: 25 U/L (ref 1–41)
ANION GAP SERPL CALCULATED.3IONS-SCNC: 15.9 MMOL/L (ref 5–15)
AST SERPL-CCNC: 20 U/L (ref 1–40)
BACTERIA UR QL AUTO: ABNORMAL /HPF
BASOPHILS # BLD AUTO: 0.04 10*3/MM3 (ref 0–0.2)
BASOPHILS NFR BLD AUTO: 0.3 % (ref 0–1.5)
BILIRUB SERPL-MCNC: 0.2 MG/DL (ref 0–1.2)
BILIRUB UR QL STRIP: NEGATIVE
BUN SERPL-MCNC: 24 MG/DL (ref 8–23)
BUN/CREAT SERPL: 22.4 (ref 7–25)
CALCIUM SPEC-SCNC: 9.6 MG/DL (ref 8.6–10.5)
CHLORIDE SERPL-SCNC: 103 MMOL/L (ref 98–107)
CLARITY UR: ABNORMAL
CO2 SERPL-SCNC: 20.1 MMOL/L (ref 22–29)
COLOR UR: ABNORMAL
CREAT SERPL-MCNC: 1.07 MG/DL (ref 0.76–1.27)
DEPRECATED RDW RBC AUTO: 46.1 FL (ref 37–54)
EGFRCR SERPLBLD CKD-EPI 2021: 79.4 ML/MIN/1.73
EOSINOPHIL # BLD AUTO: 0.18 10*3/MM3 (ref 0–0.4)
EOSINOPHIL NFR BLD AUTO: 1.4 % (ref 0.3–6.2)
ERYTHROCYTE [DISTWIDTH] IN BLOOD BY AUTOMATED COUNT: 13.5 % (ref 12.3–15.4)
GLOBULIN UR ELPH-MCNC: 3.1 GM/DL
GLUCOSE SERPL-MCNC: 150 MG/DL (ref 65–99)
GLUCOSE UR STRIP-MCNC: ABNORMAL MG/DL
HCT VFR BLD AUTO: 51.3 % (ref 37.5–51)
HGB BLD-MCNC: 16.2 G/DL (ref 13–17.7)
HGB UR QL STRIP.AUTO: ABNORMAL
HYALINE CASTS UR QL AUTO: ABNORMAL /LPF
IMM GRANULOCYTES # BLD AUTO: 0.02 10*3/MM3 (ref 0–0.05)
IMM GRANULOCYTES NFR BLD AUTO: 0.2 % (ref 0–0.5)
KETONES UR QL STRIP: NEGATIVE
LEUKOCYTE ESTERASE UR QL STRIP.AUTO: NEGATIVE
LYMPHOCYTES # BLD AUTO: 3.24 10*3/MM3 (ref 0.7–3.1)
LYMPHOCYTES NFR BLD AUTO: 26 % (ref 19.6–45.3)
MCH RBC QN AUTO: 28.8 PG (ref 26.6–33)
MCHC RBC AUTO-ENTMCNC: 31.6 G/DL (ref 31.5–35.7)
MCV RBC AUTO: 91.3 FL (ref 79–97)
MONOCYTES # BLD AUTO: 1.02 10*3/MM3 (ref 0.1–0.9)
MONOCYTES NFR BLD AUTO: 8.2 % (ref 5–12)
NEUTROPHILS NFR BLD AUTO: 63.9 % (ref 42.7–76)
NEUTROPHILS NFR BLD AUTO: 7.98 10*3/MM3 (ref 1.7–7)
NITRITE UR QL STRIP: NEGATIVE
PH UR STRIP.AUTO: 5.5 [PH] (ref 4.5–8)
PLATELET # BLD AUTO: 371 10*3/MM3 (ref 140–450)
PMV BLD AUTO: 10.4 FL (ref 6–12)
POTASSIUM SERPL-SCNC: 4.3 MMOL/L (ref 3.5–5.2)
PROT SERPL-MCNC: 7.5 G/DL (ref 6–8.5)
PROT UR QL STRIP: ABNORMAL
RBC # BLD AUTO: 5.62 10*6/MM3 (ref 4.14–5.8)
RBC # UR STRIP: ABNORMAL /HPF
REF LAB TEST METHOD: ABNORMAL
SODIUM SERPL-SCNC: 139 MMOL/L (ref 136–145)
SP GR UR STRIP: 1.02 (ref 1–1.03)
SQUAMOUS #/AREA URNS HPF: ABNORMAL /HPF
UROBILINOGEN UR QL STRIP: ABNORMAL
WBC # UR STRIP: ABNORMAL /HPF
WBC NRBC COR # BLD: 12.48 10*3/MM3 (ref 3.4–10.8)

## 2023-02-16 PROCEDURE — 80053 COMPREHEN METABOLIC PANEL: CPT | Performed by: EMERGENCY MEDICINE

## 2023-02-16 PROCEDURE — 96375 TX/PRO/DX INJ NEW DRUG ADDON: CPT

## 2023-02-16 PROCEDURE — 74177 CT ABD & PELVIS W/CONTRAST: CPT

## 2023-02-16 PROCEDURE — 81001 URINALYSIS AUTO W/SCOPE: CPT | Performed by: EMERGENCY MEDICINE

## 2023-02-16 PROCEDURE — 25010000002 KETOROLAC TROMETHAMINE PER 15 MG: Performed by: EMERGENCY MEDICINE

## 2023-02-16 PROCEDURE — 0 IOPAMIDOL PER 1 ML: Performed by: EMERGENCY MEDICINE

## 2023-02-16 PROCEDURE — 96374 THER/PROPH/DIAG INJ IV PUSH: CPT

## 2023-02-16 PROCEDURE — 25010000002 MORPHINE PER 10 MG: Performed by: EMERGENCY MEDICINE

## 2023-02-16 PROCEDURE — 99283 EMERGENCY DEPT VISIT LOW MDM: CPT

## 2023-02-16 PROCEDURE — 85025 COMPLETE CBC W/AUTO DIFF WBC: CPT | Performed by: EMERGENCY MEDICINE

## 2023-02-16 PROCEDURE — 25010000002 ONDANSETRON PER 1 MG: Performed by: EMERGENCY MEDICINE

## 2023-02-16 RX ORDER — HYDROCODONE BITARTRATE AND ACETAMINOPHEN 7.5; 325 MG/1; MG/1
1 TABLET ORAL EVERY 6 HOURS PRN
Qty: 12 TABLET | Refills: 0 | Status: SHIPPED | OUTPATIENT
Start: 2023-02-16

## 2023-02-16 RX ORDER — TAMSULOSIN HYDROCHLORIDE 0.4 MG/1
1 CAPSULE ORAL DAILY
Qty: 10 CAPSULE | Refills: 0 | Status: SHIPPED | OUTPATIENT
Start: 2023-02-16 | End: 2023-03-06 | Stop reason: SDUPTHER

## 2023-02-16 RX ORDER — ONDANSETRON 4 MG/1
4 TABLET, ORALLY DISINTEGRATING ORAL EVERY 8 HOURS PRN
Qty: 20 TABLET | Refills: 0 | Status: SHIPPED | OUTPATIENT
Start: 2023-02-16

## 2023-02-16 RX ORDER — KETOROLAC TROMETHAMINE 30 MG/ML
15 INJECTION, SOLUTION INTRAMUSCULAR; INTRAVENOUS EVERY 6 HOURS PRN
Status: DISCONTINUED | OUTPATIENT
Start: 2023-02-16 | End: 2023-02-16 | Stop reason: HOSPADM

## 2023-02-16 RX ORDER — ONDANSETRON 2 MG/ML
4 INJECTION INTRAMUSCULAR; INTRAVENOUS ONCE
Status: COMPLETED | OUTPATIENT
Start: 2023-02-16 | End: 2023-02-16

## 2023-02-16 RX ADMIN — IOPAMIDOL 100 ML: 755 INJECTION, SOLUTION INTRAVENOUS at 12:17

## 2023-02-16 RX ADMIN — KETOROLAC TROMETHAMINE 15 MG: 30 INJECTION, SOLUTION INTRAMUSCULAR; INTRAVENOUS at 11:49

## 2023-02-16 RX ADMIN — MORPHINE SULFATE 4 MG: 4 INJECTION, SOLUTION INTRAMUSCULAR; INTRAVENOUS at 11:49

## 2023-02-16 RX ADMIN — ONDANSETRON 4 MG: 2 INJECTION INTRAMUSCULAR; INTRAVENOUS at 11:49

## 2023-02-16 NOTE — ED PROVIDER NOTES
"Subjective   History of Present Illness  Patient reports a 4-hour history of right lower quadrant pain.  Patient said it came on gradually is been constant ever since.  No change in intensity since it began.  No therapy taken prior to arrival.  Patient does report some nausea and 2 episodes of vomiting.  No blood in his emesis.  Patient states otherwise been healthy and no recent travel, sick contacts, bad food exposure, or trauma.  Patient says he lays on his right side it hurts worse.  Patient's not able to worsen the pain through palpation.  Nothing seems to make it better but patient has gotten into a position of comfort and says it is easing off a little bit but still present.  No blood in his urine or dysuria.  No fever or back pain.        Review of Systems   All other systems reviewed and are negative.      Past Medical History:   Diagnosis Date   • Anesthesia complication     PATIENT STATES \"STOPPED BREATHING DURING COLONOSCOPY APPROX 4-5 YRS AGO\".   • Anxiety    • Arthritis    • Colon polyp    • COPD (chronic obstructive pulmonary disease) (HCC)    • Depression    • Diabetes mellitus (HCC)    • Diverticulitis    • Diverticulosis    • Elevated cholesterol    • History of TIA (transient ischemic attack)     3 yr ago   • Hyperlipidemia    • Hypertension    • Low back pain    • Numbness and tingling     BILATERAL LEGS RIGHT LEG WORSE   • Obesity    • Sleep apnea     uses cpap occasionally   • Stroke (HCC)        Allergies   Allergen Reactions   • Lisinopril Angioedema   • Tamiflu [Oseltamivir Phosphate] Other (See Comments)     Made me sick       Past Surgical History:   Procedure Laterality Date   • COLON SURGERY     • COLONOSCOPY  2014   • COLONOSCOPY N/A 4/12/2019    Procedure: COLONOSCOPY w/ polypectomy;  Surgeon: Tin Giang MD;  Location: [facility];  Service: Gastroenterology   • COLOSTOMY      Dr. Gupta   • COLOSTOMY REVISION  07/2006    Diverticulitis-Dr. Gupta   • INCISIONAL HERNIA " REPAIR  2011   • LUMBAR LAMINECTOMY WITH FUSION N/A 7/20/2020    Procedure: Lumbar 2 to lumbar 3, lumbar 3 to lumbar 4 and lumbar 4 lumbar 5 laminectomy with a fusion by orthopedics;  Surgeon: John Tran MD;  Location: [facility] OR;  Service: Neurosurgery;  Laterality: N/A;   • LUMBAR LAMINECTOMY WITH FUSION N/A 7/20/2020    Procedure: Lumbar 2 to lumbar 5 fusion with instrumentation and laminectomy by Dr. Hutchinson;  Surgeon: Melvin Ya MD;  Location: [facility] MAIN OR;  Service: Neurosurgery;  Laterality: N/A;   • ORIF ANKLE FRACTURE Right 1995   • SPINE SURGERY  2020       Family History   Problem Relation Age of Onset   • Depression Mother    • Hypertension Mother    • Depression Father    • Kidney disease Father    • COPD Father    • Hypertension Father    • Colon cancer Neg Hx    • Colon polyps Neg Hx    • Malig Hyperthermia Neg Hx        Social History     Socioeconomic History   • Marital status:    • Number of children: 2   Tobacco Use   • Smoking status: Former     Packs/day: 2.00     Years: 35.00     Pack years: 70.00     Types: Electronic Cigarette, Cigarettes     Quit date: 2013     Years since quitting: 10.1   • Smokeless tobacco: Never   Substance and Sexual Activity   • Alcohol use: Yes     Alcohol/week: 0.0 standard drinks     Comment: Rarely consume alcohol   • Drug use: No   • Sexual activity: Not Currently     Partners: Female           Objective   Physical Exam  Vitals and nursing note reviewed.   HENT:      Head: Normocephalic and atraumatic.   Eyes:      Conjunctiva/sclera: Conjunctivae normal.   Cardiovascular:      Rate and Rhythm: Normal rate and regular rhythm.      Heart sounds: Normal heart sounds.   Pulmonary:      Effort: Pulmonary effort is normal.      Breath sounds: Normal breath sounds.   Abdominal:      General: Bowel sounds are normal. There is no distension.      Palpations: Abdomen is soft.      Tenderness: There is no abdominal tenderness. There is no right CVA  tenderness, left CVA tenderness, guarding or rebound.      Hernia: No hernia is present.   Musculoskeletal:      Right lower leg: No edema.      Left lower leg: No edema.   Skin:     General: Skin is warm and dry.      Capillary Refill: Capillary refill takes 2 to 3 seconds.   Neurological:      Mental Status: He is alert.   Psychiatric:         Mood and Affect: Mood normal.         Behavior: Behavior normal.         Procedures           ED Course                                           Medical Decision Making  ddx appendicitis, colitis, enteritis, kidney stone, pyelonephritis, UTI    CT Abdomen Pelvis With Contrast    Addendum Date: 2/16/2023    Addendum: There is a 3 mm distal right ureteral stone on axial image 67. There is minimal distention of the proximal ureter. This has been discussed with the ER physician  This report was finalized on 2/16/2023 1:07 PM by Dr. Jose Oliveros MD.      Result Date: 2/16/2023  Normal appendix. No acute findings  This report was finalized on 2/16/2023 12:35 PM by Dr. Jose Oliveros MD.          Labs Reviewed  COMPREHENSIVE METABOLIC PANEL - Abnormal; Notable for the following components:     Glucose                       150 (*)                BUN                           24 (*)                 CO2                           20.1 (*)               Anion Gap                     15.9 (*)            All other components within normal limits         Narrative: GFR Normal >60                  Chronic Kidney Disease <60                  Kidney Failure <15                    URINALYSIS W/ MICROSCOPIC IF INDICATED (NO CULTURE) - Abnormal; Notable for the following components:     Color, UA                     Dark Yellow (*)               Appearance, UA                  (*)                  Glucose, UA                     (*)                  Blood, UA                     Large (3+) (*)               Protein, UA                     (*)               All other components within normal  limits  CBC WITH AUTO DIFFERENTIAL - Abnormal; Notable for the following components:     WBC                           12.48 (*)               Hematocrit                    51.3 (*)               Neutrophils, Absolute         7.98 (*)               Lymphocytes, Absolute         3.24 (*)               Monocytes, Absolute           1.02 (*)            All other components within normal limits  URINALYSIS, MICROSCOPIC ONLY - Abnormal; Notable for the following components:     RBC, UA                       21-30 (*)               WBC, UA                       0-2 (*)             All other components within normal limits  CBC AND DIFFERENTIAL        1215 Pt seen again prior to d/c.  Labs/Imaging reviewed and are remarkable for kidney stone but no uti.  Symptoms improved and pt feels better; vitals stable and pt. in NAD. Non-toxic. Comfortable. Ambulating without difficulty.  Tolerating po.  Relaxed breathing.  All questions personally answered at the bedside and all d/c instructions personally reviewed with pt.  Discussed the importance of close outpt. f/u and pt. understands this and agrees to do so.  Pt agrees to return to ED immediately for any new, persistent, or worsening symptoms.    EMR Dragon/Transcription disclaimer:  Much of this encounter note is an electronic transcription/translation of spoken language to printed text, aka voice recognition.  The electronic translation of spoken language may permit erroneous or at times nonsensical words or phrases to be inadvertently transcribed; although I have reviewed the note for such errors, some may still exist so please interpret based on surrounding text content.      Amount and/or Complexity of Data Reviewed  Labs: ordered.  Radiology: ordered.      Risk  Prescription drug management.          Final diagnoses:   Flank pain   Ureterolithiasis       ED Disposition  ED Disposition     ED Disposition   Discharge    Condition   Stable    Comment   --             [facility]  [address] [facility] [address]  [phone]  In 1 week           Medication List      New Prescriptions    HYDROcodone-acetaminophen 7.5-325 MG per tablet  Commonly known as: NORCO  Take 1 tablet by mouth Every 6 (Six) Hours As Needed for Moderate Pain.     ondansetron ODT 4 MG disintegrating tablet  Commonly known as: ZOFRAN-ODT  Place 1 tablet on the tongue Every 8 (Eight) Hours As Needed for Nausea or Vomiting.     tamsulosin 0.4 MG capsule 24 hr capsule  Commonly known as: FLOMAX  Take 1 capsule by mouth Daily.           Where to Get Your Medications      These medications were sent to [facility] [id number] - NOEL ZHANG - [address] - [phone]  - [phone] FX  [address] [place] [place] [address]    Phone: [phone]   · HYDROcodone-acetaminophen 7.5-325 MG per tablet  · ondansetron ODT 4 MG disintegrating tablet  · tamsulosin 0.4 MG capsule 24 hr capsule          Michael Caballero MD  02/16/23 [id number]

## 2023-02-16 NOTE — TELEPHONE ENCOUNTER
HAVING SEVERE STOMACH PAIN AND COUGHING UP BILE. I EXPLAINED WE DO NOT HAVE ANY APPOINTMENTS OPEN TODAY.  HE NEEDS TO GO TO THE ER OR A URGENT CARE.

## 2023-02-22 ENCOUNTER — TELEPHONE (OUTPATIENT)
Dept: INTERNAL MEDICINE | Facility: CLINIC | Age: 63
End: 2023-02-22
Payer: MEDICARE

## 2023-02-23 ENCOUNTER — TELEPHONE (OUTPATIENT)
Dept: INTERNAL MEDICINE | Facility: CLINIC | Age: 63
End: 2023-02-23
Payer: MEDICARE

## 2023-02-23 ENCOUNTER — TRANSCRIBE ORDERS (OUTPATIENT)
Dept: ADMINISTRATIVE | Facility: HOSPITAL | Age: 63
End: 2023-02-23
Payer: MEDICARE

## 2023-02-23 DIAGNOSIS — N20.1 CALCULUS OF URETER: Primary | ICD-10-CM

## 2023-02-23 NOTE — TELEPHONE ENCOUNTER
I spoke with him today to see if he needed to see Dr. Cates sooner than his March apt.  Per Mr. Greene he is good.

## 2023-02-27 ENCOUNTER — PATIENT OUTREACH (OUTPATIENT)
Dept: CASE MANAGEMENT | Facility: OTHER | Age: 63
End: 2023-02-27
Payer: MEDICARE

## 2023-02-27 NOTE — OUTREACH NOTE
AMBULATORY CASE MANAGEMENT NOTE    Name and Relationship of Patient/Support Person: Lloyd Greene - Self    Patient Outreach  RN-ACM unable to reach patient x 4 regarding 2/16/23 ED visit for flank pain and ureterolithiasis. Patient treated; discharged home to follow up with PCP.Note per [facility] 3/6/23 PCP appointment.This note routed per Clinical Pool.       Susan LOGAN  Ambulatory Case Management    2/27/2023, 13:48 EST

## 2023-03-06 ENCOUNTER — OFFICE VISIT (OUTPATIENT)
Dept: INTERNAL MEDICINE | Facility: CLINIC | Age: 63
End: 2023-03-06
Payer: MEDICARE

## 2023-03-06 VITALS
WEIGHT: 230 LBS | DIASTOLIC BLOOD PRESSURE: 70 MMHG | HEART RATE: 69 BPM | BODY MASS INDEX: 38.32 KG/M2 | OXYGEN SATURATION: 96 % | SYSTOLIC BLOOD PRESSURE: 124 MMHG | TEMPERATURE: 99.5 F | HEIGHT: 65 IN

## 2023-03-06 DIAGNOSIS — E66.01 CLASS 2 SEVERE OBESITY WITH SERIOUS COMORBIDITY AND BODY MASS INDEX (BMI) OF 38.0 TO 38.9 IN ADULT, UNSPECIFIED OBESITY TYPE: ICD-10-CM

## 2023-03-06 DIAGNOSIS — N20.0 KIDNEY STONE: Primary | ICD-10-CM

## 2023-03-06 DIAGNOSIS — E11.42 TYPE 2 DIABETES MELLITUS WITH DIABETIC POLYNEUROPATHY, WITHOUT LONG-TERM CURRENT USE OF INSULIN: ICD-10-CM

## 2023-03-06 DIAGNOSIS — Z87.891 HISTORY OF SMOKING: ICD-10-CM

## 2023-03-06 DIAGNOSIS — I10 PRIMARY HYPERTENSION: ICD-10-CM

## 2023-03-06 PROCEDURE — 99214 OFFICE O/P EST MOD 30 MIN: CPT | Performed by: INTERNAL MEDICINE

## 2023-03-06 RX ORDER — TAMSULOSIN HYDROCHLORIDE 0.4 MG/1
1 CAPSULE ORAL DAILY
Qty: 10 CAPSULE | Refills: 0 | Status: SHIPPED | OUTPATIENT
Start: 2023-03-06

## 2023-03-06 NOTE — PROGRESS NOTES
Lloyd Greene is a 62 y.o. male who presents with a chief complaint of   Chief Complaint   Patient presents with   • Diabetes Mellitus     F/U  Pt went to ER about 2 weeks ago and does have a kidney stone but says it has not bothered him since ER visit.       HPI     Kidney stone still present as far as he knows.      Labs reviewed and CT scan reviewed.     Will pause on colonoscopy for now.     The following portions of the patient's history were reviewed and updated as appropriate: allergies, current medications, past family history, past medical history, past social history, past surgical history and problem list.      Current Outpatient Medications:   •  Alcohol Swabs (B-D SINGLE USE SWABS REGULAR) pads, Use daily for BG checks, Disp: 100 each, Rfl: 5  •  amLODIPine (NORVASC) 2.5 MG tablet, Take 1 tablet by mouth Daily., Disp: 90 tablet, Rfl: 1  •  ARIPiprazole (ABILIFY) 5 MG tablet, Take 1 tablet by mouth Daily., Disp: 90 tablet, Rfl: 1  •  Blood Glucose Monitoring Suppl (TRUE METRIX AIR GLUCOSE METER) device, 1 Device Daily., Disp: 1 Device, Rfl: 0  •  buPROPion XL (WELLBUTRIN XL) 150 MG 24 hr tablet, Take 1 tablet by mouth Daily., Disp: 90 tablet, Rfl: 1  •  Cholecalciferol (VITAMIN D3) 125 MCG (5000 UT) capsule capsule, Take 1 capsule by mouth Daily., Disp: , Rfl:   •  Diclofenac Sodium (VOLTAREN) 1 % gel gel, Apply 4 g topically to the appropriate area as directed At Night As Needed (foot cramps)., Disp: 50 g, Rfl: 2  •  Insulin Glargine (BASAGLAR KWIKPEN) 100 UNIT/ML injection pen, , Disp: , Rfl:   •  Jardiance 25 MG tablet tablet, , Disp: , Rfl:   •  [employer] Pen Needles 31G X 6 MM misc, , Disp: , Rfl:   •  metFORMIN (GLUCOPHAGE) 1000 MG tablet, Take 1 tablet by mouth 2 (Two) Times a Day With Meals., Disp: 180 tablet, Rfl: 1  •  multivitamin with minerals tablet tablet, Take 1 tablet by mouth Daily., Disp: , Rfl:   •  simvastatin (ZOCOR) 40 MG tablet, Take 1 tablet by mouth Daily., Disp: 90  "tablet, Rfl: 1  •  tamsulosin (FLOMAX) 0.4 MG capsule 24 hr capsule, Take 1 capsule by mouth Daily., Disp: 10 capsule, Rfl: 0  •  True Metrix Blood Glucose Test test strip, TEST BLOOD SUGAR EVERY DAY, Disp: 200 each, Rfl: 0  •  TRUEplus Lancets 28G misc, TEST BLOOD SUGAR TWO TIMES DAILY, Disp: 200 each, Rfl: 2  •  Trulicity 1.5 MG/0.5ML solution pen-injector, , Disp: , Rfl:   •  venlafaxine (EFFEXOR) 100 MG tablet, Take 1 tablet by mouth 2 (Two) Times a Day., Disp: 180 tablet, Rfl: 1  •  fenofibrate 160 MG tablet, Take 1 tablet by mouth Daily., Disp: 90 tablet, Rfl: 1  •  HYDROcodone-acetaminophen (NORCO) 7.5-325 MG per tablet, Take 1 tablet by mouth Every 6 (Six) Hours As Needed for Moderate Pain., Disp: 12 tablet, Rfl: 0  •  ondansetron ODT (ZOFRAN-ODT) 4 MG disintegrating tablet, Place 1 tablet on the tongue Every 8 (Eight) Hours As Needed for Nausea or Vomiting., Disp: 20 tablet, Rfl: 0            Physical Exam  /70 (BP Location: Left arm)   Pulse 69   Temp 99.5 °F (37.5 °C) (Infrared)   Ht 165.1 cm (65\")   Wt 104 kg (230 lb)   SpO2 96%   BMI 38.27 kg/m²     Physical Exam  Vitals reviewed.   Constitutional:       General: He is not in acute distress.     Appearance: Normal appearance.   HENT:      Head: Normocephalic and atraumatic.      Nose: Nose normal.      Mouth/Throat:      Mouth: Mucous membranes are moist.   Eyes:      Conjunctiva/sclera: Conjunctivae normal.   Cardiovascular:      Rate and Rhythm: Normal rate and regular rhythm.      Pulses: Normal pulses.      Heart sounds: Normal heart sounds.   Pulmonary:      Effort: Pulmonary effort is normal.      Breath sounds: Normal breath sounds.   Abdominal:      Palpations: Abdomen is soft.      Tenderness: There is no abdominal tenderness.   Musculoskeletal:      Right lower leg: No edema.      Left lower leg: No edema.   Skin:     General: Skin is warm and dry.   Neurological:      General: No focal deficit present.      Mental Status: He is " alert.   Psychiatric:         Mood and Affect: Mood normal.           Results for orders placed or performed during the hospital encounter of 02/16/23   Comprehensive Metabolic Panel    Specimen: Blood   Result Value Ref Range    Glucose 150 (H) 65 - 99 mg/dL    BUN 24 (H) 8 - 23 mg/dL    Creatinine 1.07 0.76 - 1.27 mg/dL    Sodium 139 136 - 145 mmol/L    Potassium 4.3 3.5 - 5.2 mmol/L    Chloride 103 98 - 107 mmol/L    CO2 20.1 (L) 22.0 - 29.0 mmol/L    Calcium 9.6 8.6 - 10.5 mg/dL    Total Protein 7.5 6.0 - 8.5 g/dL    Albumin 4.4 3.5 - 5.2 g/dL    ALT (SGPT) 25 1 - 41 U/L    AST (SGOT) 20 1 - 40 U/L    Alkaline Phosphatase 74 39 - 117 U/L    Total Bilirubin 0.2 0.0 - 1.2 mg/dL    Globulin 3.1 gm/dL    A/G Ratio 1.4 g/dL    BUN/Creatinine Ratio 22.4 7.0 - 25.0    Anion Gap 15.9 (H) 5.0 - 15.0 mmol/L    eGFR 79.4 >60.0 mL/min/1.73   Urinalysis With Microscopic If Indicated (No Culture) - Urine, Clean Catch    Specimen: Urine, Clean Catch   Result Value Ref Range    Color, UA Dark Yellow (A) Yellow, Straw    Appearance, UA Slightly Cloudy (A) Clear    pH, UA 5.5 4.5 - 8.0    Specific Gravity, UA 1.020 1.003 - 1.030    Glucose,  mg/dL (2+) (A) Negative    Ketones, UA Negative Negative    Bilirubin, UA Negative Negative    Blood, UA Large (3+) (A) Negative    Protein, UA 30 mg/dL (1+) (A) Negative    Leuk Esterase, UA Negative Negative    Nitrite, UA Negative Negative    Urobilinogen, UA 0.2 E.U./dL 0.2 - 1.0 E.U./dL   CBC Auto Differential    Specimen: Blood   Result Value Ref Range    WBC 12.48 (H) 3.40 - 10.80 10*3/mm3    RBC 5.62 4.14 - 5.80 10*6/mm3    Hemoglobin 16.2 13.0 - 17.7 g/dL    Hematocrit 51.3 (H) 37.5 - 51.0 %    MCV 91.3 79.0 - 97.0 fL    MCH 28.8 26.6 - 33.0 pg    MCHC 31.6 31.5 - 35.7 g/dL    RDW 13.5 12.3 - 15.4 %    RDW-SD 46.1 37.0 - 54.0 fl    MPV 10.4 6.0 - 12.0 fL    Platelets 371 140 - 450 10*3/mm3    Neutrophil % 63.9 42.7 - 76.0 %    Lymphocyte % 26.0 19.6 - 45.3 %    Monocyte % 8.2  5.0 - 12.0 %    Eosinophil % 1.4 0.3 - 6.2 %    Basophil % 0.3 0.0 - 1.5 %    Immature Grans % 0.2 0.0 - 0.5 %    Neutrophils, Absolute 7.98 (H) 1.70 - 7.00 10*3/mm3    Lymphocytes, Absolute 3.24 (H) 0.70 - 3.10 10*3/mm3    Monocytes, Absolute 1.02 (H) 0.10 - 0.90 10*3/mm3    Eosinophils, Absolute 0.18 0.00 - 0.40 10*3/mm3    Basophils, Absolute 0.04 0.00 - 0.20 10*3/mm3    Immature Grans, Absolute 0.02 0.00 - 0.05 10*3/mm3   Urinalysis, Microscopic Only - Urine, Clean Catch    Specimen: Urine, Clean Catch   Result Value Ref Range    RBC, UA 21-30 (A) None Seen /HPF    WBC, UA 0-2 (A) None Seen /HPF    Bacteria, UA None Seen None Seen /HPF    Squamous Epithelial Cells, UA None Seen None Seen, 0-2 /HPF    Hyaline Casts, UA 0-2 None Seen /LPF    Methodology Manual Light Microscopy            Diagnoses and all orders for this visit:    1. Kidney stone (Primary)  Assessment & Plan:  Recent ER visit for kidney stone, which currently is still present.  Refilled the tamsulosin.  He has follow-up with urology coming up.  Return precautions discussed.  ER documentation as well as labs reviewed today by myself.    Orders:  -     tamsulosin (FLOMAX) 0.4 MG capsule 24 hr capsule; Take 1 capsule by mouth Daily.  Dispense: 10 capsule; Refill: 0    2. History of smoking  Assessment & Plan:  Shared decision making today to complete low-dose lung cancer screening.  He had screening last March which was negative.    Orders:  -      CT Chest Low Dose Cancer Screening WO    3. Type 2 diabetes mellitus with diabetic polyneuropathy, without long-term current use of insulin (HCC)  Assessment & Plan:  Blood sugars continuing to be well controlled at home.  Last A1c was 6.3%.  Has frequent labs with endocrinology.  We will hold off on lab testing prior to next appointment unless or something I specifically need.      4. Class 2 severe obesity with serious comorbidity and body mass index (BMI) of 38.0 to 38.9 in adult, unspecified obesity  type (HCC)  Assessment & Plan:  Continuing to work on increasing movement and controlling diet.  Provided encouragement to continue to do so.      5. Primary hypertension  Assessment & Plan:  Blood pressure well controlled today, continue current regimen        We discussed colonoscopy today.  It appears that he had a colonoscopy in 2019 with 1 hyperplastic and one tubular adenoma polyp removed and it was recommended he follow-up in 3 years.  He would like to delay me referring him for just a couple more months until he can get his kidney stone issues under control.  We discussed that at that time we do need to go ahead and complete his screening colonoscopy as he is already a little bit behind.  He stated his understanding.

## 2023-03-07 ENCOUNTER — HOSPITAL ENCOUNTER (OUTPATIENT)
Dept: ULTRASOUND IMAGING | Facility: HOSPITAL | Age: 63
Discharge: HOME | End: 2023-03-07
Admitting: UROLOGY
Payer: MEDICARE

## 2023-03-07 DIAGNOSIS — N20.1 CALCULUS OF URETER: ICD-10-CM

## 2023-03-07 PROBLEM — N20.0 KIDNEY STONE: Status: ACTIVE | Noted: 2023-03-07

## 2023-03-07 PROBLEM — Z87.891 HISTORY OF SMOKING: Status: ACTIVE | Noted: 2023-03-07

## 2023-03-07 PROCEDURE — 76775 US EXAM ABDO BACK WALL LIM: CPT

## 2023-03-07 NOTE — ASSESSMENT & PLAN NOTE
Continuing to work on increasing movement and controlling diet.  Provided encouragement to continue to do so.

## 2023-03-07 NOTE — ASSESSMENT & PLAN NOTE
Shared decision making today to complete low-dose lung cancer screening.  He had screening last March which was negative.

## 2023-03-07 NOTE — ASSESSMENT & PLAN NOTE
Blood sugars continuing to be well controlled at home.  Last A1c was 6.3%.  Has frequent labs with endocrinology.  We will hold off on lab testing prior to next appointment unless or something I specifically need.

## 2023-03-07 NOTE — ASSESSMENT & PLAN NOTE
Recent ER visit for kidney stone, which currently is still present.  Refilled the tamsulosin.  He has follow-up with urology coming up.  Return precautions discussed.  ER documentation as well as labs reviewed today by myself.

## 2023-03-13 DIAGNOSIS — N20.0 KIDNEY STONE: ICD-10-CM

## 2023-03-13 RX ORDER — TAMSULOSIN HYDROCHLORIDE 0.4 MG/1
CAPSULE ORAL
Qty: 10 CAPSULE | Refills: 0 | OUTPATIENT
Start: 2023-03-13

## 2023-03-17 ENCOUNTER — HOSPITAL ENCOUNTER (OUTPATIENT)
Dept: CT IMAGING | Facility: HOSPITAL | Age: 63
Discharge: HOME | End: 2023-03-17
Admitting: INTERNAL MEDICINE
Payer: MEDICARE

## 2023-03-17 PROCEDURE — 71271 CT THORAX LUNG CANCER SCR C-: CPT

## 2023-08-31 DIAGNOSIS — E11.65 UNCONTROLLED TYPE 2 DIABETES MELLITUS WITH HYPERGLYCEMIA: ICD-10-CM

## 2023-08-31 DIAGNOSIS — F32.9 REACTIVE DEPRESSION: ICD-10-CM

## 2023-08-31 DIAGNOSIS — E78.5 HYPERLIPIDEMIA, UNSPECIFIED HYPERLIPIDEMIA TYPE: ICD-10-CM

## 2023-08-31 DIAGNOSIS — I10 HYPERTENSION, ESSENTIAL: ICD-10-CM

## 2023-08-31 NOTE — TELEPHONE ENCOUNTER
Previously filled by Lennie Victor MD. Would you like to refill?       Rx Refill Note  Requested Prescriptions     Pending Prescriptions Disp Refills    venlafaxine (EFFEXOR) 100 MG tablet 180 tablet 1     Sig: Take 1 tablet by mouth 2 (Two) Times a Day.    metFORMIN (GLUCOPHAGE) 1000 MG tablet 180 tablet 1     Sig: Take 1 tablet by mouth 2 (Two) Times a Day With Meals.    amLODIPine (NORVASC) 2.5 MG tablet 90 tablet 1     Sig: Take 1 tablet by mouth Daily.    ARIPiprazole (ABILIFY) 5 MG tablet 90 tablet 1     Sig: Take 1 tablet by mouth Daily.      Last office visit with prescribing clinician: 3/6/2023   Last telemedicine visit with prescribing clinician: Visit date not found   Next office visit with prescribing clinician: 11/13/2023                         Would you like a call back once the refill request has been completed: [] Yes [] No    If the office needs to give you a call back, can they leave a voicemail: [] Yes [] No    Kandace Cui MA  08/31/23, 14:31 EDT

## 2023-08-31 NOTE — TELEPHONE ENCOUNTER
"    Caller: Lloyd Greene \"Dipak\"    Relationship: Self    Best call back number: [phone]    Requested Prescriptions:   Requested Prescriptions     Pending Prescriptions Disp Refills    venlafaxine (EFFEXOR) 100 MG tablet 180 tablet 1     Sig: Take 1 tablet by mouth 2 (Two) Times a Day.    metFORMIN (GLUCOPHAGE) 1000 MG tablet 180 tablet 1     Sig: Take 1 tablet by mouth 2 (Two) Times a Day With Meals.    amLODIPine (NORVASC) 2.5 MG tablet 90 tablet 1     Sig: Take 1 tablet by mouth Daily.    ARIPiprazole (ABILIFY) 5 MG tablet 90 tablet 1     Sig: Take 1 tablet by mouth Daily.        Pharmacy where request should be sent: [facility] - [facility] [address] - [phone]  - [phone] FX     Last office visit with prescribing clinician: 3/6/2023   Last telemedicine visit with prescribing clinician: Visit date not found   Next office visit with prescribing clinician: 11/13/2023     Additional details provided by patient: PATIENT HAS A 1 WEEK SUPPLY LEFT OF THESE MEDICATIONS.     Does the patient have less than a 3 day supply:  [] Yes  [x] No    Would you like a call back once the refill request has been completed: [] Yes [x] No    If the office needs to give you a call back, can they leave a voicemail: [] Yes [x] No    Meron Napoles Rep   08/31/23 14:22 EDT         DELETE AFTER READING TO PATIENT: "Thank you for sharing this information with me. I will send a message to the clinical team. Please allow 48 hours for the clinical staff to follow up on this request."    "

## 2023-09-01 RX ORDER — AMLODIPINE BESYLATE 2.5 MG/1
2.5 TABLET ORAL DAILY
Qty: 90 TABLET | Refills: 1 | Status: SHIPPED | OUTPATIENT
Start: 2023-09-01

## 2023-09-01 RX ORDER — ARIPIPRAZOLE 5 MG/1
5 TABLET ORAL DAILY
Qty: 90 TABLET | Refills: 1 | Status: SHIPPED | OUTPATIENT
Start: 2023-09-01

## 2023-09-01 RX ORDER — VENLAFAXINE 100 MG/1
100 TABLET ORAL 2 TIMES DAILY
Qty: 180 TABLET | Refills: 1 | Status: SHIPPED | OUTPATIENT
Start: 2023-09-01

## 2023-11-13 DIAGNOSIS — E78.5 HYPERLIPIDEMIA, UNSPECIFIED HYPERLIPIDEMIA TYPE: ICD-10-CM

## 2023-11-13 DIAGNOSIS — F32.9 REACTIVE DEPRESSION: ICD-10-CM

## 2023-11-13 DIAGNOSIS — I10 HYPERTENSION, ESSENTIAL: ICD-10-CM

## 2023-11-13 RX ORDER — BUPROPION HYDROCHLORIDE 150 MG/1
150 TABLET ORAL DAILY
Qty: 90 TABLET | Refills: 1 | Status: SHIPPED | OUTPATIENT
Start: 2023-11-13

## 2023-11-13 RX ORDER — SIMVASTATIN 40 MG
40 TABLET ORAL DAILY
Qty: 90 TABLET | Refills: 1 | Status: SHIPPED | OUTPATIENT
Start: 2023-11-13

## 2023-11-13 RX ORDER — FENOFIBRATE 160 MG/1
160 TABLET ORAL DAILY
Qty: 90 TABLET | Refills: 1 | Status: SHIPPED | OUTPATIENT
Start: 2023-11-13

## 2024-01-24 DIAGNOSIS — I10 HYPERTENSION, ESSENTIAL: ICD-10-CM

## 2024-01-24 DIAGNOSIS — E78.5 HYPERLIPIDEMIA, UNSPECIFIED HYPERLIPIDEMIA TYPE: ICD-10-CM

## 2024-01-24 DIAGNOSIS — F32.9 REACTIVE DEPRESSION: ICD-10-CM

## 2024-01-24 RX ORDER — AMLODIPINE BESYLATE 2.5 MG/1
2.5 TABLET ORAL DAILY
Qty: 90 TABLET | Refills: 3 | Status: SHIPPED | OUTPATIENT
Start: 2024-01-24

## 2024-01-24 RX ORDER — VENLAFAXINE 100 MG/1
100 TABLET ORAL 2 TIMES DAILY
Qty: 180 TABLET | Refills: 3 | Status: SHIPPED | OUTPATIENT
Start: 2024-01-24

## 2024-01-24 NOTE — TELEPHONE ENCOUNTER
Rx Refill Note  Requested Prescriptions     Pending Prescriptions Disp Refills    amLODIPine (NORVASC) 2.5 MG tablet [Pharmacy Med Name: AMLODIPINE BESYLATE 2.5 MG Tablet] 90 tablet 3     Sig: TAKE 1 TABLET EVERY DAY    venlafaxine (EFFEXOR) 100 MG tablet [Pharmacy Med Name: VENLAFAXINE  MG Tablet] 180 tablet 3     Sig: TAKE 1 TABLET TWICE DAILY      Last office visit with prescribing clinician: 3/6/2023   Last telemedicine visit with prescribing clinician: Visit date not found   Next office visit with prescribing clinician: Visit date not found                         Would you like a call back once the refill request has been completed: [] Yes [] No    If the office needs to give you a call back, can they leave a voicemail: [] Yes [] No    Beverley Hargrove MA  01/24/24, 14:27 EST

## 2024-05-15 DIAGNOSIS — Z79.4 TYPE 2 DIABETES MELLITUS WITHOUT COMPLICATION, WITH LONG-TERM CURRENT USE OF INSULIN: ICD-10-CM

## 2024-05-15 DIAGNOSIS — E11.9 TYPE 2 DIABETES MELLITUS WITHOUT COMPLICATION, WITH LONG-TERM CURRENT USE OF INSULIN: ICD-10-CM

## 2024-05-15 DIAGNOSIS — E78.01 FAMILIAL HYPERCHOLESTEROLEMIA: ICD-10-CM

## 2024-05-15 DIAGNOSIS — Z12.5 ENCOUNTER FOR SCREENING FOR MALIGNANT NEOPLASM OF PROSTATE: ICD-10-CM

## 2024-05-15 DIAGNOSIS — R25.2 NOCTURNAL FOOT CRAMPS: ICD-10-CM

## 2024-05-15 DIAGNOSIS — E87.1 HYPONATREMIA: ICD-10-CM

## 2024-05-15 DIAGNOSIS — E66.01 CLASS 2 SEVERE OBESITY WITH SERIOUS COMORBIDITY AND BODY MASS INDEX (BMI) OF 38.0 TO 38.9 IN ADULT, UNSPECIFIED OBESITY TYPE: ICD-10-CM

## 2024-05-15 DIAGNOSIS — G89.29 OTHER CHRONIC PAIN: Primary | ICD-10-CM

## 2024-05-15 DIAGNOSIS — I10 PRIMARY HYPERTENSION: ICD-10-CM

## 2024-06-19 DIAGNOSIS — Z12.5 ENCOUNTER FOR SCREENING FOR MALIGNANT NEOPLASM OF PROSTATE: ICD-10-CM

## 2024-06-19 DIAGNOSIS — E11.9 TYPE 2 DIABETES MELLITUS WITHOUT COMPLICATION, WITH LONG-TERM CURRENT USE OF INSULIN: ICD-10-CM

## 2024-06-19 DIAGNOSIS — E66.01 CLASS 2 SEVERE OBESITY WITH SERIOUS COMORBIDITY AND BODY MASS INDEX (BMI) OF 38.0 TO 38.9 IN ADULT, UNSPECIFIED OBESITY TYPE: ICD-10-CM

## 2024-06-19 DIAGNOSIS — R25.2 NOCTURNAL FOOT CRAMPS: ICD-10-CM

## 2024-06-19 DIAGNOSIS — E87.1 HYPONATREMIA: ICD-10-CM

## 2024-06-19 DIAGNOSIS — I10 PRIMARY HYPERTENSION: ICD-10-CM

## 2024-06-19 DIAGNOSIS — G89.29 OTHER CHRONIC PAIN: ICD-10-CM

## 2024-06-19 DIAGNOSIS — Z79.4 TYPE 2 DIABETES MELLITUS WITHOUT COMPLICATION, WITH LONG-TERM CURRENT USE OF INSULIN: ICD-10-CM

## 2024-06-19 DIAGNOSIS — E78.01 FAMILIAL HYPERCHOLESTEROLEMIA: ICD-10-CM

## 2024-06-28 LAB
ALBUMIN SERPL-MCNC: 4.6 G/DL (ref 3.5–5.2)
ALBUMIN/GLOB SERPL: 2 G/DL
ALP SERPL-CCNC: 55 U/L (ref 39–117)
ALT SERPL-CCNC: 29 U/L (ref 1–41)
AST SERPL-CCNC: 27 U/L (ref 1–40)
BASOPHILS # BLD AUTO: 0.07 10*3/MM3 (ref 0–0.2)
BASOPHILS NFR BLD AUTO: 0.7 % (ref 0–1.5)
BILIRUB SERPL-MCNC: 0.3 MG/DL (ref 0–1.2)
BUN SERPL-MCNC: 18 MG/DL (ref 8–23)
BUN/CREAT SERPL: 18.2 (ref 7–25)
CALCIUM SERPL-MCNC: 10.3 MG/DL (ref 8.6–10.5)
CHLORIDE SERPL-SCNC: 104 MMOL/L (ref 98–107)
CHOLEST SERPL-MCNC: 116 MG/DL (ref 0–200)
CO2 SERPL-SCNC: 24.2 MMOL/L (ref 22–29)
CREAT SERPL-MCNC: 0.99 MG/DL (ref 0.76–1.27)
EGFRCR SERPLBLD CKD-EPI 2021: 86.7 ML/MIN/1.73
EOSINOPHIL # BLD AUTO: 0.23 10*3/MM3 (ref 0–0.4)
EOSINOPHIL NFR BLD AUTO: 2.4 % (ref 0.3–6.2)
ERYTHROCYTE [DISTWIDTH] IN BLOOD BY AUTOMATED COUNT: 13.1 % (ref 12.3–15.4)
GLOBULIN SER CALC-MCNC: 2.3 GM/DL
GLUCOSE SERPL-MCNC: 82 MG/DL (ref 65–99)
HBA1C MFR BLD: 6.3 % (ref 4.8–5.6)
HCT VFR BLD AUTO: 46.8 % (ref 37.5–51)
HDLC SERPL-MCNC: 40 MG/DL (ref 40–60)
HGB BLD-MCNC: 15 G/DL (ref 13–17.7)
IMM GRANULOCYTES # BLD AUTO: 0.03 10*3/MM3 (ref 0–0.05)
IMM GRANULOCYTES NFR BLD AUTO: 0.3 % (ref 0–0.5)
LDLC SERPL CALC-MCNC: 55 MG/DL (ref 0–100)
LDLC/HDLC SERPL: 1.32 {RATIO}
LYMPHOCYTES # BLD AUTO: 2.64 10*3/MM3 (ref 0.7–3.1)
LYMPHOCYTES NFR BLD AUTO: 27.5 % (ref 19.6–45.3)
MCH RBC QN AUTO: 28.8 PG (ref 26.6–33)
MCHC RBC AUTO-ENTMCNC: 32.1 G/DL (ref 31.5–35.7)
MCV RBC AUTO: 89.8 FL (ref 79–97)
MONOCYTES # BLD AUTO: 0.84 10*3/MM3 (ref 0.1–0.9)
MONOCYTES NFR BLD AUTO: 8.8 % (ref 5–12)
NEUTROPHILS # BLD AUTO: 5.79 10*3/MM3 (ref 1.7–7)
NEUTROPHILS NFR BLD AUTO: 60.3 % (ref 42.7–76)
NRBC BLD AUTO-RTO: 0 /100 WBC (ref 0–0.2)
PLATELET # BLD AUTO: 336 10*3/MM3 (ref 140–450)
POTASSIUM SERPL-SCNC: 5.2 MMOL/L (ref 3.5–5.2)
PROT SERPL-MCNC: 6.9 G/DL (ref 6–8.5)
PSA SERPL-MCNC: 0.53 NG/ML (ref 0–4)
RBC # BLD AUTO: 5.21 10*6/MM3 (ref 4.14–5.8)
SODIUM SERPL-SCNC: 142 MMOL/L (ref 136–145)
T4 FREE SERPL-MCNC: 1.03 NG/DL (ref 0.92–1.68)
TRIGL SERPL-MCNC: 116 MG/DL (ref 0–150)
TSH SERPL DL<=0.005 MIU/L-ACNC: 2.77 UIU/ML (ref 0.27–4.2)
VLDLC SERPL CALC-MCNC: 21 MG/DL (ref 5–40)
WBC # BLD AUTO: 9.6 10*3/MM3 (ref 3.4–10.8)

## 2024-07-22 ENCOUNTER — OFFICE VISIT (OUTPATIENT)
Dept: INTERNAL MEDICINE | Facility: CLINIC | Age: 64
End: 2024-07-22
Payer: MEDICARE

## 2024-07-22 VITALS
OXYGEN SATURATION: 94 % | SYSTOLIC BLOOD PRESSURE: 120 MMHG | WEIGHT: 233 LBS | HEIGHT: 65 IN | DIASTOLIC BLOOD PRESSURE: 66 MMHG | TEMPERATURE: 97.5 F | BODY MASS INDEX: 38.82 KG/M2 | HEART RATE: 74 BPM

## 2024-07-22 DIAGNOSIS — Z00.00 MEDICARE ANNUAL WELLNESS VISIT, SUBSEQUENT: Primary | ICD-10-CM

## 2024-07-22 DIAGNOSIS — Z12.11 ENCOUNTER FOR SCREENING COLONOSCOPY: ICD-10-CM

## 2024-07-22 PROCEDURE — G0439 PPPS, SUBSEQ VISIT: HCPCS | Performed by: INTERNAL MEDICINE

## 2024-07-22 PROCEDURE — 3078F DIAST BP <80 MM HG: CPT | Performed by: INTERNAL MEDICINE

## 2024-07-22 PROCEDURE — 3044F HG A1C LEVEL LT 7.0%: CPT | Performed by: INTERNAL MEDICINE

## 2024-07-22 PROCEDURE — 96160 PT-FOCUSED HLTH RISK ASSMT: CPT | Performed by: INTERNAL MEDICINE

## 2024-07-22 PROCEDURE — 1126F AMNT PAIN NOTED NONE PRSNT: CPT | Performed by: INTERNAL MEDICINE

## 2024-07-22 PROCEDURE — 1159F MED LIST DOCD IN RCRD: CPT | Performed by: INTERNAL MEDICINE

## 2024-07-22 PROCEDURE — 1160F RVW MEDS BY RX/DR IN RCRD: CPT | Performed by: INTERNAL MEDICINE

## 2024-07-22 PROCEDURE — 3074F SYST BP LT 130 MM HG: CPT | Performed by: INTERNAL MEDICINE

## 2024-07-22 RX ORDER — DULAGLUTIDE 3 MG/.5ML
3 INJECTION, SOLUTION SUBCUTANEOUS
COMMUNITY
Start: 2024-04-26

## 2024-07-22 RX ORDER — BENZOCAINE 20 %
GEL (GRAM) MUCOUS MEMBRANE
COMMUNITY

## 2024-07-22 RX ORDER — ROSUVASTATIN CALCIUM 20 MG/1
20 TABLET, COATED ORAL DAILY
COMMUNITY
Start: 2024-02-16 | End: 2025-02-15

## 2024-07-22 RX ORDER — MELOXICAM 15 MG/1
1 TABLET ORAL DAILY PRN
COMMUNITY
Start: 2024-06-13 | End: 2025-06-13

## 2024-07-22 NOTE — PROGRESS NOTES
Subjective   The ABCs of the Annual Wellness Visit  Medicare Wellness Visit      Lloyd Greene is a 64 y.o. patient who presents for a Medicare Wellness Visit.    The following portions of the patient's history were reviewed and   updated as appropriate: allergies, current medications, past family history, past medical history, past social history, past surgical history, and problem list.    Compared to one year ago, the patient's physical   health is the same.  Compared to one year ago, the patient's mental   health is the same.    Recent Hospitalizations:  He was not admitted to the hospital during the last year.     Current Medical Providers:  Patient Care Team:  Monica Cates MD as PCP - General (Internal Medicine)  Barry Jacobson DO (Orthopedic Surgery)  Kathy Aguilar APRN (Endocrinology)    Outpatient Medications Prior to Visit   Medication Sig Dispense Refill    amLODIPine (NORVASC) 2.5 MG tablet TAKE 1 TABLET EVERY DAY 90 tablet 3    ARIPiprazole (ABILIFY) 5 MG tablet Take 1 tablet by mouth Daily. 90 tablet 1    Blood Glucose Monitoring Suppl (TRUE METRIX AIR GLUCOSE METER) device 1 Device Daily. 1 Device 0    buPROPion XL (WELLBUTRIN XL) 150 MG 24 hr tablet Take 1 tablet by mouth Daily. 90 tablet 1    Cholecalciferol (VITAMIN D3) 125 MCG (5000 UT) capsule capsule Take 1 capsule by mouth Daily.      Dulaglutide (Trulicity) 3 MG/0.5ML solution pen-injector Inject 0.5 mL under the skin into the appropriate area as directed.      fenofibrate 160 MG tablet Take 1 tablet by mouth Daily. 90 tablet 1    Insulin Glargine (BASAGLAR KWIKPEN) 100 UNIT/ML injection pen       Jardiance 25 MG tablet tablet       [employer] Pen Needles 31G X 6 MM misc       meloxicam (MOBIC) 15 MG tablet Take 1 tablet by mouth Daily As Needed.      metFORMIN (GLUCOPHAGE) 1000 MG tablet Take 1 tablet by mouth 2 (Two) Times a Day With Meals. 180 tablet 1    multivitamin with minerals tablet tablet Take 1 tablet by mouth Daily.       rosuvastatin (CRESTOR) 20 MG tablet Take 1 tablet by mouth Daily.      True Metrix Blood Glucose Test test strip TEST BLOOD SUGAR EVERY  each 0    TRUEplus Lancets 28G misc TEST BLOOD SUGAR TWO TIMES DAILY 200 each 2    venlafaxine (EFFEXOR) 100 MG tablet TAKE 1 TABLET TWICE DAILY 180 tablet 3    Flaxseed, Linseed, 1000 MG capsule Take  by mouth.      Alcohol Swabs (B-D SINGLE USE SWABS REGULAR) pads Use daily for BG checks 100 each 5    Diclofenac Sodium (VOLTAREN) 1 % gel gel Apply 4 g topically to the appropriate area as directed At Night As Needed (foot cramps). 50 g 2    HYDROcodone-acetaminophen (NORCO) 7.5-325 MG per tablet Take 1 tablet by mouth Every 6 (Six) Hours As Needed for Moderate Pain. 12 tablet 0    ondansetron ODT (ZOFRAN-ODT) 4 MG disintegrating tablet Place 1 tablet on the tongue Every 8 (Eight) Hours As Needed for Nausea or Vomiting. 20 tablet 0    simvastatin (ZOCOR) 40 MG tablet Take 1 tablet by mouth Daily. 90 tablet 1    tamsulosin (FLOMAX) 0.4 MG capsule 24 hr capsule Take 1 capsule by mouth Daily. 10 capsule 0    Trulicity 1.5 MG/0.5ML solution pen-injector        No facility-administered medications prior to visit.     No opioid medication identified on active medication list. I have reviewed chart for other potential  high risk medication/s and harmful drug interactions in the elderly.      Aspirin is not on active medication list.   Discussed pros and cons of daily baby ASA .    Patient Active Problem List   Diagnosis    Other chronic pain    Partial small bowel obstruction    Angio-edema    Adenomatous polyp of colon    Type 2 diabetes mellitus without complication, with long-term current use of insulin    Familial hypercholesterolemia    Primary hypertension    Type 2 diabetes mellitus with diabetic polyneuropathy, without long-term current use of insulin    DDD (degenerative disc disease), lumbar    Lumbar stenosis with neurogenic claudication    Spinal stenosis of lumbar  "region with neurogenic claudication    Sleep apnea    Hyponatremia    Encounter to establish care with new doctor    Class 2 severe obesity with serious comorbidity and body mass index (BMI) of 38.0 to 38.9 in adult    Primary insomnia    Nocturnal foot cramps    Mixed hyperlipidemia    Diverticulitis    History of smoking    Kidney stone     Advance Care Planning (Click this link to access ACP Navigator)  Advance Directive is on file.  ACP discussion was held with the patient during this visit. Patient has an advance directive in EMR which is still valid.         Objective   Vitals:    07/22/24 1555   BP: 120/66   Pulse: 74   Temp: 97.5 °F (36.4 °C)   SpO2: 94%   Weight: 106 kg (233 lb)   Height: 165.1 cm (65\")   PainSc: 0-No pain       Estimated body mass index is 38.77 kg/m² as calculated from the following:    Height as of this encounter: 165.1 cm (65\").    Weight as of this encounter: 106 kg (233 lb).    Class 2 Severe Obesity (BMI >=35 and <=39.9). Obesity-related health conditions include the following: diabetes mellitus and dyslipidemias. Obesity is unchanged. BMI is is above average; BMI management plan is completed. We discussed portion control, increasing exercise, and pharmacologic options including Ozempic or Mounjaro .    Physical Exam  Vitals reviewed.   Constitutional:       General: He is not in acute distress.     Appearance: Normal appearance.   HENT:      Head: Normocephalic and atraumatic.      Nose: Nose normal.      Mouth/Throat:      Mouth: Mucous membranes are moist.   Eyes:      Conjunctiva/sclera: Conjunctivae normal.   Pulmonary:      Effort: Pulmonary effort is normal.   Skin:     General: Skin is warm and dry.   Neurological:      General: No focal deficit present.      Mental Status: He is alert.   Psychiatric:         Mood and Affect: Mood normal.               Does the patient have evidence of cognitive impairment? No  Lab Results   Component Value Date    CHLPL 116 06/28/2024    " TRIG 116 2024    HDL 40 2024    LDL 55 2024    VLDL 21 2024    HGBA1C 6.30 (H) 2024                                                                                               Health  Risk Assessment    Smoking Status:  Social History     Tobacco Use   Smoking Status Former    Types: Electronic Cigarette   Smokeless Tobacco Never     Alcohol Consumption:  Social History     Substance and Sexual Activity   Alcohol Use Yes    Comment: Rarely consume alcohol     Fall Risk Screen:  JAVED Fall Risk Assessment was completed, and patient is at LOW risk for falls.Assessment completed on:2024    Depression Screenin/22/2024     4:03 PM   PHQ-2/PHQ-9 Depression Screening   Little Interest or Pleasure in Doing Things 0-->not at all   Feeling Down, Depressed or Hopeless 0-->not at all   PHQ-9: Brief Depression Severity Measure Score 0     Health Habits and Functional and Cognitive Screenin/15/2024     3:40 PM   Functional & Cognitive Status   Do you have difficulty preparing food and eating? No   Do you have difficulty bathing yourself, getting dressed or grooming yourself? No   Do you have difficulty using the toilet? No   Do you have difficulty moving around from place to place? No   Do you have trouble with steps or getting out of a bed or a chair? No   Current Diet Unhealthy Diet   Dental Exam Not up to date   Eye Exam Up to date   Exercise (times per week) 0 times per week   Current Exercises Include No Regular Exercise   Do you need help using the phone?  No   Are you deaf or do you have serious difficulty hearing?  No   Do you need help to go to places out of walking distance? No   Do you need help shopping? No   Do you need help preparing meals?  No   Do you need help with housework?  No   Do you need help with laundry? No   Do you need help taking your medications? No   Do you need help managing money? No   Do you ever drive or ride in a car without wearing a seat  belt? No   Have you felt unusual stress, anger or loneliness in the last month? No   Who do you live with? Other   If you need help, do you have trouble finding someone available to you? No   Have you been bothered in the last four weeks by sexual problems? Yes   Do you have difficulty concentrating, remembering or making decisions? No             Age-appropriate Screening Schedule:  Refer to the list below for future screening recommendations based on patient's age, sex and/or medical conditions. Orders for these recommended tests are listed in the plan section. The patient has been provided with a written plan.    Health Maintenance List  Health Maintenance   Topic Date Due    COLORECTAL CANCER SCREENING  04/12/2022    DIABETIC FOOT EXAM  12/01/2023    INFLUENZA VACCINE  08/01/2024    DIABETIC EYE EXAM  12/21/2024    URINE MICROALBUMIN  04/19/2025    LIPID PANEL  06/28/2025    HEMOGLOBIN A1C  06/28/2025    ANNUAL WELLNESS VISIT  07/22/2025    BMI FOLLOWUP  07/22/2025    TDAP/TD VACCINES (3 - Td or Tdap) 12/05/2032    HEPATITIS C SCREENING  Completed    COVID-19 Vaccine  Completed    Pneumococcal Vaccine 0-64  Completed    ZOSTER VACCINE  Completed    LUNG CANCER SCREENING  Discontinued                                                                                                                                                CMS Preventative Services Quick Reference  Risk Factors Identified During Encounter  Labs look great over all.  Doing well.  Just worried about weight today.  Will discuss possibility of Ozempic with his endocrinologist.  We discussed this would really be from the standpoint of weight change and not from diabetes control.    The above risks/problems have been discussed with the patient.  Pertinent information has been shared with the patient in the After Visit Summary.  An After Visit Summary and PPPS were made available to the patient.    Follow Up:   Next Medicare Wellness visit to be  scheduled in 1 year.     Assessment & Plan  Encounter for screening colonoscopy    Medicare annual wellness visit, subsequent      Orders Placed This Encounter   Procedures    Ambulatory Referral For Screening Colonoscopy             Follow Up:   Return in about 6 months (around 1/22/2025) for f/u T2DM, other chronic health conditions.

## 2024-08-14 DIAGNOSIS — E11.65 UNCONTROLLED TYPE 2 DIABETES MELLITUS WITH HYPERGLYCEMIA: ICD-10-CM

## 2024-08-23 RX ORDER — MELOXICAM 15 MG/1
15 TABLET ORAL DAILY PRN
Qty: 90 TABLET | Refills: 1 | Status: SHIPPED | OUTPATIENT
Start: 2024-08-23

## 2024-08-23 NOTE — TELEPHONE ENCOUNTER
From historical provider    Rx Refill Note  Requested Prescriptions     Pending Prescriptions Disp Refills    meloxicam (MOBIC) 15 MG tablet       Sig: Take 1 tablet by mouth Daily As Needed.      Last office visit with prescribing clinician: 7/22/2024   Last telemedicine visit with prescribing clinician: Visit date not found   Next office visit with prescribing clinician: 1/23/2025                         Would you like a call back once the refill request has been completed: [] Yes [] No    If the office needs to give you a call back, can they leave a voicemail: [] Yes [] No    Daniel Pena MA  08/23/24, 10:38 EDT

## 2024-10-10 ENCOUNTER — HOSPITAL ENCOUNTER (INPATIENT)
Facility: HOSPITAL | Age: 64
LOS: 3 days | Discharge: HOME | End: 2024-10-14
Attending: STUDENT IN AN ORGANIZED HEALTH CARE EDUCATION/TRAINING PROGRAM | Admitting: FAMILY MEDICINE
Payer: MEDICARE

## 2024-10-10 ENCOUNTER — APPOINTMENT (OUTPATIENT)
Dept: CT IMAGING | Facility: HOSPITAL | Age: 64
End: 2024-10-10
Payer: MEDICARE

## 2024-10-10 DIAGNOSIS — K56.600 PARTIAL SMALL BOWEL OBSTRUCTION: Primary | ICD-10-CM

## 2024-10-10 LAB
ALBUMIN SERPL-MCNC: 4.7 G/DL (ref 3.5–5.2)
ALBUMIN/GLOB SERPL: 1.5 G/DL
ALP SERPL-CCNC: 69 U/L (ref 39–117)
ALT SERPL W P-5'-P-CCNC: 33 U/L (ref 1–41)
ANION GAP SERPL CALCULATED.3IONS-SCNC: 16.5 MMOL/L (ref 5–15)
AST SERPL-CCNC: 32 U/L (ref 1–40)
BASOPHILS # BLD AUTO: 0.08 10*3/MM3 (ref 0–0.2)
BASOPHILS NFR BLD AUTO: 0.7 % (ref 0–1.5)
BILIRUB SERPL-MCNC: 0.2 MG/DL (ref 0–1.2)
BUN SERPL-MCNC: 21 MG/DL (ref 8–23)
BUN/CREAT SERPL: 21.9 (ref 7–25)
CALCIUM SPEC-SCNC: 9.8 MG/DL (ref 8.6–10.5)
CHLORIDE SERPL-SCNC: 101 MMOL/L (ref 98–107)
CO2 SERPL-SCNC: 22.5 MMOL/L (ref 22–29)
CREAT SERPL-MCNC: 0.96 MG/DL (ref 0.76–1.27)
DEPRECATED RDW RBC AUTO: 44.7 FL (ref 37–54)
EGFRCR SERPLBLD CKD-EPI 2021: 89.4 ML/MIN/1.73
EOSINOPHIL # BLD AUTO: 0.21 10*3/MM3 (ref 0–0.4)
EOSINOPHIL NFR BLD AUTO: 1.8 % (ref 0.3–6.2)
ERYTHROCYTE [DISTWIDTH] IN BLOOD BY AUTOMATED COUNT: 13.7 % (ref 12.3–15.4)
GLOBULIN UR ELPH-MCNC: 3.1 GM/DL
GLUCOSE SERPL-MCNC: 164 MG/DL (ref 65–99)
HCT VFR BLD AUTO: 48.1 % (ref 37.5–51)
HGB BLD-MCNC: 15.4 G/DL (ref 13–17.7)
HOLD SPECIMEN: NORMAL
HOLD SPECIMEN: NORMAL
IMM GRANULOCYTES # BLD AUTO: 0.06 10*3/MM3 (ref 0–0.05)
IMM GRANULOCYTES NFR BLD AUTO: 0.5 % (ref 0–0.5)
LIPASE SERPL-CCNC: 40 U/L (ref 13–60)
LYMPHOCYTES # BLD AUTO: 3.62 10*3/MM3 (ref 0.7–3.1)
LYMPHOCYTES NFR BLD AUTO: 31.4 % (ref 19.6–45.3)
MCH RBC QN AUTO: 28.7 PG (ref 26.6–33)
MCHC RBC AUTO-ENTMCNC: 32 G/DL (ref 31.5–35.7)
MCV RBC AUTO: 89.7 FL (ref 79–97)
MONOCYTES # BLD AUTO: 1.09 10*3/MM3 (ref 0.1–0.9)
MONOCYTES NFR BLD AUTO: 9.5 % (ref 5–12)
NEUTROPHILS NFR BLD AUTO: 56.1 % (ref 42.7–76)
NEUTROPHILS NFR BLD AUTO: 6.47 10*3/MM3 (ref 1.7–7)
NRBC BLD AUTO-RTO: 0 /100 WBC (ref 0–0.2)
PLATELET # BLD AUTO: 398 10*3/MM3 (ref 140–450)
PMV BLD AUTO: 10.6 FL (ref 6–12)
POTASSIUM SERPL-SCNC: 4 MMOL/L (ref 3.5–5.2)
PROT SERPL-MCNC: 7.8 G/DL (ref 6–8.5)
RBC # BLD AUTO: 5.36 10*6/MM3 (ref 4.14–5.8)
SODIUM SERPL-SCNC: 140 MMOL/L (ref 136–145)
TROPONIN T SERPL HS-MCNC: 14 NG/L
WBC NRBC COR # BLD AUTO: 11.53 10*3/MM3 (ref 3.4–10.8)
WHOLE BLOOD HOLD COAG: NORMAL
WHOLE BLOOD HOLD SPECIMEN: NORMAL

## 2024-10-10 PROCEDURE — 80053 COMPREHEN METABOLIC PANEL: CPT | Performed by: STUDENT IN AN ORGANIZED HEALTH CARE EDUCATION/TRAINING PROGRAM

## 2024-10-10 PROCEDURE — 25010000002 MORPHINE PER 10 MG: Performed by: STUDENT IN AN ORGANIZED HEALTH CARE EDUCATION/TRAINING PROGRAM

## 2024-10-10 PROCEDURE — 99285 EMERGENCY DEPT VISIT HI MDM: CPT | Performed by: STUDENT IN AN ORGANIZED HEALTH CARE EDUCATION/TRAINING PROGRAM

## 2024-10-10 PROCEDURE — 93005 ELECTROCARDIOGRAM TRACING: CPT | Performed by: STUDENT IN AN ORGANIZED HEALTH CARE EDUCATION/TRAINING PROGRAM

## 2024-10-10 PROCEDURE — 83690 ASSAY OF LIPASE: CPT | Performed by: STUDENT IN AN ORGANIZED HEALTH CARE EDUCATION/TRAINING PROGRAM

## 2024-10-10 PROCEDURE — G0378 HOSPITAL OBSERVATION PER HR: HCPCS

## 2024-10-10 PROCEDURE — 25510000001 IOPAMIDOL PER 1 ML: Performed by: STUDENT IN AN ORGANIZED HEALTH CARE EDUCATION/TRAINING PROGRAM

## 2024-10-10 PROCEDURE — 93010 ELECTROCARDIOGRAM REPORT: CPT | Performed by: INTERNAL MEDICINE

## 2024-10-10 PROCEDURE — 25810000003 SODIUM CHLORIDE 0.9 % SOLUTION: Performed by: FAMILY MEDICINE

## 2024-10-10 PROCEDURE — 84484 ASSAY OF TROPONIN QUANT: CPT | Performed by: STUDENT IN AN ORGANIZED HEALTH CARE EDUCATION/TRAINING PROGRAM

## 2024-10-10 PROCEDURE — 85025 COMPLETE CBC W/AUTO DIFF WBC: CPT | Performed by: STUDENT IN AN ORGANIZED HEALTH CARE EDUCATION/TRAINING PROGRAM

## 2024-10-10 PROCEDURE — 74177 CT ABD & PELVIS W/CONTRAST: CPT

## 2024-10-10 PROCEDURE — 99223 1ST HOSP IP/OBS HIGH 75: CPT | Performed by: FAMILY MEDICINE

## 2024-10-10 PROCEDURE — 25810000003 SODIUM CHLORIDE 0.9 % SOLUTION: Performed by: SURGERY

## 2024-10-10 PROCEDURE — 25010000002 ONDANSETRON PER 1 MG: Performed by: STUDENT IN AN ORGANIZED HEALTH CARE EDUCATION/TRAINING PROGRAM

## 2024-10-10 RX ORDER — SODIUM CHLORIDE 9 MG/ML
40 INJECTION, SOLUTION INTRAVENOUS AS NEEDED
Status: DISCONTINUED | OUTPATIENT
Start: 2024-10-10 | End: 2024-10-14 | Stop reason: HOSPADM

## 2024-10-10 RX ORDER — ONDANSETRON 2 MG/ML
4 INJECTION INTRAMUSCULAR; INTRAVENOUS EVERY 4 HOURS PRN
Status: DISCONTINUED | OUTPATIENT
Start: 2024-10-10 | End: 2024-10-14 | Stop reason: HOSPADM

## 2024-10-10 RX ORDER — HYDRALAZINE HYDROCHLORIDE 20 MG/ML
10 INJECTION INTRAMUSCULAR; INTRAVENOUS EVERY 4 HOURS PRN
Status: DISCONTINUED | OUTPATIENT
Start: 2024-10-10 | End: 2024-10-12

## 2024-10-10 RX ORDER — IOPAMIDOL 755 MG/ML
100 INJECTION, SOLUTION INTRAVASCULAR
Status: COMPLETED | OUTPATIENT
Start: 2024-10-10 | End: 2024-10-10

## 2024-10-10 RX ORDER — SODIUM CHLORIDE 0.9 % (FLUSH) 0.9 %
10 SYRINGE (ML) INJECTION EVERY 12 HOURS SCHEDULED
Status: DISCONTINUED | OUTPATIENT
Start: 2024-10-10 | End: 2024-10-14 | Stop reason: HOSPADM

## 2024-10-10 RX ORDER — DEXTROSE MONOHYDRATE 25 G/50ML
25 INJECTION, SOLUTION INTRAVENOUS
Status: DISCONTINUED | OUTPATIENT
Start: 2024-10-10 | End: 2024-10-14 | Stop reason: HOSPADM

## 2024-10-10 RX ORDER — ONDANSETRON 2 MG/ML
4 INJECTION INTRAMUSCULAR; INTRAVENOUS ONCE
Status: COMPLETED | OUTPATIENT
Start: 2024-10-10 | End: 2024-10-10

## 2024-10-10 RX ORDER — IBUPROFEN 600 MG/1
1 TABLET ORAL
Status: DISCONTINUED | OUTPATIENT
Start: 2024-10-10 | End: 2024-10-14 | Stop reason: HOSPADM

## 2024-10-10 RX ORDER — SODIUM CHLORIDE 9 MG/ML
150 INJECTION, SOLUTION INTRAVENOUS CONTINUOUS
Status: DISCONTINUED | OUTPATIENT
Start: 2024-10-10 | End: 2024-10-11

## 2024-10-10 RX ORDER — NALOXONE HCL 0.4 MG/ML
0.4 VIAL (ML) INJECTION
Status: DISCONTINUED | OUTPATIENT
Start: 2024-10-10 | End: 2024-10-14 | Stop reason: HOSPADM

## 2024-10-10 RX ORDER — SODIUM CHLORIDE 0.9 % (FLUSH) 0.9 %
10 SYRINGE (ML) INJECTION AS NEEDED
Status: DISCONTINUED | OUTPATIENT
Start: 2024-10-10 | End: 2024-10-14 | Stop reason: HOSPADM

## 2024-10-10 RX ORDER — NALOXONE HCL 0.4 MG/ML
0.4 VIAL (ML) INJECTION
Status: DISCONTINUED | OUTPATIENT
Start: 2024-10-10 | End: 2024-10-10

## 2024-10-10 RX ORDER — INSULIN LISPRO 100 [IU]/ML
2-9 INJECTION, SOLUTION INTRAVENOUS; SUBCUTANEOUS EVERY 6 HOURS SCHEDULED
Status: DISCONTINUED | OUTPATIENT
Start: 2024-10-11 | End: 2024-10-14 | Stop reason: HOSPADM

## 2024-10-10 RX ORDER — NICOTINE POLACRILEX 4 MG
15 LOZENGE BUCCAL
Status: DISCONTINUED | OUTPATIENT
Start: 2024-10-10 | End: 2024-10-14 | Stop reason: HOSPADM

## 2024-10-10 RX ADMIN — IOPAMIDOL 100 ML: 755 INJECTION, SOLUTION INTRAVENOUS at 21:53

## 2024-10-10 RX ADMIN — MORPHINE SULFATE 4 MG: 4 INJECTION, SOLUTION INTRAMUSCULAR; INTRAVENOUS at 21:09

## 2024-10-10 RX ADMIN — ONDANSETRON 4 MG: 2 INJECTION INTRAMUSCULAR; INTRAVENOUS at 21:08

## 2024-10-10 RX ADMIN — SODIUM CHLORIDE 75 ML/HR: 9 INJECTION, SOLUTION INTRAVENOUS at 23:14

## 2024-10-11 ENCOUNTER — APPOINTMENT (OUTPATIENT)
Dept: GENERAL RADIOLOGY | Facility: HOSPITAL | Age: 64
End: 2024-10-11
Payer: MEDICARE

## 2024-10-11 LAB
ANION GAP SERPL CALCULATED.3IONS-SCNC: 14 MMOL/L (ref 5–15)
BACTERIA UR QL AUTO: NORMAL /HPF
BASOPHILS # BLD AUTO: 0.06 10*3/MM3 (ref 0–0.2)
BASOPHILS NFR BLD AUTO: 0.5 % (ref 0–1.5)
BILIRUB UR QL STRIP: NEGATIVE
BUN SERPL-MCNC: 20 MG/DL (ref 8–23)
BUN/CREAT SERPL: 24.4 (ref 7–25)
CALCIUM SPEC-SCNC: 9.8 MG/DL (ref 8.6–10.5)
CHLORIDE SERPL-SCNC: 106 MMOL/L (ref 98–107)
CLARITY UR: CLEAR
CO2 SERPL-SCNC: 23 MMOL/L (ref 22–29)
COLOR UR: YELLOW
CREAT SERPL-MCNC: 0.82 MG/DL (ref 0.76–1.27)
DEPRECATED RDW RBC AUTO: 46.2 FL (ref 37–54)
EGFRCR SERPLBLD CKD-EPI 2021: 99.3 ML/MIN/1.73
EOSINOPHIL # BLD AUTO: 0.03 10*3/MM3 (ref 0–0.4)
EOSINOPHIL NFR BLD AUTO: 0.3 % (ref 0.3–6.2)
ERYTHROCYTE [DISTWIDTH] IN BLOOD BY AUTOMATED COUNT: 13.7 % (ref 12.3–15.4)
GLUCOSE BLDC GLUCOMTR-MCNC: 159 MG/DL (ref 70–130)
GLUCOSE BLDC GLUCOMTR-MCNC: 190 MG/DL (ref 70–130)
GLUCOSE BLDC GLUCOMTR-MCNC: 192 MG/DL (ref 70–130)
GLUCOSE BLDC GLUCOMTR-MCNC: 197 MG/DL (ref 70–130)
GLUCOSE SERPL-MCNC: 184 MG/DL (ref 65–99)
GLUCOSE UR STRIP-MCNC: ABNORMAL MG/DL
HCT VFR BLD AUTO: 47.4 % (ref 37.5–51)
HGB BLD-MCNC: 15 G/DL (ref 13–17.7)
HGB UR QL STRIP.AUTO: NEGATIVE
HYALINE CASTS UR QL AUTO: NORMAL /LPF
IMM GRANULOCYTES # BLD AUTO: 0.04 10*3/MM3 (ref 0–0.05)
IMM GRANULOCYTES NFR BLD AUTO: 0.3 % (ref 0–0.5)
KETONES UR QL STRIP: ABNORMAL
LEUKOCYTE ESTERASE UR QL STRIP.AUTO: NEGATIVE
LYMPHOCYTES # BLD AUTO: 1.77 10*3/MM3 (ref 0.7–3.1)
LYMPHOCYTES NFR BLD AUTO: 15.3 % (ref 19.6–45.3)
MAGNESIUM SERPL-MCNC: 2.3 MG/DL (ref 1.6–2.4)
MCH RBC QN AUTO: 28.9 PG (ref 26.6–33)
MCHC RBC AUTO-ENTMCNC: 31.6 G/DL (ref 31.5–35.7)
MCV RBC AUTO: 91.3 FL (ref 79–97)
MONOCYTES # BLD AUTO: 0.86 10*3/MM3 (ref 0.1–0.9)
MONOCYTES NFR BLD AUTO: 7.4 % (ref 5–12)
NEUTROPHILS NFR BLD AUTO: 76.2 % (ref 42.7–76)
NEUTROPHILS NFR BLD AUTO: 8.83 10*3/MM3 (ref 1.7–7)
NITRITE UR QL STRIP: NEGATIVE
NRBC BLD AUTO-RTO: 0 /100 WBC (ref 0–0.2)
PH UR STRIP.AUTO: 5.5 [PH] (ref 4.5–8)
PHOSPHATE SERPL-MCNC: 4.4 MG/DL (ref 2.5–4.5)
PLATELET # BLD AUTO: 363 10*3/MM3 (ref 140–450)
PMV BLD AUTO: 10.8 FL (ref 6–12)
POTASSIUM SERPL-SCNC: 4.6 MMOL/L (ref 3.5–5.2)
PROT UR QL STRIP: ABNORMAL
QT INTERVAL: 393 MS
QTC INTERVAL: 496 MS
RBC # BLD AUTO: 5.19 10*6/MM3 (ref 4.14–5.8)
RBC # UR STRIP: NORMAL /HPF
REF LAB TEST METHOD: NORMAL
SODIUM SERPL-SCNC: 143 MMOL/L (ref 136–145)
SP GR UR STRIP: 1.02 (ref 1–1.03)
SQUAMOUS #/AREA URNS HPF: NORMAL /HPF
TSH SERPL DL<=0.05 MIU/L-ACNC: 2.4 UIU/ML (ref 0.27–4.2)
UROBILINOGEN UR QL STRIP: ABNORMAL
WBC # UR STRIP: NORMAL /HPF
WBC NRBC COR # BLD AUTO: 11.59 10*3/MM3 (ref 3.4–10.8)

## 2024-10-11 PROCEDURE — 99221 1ST HOSP IP/OBS SF/LOW 40: CPT | Performed by: SURGERY

## 2024-10-11 PROCEDURE — 84443 ASSAY THYROID STIM HORMONE: CPT | Performed by: HOSPITALIST

## 2024-10-11 PROCEDURE — 82948 REAGENT STRIP/BLOOD GLUCOSE: CPT

## 2024-10-11 PROCEDURE — 99232 SBSQ HOSP IP/OBS MODERATE 35: CPT | Performed by: HOSPITALIST

## 2024-10-11 PROCEDURE — 74018 RADEX ABDOMEN 1 VIEW: CPT

## 2024-10-11 PROCEDURE — 25010000002 HYDROMORPHONE PER 4 MG: Performed by: FAMILY MEDICINE

## 2024-10-11 PROCEDURE — 74250 X-RAY XM SM INT 1CNTRST STD: CPT

## 2024-10-11 PROCEDURE — 80048 BASIC METABOLIC PNL TOTAL CA: CPT | Performed by: FAMILY MEDICINE

## 2024-10-11 PROCEDURE — 25010000002 ONDANSETRON PER 1 MG: Performed by: FAMILY MEDICINE

## 2024-10-11 PROCEDURE — 25010000002 SODIUM CHLORIDE 0.9 % WITH KCL 20 MEQ 20-0.9 MEQ/L-% SOLUTION: Performed by: HOSPITALIST

## 2024-10-11 PROCEDURE — 0 DIATRIZOATE MEGLUMINE & SODIUM PER 1 ML: Performed by: FAMILY MEDICINE

## 2024-10-11 PROCEDURE — 25010000002 MORPHINE PER 10 MG: Performed by: FAMILY MEDICINE

## 2024-10-11 PROCEDURE — 63710000001 INSULIN LISPRO (HUMAN) PER 5 UNITS: Performed by: FAMILY MEDICINE

## 2024-10-11 PROCEDURE — 81001 URINALYSIS AUTO W/SCOPE: CPT | Performed by: STUDENT IN AN ORGANIZED HEALTH CARE EDUCATION/TRAINING PROGRAM

## 2024-10-11 PROCEDURE — 84100 ASSAY OF PHOSPHORUS: CPT | Performed by: FAMILY MEDICINE

## 2024-10-11 PROCEDURE — 85025 COMPLETE CBC W/AUTO DIFF WBC: CPT | Performed by: FAMILY MEDICINE

## 2024-10-11 PROCEDURE — 94761 N-INVAS EAR/PLS OXIMETRY MLT: CPT

## 2024-10-11 PROCEDURE — 25010000002 HYDROMORPHONE PER 4 MG: Performed by: HOSPITALIST

## 2024-10-11 PROCEDURE — 83735 ASSAY OF MAGNESIUM: CPT | Performed by: FAMILY MEDICINE

## 2024-10-11 PROCEDURE — 74022 RADEX COMPL AQT ABD SERIES: CPT

## 2024-10-11 RX ORDER — HYDROMORPHONE HYDROCHLORIDE 1 MG/ML
0.5 INJECTION, SOLUTION INTRAMUSCULAR; INTRAVENOUS; SUBCUTANEOUS ONCE
Status: COMPLETED | OUTPATIENT
Start: 2024-10-11 | End: 2024-10-11

## 2024-10-11 RX ORDER — SODIUM CHLORIDE AND POTASSIUM CHLORIDE 150; 900 MG/100ML; MG/100ML
100 INJECTION, SOLUTION INTRAVENOUS CONTINUOUS
Status: DISPENSED | OUTPATIENT
Start: 2024-10-11 | End: 2024-10-13

## 2024-10-11 RX ORDER — DIATRIZOATE MEGLUMINE AND DIATRIZOATE SODIUM 660; 100 MG/ML; MG/ML
120 SOLUTION ORAL; RECTAL ONCE
Status: COMPLETED | OUTPATIENT
Start: 2024-10-11 | End: 2024-10-11

## 2024-10-11 RX ORDER — HYDROMORPHONE HYDROCHLORIDE 1 MG/ML
0.5 INJECTION, SOLUTION INTRAMUSCULAR; INTRAVENOUS; SUBCUTANEOUS
Status: DISCONTINUED | OUTPATIENT
Start: 2024-10-11 | End: 2024-10-11

## 2024-10-11 RX ORDER — HYDROMORPHONE HYDROCHLORIDE 1 MG/ML
0.5 INJECTION, SOLUTION INTRAMUSCULAR; INTRAVENOUS; SUBCUTANEOUS
Status: DISCONTINUED | OUTPATIENT
Start: 2024-10-11 | End: 2024-10-14 | Stop reason: HOSPADM

## 2024-10-11 RX ADMIN — HYDROMORPHONE HYDROCHLORIDE 0.5 MG: 1 INJECTION, SOLUTION INTRAMUSCULAR; INTRAVENOUS; SUBCUTANEOUS at 14:18

## 2024-10-11 RX ADMIN — HYDROMORPHONE HYDROCHLORIDE 0.5 MG: 1 INJECTION, SOLUTION INTRAMUSCULAR; INTRAVENOUS; SUBCUTANEOUS at 20:42

## 2024-10-11 RX ADMIN — ONDANSETRON 4 MG: 2 INJECTION INTRAMUSCULAR; INTRAVENOUS at 15:04

## 2024-10-11 RX ADMIN — POTASSIUM CHLORIDE AND SODIUM CHLORIDE 100 ML/HR: 900; 150 INJECTION, SOLUTION INTRAVENOUS at 18:06

## 2024-10-11 RX ADMIN — MORPHINE SULFATE 4 MG: 4 INJECTION, SOLUTION INTRAMUSCULAR; INTRAVENOUS at 00:16

## 2024-10-11 RX ADMIN — INSULIN LISPRO 2 UNITS: 100 INJECTION, SOLUTION INTRAVENOUS; SUBCUTANEOUS at 18:05

## 2024-10-11 RX ADMIN — HYDROMORPHONE HYDROCHLORIDE 0.5 MG: 1 INJECTION, SOLUTION INTRAMUSCULAR; INTRAVENOUS; SUBCUTANEOUS at 16:37

## 2024-10-11 RX ADMIN — Medication 10 ML: at 20:43

## 2024-10-11 RX ADMIN — HYDROMORPHONE HYDROCHLORIDE 0.5 MG: 1 INJECTION, SOLUTION INTRAMUSCULAR; INTRAVENOUS; SUBCUTANEOUS at 08:10

## 2024-10-11 RX ADMIN — Medication 10 ML: at 08:11

## 2024-10-11 RX ADMIN — MORPHINE SULFATE 4 MG: 4 INJECTION, SOLUTION INTRAMUSCULAR; INTRAVENOUS at 03:09

## 2024-10-11 RX ADMIN — HYDROMORPHONE HYDROCHLORIDE 0.5 MG: 1 INJECTION, SOLUTION INTRAMUSCULAR; INTRAVENOUS; SUBCUTANEOUS at 05:26

## 2024-10-11 RX ADMIN — Medication 10 ML: at 00:32

## 2024-10-11 RX ADMIN — INSULIN LISPRO 2 UNITS: 100 INJECTION, SOLUTION INTRAVENOUS; SUBCUTANEOUS at 12:22

## 2024-10-11 RX ADMIN — HYDROMORPHONE HYDROCHLORIDE 0.5 MG: 1 INJECTION, SOLUTION INTRAMUSCULAR; INTRAVENOUS; SUBCUTANEOUS at 10:18

## 2024-10-11 RX ADMIN — DIATRIZOATE MEGLUMINE AND DIATRIZOATE SODIUM 120 ML: 660; 100 LIQUID ORAL; RECTAL at 13:40

## 2024-10-11 RX ADMIN — HYDROMORPHONE HYDROCHLORIDE 0.5 MG: 1 INJECTION, SOLUTION INTRAMUSCULAR; INTRAVENOUS; SUBCUTANEOUS at 12:16

## 2024-10-11 RX ADMIN — ONDANSETRON 4 MG: 2 INJECTION INTRAMUSCULAR; INTRAVENOUS at 10:23

## 2024-10-11 RX ADMIN — POTASSIUM CHLORIDE AND SODIUM CHLORIDE 100 ML/HR: 900; 150 INJECTION, SOLUTION INTRAVENOUS at 09:43

## 2024-10-11 RX ADMIN — ONDANSETRON 4 MG: 2 INJECTION INTRAMUSCULAR; INTRAVENOUS at 20:50

## 2024-10-11 NOTE — NURSING NOTE
After 2 doses of Morphine  patient reports he is not getting any relief from the pain. Notified Dr gutierres. New  orders noted.

## 2024-10-11 NOTE — CONSULTS
"General Surgery      Patient Care Team:  Monica Cates MD as PCP - General (Internal Medicine)  Barry Jacobson DO (Orthopedic Surgery)  Kathy Aguilar APRN (Endocrinology)    CHIEF COMPLAINT: Opinion regarding small bowel obstruction    HISTORY OF PRESENT ILLNESS:    This very pleasant 62-year-old male who has a significant history of Roberts's procedure for ruptured diverticulitis with colostomy closure and incisional hernia repair of the emergency department early this morning with severe abdominal pain.  The pain started yesterday and he thought he may have had another diverticulitis attack.  It progressed to the point where it was even worse than I diverticulitis attack so he presented the emergency department.  He had some nausea but no vomiting.  He has been passing gas.  He has a slightly elevated white count and a CT scan that I personally reviewed that showed some dilated small bowel loops consistent with a possible obstruction.  On discussion with Dipak he is very distended.  His pain is controlled with Dilaudid.      Past Medical History:   Diagnosis Date    Anesthesia complication     PATIENT STATES \"STOPPED BREATHING DURING COLONOSCOPY APPROX 4-5 YRS AGO\".    Anxiety     Arthritis     Colon polyp     COPD (chronic obstructive pulmonary disease)     Depression     Diabetes mellitus     Diverticulitis     Diverticulosis     Elevated cholesterol     Erectile dysfunction 2020    History of TIA (transient ischemic attack)     3 yr ago    Hyperlipidemia     Hypertension     Kidney stone 02/16/2023    Low back pain     Numbness and tingling     BILATERAL LEGS RIGHT LEG WORSE    Obesity     Sleep apnea     uses cpap occasionally    Stroke      Past Surgical History:   Procedure Laterality Date    COLON SURGERY      COLONOSCOPY  2014    COLONOSCOPY N/A 4/12/2019    Procedure: COLONOSCOPY w/ polypectomy;  Surgeon: Tin Giang MD;  Location: [facility];  Service: Gastroenterology    " COLOSTOMY      Dr. Gupta    COLOSTOMY REVISION  07/2006    Diverticulitis-Dr. Gupta    INCISIONAL HERNIA REPAIR  2011    LUMBAR LAMINECTOMY WITH FUSION N/A 7/20/2020    Procedure: Lumbar 2 to lumbar 3, lumbar 3 to lumbar 4 and lumbar 4 lumbar 5 laminectomy with a fusion by orthopedics;  Surgeon: John Tran MD;  Location: [facility] MAIN OR;  Service: Neurosurgery;  Laterality: N/A;    LUMBAR LAMINECTOMY WITH FUSION N/A 7/20/2020    Procedure: Lumbar 2 to lumbar 5 fusion with instrumentation and laminectomy by Dr. Hucthinson;  Surgeon: Melvin Ya MD;  Location: [facility] MAIN OR;  Service: Neurosurgery;  Laterality: N/A;    ORIF ANKLE FRACTURE Right 1995    SPINE SURGERY  2020     Family History   Problem Relation Age of Onset    Depression Mother     Hypertension Mother     Arthritis Mother     Depression Father     Kidney disease Father     COPD Father     Hypertension Father     Hearing loss Father     Colon cancer Neg Hx     Colon polyps Neg Hx     Malig Hyperthermia Neg Hx      Social History     Tobacco Use    Smoking status: Former     Types: Electronic Cigarette    Smokeless tobacco: Never   Substance Use Topics    Alcohol use: Yes     Comment: Rarely consume alcohol    Drug use: No     Medications Prior to Admission   Medication Sig Dispense Refill Last Dose    amLODIPine (NORVASC) 2.5 MG tablet TAKE 1 TABLET EVERY DAY 90 tablet 3 10/10/2024 at 0800    ARIPiprazole (ABILIFY) 5 MG tablet Take 1 tablet by mouth Daily. 90 tablet 1 10/10/2024 at 0800    buPROPion XL (WELLBUTRIN XL) 150 MG 24 hr tablet Take 1 tablet by mouth Daily. 90 tablet 1 10/10/2024 at 0800    Cholecalciferol (VITAMIN D3) 125 MCG (5000 UT) capsule capsule Take 1 capsule by mouth Daily.   10/10/2024 at 1800    fenofibrate 160 MG tablet Take 1 tablet by mouth Daily. 90 tablet 1 10/10/2024 at 0800    Flaxseed, Linseed, 1000 MG capsule Take  by mouth.   10/10/2024 at 1800    Jardiance 25 MG tablet tablet    10/10/2024 at 0800    meloxicam (MOBIC)  "15 MG tablet Take 1 tablet by mouth Daily As Needed for Moderate Pain. (Patient taking differently: Take 1 tablet by mouth 2 (Two) Times a Day.) 90 tablet 1 10/10/2024 at 1800    metFORMIN (GLUCOPHAGE) 1000 MG tablet TAKE 1 TABLET TWICE DAILY WITH MEALS 180 tablet 3 10/10/2024 at 0800    multivitamin with minerals tablet tablet Take 1 tablet by mouth Daily.   10/10/2024 at 1800    rosuvastatin (CRESTOR) 20 MG tablet Take 1 tablet by mouth Daily.   10/10/2024 at 1800    venlafaxine (EFFEXOR) 100 MG tablet TAKE 1 TABLET TWICE DAILY 180 tablet 3 10/10/2024 at 0800    Blood Glucose Monitoring Suppl (TRUE METRIX AIR GLUCOSE METER) device 1 Device Daily. 1 Device 0     Dulaglutide (Trulicity) 3 MG/0.5ML solution pen-injector Inject 0.5 mL under the skin into the appropriate area as directed.   10/6/2024    Insulin Glargine (BASAGLAR KWIKPEN) 100 UNIT/ML injection pen 70 Units Daily.       [employer] Pen Needles 31G X 6 MM misc        True Metrix Blood Glucose Test test strip TEST BLOOD SUGAR EVERY  each 0     TRUEplus Lancets 28G misc TEST BLOOD SUGAR TWO TIMES DAILY 200 each 2      Allergies:  Lisinopril and Tamiflu [oseltamivir phosphate]    REVIEW OF SYSTEMS:  Please see the above history of present illness for pertinent positives and negatives.  The remainder of the patient's systems have been reviewed and are negative.     Vital Signs  Temp:  [97.7 °F (36.5 °C)-98.1 °F (36.7 °C)] 97.7 °F (36.5 °C)  Heart Rate:  [83-95] 83  Resp:  [18-20] 18  BP: (144-193)/() 144/77    Flowsheet Rows      Flowsheet Row First Filed Value   Admission Height 165.1 cm (65\") Documented at 10/10/2024 2032   Admission Weight 108 kg (237 lb) Documented at 10/10/2024 2032             Physical Exam:  Physical Exam   Constitutional: Patient appears well-developed and well-nourished and in no acute distress   HEENT:   Head: Normocephalic and atraumatic.   Cardiovascular: Regular rate, regular rhythm.  Pulmonary/Chest: Lungs are clear to " auscultation bilaterally.  Abdominal: Obese, very distended, hypoactive bowel sounds, no tenderness to palpation  Musculoskeletal: Normal posture.  Extremities: No edema.   Neurological: Patient is alert and oriented.  Psychological:   Mood and behavior appropriate.  Skin: Skin is warm and dry.    Debilities/Disabilities Identified: None    Emotional Behavior: Appropriate           Results Review:    I reviewed the patient's new clinical results.  Lab Results (most recent)       Procedure Component Value Units Date/Time    Magnesium [[id number]]  (Normal) Collected: 10/11/24 0421    Specimen: Blood Updated: 10/11/24 0507     Magnesium 2.3 mg/dL     Basic Metabolic Panel [[id number]]  (Abnormal) Collected: 10/11/24 0421    Specimen: Blood Updated: 10/11/24 0507     Glucose 184 mg/dL      BUN 20 mg/dL      Creatinine 0.82 mg/dL      Sodium 143 mmol/L      Potassium 4.6 mmol/L      Chloride 106 mmol/L      CO2 23.0 mmol/L      Calcium 9.8 mg/dL      BUN/Creatinine Ratio 24.4     Anion Gap 14.0 mmol/L      eGFR 99.3 mL/min/1.73     Narrative:      GFR Normal >60  Chronic Kidney Disease <60  Kidney Failure <15      Phosphorus [[id number]]  (Normal) Collected: 10/11/24 0421    Specimen: Blood Updated: 10/11/24 0507     Phosphorus 4.4 mg/dL     CBC & Differential [[id number]]  (Abnormal) Collected: 10/11/24 0421    Specimen: Blood Updated: 10/11/24 0445    Narrative:      The following orders were created for panel order CBC & Differential.  Procedure                               Abnormality         Status                     ---------                               -----------         ------                     CBC Auto Differential[[id number]]        Abnormal            Final result                 Please view results for these tests on the individual orders.    CBC Auto Differential [[id number]]  (Abnormal) Collected: 10/11/24 0421    Specimen: Blood Updated: 10/11/24 0445     WBC 11.59 10*3/mm3      RBC 5.19 10*6/mm3       Hemoglobin 15.0 g/dL      Hematocrit 47.4 %      MCV 91.3 fL      MCH 28.9 pg      MCHC 31.6 g/dL      RDW 13.7 %      RDW-SD 46.2 fl      MPV 10.8 fL      Platelets 363 10*3/mm3      Neutrophil % 76.2 %      Lymphocyte % 15.3 %      Monocyte % 7.4 %      Eosinophil % 0.3 %      Basophil % 0.5 %      Immature Grans % 0.3 %      Neutrophils, Absolute 8.83 10*3/mm3      Lymphocytes, Absolute 1.77 10*3/mm3      Monocytes, Absolute 0.86 10*3/mm3      Eosinophils, Absolute 0.03 10*3/mm3      Basophils, Absolute 0.06 10*3/mm3      Immature Grans, Absolute 0.04 10*3/mm3      nRBC 0.0 /100 WBC     Comprehensive Metabolic Panel [[id number]]  (Abnormal) Collected: 10/10/24 2038    Specimen: Blood Updated: 10/10/24 2107     Glucose 164 mg/dL      BUN 21 mg/dL      Creatinine 0.96 mg/dL      Sodium 140 mmol/L      Potassium 4.0 mmol/L      Chloride 101 mmol/L      CO2 22.5 mmol/L      Calcium 9.8 mg/dL      Total Protein 7.8 g/dL      Albumin 4.7 g/dL      ALT (SGPT) 33 U/L      AST (SGOT) 32 U/L      Alkaline Phosphatase 69 U/L      Total Bilirubin 0.2 mg/dL      Globulin 3.1 gm/dL      A/G Ratio 1.5 g/dL      BUN/Creatinine Ratio 21.9     Anion Gap 16.5 mmol/L      eGFR 89.4 mL/min/1.73     Narrative:      GFR Normal >60  Chronic Kidney Disease <60  Kidney Failure <15      Lipase [[id number]]  (Normal) Collected: 10/10/24 2038    Specimen: Blood Updated: 10/10/24 2107     Lipase 40 U/L     Single High Sensitivity Troponin T [[id number]]  (Normal) Collected: 10/10/24 2038    Specimen: Blood Updated: 10/10/24 2107     HS Troponin T 14 ng/L     Narrative:      High Sensitive Troponin T Reference Range:  <14.0 ng/L- Negative Female for AMI  <22.0 ng/L- Negative Male for AMI  >=14 - Abnormal Female indicating possible myocardial injury.  >=22 - Abnormal Male indicating possible myocardial injury.   Clinicians would have to utilize clinical acumen, EKG, Troponin, and serial changes to determine if it is an Acute Myocardial Infarction  or myocardial injury due to an underlying chronic condition.         CBC & Differential [[id number]]  (Abnormal) Collected: 10/10/24 2038    Specimen: Blood Updated: 10/10/24 2055    Narrative:      The following orders were created for panel order CBC & Differential.  Procedure                               Abnormality         Status                     ---------                               -----------         ------                     CBC Auto Differential[[id number]]        Abnormal            Final result                 Please view results for these tests on the individual orders.    CBC Auto Differential [[id number]]  (Abnormal) Collected: 10/10/24 2038    Specimen: Blood Updated: 10/10/24 2055     WBC 11.53 10*3/mm3      RBC 5.36 10*6/mm3      Hemoglobin 15.4 g/dL      Hematocrit 48.1 %      MCV 89.7 fL      MCH 28.7 pg      MCHC 32.0 g/dL      RDW 13.7 %      RDW-SD 44.7 fl      MPV 10.6 fL      Platelets 398 10*3/mm3      Neutrophil % 56.1 %      Lymphocyte % 31.4 %      Monocyte % 9.5 %      Eosinophil % 1.8 %      Basophil % 0.7 %      Immature Grans % 0.5 %      Neutrophils, Absolute 6.47 10*3/mm3      Lymphocytes, Absolute 3.62 10*3/mm3      Monocytes, Absolute 1.09 10*3/mm3      Eosinophils, Absolute 0.21 10*3/mm3      Basophils, Absolute 0.08 10*3/mm3      Immature Grans, Absolute 0.06 10*3/mm3      nRBC 0.0 /100 WBC     [place] Draw [[id number]] Collected: 10/10/24 2038    Specimen: Blood Updated: 10/10/24 2045    Narrative:      The following orders were created for panel order [place] Draw.  Procedure                               Abnormality         Status                     ---------                               -----------         ------                     Green Top (Gel)[[id number]]                                  Final result               Lavender Top[[id number]]                                     Final result               Gold Top - SST[[id number]]                                   Final  result               Light Blue Top[[id number]]                                   Final result                 Please view results for these tests on the individual orders.    Green Top (Gel) [[id number]] Collected: 10/10/24 2038    Specimen: Blood Updated: 10/10/24 2045     Extra Tube Hold for add-ons.     Comment: Auto resulted.       Lavender Top [[id number]] Collected: 10/10/24 2038    Specimen: Blood Updated: 10/10/24 2045     Extra Tube hold for add-on     Comment: Auto resulted       Gold Top - SST [[id number]] Collected: 10/10/24 2038    Specimen: Blood Updated: 10/10/24 2045     Extra Tube Hold for add-ons.     Comment: Auto resulted.       Light Blue Top [[id number]] Collected: 10/10/24 2038    Specimen: Blood Updated: 10/10/24 2045     Extra Tube Hold for add-ons.     Comment: Auto resulted               Imaging Results (Most Recent)       Procedure Component Value Units Date/Time    CT Abdomen Pelvis With Contrast [[id number]] Collected: 10/10/24 2203     Updated: 10/10/24 2219    Narrative:      CT ABDOMEN PELVIS W CONTRAST    Date of Exam: 10/10/2024 9:37 PM EDT    Indication: abdominal pain, hx ruptured diverticulitis.    Comparison: CT abdomen pelvis 2/16/2023    Technique: Axial CT images were obtained of the abdomen and pelvis following the uneventful intravenous administration of iodinated contrast. Sagittal and coronal reconstructions were performed.  Automated exposure control and iterative reconstruction   methods were used.        Findings:  Lung Bases: Bibasal atelectasis. Coronary artery calcifications.    Liver: Hepatic steatosis.     Gallbladder and biliary tree: No significant abnormality.     Spleen: No significant abnormality.     Pancreas: No significant abnormality.     Adrenal glands: No significant abnormality.     Kidneys and ureters: Right renal cyst. No hydroureteronephrosis.     Stomach and duodenum:No significant abnormality.    Small and large bowel: Postoperative changes of prior  sigmoid resection. Normal-appearing appendix. There are dilated loops of small bowel in the mid abdomen with feculent material.    Peritoneal cavity: No free fluid or free air.    Bladder: No significant abnormality.     Pelvic organs: No significant abnormality.     Vasculature: Atherosclerotic calcifications.     Lymph nodes: No pathologic appearing lymph nodes by imaging criteria.     Bones and soft tissues: Degenerative changes of the imaged spine. No acute osseous abnormality. Postsurgical changes of posterior decompression and posterior instrumented fixation of the lumbar spine.      Impression:      Impression:  Dilated loops of small bowel in the mid abdomen with feculent material, suggestive of partial small bowel obstruction.            Electronically Signed: Chester Cagle    10/10/2024 10:16 PM EDT    Workstation ID: [id number]          reviewed    ECG/EMG Results (most recent)       Procedure Component Value Units Date/Time    ECG 12 Lead ED Triage Standing Order; Abdominal Pain (Upper) [[id number]] Collected: 10/10/24 2048     Updated: 10/10/24 2049     QT Interval 393 ms      QTC Interval 496 ms     Narrative:      HEART RATE=96  bpm  RR Cldlrded=251  ms  OH Dnoeoayz=371  ms  P Horizontal Axis=-21  deg  P Front Axis=0  deg  QRSD Xetyqscu=194  ms  QT Kzefsdgq=381  ms  XKpZ=427  ms  QRS Axis=-6  deg  T Wave Axis=22  deg  - ABNORMAL ECG -  Sinus rhythm  Right bundle branch block  Date and Time of Study:2024-10-10 20:48:09              Assessment & Plan     Small bowel obstruction  Right now he needs an NG tube for some decompression.  He is incredibly distended.  Once his NG tube is placed we will follow-up with a KUB.  Will increase his fluids as the NG tube is most likely going to remove quite a bit of fluid.  I will continue to follow along.      Kiana Mireles DO  10/11/24  06:04 EDT

## 2024-10-11 NOTE — PROGRESS NOTES
"Hospitalist Team      Patient Care Team:  Monica Cates MD as PCP - General (Internal Medicine)  Barry Jacobson DO (Orthopedic Surgery)  Kathy Aguilar APRN (Endocrinology)        Chief Complaint: Follow-up SBO    Subjective    Mr. Greene still describes some discomfort this morning.  He reports some relief with placement of the NG tube but is uncomfortable.  He denies chest pain and dyspnea.  He is getting up to the bathroom without issue.  He reports he is passing a little bit of flatus but has not had a bowel movement.    Objective    Vital Signs  Temp:  [97.7 °F (36.5 °C)-98.1 °F (36.7 °C)] 97.8 °F (36.6 °C)  Heart Rate:  [83-95] 88  Resp:  [18-20] 18  BP: (144-193)/() 167/91  Oxygen Therapy  SpO2: 90 %  Pulse Oximetry Type: Intermittent  Device (Oxygen Therapy): room air  Flow (L/min): 2}    Flowsheet Rows      Flowsheet Row First Filed Value   Admission Height 165.1 cm (65\") Documented at 10/10/2024 2032   Admission Weight 108 kg (237 lb) Documented at 10/10/2024 2032            Physical Exam:    General: Appears stated age in no acute distress.  Lungs: Breath sounds are clear throughout all fields.  Excursion is limited by abdominal pain.  No accessory use noted.  CV: Regular rate and rhythm.  Appreciate no murmur.  Radial pulses are 2+ and symmetric.  Abdomen: Distended, but soft.  Bowel sounds are diminished.  MSK: No clubbing, cyanosis, or edema.  Neuro: Cranial nerves II through XII are grossly intact.  Psych: Pleasant affect.  [place] x 3.    Results Review:     I reviewed the patient's new clinical results.    Lab Results (last 24 hours)       Procedure Component Value Units Date/Time    POC Glucose Once [[id number]]  (Abnormal) Collected: 10/11/24 0835    Specimen: Blood Updated: 10/11/24 0841     Glucose 197 mg/dL     Magnesium [[id number]]  (Normal) Collected: 10/11/24 0421    Specimen: Blood Updated: 10/11/24 0507     Magnesium 2.3 mg/dL     Basic Metabolic Panel [[id number]]  (Abnormal) " Collected: 10/11/24 0421    Specimen: Blood Updated: 10/11/24 0507     Glucose 184 mg/dL      BUN 20 mg/dL      Creatinine 0.82 mg/dL      Sodium 143 mmol/L      Potassium 4.6 mmol/L      Chloride 106 mmol/L      CO2 23.0 mmol/L      Calcium 9.8 mg/dL      BUN/Creatinine Ratio 24.4     Anion Gap 14.0 mmol/L      eGFR 99.3 mL/min/1.73     Narrative:      GFR Normal >60  Chronic Kidney Disease <60  Kidney Failure <15      Phosphorus [[id number]]  (Normal) Collected: 10/11/24 0421    Specimen: Blood Updated: 10/11/24 0507     Phosphorus 4.4 mg/dL     CBC & Differential [[id number]]  (Abnormal) Collected: 10/11/24 0421    Specimen: Blood Updated: 10/11/24 0445    Narrative:      The following orders were created for panel order CBC & Differential.  Procedure                               Abnormality         Status                     ---------                               -----------         ------                     CBC Auto Differential[[id number]]        Abnormal            Final result                 Please view results for these tests on the individual orders.    CBC Auto Differential [[id number]]  (Abnormal) Collected: 10/11/24 0421    Specimen: Blood Updated: 10/11/24 0445     WBC 11.59 10*3/mm3      RBC 5.19 10*6/mm3      Hemoglobin 15.0 g/dL      Hematocrit 47.4 %      MCV 91.3 fL      MCH 28.9 pg      MCHC 31.6 g/dL      RDW 13.7 %      RDW-SD 46.2 fl      MPV 10.8 fL      Platelets 363 10*3/mm3      Neutrophil % 76.2 %      Lymphocyte % 15.3 %      Monocyte % 7.4 %      Eosinophil % 0.3 %      Basophil % 0.5 %      Immature Grans % 0.3 %      Neutrophils, Absolute 8.83 10*3/mm3      Lymphocytes, Absolute 1.77 10*3/mm3      Monocytes, Absolute 0.86 10*3/mm3      Eosinophils, Absolute 0.03 10*3/mm3      Basophils, Absolute 0.06 10*3/mm3      Immature Grans, Absolute 0.04 10*3/mm3      nRBC 0.0 /100 WBC     Comprehensive Metabolic Panel [[id number]]  (Abnormal) Collected: 10/10/24 2038    Specimen: Blood Updated:  10/10/24 2107     Glucose 164 mg/dL      BUN 21 mg/dL      Creatinine 0.96 mg/dL      Sodium 140 mmol/L      Potassium 4.0 mmol/L      Chloride 101 mmol/L      CO2 22.5 mmol/L      Calcium 9.8 mg/dL      Total Protein 7.8 g/dL      Albumin 4.7 g/dL      ALT (SGPT) 33 U/L      AST (SGOT) 32 U/L      Alkaline Phosphatase 69 U/L      Total Bilirubin 0.2 mg/dL      Globulin 3.1 gm/dL      A/G Ratio 1.5 g/dL      BUN/Creatinine Ratio 21.9     Anion Gap 16.5 mmol/L      eGFR 89.4 mL/min/1.73     Narrative:      GFR Normal >60  Chronic Kidney Disease <60  Kidney Failure <15      Lipase [[id number]]  (Normal) Collected: 10/10/24 2038    Specimen: Blood Updated: 10/10/24 2107     Lipase 40 U/L     Single High Sensitivity Troponin T [[id number]]  (Normal) Collected: 10/10/24 2038    Specimen: Blood Updated: 10/10/24 2107     HS Troponin T 14 ng/L     Narrative:      High Sensitive Troponin T Reference Range:  <14.0 ng/L- Negative Female for AMI  <22.0 ng/L- Negative Male for AMI  >=14 - Abnormal Female indicating possible myocardial injury.  >=22 - Abnormal Male indicating possible myocardial injury.   Clinicians would have to utilize clinical acumen, EKG, Troponin, and serial changes to determine if it is an Acute Myocardial Infarction or myocardial injury due to an underlying chronic condition.         CBC & Differential [[id number]]  (Abnormal) Collected: 10/10/24 2038    Specimen: Blood Updated: 10/10/24 2055    Narrative:      The following orders were created for panel order CBC & Differential.  Procedure                               Abnormality         Status                     ---------                               -----------         ------                     CBC Auto Differential[[id number]]        Abnormal            Final result                 Please view results for these tests on the individual orders.    CBC Auto Differential [[id number]]  (Abnormal) Collected: 10/10/24 2038    Specimen: Blood Updated: 10/10/24  2055     WBC 11.53 10*3/mm3      RBC 5.36 10*6/mm3      Hemoglobin 15.4 g/dL      Hematocrit 48.1 %      MCV 89.7 fL      MCH 28.7 pg      MCHC 32.0 g/dL      RDW 13.7 %      RDW-SD 44.7 fl      MPV 10.6 fL      Platelets 398 10*3/mm3      Neutrophil % 56.1 %      Lymphocyte % 31.4 %      Monocyte % 9.5 %      Eosinophil % 1.8 %      Basophil % 0.7 %      Immature Grans % 0.5 %      Neutrophils, Absolute 6.47 10*3/mm3      Lymphocytes, Absolute 3.62 10*3/mm3      Monocytes, Absolute 1.09 10*3/mm3      Eosinophils, Absolute 0.21 10*3/mm3      Basophils, Absolute 0.08 10*3/mm3      Immature Grans, Absolute 0.06 10*3/mm3      nRBC 0.0 /100 WBC     [facility] Draw [[id number]] Collected: 10/10/24 2038    Specimen: Blood Updated: 10/10/24 2045    Narrative:      The following orders were created for panel order [facility] Draw.  Procedure                               Abnormality         Status                     ---------                               -----------         ------                     Green Top (Gel)[[id number]]                                  Final result               Lavender Top[[id number]]                                     Final result               Gold Top - SST[[id number]]                                   Final result               Light Blue Top[[id number]]                                   Final result                 Please view results for these tests on the individual orders.    Green Top (Gel) [[id number]] Collected: 10/10/24 2038    Specimen: Blood Updated: 10/10/24 2045     Extra Tube Hold for add-ons.     Comment: Auto resulted.       Lavender Top [[id number]] Collected: 10/10/24 2038    Specimen: Blood Updated: 10/10/24 2045     Extra Tube hold for add-on     Comment: Auto resulted       Gold Top - SST [[id number]] Collected: 10/10/24 2038    Specimen: Blood Updated: 10/10/24 2045     Extra Tube Hold for add-ons.     Comment: Auto resulted.       Light Blue Top [[id number]] Collected: 10/10/24 2038     Specimen: Blood Updated: 10/10/24 2045     Extra Tube Hold for add-ons.     Comment: Auto resulted               Imaging Results (Last 24 Hours)       Procedure Component Value Units Date/Time    XR Abdomen 2+ VW with Chest 1 VW [[id number]] Collected: 10/11/24 0832     Updated: 10/11/24 0839    Narrative:      XR ABDOMEN 2+ VIEWS W CHEST 1 VW    Date of Exam: 10/11/2024 8:27 AM EDT    Indication: sbo    Comparison: 10/10/2024 CT abdomen/pelvis    Findings:  Nasogastric tube distal tip and sideport overlie the stomach. There is no acute cardiopulmonary abnormality.    There are multiple dilated loops of small bowel with gas fluid levels consistent with small bowel obstruction. Surgical clips overlie the left hemiabdomen. Lumbar fusion hardware without evidence of complication. No acute bone abnormality.      Impression:      Impression:  Multiple dilated loops of small bowel with gas fluid levels consistent with small bowel obstruction.    No acute cardiopulmonary abnormality.      Electronically Signed: Rober Tomlinson MD    10/11/2024 8:36 AM EDT    Workstation ID: [id number]    XR Abdomen KUB [[id number]] Collected: 10/11/24 0823     Updated: 10/11/24 0827    Narrative:      XR ABDOMEN KUB    Date of Exam: 10/11/2024 6:10 AM EDT    Indication: after insertion of ng tube    Comparison: 10/10/2024 CT    Findings:  Nasogastric tube distal tip and sideport overlie the stomach. There are multiple dilated loops of small bowel in keeping with evidence of small bowel obstruction. Surgical clips overlie the left hemiabdomen. Partially visualized lumbar fusion hardware.   Lung bases are unremarkable. No acute osseous abnormality.      Impression:      Impression:  Nasogastric tube tip and sideport overlie the stomach.      Electronically Signed: Rober Tomlinson MD    10/11/2024 8:24 AM EDT    Workstation ID: [id number]    CT Abdomen Pelvis With Contrast [[id number]] Collected: 10/10/24 2203     Updated: 10/10/24 2219    Narrative:       CT ABDOMEN PELVIS W CONTRAST    Date of Exam: 10/10/2024 9:37 PM EDT    Indication: abdominal pain, hx ruptured diverticulitis.    Comparison: CT abdomen pelvis 2/16/2023    Technique: Axial CT images were obtained of the abdomen and pelvis following the uneventful intravenous administration of iodinated contrast. Sagittal and coronal reconstructions were performed.  Automated exposure control and iterative reconstruction   methods were used.        Findings:  Lung Bases: Bibasal atelectasis. Coronary artery calcifications.    Liver: Hepatic steatosis.     Gallbladder and biliary tree: No significant abnormality.     Spleen: No significant abnormality.     Pancreas: No significant abnormality.     Adrenal glands: No significant abnormality.     Kidneys and ureters: Right renal cyst. No hydroureteronephrosis.     Stomach and duodenum:No significant abnormality.    Small and large bowel: Postoperative changes of prior sigmoid resection. Normal-appearing appendix. There are dilated loops of small bowel in the mid abdomen with feculent material.    Peritoneal cavity: No free fluid or free air.    Bladder: No significant abnormality.     Pelvic organs: No significant abnormality.     Vasculature: Atherosclerotic calcifications.     Lymph nodes: No pathologic appearing lymph nodes by imaging criteria.     Bones and soft tissues: Degenerative changes of the imaged spine. No acute osseous abnormality. Postsurgical changes of posterior decompression and posterior instrumented fixation of the lumbar spine.      Impression:      Impression:  Dilated loops of small bowel in the mid abdomen with feculent material, suggestive of partial small bowel obstruction.            Electronically Signed: Chester Cagle    10/10/2024 10:16 PM EDT    Workstation ID: [id number]              Medication Review:   I have reviewed the patient's current medication list    Current Facility-Administered Medications:     Calcium Replacement -  Follow Nurse / BPA Driven Protocol, , Does not apply, PRNHumberto Paul A, DO    dextrose (D50W) (25 g/50 mL) IV injection 25 g, 25 g, Intravenous, Q15 Min PRN, Jhonny Paul DO    dextrose (GLUTOSE) oral gel 15 g, 15 g, Oral, Q15 Min PRN, Jhonny Paul DO    glucagon (GLUCAGEN) injection 1 mg, 1 mg, Intramuscular, Q15 Min PRN, Jhonny Paul DO    hydrALAZINE (APRESOLINE) injection 10 mg, 10 mg, Intravenous, Q4H PRN, Jhonny Paul DO    HYDROmorphone (DILAUDID) injection 0.5 mg, 0.5 mg, Intravenous, Q2H PRN, Justin Jerome MD    Insulin Lispro (humaLOG) injection 2-9 Units, 2-9 Units, Subcutaneous, Q6H, Jhonny Paul DO    Magnesium Standard Dose Replacement - Follow Nurse / BPA Driven Protocol, , Does not apply, PRNHumberto Paul A, DO    [DISCONTINUED] morphine injection 4 mg, 4 mg, Intravenous, Q3H PRN, 4 mg at 10/11/24 0309 **AND** naloxone (NARCAN) injection 0.4 mg, 0.4 mg, Intravenous, Q5 Min PRN, Jhonny Paul DO    ondansetron (ZOFRAN) injection 4 mg, 4 mg, Intravenous, Q4H PRN, Jhonny Paul DO    Phosphorus Replacement - Follow Nurse / BPA Driven Protocol, , Does not apply, PRNHumberto Paul A, DO    Potassium Replacement - Follow Nurse / BPA Driven Protocol, , Does not apply, PRNHumberto Paul A, DO    sodium chloride 0.9 % flush 10 mL, 10 mL, Intravenous, PRN, Deonte De Santiago MD    sodium chloride 0.9 % flush 10 mL, 10 mL, Intravenous, Q12H, Jhonny Paul DO, 10 mL at 10/11/24 0811    sodium chloride 0.9 % flush 10 mL, 10 mL, Intravenous, PRN, Jhonny Paul DO    sodium chloride 0.9 % infusion 40 mL, 40 mL, Intravenous, PRN, Jhonny Paul DO    sodium chloride 0.9 % with KCl 20 mEq/L infusion, 100 mL/hr, Intravenous, Continuous, Justin Jerome MD      Assessment & Plan     Partial small bowel obstruction: Discussed with Dr. Latham.  Continue NG tube placement for now.  I also encouraged Mr. Greene to ambulate is much as possible.  Will check a  TSH.  Essential hypertension: Continue to monitor trend on current regimen.  I increased the frequency of his Dilaudid so good pain control will likely help as well.  Diabetes mellitus type 2: Continue current regimen.    Plan for disposition: Predicated on hospital course.    Justin Jerome MD  10/11/24  09:30 EDT

## 2024-10-11 NOTE — NURSING NOTE
0600 Dr Mireles in to see patient. Orders to place NG obtained. NG placed in  right nare to 65cm. Verified placement with air and xray ordered. 500 ml brown drainage obtained immediately. Tolerated without complaints.

## 2024-10-11 NOTE — H&P
"[facility] GROUP HOSPITALIST     PCP: Monica Cates MD    CODE status: Full     CHIEF COMPLAINT:     HISTORY OF PRESENT ILLNESS:    63 y/o male with hx of COPD, DM II, hyperlipidemia, HTN, and ruptured diverticulitis s/p partial colon resection,  presents to [facility] ED for evaluation of abdominal pain.  It started abruptly around 2 PM today with intense, crampy pain, mid to lower abdomen.  He thought it might be another flair of diverticulitis.  The pain intensified over the next few hours, so he decided to come get evaluated.  He admits to nausea and dry heaves, is passing some gas, but denies diarrhea, melena, hematochezia, fevers, or chills.  He feels like his abdomen is much more distended than usual.  He has DM  and took 70 U of his basal insulin this AM.        Past Medical History:   Diagnosis Date    Anesthesia complication     PATIENT STATES \"STOPPED BREATHING DURING COLONOSCOPY APPROX 4-5 YRS AGO\".    Anxiety     Arthritis     Colon polyp     COPD (chronic obstructive pulmonary disease)     Depression     Diabetes mellitus     Diverticulitis     Diverticulosis     Elevated cholesterol     Erectile dysfunction 2020    History of TIA (transient ischemic attack)     3 yr ago    Hyperlipidemia     Hypertension     Kidney stone 02/16/2023    Low back pain     Numbness and tingling     BILATERAL LEGS RIGHT LEG WORSE    Obesity     Sleep apnea     uses cpap occasionally    Stroke      Past Surgical History:   Procedure Laterality Date    COLON SURGERY      COLONOSCOPY  2014    COLONOSCOPY N/A 4/12/2019    Procedure: COLONOSCOPY w/ polypectomy;  Surgeon: Tin Giang MD;  Location: [facility];  Service: Gastroenterology    COLOSTOMY      Dr. Gupta    COLOSTOMY REVISION  07/2006    Diverticulitis-Dr. Gupta    INCISIONAL HERNIA REPAIR  2011    LUMBAR LAMINECTOMY WITH FUSION N/A 7/20/2020    Procedure: Lumbar 2 to lumbar 3, lumbar 3 to lumbar 4 and lumbar 4 lumbar 5 laminectomy " with a fusion by orthopedics;  Surgeon: John Tran MD;  Location: [facility] OR;  Service: Neurosurgery;  Laterality: N/A;    LUMBAR LAMINECTOMY WITH FUSION N/A 7/20/2020    Procedure: Lumbar 2 to lumbar 5 fusion with instrumentation and laminectomy by Dr. Hutchinson;  Surgeon: Melvin Ya MD;  Location: [facility] OR;  Service: Neurosurgery;  Laterality: N/A;    ORIF ANKLE FRACTURE Right 1995    SPINE SURGERY  2020     Family History   Problem Relation Age of Onset    Depression Mother     Hypertension Mother     Arthritis Mother     Depression Father     Kidney disease Father     COPD Father     Hypertension Father     Hearing loss Father     Colon cancer Neg Hx     Colon polyps Neg Hx     Malig Hyperthermia Neg Hx      Social History     Tobacco Use    Smoking status: Former     Types: Electronic Cigarette    Smokeless tobacco: Never   Substance Use Topics    Alcohol use: Yes     Comment: Rarely consume alcohol    Drug use: No     (Not in a hospital admission)    Allergies:  Lisinopril and Tamiflu [oseltamivir phosphate]    Immunization History   Administered Date(s) Administered    COVID-19 (PFIZER) 12YRS+ (COMIRNATY) 10/04/2024    COVID-19 (PFIZER) Purple Cap Monovalent 03/28/2021, 04/18/2021, 11/04/2021    Fluzone (or Fluarix & Flulaval for VFC) >6mos 10/11/2017, 10/30/2018, 10/24/2019    Fluzone High-Dose 65+YRS 10/15/2015    Influenza, Unspecified 10/15/2017, 10/24/2019    PPD Test 07/25/2020    Pneumococcal Conjugate 20-Valent (PCV20) 12/05/2022    Pneumococcal Polysaccharide (PPSV23) 10/24/2019, 10/24/2019    Shingrix 10/24/2019, 10/24/2019    Tdap 12/05/2022    Zostavax 10/03/2016       REVIEW OF SYSTEMS:  Please see the above history of present illness for pertinent positives and negatives.  The remainder of the patient's systems have been reviewed and are negative.     Vital Signs  Temp:  [98.1 °F (36.7 °C)] 98.1 °F (36.7 °C)  Heart Rate:  [83-95] 84  Resp:  [18-20] 18  BP: (170-193)/()  "170/90  Flowsheet Rows      Flowsheet Row First Filed Value   Admission Height 165.1 cm (65\") Documented at 10/10/2024 2032   Admission Weight 108 kg (237 lb) Documented at 10/10/2024 2032               Physical Exam:  General: Patient is awake and alert. Sitting up on side of bed; holding abdomen; mild distress   HENT: Head is atraumatic, normocephalic.  Eyes: Vision is grossly intact. Pupils appear equal and round.   Cardiovascular: Heart has regular rate and rhythm with no murmurs, rubs or gallops noted.   Respiratory: Lungs are clear to ausculation without wheezes, rhonchi or rales.   Abdominal/GI: Obese; moderately distended; hypoactive BS:  + diffuse tenderness to palpation   Extremities: No peripheral edema noted.   Musculoskeletal: Spontaneous movement of bilateral upper and lower extremities against gravity noted. No signs of injury or deformity noted.   Skin: Warm and dry.   Psych: Mood and affect are appropriate. Cooperative with exam.   Neuro: No facial asymmetry noted. No focal deficits noted, hearing and vision are grossly intact.       Results Review:    I reviewed the patient's new clinical results.  Lab Results (most recent)       Procedure Component Value Units Date/Time    Comprehensive Metabolic Panel [[id number]]  (Abnormal) Collected: 10/10/24 2038    Specimen: Blood Updated: 10/10/24 2107     Glucose 164 mg/dL      BUN 21 mg/dL      Creatinine 0.96 mg/dL      Sodium 140 mmol/L      Potassium 4.0 mmol/L      Chloride 101 mmol/L      CO2 22.5 mmol/L      Calcium 9.8 mg/dL      Total Protein 7.8 g/dL      Albumin 4.7 g/dL      ALT (SGPT) 33 U/L      AST (SGOT) 32 U/L      Alkaline Phosphatase 69 U/L      Total Bilirubin 0.2 mg/dL      Globulin 3.1 gm/dL      A/G Ratio 1.5 g/dL      BUN/Creatinine Ratio 21.9     Anion Gap 16.5 mmol/L      eGFR 89.4 mL/min/1.73     Narrative:      GFR Normal >60  Chronic Kidney Disease <60  Kidney Failure <15      Lipase [[id number]]  (Normal) Collected: 10/10/24 " 2038    Specimen: Blood Updated: 10/10/24 2107     Lipase 40 U/L     Single High Sensitivity Troponin T [[id number]]  (Normal) Collected: 10/10/24 2038    Specimen: Blood Updated: 10/10/24 2107     HS Troponin T 14 ng/L     Narrative:      High Sensitive Troponin T Reference Range:  <14.0 ng/L- Negative Female for AMI  <22.0 ng/L- Negative Male for AMI  >=14 - Abnormal Female indicating possible myocardial injury.  >=22 - Abnormal Male indicating possible myocardial injury.   Clinicians would have to utilize clinical acumen, EKG, Troponin, and serial changes to determine if it is an Acute Myocardial Infarction or myocardial injury due to an underlying chronic condition.         CBC & Differential [[id number]]  (Abnormal) Collected: 10/10/24 2038    Specimen: Blood Updated: 10/10/24 2055    Narrative:      The following orders were created for panel order CBC & Differential.  Procedure                               Abnormality         Status                     ---------                               -----------         ------                     CBC Auto Differential[[id number]]        Abnormal            Final result                 Please view results for these tests on the individual orders.    CBC Auto Differential [[id number]]  (Abnormal) Collected: 10/10/24 2038    Specimen: Blood Updated: 10/10/24 2055     WBC 11.53 10*3/mm3      RBC 5.36 10*6/mm3      Hemoglobin 15.4 g/dL      Hematocrit 48.1 %      MCV 89.7 fL      MCH 28.7 pg      MCHC 32.0 g/dL      RDW 13.7 %      RDW-SD 44.7 fl      MPV 10.6 fL      Platelets 398 10*3/mm3      Neutrophil % 56.1 %      Lymphocyte % 31.4 %      Monocyte % 9.5 %      Eosinophil % 1.8 %      Basophil % 0.7 %      Immature Grans % 0.5 %      Neutrophils, Absolute 6.47 10*3/mm3      Lymphocytes, Absolute 3.62 10*3/mm3      Monocytes, Absolute 1.09 10*3/mm3      Eosinophils, Absolute 0.21 10*3/mm3      Basophils, Absolute 0.08 10*3/mm3      Immature Grans, Absolute 0.06 10*3/mm3       nRBC 0.0 /100 WBC     [place] Draw [[id number]] Collected: 10/10/24 2038    Specimen: Blood Updated: 10/10/24 2045    Narrative:      The following orders were created for panel order [place] Draw.  Procedure                               Abnormality         Status                     ---------                               -----------         ------                     Green Top (Gel)[[id number]]                                  Final result               Lavender Top[[id number]]                                     Final result               Gold Top - SST[[id number]]                                   Final result               Light Blue Top[[id number]]                                   Final result                 Please view results for these tests on the individual orders.    Green Top (Gel) [[id number]] Collected: 10/10/24 2038    Specimen: Blood Updated: 10/10/24 2045     Extra Tube Hold for add-ons.     Comment: Auto resulted.       Lavender Top [[id number]] Collected: 10/10/24 2038    Specimen: Blood Updated: 10/10/24 2045     Extra Tube hold for add-on     Comment: Auto resulted       Gold Top - SST [[id number]] Collected: 10/10/24 2038    Specimen: Blood Updated: 10/10/24 2045     Extra Tube Hold for add-ons.     Comment: Auto resulted.       Light Blue Top [[id number]] Collected: 10/10/24 2038    Specimen: Blood Updated: 10/10/24 2045     Extra Tube Hold for add-ons.     Comment: Auto resulted               Imaging Results (Most Recent)       Procedure Component Value Units Date/Time    CT Abdomen Pelvis With Contrast [[id number]] Collected: 10/10/24 2203     Updated: 10/10/24 2219    Narrative:      CT ABDOMEN PELVIS W CONTRAST    Date of Exam: 10/10/2024 9:37 PM EDT    Indication: abdominal pain, hx ruptured diverticulitis.    Comparison: CT abdomen pelvis 2/16/2023    Technique: Axial CT images were obtained of the abdomen and pelvis following the uneventful intravenous administration of iodinated contrast.  Sagittal and coronal reconstructions were performed.  Automated exposure control and iterative reconstruction   methods were used.        Findings:  Lung Bases: Bibasal atelectasis. Coronary artery calcifications.    Liver: Hepatic steatosis.     Gallbladder and biliary tree: No significant abnormality.     Spleen: No significant abnormality.     Pancreas: No significant abnormality.     Adrenal glands: No significant abnormality.     Kidneys and ureters: Right renal cyst. No hydroureteronephrosis.     Stomach and duodenum:No significant abnormality.    Small and large bowel: Postoperative changes of prior sigmoid resection. Normal-appearing appendix. There are dilated loops of small bowel in the mid abdomen with feculent material.    Peritoneal cavity: No free fluid or free air.    Bladder: No significant abnormality.     Pelvic organs: No significant abnormality.     Vasculature: Atherosclerotic calcifications.     Lymph nodes: No pathologic appearing lymph nodes by imaging criteria.     Bones and soft tissues: Degenerative changes of the imaged spine. No acute osseous abnormality. Postsurgical changes of posterior decompression and posterior instrumented fixation of the lumbar spine.      Impression:      Impression:  Dilated loops of small bowel in the mid abdomen with feculent material, suggestive of partial small bowel obstruction.            Electronically Signed: Chester Cagle    10/10/2024 10:16 PM EDT    Workstation ID: [id number]          reviewed    ECG/EMG Results (most recent)       Procedure Component Value Units Date/Time    ECG 12 Lead ED Triage Standing Order; Abdominal Pain (Upper) [[id number]] Collected: 10/10/24 2048     Updated: 10/10/24 2049     QT Interval 393 ms      QTC Interval 496 ms     Narrative:      HEART RATE=96  bpm  RR Enpkcidb=433  ms  NV Mppcosgk=971  ms  P Horizontal Axis=-21  deg  P Front Axis=0  deg  QRSD Gqdnniie=771  ms  QT Yxcsxuvx=126  ms  ULfE=445  ms  QRS Axis=-6  deg  T  Wave Axis=22  deg  - ABNORMAL ECG -  Sinus rhythm  Right bundle branch block  Date and Time of Study:2024-10-10 20:48:09          reviewed    Assessment & Plan     Partial SBO  I personally reviewed CT abdomen/pelvis  Keep NPO   Admit and start IV fluids  Prn IV morphine/Zofran  General surgery consulted from ED, will see in AM    HTN  BP up more today  Hold home oral meds  Prn IV Hydralazine for systolic >170    DM II   Hold home insulin while NPO  Accuchecks with SSI    DVT ppx:  SCDs/ambulation         Jhonny Paul DO  10/10/24  22:52 EDT        At [facility], we believe that sharing information builds trust and better relationships. You are receiving this note because you recently visited [facility]. It is possible you will see health information before a provider has talked with you about it. This kind of information can be easy to misunderstand. To help you fully understand what it means for your health, we urge you to discuss this note with your provider.

## 2024-10-11 NOTE — ED NOTES
"Nursing report ED to floor  Lloyd Greene  64 y.o.  male    HPI :   Chief Complaint   Patient presents with    Abdominal Pain     Hx diverticulitis, started at 1400 increased with nausea over last 2 hours         Admitting doctor:   Jhonny Paul DO    Admitting diagnosis:   The encounter diagnosis was Partial small bowel obstruction.    Code status:   Current Code Status       Date Active Code Status Order ID Comments User Context       Prior            Allergies:   Lisinopril and Tamiflu [oseltamivir phosphate]    Isolation:   No active isolations    Intake and Output  No intake or output data in the 24 hours ending 10/10/24 2245    Weight:       10/10/24  2032   Weight: 108 kg (237 lb)       Most recent vitals:   Vitals:    10/10/24 2032 10/10/24 2123 10/10/24 2124 10/10/24 2130   BP: (!) 193/112      BP Location: Right arm      Patient Position: Lying      Pulse: 95   83   Resp: 20      Temp: 98.1 °F (36.7 °C)      TempSrc: Oral      SpO2: 98% (!) 88% 93% 94%   Weight: 108 kg (237 lb)      Height: 165.1 cm (65\")          Active LDAs/IV Access:   Lines, Drains & Airways       Active LDAs       Name Placement date Placement time Site Days    Peripheral IV 10/10/24 2037 Left Antecubital 10/10/24  2037  Antecubital  less than 1                    Labs (abnormal labs have a star):   Labs Reviewed   COMPREHENSIVE METABOLIC PANEL - Abnormal; Notable for the following components:       Result Value    Glucose 164 (*)     Anion Gap 16.5 (*)     All other components within normal limits    Narrative:     GFR Normal >60  Chronic Kidney Disease <60  Kidney Failure <15     CBC WITH AUTO DIFFERENTIAL - Abnormal; Notable for the following components:    WBC 11.53 (*)     Lymphocytes, Absolute 3.62 (*)     Monocytes, Absolute 1.09 (*)     Immature Grans, Absolute 0.06 (*)     All other components within normal limits   LIPASE - Normal   SINGLE HS TROPONIN T - Normal    Narrative:     High Sensitive Troponin T Reference " Range:  <14.0 ng/L- Negative Female for AMI  <22.0 ng/L- Negative Male for AMI  >=14 - Abnormal Female indicating possible myocardial injury.  >=22 - Abnormal Male indicating possible myocardial injury.   Clinicians would have to utilize clinical acumen, EKG, Troponin, and serial changes to determine if it is an Acute Myocardial Infarction or myocardial injury due to an underlying chronic condition.        RAINBOW DRAW    Narrative:     The following orders were created for panel order [place] Draw.  Procedure                               Abnormality         Status                     ---------                               -----------         ------                     Green Top (Gel)[[id number]]                                  Final result               Lavender Top[[id number]]                                     Final result               Gold Top - SST[[id number]]                                   Final result               Light Blue Top[[id number]]                                   Final result                 Please view results for these tests on the individual orders.   URINALYSIS W/ MICROSCOPIC IF INDICATED (NO CULTURE)   CBC AND DIFFERENTIAL    Narrative:     The following orders were created for panel order CBC & Differential.  Procedure                               Abnormality         Status                     ---------                               -----------         ------                     CBC Auto Differential[[id number]]        Abnormal            Final result                 Please view results for these tests on the individual orders.   GREEN TOP   LAVENDER TOP   GOLD TOP - SST   LIGHT BLUE TOP       Results       Procedure Component Value Ref Range Date/Time    Comprehensive Metabolic Panel [[id number]]  (Abnormal) Collected: 10/10/24 2038    Order Status: Completed Specimen: Blood Updated: 10/10/24 2107     Glucose 164 65 - 99 mg/dL      BUN 21 8 - 23 mg/dL      Creatinine 0.96 0.76 - 1.27  mg/dL      Sodium 140 136 - 145 mmol/L      Potassium 4.0 3.5 - 5.2 mmol/L      Chloride 101 98 - 107 mmol/L      CO2 22.5 22.0 - 29.0 mmol/L      Calcium 9.8 8.6 - 10.5 mg/dL      Total Protein 7.8 6.0 - 8.5 g/dL      Albumin 4.7 3.5 - 5.2 g/dL      ALT (SGPT) 33 1 - 41 U/L      AST (SGOT) 32 1 - 40 U/L      Alkaline Phosphatase 69 39 - 117 U/L      Total Bilirubin 0.2 0.0 - 1.2 mg/dL      Globulin 3.1 gm/dL      A/G Ratio 1.5 g/dL      BUN/Creatinine Ratio 21.9 7.0 - 25.0      Anion Gap 16.5 5.0 - 15.0 mmol/L      eGFR 89.4 >60.0 mL/min/1.73     Narrative:      GFR Normal >60  Chronic Kidney Disease <60  Kidney Failure <15      Lipase [[id number]]  (Normal) Collected: 10/10/24 2038    Order Status: Completed Specimen: Blood Updated: 10/10/24 2107     Lipase 40 13 - 60 U/L     Single High Sensitivity Troponin T [[id number]]  (Normal) Collected: 10/10/24 2038    Order Status: Completed Specimen: Blood Updated: 10/10/24 2107     HS Troponin T 14 <22 ng/L     Narrative:      High Sensitive Troponin T Reference Range:  <14.0 ng/L- Negative Female for AMI  <22.0 ng/L- Negative Male for AMI  >=14 - Abnormal Female indicating possible myocardial injury.  >=22 - Abnormal Male indicating possible myocardial injury.   Clinicians would have to utilize clinical acumen, EKG, Troponin, and serial changes to determine if it is an Acute Myocardial Infarction or myocardial injury due to an underlying chronic condition.         CBC Auto Differential [[id number]]  (Abnormal) Collected: 10/10/24 2038    Order Status: Completed Specimen: Blood Updated: 10/10/24 2055     WBC 11.53 3.40 - 10.80 10*3/mm3      RBC 5.36 4.14 - 5.80 10*6/mm3      Hemoglobin 15.4 13.0 - 17.7 g/dL      Hematocrit 48.1 37.5 - 51.0 %      MCV 89.7 79.0 - 97.0 fL      MCH 28.7 26.6 - 33.0 pg      MCHC 32.0 31.5 - 35.7 g/dL      RDW 13.7 12.3 - 15.4 %      RDW-SD 44.7 37.0 - 54.0 fl      MPV 10.6 6.0 - 12.0 fL      Platelets 398 140 - 450 10*3/mm3      Neutrophil %  56.1 42.7 - 76.0 %      Lymphocyte % 31.4 19.6 - 45.3 %      Monocyte % 9.5 5.0 - 12.0 %      Eosinophil % 1.8 0.3 - 6.2 %      Basophil % 0.7 0.0 - 1.5 %      Immature Grans % 0.5 0.0 - 0.5 %      Neutrophils, Absolute 6.47 1.70 - 7.00 10*3/mm3      Lymphocytes, Absolute 3.62 0.70 - 3.10 10*3/mm3      Monocytes, Absolute 1.09 0.10 - 0.90 10*3/mm3      Eosinophils, Absolute 0.21 0.00 - 0.40 10*3/mm3      Basophils, Absolute 0.08 0.00 - 0.20 10*3/mm3      Immature Grans, Absolute 0.06 0.00 - 0.05 10*3/mm3      nRBC 0.0 0.0 - 0.2 /100 WBC     [place] Draw [[id number]] Collected: 10/10/24 2038    Order Status: Completed Specimen: Blood Updated: 10/10/24 2045    Narrative:      The following orders were created for panel order [place] Draw.  Procedure                               Abnormality         Status                     ---------                               -----------         ------                     Green Top (Gel)[[id number]]                                  Final result               Lavender Top[[id number]]                                     Final result               Gold Top - SST[[id number]]                                   Final result               Light Blue Top[[id number]]                                   Final result                 Please view results for these tests on the individual orders.    Urinalysis With Microscopic If Indicated (No Culture) - Urine, Clean Catch [[id number]]     Order Status: Sent Specimen: Urine, Clean Catch              EKG:   ECG 12 Lead ED Triage Standing Order; Abdominal Pain (Upper)   Preliminary Result   HEART RATE=96  bpm   RR Rvwxatmo=472  ms   TX Sxindqhc=757  ms   P Horizontal Axis=-21  deg   P Front Axis=0  deg   QRSD Ltlkueiz=103  ms   QT Zbnuujut=947  ms   HThP=988  ms   QRS Axis=-6  deg   T Wave Axis=22  deg   - ABNORMAL ECG -   Sinus rhythm   Right bundle branch block   Date and Time of Study:2024-10-10 20:48:09          Meds given in ED:   Medications    sodium chloride 0.9 % flush 10 mL (has no administration in time range)   ondansetron (ZOFRAN) injection 4 mg (4 mg Intravenous Given 10/10/24 2108)   morphine injection 4 mg (4 mg Intravenous Given 10/10/24 2109)   iopamidol (ISOVUE-370) 76 % injection 100 mL (100 mL Intravenous Given 10/10/24 2153)       Imaging results:  CT Abdomen Pelvis With Contrast    Result Date: 10/10/2024  Impression: Dilated loops of small bowel in the mid abdomen with feculent material, suggestive of partial small bowel obstruction. Electronically Signed: Chester Cagle  10/10/2024 10:16 PM EDT  Workstation ID: [id number]     Ambulatory status:   - self    Social issues:   Social History     Socioeconomic History    Marital status:     Number of children: 2   Tobacco Use    Smoking status: Former     Types: Electronic Cigarette    Smokeless tobacco: Never   Substance and Sexual Activity    Alcohol use: Yes     Comment: Rarely consume alcohol    Drug use: No    Sexual activity: Not Currently     Partners: Female       NIH Stroke Scale:  N/A       Katelynn Vargas RN  10/10/24 22:45 EDT

## 2024-10-11 NOTE — CASE MANAGEMENT/SOCIAL WORK
Continued Stay Note  ROHAN Gil     Patient Name: Lloyd Greene  MRN: [id number]  Today's Date: 10/11/2024    Admit Date: 10/10/2024    Plan: plan home with family   Discharge Plan       Row Name 10/11/24 1433       Plan    Plan plan home with family    Patient/Family in Agreement with Plan yes    Plan Comments Spoke iwth patient at bedside, face sheet verified. IMM explained, signed and copy declined. Patient lives in a home with his mother. He is independent of ADLs including driving. He uses cpap and denies additional DME. He has used [facility] in the past and hs been to [facility] in the past. He has a living will. He sees Dr Cates as PCP. He uses  [facility] [place] and denies issues obtaining medications. SDOH completed. Patient plans to return home with family at [place]. CM # placed on white board, will continue to follow.                   Discharge Codes    No documentation.                       Chalino Motta RN

## 2024-10-11 NOTE — ED PROVIDER NOTES
"Subjective   History of Present Illness  Pt is a 64 y.o. male with PMH as listed who presents for   Chief Complaint   Patient presents with    Abdominal Pain     Hx diverticulitis, started at 1400 increased with nausea over last 2 hours         Patient is a 64-year-old male presents for abdominal pain for the past several hours.  States that it started around 2:00 in increase significantly around 630 this evening.  He has had nausea but no vomiting and normal urine output and bowel movements.  He denies any fever chest pain or shortness of breath.  He has a history of diverticulitis as well as ruptured diverticula, states that he had a fever at that time however.  No fever at this time.  Has no other new complaints currently.      Review of Systems    Past Medical History:   Diagnosis Date    Anesthesia complication     PATIENT STATES \"STOPPED BREATHING DURING COLONOSCOPY APPROX 4-5 YRS AGO\".    Anxiety     Arthritis     Colon polyp     COPD (chronic obstructive pulmonary disease)     Depression     Diabetes mellitus     Diverticulitis     Diverticulosis     Elevated cholesterol     Erectile dysfunction 2020    History of TIA (transient ischemic attack)     3 yr ago    Hyperlipidemia     Hypertension     Kidney stone 02/16/2023    Low back pain     Numbness and tingling     BILATERAL LEGS RIGHT LEG WORSE    Obesity     Sleep apnea     uses cpap occasionally    Stroke        Allergies   Allergen Reactions    Lisinopril Angioedema    Tamiflu [Oseltamivir Phosphate] Other (See Comments)     Made me sick       Past Surgical History:   Procedure Laterality Date    COLON SURGERY      COLONOSCOPY  2014    COLONOSCOPY N/A 4/12/2019    Procedure: COLONOSCOPY w/ polypectomy;  Surgeon: Tin Giang MD;  Location: [facility];  Service: Gastroenterology    COLOSTOMY      Dr. Gupta    COLOSTOMY REVISION  07/2006    Diverticulitis-Dr. Gupta    INCISIONAL HERNIA REPAIR  2011    LUMBAR LAMINECTOMY WITH FUSION N/A " 7/20/2020    Procedure: Lumbar 2 to lumbar 3, lumbar 3 to lumbar 4 and lumbar 4 lumbar 5 laminectomy with a fusion by orthopedics;  Surgeon: John Tran MD;  Location: [facility];  Service: Neurosurgery;  Laterality: N/A;    LUMBAR LAMINECTOMY WITH FUSION N/A 7/20/2020    Procedure: Lumbar 2 to lumbar 5 fusion with instrumentation and laminectomy by Dr. Hutchinson;  Surgeon: Melvin Ya MD;  Location: [facility];  Service: Neurosurgery;  Laterality: N/A;    ORIF ANKLE FRACTURE Right 1995    SPINE SURGERY  2020       Family History   Problem Relation Age of Onset    Depression Mother     Hypertension Mother     Arthritis Mother     Depression Father     Kidney disease Father     COPD Father     Hypertension Father     Hearing loss Father     Colon cancer Neg Hx     Colon polyps Neg Hx     Malig Hyperthermia Neg Hx        Social History     Socioeconomic History    Marital status:     Number of children: 2   Tobacco Use    Smoking status: Former     Types: Electronic Cigarette    Smokeless tobacco: Never   Substance and Sexual Activity    Alcohol use: Yes     Comment: Rarely consume alcohol    Drug use: No    Sexual activity: Not Currently     Partners: Female           Objective   Physical Exam  Constitutional:       Appearance: Normal appearance.   HENT:      Head: Normocephalic and atraumatic.      Mouth/Throat:      Mouth: Mucous membranes are moist.      Pharynx: Oropharynx is clear.   Eyes:      Conjunctiva/sclera: Conjunctivae normal.   Cardiovascular:      Rate and Rhythm: Normal rate and regular rhythm.   Pulmonary:      Effort: Pulmonary effort is normal.      Breath sounds: Normal breath sounds.   Abdominal:      General: Abdomen is protuberant. A surgical scar is present. There is distension.      Tenderness: There is abdominal tenderness in the periumbilical area.   Musculoskeletal:      Cervical back: Neck supple.   Skin:     General: Skin is warm and dry.   Neurological:       Mental Status: He is alert.   Psychiatric:         Mood and Affect: Mood normal.         Procedures           ED Course  ED Course as of 10/10/24 2225   Thu Oct 10, 2024   2050 Patient is a 64-year-old male presents for abdominal pain for the past several hours.  States that it started around 2:00 in increase significantly around 630 this evening.  He has had nausea but no vomiting and normal urine output and bowel movements.  He denies any fever chest pain or shortness of breath.  He has a history of diverticulitis as well as ruptured diverticula, states that he had a fever at that time however.  No fever at this time.  Has no other new complaints currently.  Will obtain CBC CMP lipase UA troponin EKG and CT and pelvis contrast. [JF]   2221 Patient with a partial small bowel obstruction, slight white count elevation 11.53.  Spoke with Dr. Mireles with surgery, she is requesting hospitalist to admit the patient for further management and her service will consult. [JF]   2224 With spoke with Dr. Paul with hospitalist service, he agrees to meet patient for observation and further management.  Spoke with patient regarding plan for admission, he understands and agrees with plan of care.  All questions answered. [JF]      ED Course User Index  [JF] Deonte De Santiago MD                                             Medical Decision Making  My differential diagnosis for abdominal pain includes but is not limited to:  Gastritis, gastroenteritis, peptic ulcer disease, GERD, esophageal perforation, acute appendicitis, mesenteric adenitis, Meckel's diverticulum, epiploic appendagitis, diverticulitis, colon cancer, ulcerative colitis, Crohn's disease, intussusception, small bowel obstruction, adhesions, ischemic bowel, perforated viscus, ileus, obstipation, biliary colic, cholecystitis, cholelithiasis, Alonzo-Donnie Frederick, hepatitis, pancreatitis, common bile duct obstruction, cholangitis, bile leak, splenic trauma, splenic  rupture, splenic infarction, splenic abscess, abdominal abscess, ascites, spontaneous bacterial peritonitis, hernia, UTI, cystitis, prostatitis, ureterolithiasis, urinary obstruction, AAA, myocardial infarction, pneumonia, cancer, porphyria, DKA, medications, sickle cell, viral syndrome, zoster        Problems Addressed:  Partial small bowel obstruction: complicated acute illness or injury    Amount and/or Complexity of Data Reviewed  Labs: ordered. Decision-making details documented in ED Course.  Radiology: ordered. Decision-making details documented in ED Course.  ECG/medicine tests: ordered.     Details: EKG  10/10/2024 at 2048  Rhythm sinus, rate 96  Normal axis, RBBB, Q-wave inferior leads lead III and aVF, no other ST changes  EKG interpreted by me contemporaneously by me with care  Discussion of management or test interpretation with external provider(s): Spoke with Dr. Mireles regarding CT findings of partial small bowel obstruction, requesting patient to be admitted to hospital service for further care and her service will consult.    Spoke with Dr. Paul with hospital service regarding need for admission for observation and further management of patient small bowel obstruction.  He agrees admit patient for further care.    Risk  Prescription drug management.  Decision regarding hospitalization.        Final diagnoses:   Partial small bowel obstruction       ED Disposition  ED Disposition       ED Disposition   Decision to Admit    Condition   --    Comment   Level of Care: Med/Surg [1]   Diagnosis: Partial small bowel obstruction [348309]                 No follow-up provider specified.       Medication List      No changes were made to your prescriptions during this visit.            Deonte De Santiago MD  10/10/24 4562

## 2024-10-11 NOTE — PLAN OF CARE
Goal Outcome Evaluation:  Plan of Care Reviewed With: patient        Progress: improving  Outcome Evaluation: Dialudid given almost q 2 hrs for abd pain; NG clamped most of shift due to Small bowel follow thru; encouraged to amb in halls vicky'symone; IVFs cont; KUB & abd with CXR done; repeat KUB in am; medicated with zofran twice due to vomiting (emesis each time Dilaudid given)NPO except ice chips; labs in am

## 2024-10-12 ENCOUNTER — APPOINTMENT (OUTPATIENT)
Dept: GENERAL RADIOLOGY | Facility: HOSPITAL | Age: 64
End: 2024-10-12
Payer: MEDICARE

## 2024-10-12 LAB
ALBUMIN SERPL-MCNC: 4.7 G/DL (ref 3.5–5.2)
ANION GAP SERPL CALCULATED.3IONS-SCNC: 15.9 MMOL/L (ref 5–15)
BASOPHILS # BLD AUTO: 0.06 10*3/MM3 (ref 0–0.2)
BASOPHILS NFR BLD AUTO: 0.4 % (ref 0–1.5)
BUN SERPL-MCNC: 28 MG/DL (ref 8–23)
BUN/CREAT SERPL: 27.7 (ref 7–25)
CALCIUM SPEC-SCNC: 10 MG/DL (ref 8.6–10.5)
CHLORIDE SERPL-SCNC: 104 MMOL/L (ref 98–107)
CO2 SERPL-SCNC: 25.1 MMOL/L (ref 22–29)
CREAT SERPL-MCNC: 1.01 MG/DL (ref 0.76–1.27)
DEPRECATED RDW RBC AUTO: 47.7 FL (ref 37–54)
EGFRCR SERPLBLD CKD-EPI 2021: 84.1 ML/MIN/1.73
EOSINOPHIL # BLD AUTO: 0.05 10*3/MM3 (ref 0–0.4)
EOSINOPHIL NFR BLD AUTO: 0.4 % (ref 0.3–6.2)
ERYTHROCYTE [DISTWIDTH] IN BLOOD BY AUTOMATED COUNT: 14.3 % (ref 12.3–15.4)
GLUCOSE BLDC GLUCOMTR-MCNC: 130 MG/DL (ref 70–130)
GLUCOSE BLDC GLUCOMTR-MCNC: 146 MG/DL (ref 70–130)
GLUCOSE BLDC GLUCOMTR-MCNC: 149 MG/DL (ref 70–130)
GLUCOSE BLDC GLUCOMTR-MCNC: 157 MG/DL (ref 70–130)
GLUCOSE SERPL-MCNC: 174 MG/DL (ref 65–99)
HCT VFR BLD AUTO: 51.9 % (ref 37.5–51)
HGB BLD-MCNC: 16.1 G/DL (ref 13–17.7)
IMM GRANULOCYTES # BLD AUTO: 0.05 10*3/MM3 (ref 0–0.05)
IMM GRANULOCYTES NFR BLD AUTO: 0.4 % (ref 0–0.5)
LYMPHOCYTES # BLD AUTO: 2.12 10*3/MM3 (ref 0.7–3.1)
LYMPHOCYTES NFR BLD AUTO: 15.6 % (ref 19.6–45.3)
MCH RBC QN AUTO: 28.5 PG (ref 26.6–33)
MCHC RBC AUTO-ENTMCNC: 31 G/DL (ref 31.5–35.7)
MCV RBC AUTO: 91.9 FL (ref 79–97)
MONOCYTES # BLD AUTO: 1.45 10*3/MM3 (ref 0.1–0.9)
MONOCYTES NFR BLD AUTO: 10.7 % (ref 5–12)
NEUTROPHILS NFR BLD AUTO: 72.5 % (ref 42.7–76)
NEUTROPHILS NFR BLD AUTO: 9.84 10*3/MM3 (ref 1.7–7)
NRBC BLD AUTO-RTO: 0 /100 WBC (ref 0–0.2)
PHOSPHATE SERPL-MCNC: 4.2 MG/DL (ref 2.5–4.5)
PLATELET # BLD AUTO: 397 10*3/MM3 (ref 140–450)
PMV BLD AUTO: 10.7 FL (ref 6–12)
POTASSIUM SERPL-SCNC: 4.8 MMOL/L (ref 3.5–5.2)
RBC # BLD AUTO: 5.65 10*6/MM3 (ref 4.14–5.8)
SODIUM SERPL-SCNC: 145 MMOL/L (ref 136–145)
WBC NRBC COR # BLD AUTO: 13.57 10*3/MM3 (ref 3.4–10.8)

## 2024-10-12 PROCEDURE — 74018 RADEX ABDOMEN 1 VIEW: CPT

## 2024-10-12 PROCEDURE — 82948 REAGENT STRIP/BLOOD GLUCOSE: CPT

## 2024-10-12 PROCEDURE — 63710000001 INSULIN LISPRO (HUMAN) PER 5 UNITS: Performed by: FAMILY MEDICINE

## 2024-10-12 PROCEDURE — 25010000002 SODIUM CHLORIDE 0.9 % WITH KCL 20 MEQ 20-0.9 MEQ/L-% SOLUTION: Performed by: HOSPITALIST

## 2024-10-12 PROCEDURE — 25010000002 HYDROMORPHONE PER 4 MG: Performed by: HOSPITALIST

## 2024-10-12 PROCEDURE — 85025 COMPLETE CBC W/AUTO DIFF WBC: CPT | Performed by: SURGERY

## 2024-10-12 PROCEDURE — 80069 RENAL FUNCTION PANEL: CPT | Performed by: SURGERY

## 2024-10-12 PROCEDURE — 99232 SBSQ HOSP IP/OBS MODERATE 35: CPT | Performed by: HOSPITALIST

## 2024-10-12 PROCEDURE — 25010000002 HYDRALAZINE PER 20 MG: Performed by: HOSPITALIST

## 2024-10-12 PROCEDURE — 99231 SBSQ HOSP IP/OBS SF/LOW 25: CPT | Performed by: SURGERY

## 2024-10-12 RX ORDER — HYDRALAZINE HYDROCHLORIDE 20 MG/ML
20 INJECTION INTRAMUSCULAR; INTRAVENOUS EVERY 4 HOURS PRN
Status: DISCONTINUED | OUTPATIENT
Start: 2024-10-12 | End: 2024-10-14 | Stop reason: HOSPADM

## 2024-10-12 RX ORDER — BISACODYL 10 MG
10 SUPPOSITORY, RECTAL RECTAL DAILY
Status: DISCONTINUED | OUTPATIENT
Start: 2024-10-12 | End: 2024-10-14 | Stop reason: HOSPADM

## 2024-10-12 RX ADMIN — POTASSIUM CHLORIDE AND SODIUM CHLORIDE 100 ML/HR: 900; 150 INJECTION, SOLUTION INTRAVENOUS at 14:17

## 2024-10-12 RX ADMIN — BISACODYL 10 MG: 10 SUPPOSITORY RECTAL at 14:07

## 2024-10-12 RX ADMIN — Medication 10 ML: at 09:01

## 2024-10-12 RX ADMIN — POTASSIUM CHLORIDE AND SODIUM CHLORIDE 100 ML/HR: 900; 150 INJECTION, SOLUTION INTRAVENOUS at 05:07

## 2024-10-12 RX ADMIN — INSULIN LISPRO 2 UNITS: 100 INJECTION, SOLUTION INTRAVENOUS; SUBCUTANEOUS at 06:06

## 2024-10-12 RX ADMIN — HYDROMORPHONE HYDROCHLORIDE 0.5 MG: 1 INJECTION, SOLUTION INTRAMUSCULAR; INTRAVENOUS; SUBCUTANEOUS at 09:01

## 2024-10-12 RX ADMIN — HYDRALAZINE HYDROCHLORIDE 20 MG: 20 INJECTION INTRAMUSCULAR; INTRAVENOUS at 14:54

## 2024-10-12 NOTE — NURSING NOTE
O2 sats staying in mid 80's. Wears a cpap at home but is not available. Placed on O2@2L/NC. O2 sats now 95.

## 2024-10-12 NOTE — PROGRESS NOTES
"        Surgery Progress Note   Chief Complaint:  sbo    Subjective     Interval History:     Diapk's pain has significantly improved.  He has started passing gas.  I reviewed his KUB this morning and contrast has made it into his colon but his stomach is still quite distended even with the NG tube.  He does take Trulicity.        Objective     Vital Signs  Temp:  [96.7 °F (35.9 °C)-98.1 °F (36.7 °C)] 97.7 °F (36.5 °C)  Heart Rate:  [] 90  Resp:  [16-18] 16  BP: (144-182)/(85-94) 158/85  Body mass index is 39.44 kg/m².    Intake/Output Summary (Last 24 hours) at 10/12/2024 1014  Last data filed at 10/12/2024 0800  Gross per 24 hour   Intake 1378.33 ml   Output 2600 ml   Net -1221.67 ml     I/O this shift:  In: 120 [Other:120]  Out: 1600 [Emesis/NG output:1600]       Physical Exam:   General: patient awake, alert and cooperative   Abdomen: distended, obese, good bowel sounds, no tenderness to palpation   Extremities: no rash or edema   Neurologic: Normal mood and behavior     Results Review:     I reviewed the patient's new clinical results.      WBC No results found for: \"WBCS\"   HGB Hemoglobin   Date Value Ref Range Status   10/12/2024 16.1 13.0 - 17.7 g/dL Final   10/11/2024 15.0 13.0 - 17.7 g/dL Final   10/10/2024 15.4 13.0 - 17.7 g/dL Final      HCT Hematocrit   Date Value Ref Range Status   10/12/2024 51.9 (H) 37.5 - 51.0 % Final   10/11/2024 47.4 37.5 - 51.0 % Final   10/10/2024 48.1 37.5 - 51.0 % Final      Platlets No results found for: \"LABPLAT\"     PT/INR:  No results found for: \"PROTIME\"/No results found for: \"INR\"    Sodium Sodium   Date Value Ref Range Status   10/12/2024 145 136 - 145 mmol/L Final   10/11/2024 143 136 - 145 mmol/L Final   10/10/2024 140 136 - 145 mmol/L Final      Potassium Potassium   Date Value Ref Range Status   10/12/2024 4.8 3.5 - 5.2 mmol/L Final   10/11/2024 4.6 3.5 - 5.2 mmol/L Final   10/10/2024 4.0 3.5 - 5.2 mmol/L Final      Chloride Chloride   Date Value Ref Range " "Status   10/12/2024 104 98 - 107 mmol/L Final   10/11/2024 106 98 - 107 mmol/L Final   10/10/2024 101 98 - 107 mmol/L Final      Bicarbonate No results found for: \"PLASMABICARB\"   BUN BUN   Date Value Ref Range Status   10/12/2024 28 (H) 8 - 23 mg/dL Final   10/11/2024 20 8 - 23 mg/dL Final   10/10/2024 21 8 - 23 mg/dL Final      Creatinine Creatinine   Date Value Ref Range Status   10/12/2024 1.01 0.76 - 1.27 mg/dL Final   10/11/2024 0.82 0.76 - 1.27 mg/dL Final   10/10/2024 0.96 0.76 - 1.27 mg/dL Final      Calcium Calcium   Date Value Ref Range Status   10/12/2024 10.0 8.6 - 10.5 mg/dL Final   10/11/2024 9.8 8.6 - 10.5 mg/dL Final   10/10/2024 9.8 8.6 - 10.5 mg/dL Final      Magnesium  AST  ALT  Bilirubin, Total  AlkPhos  Albumin    Amylase  Lipase    Radiology: Magnesium   Date Value Ref Range Status   10/11/2024 2.3 1.6 - 2.4 mg/dL Final     No components found for: \"AST.*\"  No components found for: \"ALT.*\"  No results found for: \"BILIRUBIN\"    No components found for: \"ALKPHOS.*\"  No components found for: \"ALBUMIN.*\"      No components found for: \"AMYLASE.*\"  No components found for: \"LIPASE.*\"            Imaging Results (Most Recent)       Procedure Component Value Units Date/Time    XR Abdomen KUB [[id number]] Resulted: 10/12/24 0637     Updated: 10/12/24 0638    FL Small Bowel Follow Through Single-Contrast [[id number]] Collected: 10/11/24 1740     Updated: 10/11/24 1751    Narrative:      FL SMALL BOWEL FOLLOW THROUGH SINGLE-CONTRAST    Date of Exam: 10/11/2024 1:39 PM EDT    Indication: sbo.    Comparison: CT scan of the abdomen and pelvis 10/10/2024    Findings:  Images at 4 hours demonstrate NG tube in the stomach, the tip is 10 cm beyond the GE junction. Enteric contrast is seen in the stomach, which is moderately dilated.    Images of the abdomen at 8 hours demonstrate no change in the appearance of the stomach. Faint enteric contrast material is seen in small bowel loops, which are abnormally dilated. " Nondilated loops of small bowel are clearly evident on CT scan. A   transition point is not identified on this study. No contrast is seen in the colon.      Impression:      Impression:  Small bowel follow-through demonstrating findings concerning for small bowel obstruction.      Electronically Signed: Don Leroy MD    10/11/2024 5:48 PM EDT    Workstation ID: [id number]    XR Abdomen 2+ VW with Chest 1 VW [[id number]] Collected: 10/11/24 0832     Updated: 10/11/24 0839    Narrative:      XR ABDOMEN 2+ VIEWS W CHEST 1 VW    Date of Exam: 10/11/2024 8:27 AM EDT    Indication: sbo    Comparison: 10/10/2024 CT abdomen/pelvis    Findings:  Nasogastric tube distal tip and sideport overlie the stomach. There is no acute cardiopulmonary abnormality.    There are multiple dilated loops of small bowel with gas fluid levels consistent with small bowel obstruction. Surgical clips overlie the left hemiabdomen. Lumbar fusion hardware without evidence of complication. No acute bone abnormality.      Impression:      Impression:  Multiple dilated loops of small bowel with gas fluid levels consistent with small bowel obstruction.    No acute cardiopulmonary abnormality.      Electronically Signed: Rober Tomlinson MD    10/11/2024 8:36 AM EDT    Workstation ID: [id number]    XR Abdomen KUB [[id number]] Collected: 10/11/24 0823     Updated: 10/11/24 0827    Narrative:      XR ABDOMEN KUB    Date of Exam: 10/11/2024 6:10 AM EDT    Indication: after insertion of ng tube    Comparison: 10/10/2024 CT    Findings:  Nasogastric tube distal tip and sideport overlie the stomach. There are multiple dilated loops of small bowel in keeping with evidence of small bowel obstruction. Surgical clips overlie the left hemiabdomen. Partially visualized lumbar fusion hardware.   Lung bases are unremarkable. No acute osseous abnormality.      Impression:      Impression:  Nasogastric tube tip and sideport overlie the stomach.      Electronically Signed:  Rober Tomlinson MD    10/11/2024 8:24 AM EDT    Workstation ID: [id number]    CT Abdomen Pelvis With Contrast [[id number]] Collected: 10/10/24 2203     Updated: 10/10/24 2219    Narrative:      CT ABDOMEN PELVIS W CONTRAST    Date of Exam: 10/10/2024 9:37 PM EDT    Indication: abdominal pain, hx ruptured diverticulitis.    Comparison: CT abdomen pelvis 2/16/2023    Technique: Axial CT images were obtained of the abdomen and pelvis following the uneventful intravenous administration of iodinated contrast. Sagittal and coronal reconstructions were performed.  Automated exposure control and iterative reconstruction   methods were used.        Findings:  Lung Bases: Bibasal atelectasis. Coronary artery calcifications.    Liver: Hepatic steatosis.     Gallbladder and biliary tree: No significant abnormality.     Spleen: No significant abnormality.     Pancreas: No significant abnormality.     Adrenal glands: No significant abnormality.     Kidneys and ureters: Right renal cyst. No hydroureteronephrosis.     Stomach and duodenum:No significant abnormality.    Small and large bowel: Postoperative changes of prior sigmoid resection. Normal-appearing appendix. There are dilated loops of small bowel in the mid abdomen with feculent material.    Peritoneal cavity: No free fluid or free air.    Bladder: No significant abnormality.     Pelvic organs: No significant abnormality.     Vasculature: Atherosclerotic calcifications.     Lymph nodes: No pathologic appearing lymph nodes by imaging criteria.     Bones and soft tissues: Degenerative changes of the imaged spine. No acute osseous abnormality. Postsurgical changes of posterior decompression and posterior instrumented fixation of the lumbar spine.      Impression:      Impression:  Dilated loops of small bowel in the mid abdomen with feculent material, suggestive of partial small bowel obstruction.            Electronically Signed: Chester Cagle    10/10/2024 10:16 PM EDT     Workstation ID: [id number]               sodium chloride 0.9 % with KCl 20 mEq, 100 mL/hr, Last Rate: 100 mL/hr (10/12/24 2356)          Assessment & Plan     Active Hospital Problems    Diagnosis     **Partial small bowel obstruction         Sbo  I do not believe he has a mechanical obstruction.  I believe his issues are functional due to the Trulicity.  I discussed with Dr. Jerome and we are going to try to stimulate him with some suppositories.        Kiana Mireles DO  10/12/24  10:14 EDT

## 2024-10-12 NOTE — PROGRESS NOTES
"Hospitalist Team      Patient Care Team:  Monica Cates MD as PCP - General (Internal Medicine)  Barry Jacobson DO (Orthopedic Surgery)  Kathy Aguilar APRN (Endocrinology)      Chief Complaint:  Follow-up SBO    Subjective    No acute events overnight, but Mr. Greene reports he slept on and off throughout the night.  Change in frequency of pain medicine is helping immensely.  He reports he is passing some gas, but has not had a bowel movement.  He denies chest pain and dyspnea.  He has not been up from bed yet this morning.  He has had about 300 out from his NG tube this morning.    Objective    Vital Signs  Temp:  [96.7 °F (35.9 °C)-98.1 °F (36.7 °C)] 97.7 °F (36.5 °C)  Heart Rate:  [] 90  Resp:  [16-18] 16  BP: (144-182)/(85-94) 158/85  Oxygen Therapy  SpO2: 94 %  Pulse Oximetry Type: Continuous  Device (Oxygen Therapy): room air  Flow (L/min) (Oxygen Therapy): 2}    Flowsheet Rows      Flowsheet Row First Filed Value   Admission Height 165.1 cm (65\") Documented at 10/10/2024 2032   Admission Weight 108 kg (237 lb) Documented at 10/10/2024 2032          Physical Exam:    General: Appears stated age in no acute distress.  Lungs: Breath sounds are diminished.  No wheeze or rales appreciated.  Respirations are nonlabored.  CV: Heart sounds are distant but regular.  No murmurs appreciated.  Radial pulses are 2+ and symmetric.  Abdomen: Obese, but distended as well.  Bowel sounds are absent.  MSK: No clubbing, cyanosis, or edema.  Neuro: Cranial nerves II through XII are grossly intact.  Psych: Pleasant affect.    Results Review:     I reviewed the patient's new clinical results.    Lab Results (last 24 hours)       Procedure Component Value Units Date/Time    POC Glucose Once [[id number]]  (Abnormal) Collected: 10/12/24 0812    Specimen: Blood Updated: 10/12/24 0818     Glucose 157 mg/dL     Renal Function Panel [[id number]]  (Abnormal) Collected: 10/12/24 0439    Specimen: Blood Updated: 10/12/24 0459 "     Glucose 174 mg/dL      BUN 28 mg/dL      Creatinine 1.01 mg/dL      Sodium 145 mmol/L      Potassium 4.8 mmol/L      Chloride 104 mmol/L      CO2 25.1 mmol/L      Calcium 10.0 mg/dL      Albumin 4.7 g/dL      Phosphorus 4.2 mg/dL      Anion Gap 15.9 mmol/L      BUN/Creatinine Ratio 27.7     eGFR 84.1 mL/min/1.73     Narrative:      GFR Normal >60  Chronic Kidney Disease <60  Kidney Failure <15      CBC & Differential [[id number]]  (Abnormal) Collected: 10/12/24 0439    Specimen: Blood Updated: 10/12/24 0445    Narrative:      The following orders were created for panel order CBC & Differential.  Procedure                               Abnormality         Status                     ---------                               -----------         ------                     CBC Auto Differential[[id number]]        Abnormal            Final result                 Please view results for these tests on the individual orders.    CBC Auto Differential [[id number]]  (Abnormal) Collected: 10/12/24 0439    Specimen: Blood Updated: 10/12/24 0445     WBC 13.57 10*3/mm3      RBC 5.65 10*6/mm3      Hemoglobin 16.1 g/dL      Hematocrit 51.9 %      MCV 91.9 fL      MCH 28.5 pg      MCHC 31.0 g/dL      RDW 14.3 %      RDW-SD 47.7 fl      MPV 10.7 fL      Platelets 397 10*3/mm3      Neutrophil % 72.5 %      Lymphocyte % 15.6 %      Monocyte % 10.7 %      Eosinophil % 0.4 %      Basophil % 0.4 %      Immature Grans % 0.4 %      Neutrophils, Absolute 9.84 10*3/mm3      Lymphocytes, Absolute 2.12 10*3/mm3      Monocytes, Absolute 1.45 10*3/mm3      Eosinophils, Absolute 0.05 10*3/mm3      Basophils, Absolute 0.06 10*3/mm3      Immature Grans, Absolute 0.05 10*3/mm3      nRBC 0.0 /100 WBC     POC Glucose Once [[id number]]  (Abnormal) Collected: 10/12/24 0007    Specimen: Blood Updated: 10/12/24 0013     Glucose 149 mg/dL     POC Glucose Once [[id number]]  (Abnormal) Collected: 10/11/24 2018    Specimen: Blood Updated: 10/11/24 2024     Glucose  159 mg/dL     POC Glucose Once [[id number]]  (Abnormal) Collected: 10/11/24 1752    Specimen: Blood Updated: 10/11/24 1759     Glucose 190 mg/dL     TSH [[id number]]  (Normal) Collected: 10/11/24 0421    Specimen: Blood Updated: 10/11/24 1524     TSH 2.400 uIU/mL     POC Glucose Once [[id number]]  (Abnormal) Collected: 10/11/24 1219    Specimen: Blood Updated: 10/11/24 1225     Glucose 192 mg/dL     Urinalysis, Microscopic Only - Urine, Clean Catch [[id number]] Collected: 10/11/24 1011    Specimen: Urine, Clean Catch Updated: 10/11/24 1030     RBC, UA 0-2 /HPF      WBC, UA 0-2 /HPF      Bacteria, UA None Seen /HPF      Squamous Epithelial Cells, UA None Seen /HPF      Hyaline Casts, UA None Seen /LPF      Methodology Manual Light Microscopy    Urinalysis With Microscopic If Indicated (No Culture) - Urine, Clean Catch [[id number]]  (Abnormal) Collected: 10/11/24 1011    Specimen: Urine, Clean Catch Updated: 10/11/24 1021     Color, UA Yellow     Appearance, UA Clear     pH, UA 5.5     Specific Gravity, UA 1.020     Glucose,  mg/dL (2+)     Ketones, UA Trace     Bilirubin, UA Negative     Blood, UA Negative     Protein, UA 30 mg/dL (1+)     Leuk Esterase, UA Negative     Nitrite, UA Negative     Urobilinogen, UA 0.2 E.U./dL            Imaging Results (Last 24 Hours)       Procedure Component Value Units Date/Time    XR Abdomen KUB [[id number]] Resulted: 10/12/24 0637     Updated: 10/12/24 0638    FL Small Bowel Follow Through Single-Contrast [[id number]] Collected: 10/11/24 1740     Updated: 10/11/24 1751    Narrative:      FL SMALL BOWEL FOLLOW THROUGH SINGLE-CONTRAST    Date of Exam: 10/11/2024 1:39 PM EDT    Indication: sbo.    Comparison: CT scan of the abdomen and pelvis 10/10/2024    Findings:  Images at 4 hours demonstrate NG tube in the stomach, the tip is 10 cm beyond the GE junction. Enteric contrast is seen in the stomach, which is moderately dilated.    Images of the abdomen at 8 hours demonstrate no change  in the appearance of the stomach. Faint enteric contrast material is seen in small bowel loops, which are abnormally dilated. Nondilated loops of small bowel are clearly evident on CT scan. A   transition point is not identified on this study. No contrast is seen in the colon.      Impression:      Impression:  Small bowel follow-through demonstrating findings concerning for small bowel obstruction.      Electronically Signed: Don Leroy MD    10/11/2024 5:48 PM EDT    Workstation ID: [id number]            X-ray reviewed personally by physician.      Medication Review:   I have reviewed the patient's current medication list    Current Facility-Administered Medications:     Calcium Replacement - Follow Nurse / BPA Driven Protocol, , Does not apply, PRN, Jhonny Paul DO    dextrose (D50W) (25 g/50 mL) IV injection 25 g, 25 g, Intravenous, Q15 Min PRN, Jhonny Paul DO    dextrose (GLUTOSE) oral gel 15 g, 15 g, Oral, Q15 Min PRN, Jhonny Paul DO    glucagon (GLUCAGEN) injection 1 mg, 1 mg, Intramuscular, Q15 Min PRN, Jhonny Paul DO    hydrALAZINE (APRESOLINE) injection 20 mg, 20 mg, Intravenous, Q4H PRN, Justin Jerome MD    HYDROmorphone (DILAUDID) injection 0.5 mg, 0.5 mg, Intravenous, Q2H PRN, Justin Jerome MD, 0.5 mg at 10/12/24 0901    Insulin Lispro (humaLOG) injection 2-9 Units, 2-9 Units, Subcutaneous, Q6H, Jhonny Paul DO, 2 Units at 10/12/24 0606    Magnesium Standard Dose Replacement - Follow Nurse / BPA Driven Protocol, , Does not apply, PRN, Jhonny Paul DO    [DISCONTINUED] morphine injection 4 mg, 4 mg, Intravenous, Q3H PRN, 4 mg at 10/11/24 0309 **AND** naloxone (NARCAN) injection 0.4 mg, 0.4 mg, Intravenous, Q5 Min PRN, Jhonny Paul DO    ondansetron (ZOFRAN) injection 4 mg, 4 mg, Intravenous, Q4H PRN, Jhonny Paul DO, 4 mg at 10/11/24 2050    Phosphorus Replacement - Follow Nurse / BPA Driven Protocol, , Does not apply, PRN, Jhonny Paul, DO     Potassium Replacement - Follow Nurse / BPA Driven Protocol, , Does not apply, PRN, Jhonny Paul,     sodium chloride 0.9 % flush 10 mL, 10 mL, Intravenous, PRN, Deonte De Santiago MD    sodium chloride 0.9 % flush 10 mL, 10 mL, Intravenous, Q12H, Jhonny Paul DO, 10 mL at 10/12/24 0901    sodium chloride 0.9 % flush 10 mL, 10 mL, Intravenous, PRN, Jhonny Paul,     sodium chloride 0.9 % infusion 40 mL, 40 mL, Intravenous, PRN, Jhonny Paul DO    sodium chloride 0.9 % with KCl 20 mEq/L infusion, 100 mL/hr, Intravenous, Continuous, Justin Jerome MD, Last Rate: 100 mL/hr at 10/12/24 0507, 100 mL/hr at 10/12/24 0507      Assessment & Plan     Partial small bowel obstruction: Medication induced from Trulicity?  Discussed with Dr. Mireles.  Contrast is made in the colon on this morning's film.  No plans for surgery currently.  TSH was normal.  Mobilize is much as possible.  Surgery recommended trying Dulcolax suppository.  Essential hypertension: BP near goal on exam, however trend is fairly good.  Continue to manage pain with IV Dilaudid and continue as needed hydralazine.  Diabetes mellitus type 2: A.m. and bedside glucose trends are at goal.  No change to current management.  Anxiety/depression: Holding home regimen.  No acute issue.    Plan for disposition: Predicated on hospital course.    Justin Jerome MD  10/12/24  09:05 EDT   DISPLAY PLAN FREE TEXT DISPLAY PLAN FREE TEXT DISPLAY PLAN FREE TEXT DISPLAY PLAN FREE TEXT

## 2024-10-12 NOTE — PLAN OF CARE
Goal Outcome Evaluation:  Plan of Care Reviewed With: patient        Progress: improving  Outcome Evaluation: VSS. NG had to be flushed 3 times throughout the night due to thickness of secretions. Tolerated without complaints. Medicated x1 for pain and nausea with good affect. Dr Mireles notified this nurse he will not be going to surgery today, she thinks. Encouraged to ambulate in hallway. NPO after MN.

## 2024-10-12 NOTE — PLAN OF CARE
Goal Outcome Evaluation:  Plan of Care Reviewed With: patient        Progress: improving  Outcome Evaluation: Patient up, ambulate in halls. KUB today. Suppository given. BM result. Pt remains NPO. NGT remains in place to LWS. Continue IVFs. All care explained. All questions answered. Pt verb understanding. Call light within reach.

## 2024-10-13 ENCOUNTER — APPOINTMENT (OUTPATIENT)
Dept: GENERAL RADIOLOGY | Facility: HOSPITAL | Age: 64
End: 2024-10-13
Payer: MEDICARE

## 2024-10-13 LAB
ALBUMIN SERPL-MCNC: 3.8 G/DL (ref 3.5–5.2)
ANION GAP SERPL CALCULATED.3IONS-SCNC: 17.1 MMOL/L (ref 5–15)
BASOPHILS # BLD AUTO: 0.05 10*3/MM3 (ref 0–0.2)
BASOPHILS NFR BLD AUTO: 0.4 % (ref 0–1.5)
BUN SERPL-MCNC: 21 MG/DL (ref 8–23)
BUN/CREAT SERPL: 28 (ref 7–25)
CALCIUM SPEC-SCNC: 8.5 MG/DL (ref 8.6–10.5)
CHLORIDE SERPL-SCNC: 101 MMOL/L (ref 98–107)
CO2 SERPL-SCNC: 21.9 MMOL/L (ref 22–29)
CREAT SERPL-MCNC: 0.75 MG/DL (ref 0.76–1.27)
DEPRECATED RDW RBC AUTO: 48.2 FL (ref 37–54)
EGFRCR SERPLBLD CKD-EPI 2021: 102 ML/MIN/1.73
EOSINOPHIL # BLD AUTO: 0.06 10*3/MM3 (ref 0–0.4)
EOSINOPHIL NFR BLD AUTO: 0.5 % (ref 0.3–6.2)
ERYTHROCYTE [DISTWIDTH] IN BLOOD BY AUTOMATED COUNT: 14.2 % (ref 12.3–15.4)
GLUCOSE BLDC GLUCOMTR-MCNC: 100 MG/DL (ref 70–130)
GLUCOSE BLDC GLUCOMTR-MCNC: 100 MG/DL (ref 70–130)
GLUCOSE BLDC GLUCOMTR-MCNC: 116 MG/DL (ref 70–130)
GLUCOSE BLDC GLUCOMTR-MCNC: 121 MG/DL (ref 70–130)
GLUCOSE BLDC GLUCOMTR-MCNC: 125 MG/DL (ref 70–130)
GLUCOSE SERPL-MCNC: 112 MG/DL (ref 65–99)
HCT VFR BLD AUTO: 45.5 % (ref 37.5–51)
HGB BLD-MCNC: 14 G/DL (ref 13–17.7)
IMM GRANULOCYTES # BLD AUTO: 0.04 10*3/MM3 (ref 0–0.05)
IMM GRANULOCYTES NFR BLD AUTO: 0.3 % (ref 0–0.5)
LYMPHOCYTES # BLD AUTO: 2.35 10*3/MM3 (ref 0.7–3.1)
LYMPHOCYTES NFR BLD AUTO: 19.6 % (ref 19.6–45.3)
MCH RBC QN AUTO: 28.6 PG (ref 26.6–33)
MCHC RBC AUTO-ENTMCNC: 30.8 G/DL (ref 31.5–35.7)
MCV RBC AUTO: 93 FL (ref 79–97)
MONOCYTES # BLD AUTO: 1.09 10*3/MM3 (ref 0.1–0.9)
MONOCYTES NFR BLD AUTO: 9.1 % (ref 5–12)
NEUTROPHILS NFR BLD AUTO: 70.1 % (ref 42.7–76)
NEUTROPHILS NFR BLD AUTO: 8.39 10*3/MM3 (ref 1.7–7)
NRBC BLD AUTO-RTO: 0 /100 WBC (ref 0–0.2)
PHOSPHATE SERPL-MCNC: 2.6 MG/DL (ref 2.5–4.5)
PLATELET # BLD AUTO: 349 10*3/MM3 (ref 140–450)
PMV BLD AUTO: 10.7 FL (ref 6–12)
POTASSIUM SERPL-SCNC: 4.1 MMOL/L (ref 3.5–5.2)
RBC # BLD AUTO: 4.89 10*6/MM3 (ref 4.14–5.8)
SODIUM SERPL-SCNC: 140 MMOL/L (ref 136–145)
WBC NRBC COR # BLD AUTO: 11.98 10*3/MM3 (ref 3.4–10.8)

## 2024-10-13 PROCEDURE — 80069 RENAL FUNCTION PANEL: CPT | Performed by: SURGERY

## 2024-10-13 PROCEDURE — 74018 RADEX ABDOMEN 1 VIEW: CPT

## 2024-10-13 PROCEDURE — 25010000002 SODIUM CHLORIDE 0.9 % WITH KCL 20 MEQ 20-0.9 MEQ/L-% SOLUTION: Performed by: HOSPITALIST

## 2024-10-13 PROCEDURE — 99231 SBSQ HOSP IP/OBS SF/LOW 25: CPT | Performed by: SURGERY

## 2024-10-13 PROCEDURE — 94761 N-INVAS EAR/PLS OXIMETRY MLT: CPT

## 2024-10-13 PROCEDURE — 99232 SBSQ HOSP IP/OBS MODERATE 35: CPT | Performed by: HOSPITALIST

## 2024-10-13 PROCEDURE — 85025 COMPLETE CBC W/AUTO DIFF WBC: CPT | Performed by: SURGERY

## 2024-10-13 PROCEDURE — 82948 REAGENT STRIP/BLOOD GLUCOSE: CPT

## 2024-10-13 RX ADMIN — POTASSIUM CHLORIDE AND SODIUM CHLORIDE 100 ML/HR: 900; 150 INJECTION, SOLUTION INTRAVENOUS at 01:13

## 2024-10-13 NOTE — PLAN OF CARE
Goal Outcome Evaluation:  Plan of Care Reviewed With: patient        Progress: improving  Outcome Evaluation: Pt up and ambulates independently. NGT removed. IV saline locked. Clear liquid diet. Tolerating. Denies N/V/ abd pain. BM today. All care explained. All questions answered. Pt verb understanding. Room air during the day.

## 2024-10-13 NOTE — PROGRESS NOTES
"        Surgery Progress Note   Chief Complaint:  sbo    Subjective     Interval History:     Dipak is doing better.  He has no pain.  He has moved his bowels three times.  He is hungry.      Objective     Vital Signs  Temp:  [97.6 °F (36.4 °C)-100.2 °F (37.9 °C)] 98 °F (36.7 °C)  Heart Rate:  [] 83  Resp:  [16-18] 18  BP: (135-177)/(59-88) 162/88  Body mass index is 39.44 kg/m².    Intake/Output Summary (Last 24 hours) at 10/13/2024 1315  Last data filed at 10/13/2024 0930  Gross per 24 hour   Intake 1380 ml   Output 1100 ml   Net 280 ml     I/O this shift:  In: 120 [P.O.:120]  Out: -        Physical Exam:   General: patient awake, alert and cooperative   Abdomen: obese, soft, no tenderness   Extremities: no rash or edema   Neurologic: Normal mood and behavior     Results Review:     I reviewed the patient's new clinical results.      WBC No results found for: \"WBCS\"   HGB Hemoglobin   Date Value Ref Range Status   10/13/2024 14.0 13.0 - 17.7 g/dL Final   10/12/2024 16.1 13.0 - 17.7 g/dL Final   10/11/2024 15.0 13.0 - 17.7 g/dL Final   10/10/2024 15.4 13.0 - 17.7 g/dL Final      HCT Hematocrit   Date Value Ref Range Status   10/13/2024 45.5 37.5 - 51.0 % Final   10/12/2024 51.9 (H) 37.5 - 51.0 % Final   10/11/2024 47.4 37.5 - 51.0 % Final   10/10/2024 48.1 37.5 - 51.0 % Final      Platlets No results found for: \"LABPLAT\"     PT/INR:  No results found for: \"PROTIME\"/No results found for: \"INR\"    Sodium Sodium   Date Value Ref Range Status   10/13/2024 140 136 - 145 mmol/L Final   10/12/2024 145 136 - 145 mmol/L Final   10/11/2024 143 136 - 145 mmol/L Final   10/10/2024 140 136 - 145 mmol/L Final      Potassium Potassium   Date Value Ref Range Status   10/13/2024 4.1 3.5 - 5.2 mmol/L Final     Comment:     Specimen hemolyzed.  Result may be falsely elevated.   10/12/2024 4.8 3.5 - 5.2 mmol/L Final   10/11/2024 4.6 3.5 - 5.2 mmol/L Final   10/10/2024 4.0 3.5 - 5.2 mmol/L Final      Chloride Chloride   Date Value " "Ref Range Status   10/13/2024 101 98 - 107 mmol/L Final   10/12/2024 104 98 - 107 mmol/L Final   10/11/2024 106 98 - 107 mmol/L Final   10/10/2024 101 98 - 107 mmol/L Final      Bicarbonate No results found for: \"PLASMABICARB\"   BUN BUN   Date Value Ref Range Status   10/13/2024 21 8 - 23 mg/dL Final   10/12/2024 28 (H) 8 - 23 mg/dL Final   10/11/2024 20 8 - 23 mg/dL Final   10/10/2024 21 8 - 23 mg/dL Final      Creatinine Creatinine   Date Value Ref Range Status   10/13/2024 0.75 (L) 0.76 - 1.27 mg/dL Final   10/12/2024 1.01 0.76 - 1.27 mg/dL Final   10/11/2024 0.82 0.76 - 1.27 mg/dL Final   10/10/2024 0.96 0.76 - 1.27 mg/dL Final      Calcium Calcium   Date Value Ref Range Status   10/13/2024 8.5 (L) 8.6 - 10.5 mg/dL Final   10/12/2024 10.0 8.6 - 10.5 mg/dL Final   10/11/2024 9.8 8.6 - 10.5 mg/dL Final   10/10/2024 9.8 8.6 - 10.5 mg/dL Final      Magnesium  AST  ALT  Bilirubin, Total  AlkPhos  Albumin    Amylase  Lipase    Radiology: Magnesium   Date Value Ref Range Status   10/11/2024 2.3 1.6 - 2.4 mg/dL Final     No components found for: \"AST.*\"  No components found for: \"ALT.*\"  No results found for: \"BILIRUBIN\"    No components found for: \"ALKPHOS.*\"  No components found for: \"ALBUMIN.*\"      No components found for: \"AMYLASE.*\"  No components found for: \"LIPASE.*\"            Imaging Results (Most Recent)       Procedure Component Value Units Date/Time    XR Abdomen KUB [[id number]] Collected: 10/13/24 0939     Updated: 10/13/24 0945    Narrative:      XR ABDOMEN KUB    Date of Exam: 10/13/2024 9:07 AM EDT    Indication: sbo    Comparison: 10/12/2024    Findings:  Nasogastric tube and sideport are in the stomach. Prior posterior fusion in the lower lumbar spine. The lung bases are grossly clear. No definite pneumoperitoneum. Stable small bowel dilatation      Impression:      Impression:  Stable small bowel dilatation suspicious for small bowel obstruction.      Electronically Signed: Donell Menchaca MD    " 10/13/2024 9:42 AM EDT    Workstation ID: [id number]    XR Abdomen KUB [[id number]] Collected: 10/12/24 1103     Updated: 10/12/24 1110    Narrative:      XR ABDOMEN KUB    Date of Exam: 10/12/2024 10:46 AM EDT    Indication: sbo    Comparison: Abdominal radiograph 10/12/2024    Findings:  Antegrade oriented gastric tube terminates over mid stomach. Partial decompression of the stomach since prior comparison. Few mildly dilated loops of small bowel in the mid abdomen. This appears similar to prior. Previously administered positive enteric   contrast is scattered throughout the colon intermixed with moderate to large volume formed colonic stool. Limited assessment of renal stones. Pelvic phleboliths. Multilevel lumbar fusion hardware noted.      Impression:      Impression:  Similar mildly dilated loops of small bowel with intermixed positive enteric contrast throughout the colon. Findings likely reflect resolving small bowel obstruction.    Moderate to large volume colonic stool.      Electronically Signed: Melvin Coles MD    10/12/2024 11:07 AM EDT    Workstation ID: [id number]    XR Abdomen KUB [[id number]] Collected: 10/12/24 1011     Updated: 10/12/24 1018    Narrative:      XR ABDOMEN KUB    Date of Exam: 10/12/2024 6:27 AM EDT    Indication: sbo    Comparison: Small bowel follow-through exam 10/11/2024, CT abdomen and pelvis 10/10/2024    Findings:  Esophagogastric tube tip overlies the proximal body of the stomach. Contrast administered during the prior small bowel follow-through has progressed distally and is now located in the colon at the hepatic flexure. There is decreased distention of small   bowel loops in the left mid abdomen most consistent with resolving small bowel obstruction. Lumbar fixation hardware again noted. No gross pneumoperitoneum.      Impression:      Impression:  1. Esophagogastric tube tip overlies the proximal body of the stomach.  2. Contrast administered during the prior small bowel  follow-through has progressed distally and is now located in the colon at the hepatic flexure.  3. Decreased distention of small bowel loops in the left mid abdomen suggesting resolving small bowel obstruction.        Electronically Signed: Franky Chang MD    10/12/2024 10:15 AM EDT    Workstation ID: [id number]    FL Small Bowel Follow Through Single-Contrast [[id number]] Collected: 10/11/24 1740     Updated: 10/11/24 1751    Narrative:      FL SMALL BOWEL FOLLOW THROUGH SINGLE-CONTRAST    Date of Exam: 10/11/2024 1:39 PM EDT    Indication: sbo.    Comparison: CT scan of the abdomen and pelvis 10/10/2024    Findings:  Images at 4 hours demonstrate NG tube in the stomach, the tip is 10 cm beyond the GE junction. Enteric contrast is seen in the stomach, which is moderately dilated.    Images of the abdomen at 8 hours demonstrate no change in the appearance of the stomach. Faint enteric contrast material is seen in small bowel loops, which are abnormally dilated. Nondilated loops of small bowel are clearly evident on CT scan. A   transition point is not identified on this study. No contrast is seen in the colon.      Impression:      Impression:  Small bowel follow-through demonstrating findings concerning for small bowel obstruction.      Electronically Signed: Don Leroy MD    10/11/2024 5:48 PM EDT    Workstation ID: [id number]    XR Abdomen 2+ VW with Chest 1 VW [[id number]] Collected: 10/11/24 0832     Updated: 10/11/24 0839    Narrative:      XR ABDOMEN 2+ VIEWS W CHEST 1 VW    Date of Exam: 10/11/2024 8:27 AM EDT    Indication: sbo    Comparison: 10/10/2024 CT abdomen/pelvis    Findings:  Nasogastric tube distal tip and sideport overlie the stomach. There is no acute cardiopulmonary abnormality.    There are multiple dilated loops of small bowel with gas fluid levels consistent with small bowel obstruction. Surgical clips overlie the left hemiabdomen. Lumbar fusion hardware without evidence of complication. No  acute bone abnormality.      Impression:      Impression:  Multiple dilated loops of small bowel with gas fluid levels consistent with small bowel obstruction.    No acute cardiopulmonary abnormality.      Electronically Signed: Rober Tomlinson MD    10/11/2024 8:36 AM EDT    Workstation ID: [id number]    XR Abdomen KUB [[id number]] Collected: 10/11/24 0823     Updated: 10/11/24 0827    Narrative:      XR ABDOMEN KUB    Date of Exam: 10/11/2024 6:10 AM EDT    Indication: after insertion of ng tube    Comparison: 10/10/2024 CT    Findings:  Nasogastric tube distal tip and sideport overlie the stomach. There are multiple dilated loops of small bowel in keeping with evidence of small bowel obstruction. Surgical clips overlie the left hemiabdomen. Partially visualized lumbar fusion hardware.   Lung bases are unremarkable. No acute osseous abnormality.      Impression:      Impression:  Nasogastric tube tip and sideport overlie the stomach.      Electronically Signed: Rober Tomlinson MD    10/11/2024 8:24 AM EDT    Workstation ID: [id number]    CT Abdomen Pelvis With Contrast [[id number]] Collected: 10/10/24 2203     Updated: 10/10/24 2219    Narrative:      CT ABDOMEN PELVIS W CONTRAST    Date of Exam: 10/10/2024 9:37 PM EDT    Indication: abdominal pain, hx ruptured diverticulitis.    Comparison: CT abdomen pelvis 2/16/2023    Technique: Axial CT images were obtained of the abdomen and pelvis following the uneventful intravenous administration of iodinated contrast. Sagittal and coronal reconstructions were performed.  Automated exposure control and iterative reconstruction   methods were used.        Findings:  Lung Bases: Bibasal atelectasis. Coronary artery calcifications.    Liver: Hepatic steatosis.     Gallbladder and biliary tree: No significant abnormality.     Spleen: No significant abnormality.     Pancreas: No significant abnormality.     Adrenal glands: No significant abnormality.     Kidneys and ureters: Right renal  cyst. No hydroureteronephrosis.     Stomach and duodenum:No significant abnormality.    Small and large bowel: Postoperative changes of prior sigmoid resection. Normal-appearing appendix. There are dilated loops of small bowel in the mid abdomen with feculent material.    Peritoneal cavity: No free fluid or free air.    Bladder: No significant abnormality.     Pelvic organs: No significant abnormality.     Vasculature: Atherosclerotic calcifications.     Lymph nodes: No pathologic appearing lymph nodes by imaging criteria.     Bones and soft tissues: Degenerative changes of the imaged spine. No acute osseous abnormality. Postsurgical changes of posterior decompression and posterior instrumented fixation of the lumbar spine.      Impression:      Impression:  Dilated loops of small bowel in the mid abdomen with feculent material, suggestive of partial small bowel obstruction.            Electronically Signed: Chester Cagle    10/10/2024 10:16 PM EDT    Workstation ID: [id number]                      Assessment & Plan     Active Hospital Problems    Diagnosis     **Partial small bowel obstruction         I reviewed his KUB from today, he definitely has gastroparesis either from the Trulicity or diabetes or both.  I will remove his ng tube and start clears and see how he does.      Kiana Mireles DO  10/13/24  13:15 EDT

## 2024-10-13 NOTE — SIGNIFICANT NOTE
10/13/24 1043   OTHER   Discipline physical therapist   Rehab Time/Intention   Session Not Performed patient/family declined evaluation  (Spoke to pt who reports he is getting up and walking to bathroom independently and walking in hallways independently with no issues reported. Pt reports no need for PT at this time.)   Therapy Assessment/Plan (PT)   Criteria for Skilled Interventions Met (PT) no;no problems identified which require skilled intervention  (No skilled PT needs at this time. Pt is independent with all functional mobility and gait and reports no concerns with going home. He lives in a 1 story home with no stairs. No PT evaluation needed at this time.)

## 2024-10-13 NOTE — PROGRESS NOTES
"Hospitalist Team      Patient Care Team:  Monica Cates MD as PCP - General (Internal Medicine)  Barry Jacobson DO (Orthopedic Surgery)  Kathy Aguilar APRN (Endocrinology)      Chief Complaint:  Follow-up PSBO    Subjective    Mr. Greene is doing well this morning.  He is moving his bowels.  He denies chest pain and dyspnea.  He is getting up to the bathroom without issue.    Objective    Vital Signs  Temp:  [97.6 °F (36.4 °C)-100.2 °F (37.9 °C)] 97.8 °F (36.6 °C)  Heart Rate:  [] 85  Resp:  [16-18] 18  BP: (135-195)/(59-97) 150/80  Oxygen Therapy  SpO2: 99 %  Pulse Oximetry Type: Continuous  Device (Oxygen Therapy): nasal cannula  Flow (L/min) (Oxygen Therapy): 2}    Flowsheet Rows      Flowsheet Row First Filed Value   Admission Height 165.1 cm (65\") Documented at 10/10/2024 2032   Admission Weight 108 kg (237 lb) Documented at 10/10/2024 2032          Physical Exam:    General: Appears stated age in no acute distress.  Lungs: Breath sounds are clear throughout all fields.  Respirations are nonlabored.  CV: Heart sounds are distant but regular in rate and rhythm.  I appreciate no murmurs.  Radial pulses are 2+ and symmetric.  Abdomen: Morbidly obese, but soft and nontender.  Bowel sounds are active.  MSK: No clubbing, cyanosis, or edema.  Neuro: Cranial nerves II through XII are grossly intact.  Psych: Pleasant affect.  Oriented x 3.    Results Review:     I reviewed the patient's new clinical results.    Lab Results (last 24 hours)       Procedure Component Value Units Date/Time    Renal Function Panel [[id number]]  (Abnormal) Collected: 10/13/24 0607    Specimen: Blood Updated: 10/13/24 0651     Glucose 112 mg/dL      BUN 21 mg/dL      Creatinine 0.75 mg/dL      Sodium 140 mmol/L      Potassium 4.1 mmol/L      Comment: Specimen hemolyzed.  Result may be falsely elevated.        Chloride 101 mmol/L      CO2 21.9 mmol/L      Calcium 8.5 mg/dL      Albumin 3.8 g/dL      Phosphorus 2.6 mg/dL      " Anion Gap 17.1 mmol/L      BUN/Creatinine Ratio 28.0     eGFR 102.0 mL/min/1.73     Narrative:      GFR Normal >60  Chronic Kidney Disease <60  Kidney Failure <15      CBC & Differential [[id number]]  (Abnormal) Collected: 10/13/24 0607    Specimen: Blood Updated: 10/13/24 0637    Narrative:      The following orders were created for panel order CBC & Differential.  Procedure                               Abnormality         Status                     ---------                               -----------         ------                     CBC Auto Differential[[id number]]        Abnormal            Final result                 Please view results for these tests on the individual orders.    CBC Auto Differential [[id number]]  (Abnormal) Collected: 10/13/24 0607    Specimen: Blood Updated: 10/13/24 0637     WBC 11.98 10*3/mm3      RBC 4.89 10*6/mm3      Hemoglobin 14.0 g/dL      Hematocrit 45.5 %      MCV 93.0 fL      MCH 28.6 pg      MCHC 30.8 g/dL      RDW 14.2 %      RDW-SD 48.2 fl      MPV 10.7 fL      Platelets 349 10*3/mm3      Neutrophil % 70.1 %      Lymphocyte % 19.6 %      Monocyte % 9.1 %      Eosinophil % 0.5 %      Basophil % 0.4 %      Immature Grans % 0.3 %      Neutrophils, Absolute 8.39 10*3/mm3      Lymphocytes, Absolute 2.35 10*3/mm3      Monocytes, Absolute 1.09 10*3/mm3      Eosinophils, Absolute 0.06 10*3/mm3      Basophils, Absolute 0.05 10*3/mm3      Immature Grans, Absolute 0.04 10*3/mm3      nRBC 0.0 /100 WBC     POC Glucose Once [[id number]]  (Normal) Collected: 10/13/24 0606    Specimen: Blood Updated: 10/13/24 0612     Glucose 100 mg/dL     POC Glucose Once [[id number]]  (Normal) Collected: 10/13/24 0047    Specimen: Blood Updated: 10/13/24 0053     Glucose 121 mg/dL     POC Glucose Once [[id number]]  (Normal) Collected: 10/12/24 1707    Specimen: Blood Updated: 10/12/24 1714     Glucose 130 mg/dL     POC Glucose Once [[id number]]  (Abnormal) Collected: 10/12/24 1159    Specimen: Blood Updated:  10/12/24 1206     Glucose 146 mg/dL             Imaging Results (Last 24 Hours)       Procedure Component Value Units Date/Time    XR Abdomen KUB [[id number]] Resulted: 10/13/24 0907     Updated: 10/13/24 0924    XR Abdomen KUB [[id number]] Collected: 10/12/24 1103     Updated: 10/12/24 1110    Narrative:      XR ABDOMEN KUB    Date of Exam: 10/12/2024 10:46 AM EDT    Indication: sbo    Comparison: Abdominal radiograph 10/12/2024    Findings:  Antegrade oriented gastric tube terminates over mid stomach. Partial decompression of the stomach since prior comparison. Few mildly dilated loops of small bowel in the mid abdomen. This appears similar to prior. Previously administered positive enteric   contrast is scattered throughout the colon intermixed with moderate to large volume formed colonic stool. Limited assessment of renal stones. Pelvic phleboliths. Multilevel lumbar fusion hardware noted.      Impression:      Impression:  Similar mildly dilated loops of small bowel with intermixed positive enteric contrast throughout the colon. Findings likely reflect resolving small bowel obstruction.    Moderate to large volume colonic stool.      Electronically Signed: Melvin Coles MD    10/12/2024 11:07 AM EDT    Workstation ID: [id number]    XR Abdomen KUB [[id number]] Collected: 10/12/24 1011     Updated: 10/12/24 1018    Narrative:      XR ABDOMEN KUB    Date of Exam: 10/12/2024 6:27 AM EDT    Indication: sbo    Comparison: Small bowel follow-through exam 10/11/2024, CT abdomen and pelvis 10/10/2024    Findings:  Esophagogastric tube tip overlies the proximal body of the stomach. Contrast administered during the prior small bowel follow-through has progressed distally and is now located in the colon at the hepatic flexure. There is decreased distention of small   bowel loops in the left mid abdomen most consistent with resolving small bowel obstruction. Lumbar fixation hardware again noted. No gross pneumoperitoneum.       Impression:      Impression:  1. Esophagogastric tube tip overlies the proximal body of the stomach.  2. Contrast administered during the prior small bowel follow-through has progressed distally and is now located in the colon at the hepatic flexure.  3. Decreased distention of small bowel loops in the left mid abdomen suggesting resolving small bowel obstruction.        Electronically Signed: Franky Chang MD    10/12/2024 10:15 AM EDT    Workstation ID: [id number]            Medication Review:   I have reviewed the patient's current medication list    Current Facility-Administered Medications:     bisacodyl (DULCOLAX) suppository 10 mg, 10 mg, Rectal, Daily, Justin Jerome MD, 10 mg at 10/12/24 1407    Calcium Replacement - Follow Nurse / BPA Driven Protocol, , Does not apply, PRN, Jhonny Paul DO    dextrose (D50W) (25 g/50 mL) IV injection 25 g, 25 g, Intravenous, Q15 Min PRN, Jhonny Paul DO    dextrose (GLUTOSE) oral gel 15 g, 15 g, Oral, Q15 Min PRN, Jhonny Paul DO    glucagon (GLUCAGEN) injection 1 mg, 1 mg, Intramuscular, Q15 Min PRN, Jhonny Paul DO    hydrALAZINE (APRESOLINE) injection 20 mg, 20 mg, Intravenous, Q4H PRN, Justin Jerome MD, 20 mg at 10/12/24 1454    HYDROmorphone (DILAUDID) injection 0.5 mg, 0.5 mg, Intravenous, Q2H PRN, Justin Jerome MD, 0.5 mg at 10/12/24 0901    Insulin Lispro (humaLOG) injection 2-9 Units, 2-9 Units, Subcutaneous, Q6H, Jhonny Paul DO, 2 Units at 10/12/24 0606    Magnesium Standard Dose Replacement - Follow Nurse / BPA Driven Protocol, , Does not apply, PRN, Jhonny Paul DO    [DISCONTINUED] morphine injection 4 mg, 4 mg, Intravenous, Q3H PRN, 4 mg at 10/11/24 0309 **AND** naloxone (NARCAN) injection 0.4 mg, 0.4 mg, Intravenous, Q5 Min PRN, Jhonny Paul DO    ondansetron (ZOFRAN) injection 4 mg, 4 mg, Intravenous, Q4H PRN, Jhonny Paul DO, 4 mg at 10/11/24 2050    Phosphorus Replacement - Follow Nurse / BPA Driven Protocol, ,  Does not apply, Humberto CAICEDO Paul A,     Potassium Replacement - Follow Nurse / BPA Driven Protocol, , Does not apply, Humberto CAICEDO Paul A,     sodium chloride 0.9 % flush 10 mL, 10 mL, Intravenous, PRN, Deonte De Santiago MD    sodium chloride 0.9 % flush 10 mL, 10 mL, Intravenous, Q12H, Jhonny Paul, DO, 10 mL at 10/12/24 0901    sodium chloride 0.9 % flush 10 mL, 10 mL, Intravenous, PRN, Jhonny Paul,     sodium chloride 0.9 % infusion 40 mL, 40 mL, Intravenous, PRN, Jhonny Paul DO    sodium chloride 0.9 % with KCl 20 mEq/L infusion, 100 mL/hr, Intravenous, Continuous, Justin Jerome MD, Last Rate: 100 mL/hr at 10/13/24 0113, 100 mL/hr at 10/13/24 0113      Assessment & Plan     Partial small bowel obstruction: Resolving.  Will defer removal NG tube to Dr. Mireles.  He has endocrinology follow-up in November at which time he can consider other agents.  Trulicity was stopped.  Essential hypertension: Blood pressure near goal on exam.  Trend still good.  Will resume home medications if NG tube is removed.  Diabetes mellitus type 2: A.m. reading at goal.  Continue current regimen.  Honestly, he has not required his basal insulin as his glucose trend has been at goal on sliding scale.  However, if diet starts we will certainly add back basal insulin.  Anxiety/depression: No acute issues.  Resume home regimen when able.    Plan for disposition: Anticipate home in the next 1 to 2 days since not planning on going to the OR.    Justin Jerome MD  10/13/24  09:30 EDT

## 2024-10-14 ENCOUNTER — READMISSION MANAGEMENT (OUTPATIENT)
Dept: CALL CENTER | Facility: HOSPITAL | Age: 64
End: 2024-10-14
Payer: MEDICARE

## 2024-10-14 VITALS
TEMPERATURE: 98.7 F | SYSTOLIC BLOOD PRESSURE: 157 MMHG | BODY MASS INDEX: 39.49 KG/M2 | RESPIRATION RATE: 16 BRPM | HEART RATE: 84 BPM | WEIGHT: 237 LBS | HEIGHT: 65 IN | DIASTOLIC BLOOD PRESSURE: 79 MMHG | OXYGEN SATURATION: 96 %

## 2024-10-14 PROBLEM — K56.600 PARTIAL SMALL BOWEL OBSTRUCTION: Status: RESOLVED | Noted: 2018-11-15 | Resolved: 2024-10-14

## 2024-10-14 LAB
ALBUMIN SERPL-MCNC: 3.9 G/DL (ref 3.5–5.2)
ANION GAP SERPL CALCULATED.3IONS-SCNC: 11.4 MMOL/L (ref 5–15)
BASOPHILS # BLD AUTO: 0.05 10*3/MM3 (ref 0–0.2)
BASOPHILS NFR BLD AUTO: 0.5 % (ref 0–1.5)
BUN SERPL-MCNC: 13 MG/DL (ref 8–23)
BUN/CREAT SERPL: 18.8 (ref 7–25)
CALCIUM SPEC-SCNC: 8.8 MG/DL (ref 8.6–10.5)
CHLORIDE SERPL-SCNC: 101 MMOL/L (ref 98–107)
CO2 SERPL-SCNC: 23.6 MMOL/L (ref 22–29)
CREAT SERPL-MCNC: 0.69 MG/DL (ref 0.76–1.27)
DEPRECATED RDW RBC AUTO: 45.9 FL (ref 37–54)
EGFRCR SERPLBLD CKD-EPI 2021: 104.6 ML/MIN/1.73
EOSINOPHIL # BLD AUTO: 0.23 10*3/MM3 (ref 0–0.4)
EOSINOPHIL NFR BLD AUTO: 2.4 % (ref 0.3–6.2)
ERYTHROCYTE [DISTWIDTH] IN BLOOD BY AUTOMATED COUNT: 13.6 % (ref 12.3–15.4)
GLUCOSE BLDC GLUCOMTR-MCNC: 124 MG/DL (ref 70–130)
GLUCOSE BLDC GLUCOMTR-MCNC: 136 MG/DL (ref 70–130)
GLUCOSE SERPL-MCNC: 166 MG/DL (ref 65–99)
HCT VFR BLD AUTO: 42.2 % (ref 37.5–51)
HGB BLD-MCNC: 13.2 G/DL (ref 13–17.7)
IMM GRANULOCYTES # BLD AUTO: 0.02 10*3/MM3 (ref 0–0.05)
IMM GRANULOCYTES NFR BLD AUTO: 0.2 % (ref 0–0.5)
LYMPHOCYTES # BLD AUTO: 2.47 10*3/MM3 (ref 0.7–3.1)
LYMPHOCYTES NFR BLD AUTO: 25.5 % (ref 19.6–45.3)
MCH RBC QN AUTO: 28.6 PG (ref 26.6–33)
MCHC RBC AUTO-ENTMCNC: 31.3 G/DL (ref 31.5–35.7)
MCV RBC AUTO: 91.3 FL (ref 79–97)
MONOCYTES # BLD AUTO: 0.96 10*3/MM3 (ref 0.1–0.9)
MONOCYTES NFR BLD AUTO: 9.9 % (ref 5–12)
NEUTROPHILS NFR BLD AUTO: 5.96 10*3/MM3 (ref 1.7–7)
NEUTROPHILS NFR BLD AUTO: 61.5 % (ref 42.7–76)
NRBC BLD AUTO-RTO: 0 /100 WBC (ref 0–0.2)
PHOSPHATE SERPL-MCNC: 2.5 MG/DL (ref 2.5–4.5)
PLATELET # BLD AUTO: 294 10*3/MM3 (ref 140–450)
PMV BLD AUTO: 10.5 FL (ref 6–12)
POTASSIUM SERPL-SCNC: 3.8 MMOL/L (ref 3.5–5.2)
RBC # BLD AUTO: 4.62 10*6/MM3 (ref 4.14–5.8)
SODIUM SERPL-SCNC: 136 MMOL/L (ref 136–145)
WBC NRBC COR # BLD AUTO: 9.69 10*3/MM3 (ref 3.4–10.8)

## 2024-10-14 PROCEDURE — 99239 HOSP IP/OBS DSCHRG MGMT >30: CPT | Performed by: INTERNAL MEDICINE

## 2024-10-14 PROCEDURE — 99231 SBSQ HOSP IP/OBS SF/LOW 25: CPT | Performed by: SURGERY

## 2024-10-14 PROCEDURE — 85025 COMPLETE CBC W/AUTO DIFF WBC: CPT | Performed by: SURGERY

## 2024-10-14 PROCEDURE — 82948 REAGENT STRIP/BLOOD GLUCOSE: CPT

## 2024-10-14 PROCEDURE — 80069 RENAL FUNCTION PANEL: CPT | Performed by: SURGERY

## 2024-10-14 RX ADMIN — Medication 10 ML: at 09:05

## 2024-10-14 NOTE — PROGRESS NOTES
"        Surgery Progress Note   Chief Complaint: Small bowel obstruction    Subjective     Interval History:     Dipak looks great today.  He has tolerated his clear liquid diet without nausea and is moving his bowels well.  His pain has resolved.      Objective     Vital Signs  Temp:  [98 °F (36.7 °C)-99.4 °F (37.4 °C)] 98.7 °F (37.1 °C)  Heart Rate:  [67-93] 68  Resp:  [16-18] 16  BP: (128-162)/(64-88) 131/75  Body mass index is 39.44 kg/m².    Intake/Output Summary (Last 24 hours) at 10/14/2024 1038  Last data filed at 10/14/2024 0948  Gross per 24 hour   Intake 2280 ml   Output --   Net 2280 ml     I/O this shift:  In: 240 [P.O.:240]  Out: -        Physical Exam:   General: patient awake, alert and cooperative   Abdomen: Soft, no tenderness, excellent bowel sounds   Extremities: no rash or edema   Neurologic: Normal mood and behavior     Results Review:     I reviewed the patient's new clinical results.      WBC No results found for: \"WBCS\"   HGB Hemoglobin   Date Value Ref Range Status   10/14/2024 13.2 13.0 - 17.7 g/dL Final   10/13/2024 14.0 13.0 - 17.7 g/dL Final   10/12/2024 16.1 13.0 - 17.7 g/dL Final      HCT Hematocrit   Date Value Ref Range Status   10/14/2024 42.2 37.5 - 51.0 % Final   10/13/2024 45.5 37.5 - 51.0 % Final   10/12/2024 51.9 (H) 37.5 - 51.0 % Final      Platlets No results found for: \"LABPLAT\"     PT/INR:  No results found for: \"PROTIME\"/No results found for: \"INR\"    Sodium Sodium   Date Value Ref Range Status   10/14/2024 136 136 - 145 mmol/L Final   10/13/2024 140 136 - 145 mmol/L Final   10/12/2024 145 136 - 145 mmol/L Final      Potassium Potassium   Date Value Ref Range Status   10/14/2024 3.8 3.5 - 5.2 mmol/L Final   10/13/2024 4.1 3.5 - 5.2 mmol/L Final     Comment:     Specimen hemolyzed.  Result may be falsely elevated.   10/12/2024 4.8 3.5 - 5.2 mmol/L Final      Chloride Chloride   Date Value Ref Range Status   10/14/2024 101 98 - 107 mmol/L Final   10/13/2024 101 98 - 107 " "mmol/L Final   10/12/2024 104 98 - 107 mmol/L Final      Bicarbonate No results found for: \"PLASMABICARB\"   BUN BUN   Date Value Ref Range Status   10/14/2024 13 8 - 23 mg/dL Final   10/13/2024 21 8 - 23 mg/dL Final   10/12/2024 28 (H) 8 - 23 mg/dL Final      Creatinine Creatinine   Date Value Ref Range Status   10/14/2024 0.69 (L) 0.76 - 1.27 mg/dL Final   10/13/2024 0.75 (L) 0.76 - 1.27 mg/dL Final   10/12/2024 1.01 0.76 - 1.27 mg/dL Final      Calcium Calcium   Date Value Ref Range Status   10/14/2024 8.8 8.6 - 10.5 mg/dL Final   10/13/2024 8.5 (L) 8.6 - 10.5 mg/dL Final   10/12/2024 10.0 8.6 - 10.5 mg/dL Final      Magnesium  AST  ALT  Bilirubin, Total  AlkPhos  Albumin    Amylase  Lipase    Radiology: No results found for: \"MG\"  No components found for: \"AST.*\"  No components found for: \"ALT.*\"  No results found for: \"BILIRUBIN\"    No components found for: \"ALKPHOS.*\"  No components found for: \"ALBUMIN.*\"      No components found for: \"AMYLASE.*\"  No components found for: \"LIPASE.*\"            Imaging Results (Most Recent)       Procedure Component Value Units Date/Time    XR Abdomen KUB [[id number]] Collected: 10/13/24 0939     Updated: 10/13/24 0945    Narrative:      XR ABDOMEN KUB    Date of Exam: 10/13/2024 9:07 AM EDT    Indication: sbo    Comparison: 10/12/2024    Findings:  Nasogastric tube and sideport are in the stomach. Prior posterior fusion in the lower lumbar spine. The lung bases are grossly clear. No definite pneumoperitoneum. Stable small bowel dilatation      Impression:      Impression:  Stable small bowel dilatation suspicious for small bowel obstruction.      Electronically Signed: Donell Menchaca MD    10/13/2024 9:42 AM EDT    Workstation ID: [id number]    XR Abdomen KUB [[id number]] Collected: 10/12/24 1103     Updated: 10/12/24 1110    Narrative:      XR ABDOMEN KUB    Date of Exam: 10/12/2024 10:46 AM EDT    Indication: sbo    Comparison: Abdominal radiograph " 10/12/2024    Findings:  Antegrade oriented gastric tube terminates over mid stomach. Partial decompression of the stomach since prior comparison. Few mildly dilated loops of small bowel in the mid abdomen. This appears similar to prior. Previously administered positive enteric   contrast is scattered throughout the colon intermixed with moderate to large volume formed colonic stool. Limited assessment of renal stones. Pelvic phleboliths. Multilevel lumbar fusion hardware noted.      Impression:      Impression:  Similar mildly dilated loops of small bowel with intermixed positive enteric contrast throughout the colon. Findings likely reflect resolving small bowel obstruction.    Moderate to large volume colonic stool.      Electronically Signed: Melvin Coles MD    10/12/2024 11:07 AM EDT    Workstation ID: [id number]    XR Abdomen KUB [[id number]] Collected: 10/12/24 1011     Updated: 10/12/24 1018    Narrative:      XR ABDOMEN KUB    Date of Exam: 10/12/2024 6:27 AM EDT    Indication: sbo    Comparison: Small bowel follow-through exam 10/11/2024, CT abdomen and pelvis 10/10/2024    Findings:  Esophagogastric tube tip overlies the proximal body of the stomach. Contrast administered during the prior small bowel follow-through has progressed distally and is now located in the colon at the hepatic flexure. There is decreased distention of small   bowel loops in the left mid abdomen most consistent with resolving small bowel obstruction. Lumbar fixation hardware again noted. No gross pneumoperitoneum.      Impression:      Impression:  1. Esophagogastric tube tip overlies the proximal body of the stomach.  2. Contrast administered during the prior small bowel follow-through has progressed distally and is now located in the colon at the hepatic flexure.  3. Decreased distention of small bowel loops in the left mid abdomen suggesting resolving small bowel obstruction.        Electronically Signed: Franky Chang MD     10/12/2024 10:15 AM EDT    Workstation ID: [id number]    FL Small Bowel Follow Through Single-Contrast [[id number]] Collected: 10/11/24 1740     Updated: 10/11/24 1751    Narrative:      FL SMALL BOWEL FOLLOW THROUGH SINGLE-CONTRAST    Date of Exam: 10/11/2024 1:39 PM EDT    Indication: sbo.    Comparison: CT scan of the abdomen and pelvis 10/10/2024    Findings:  Images at 4 hours demonstrate NG tube in the stomach, the tip is 10 cm beyond the GE junction. Enteric contrast is seen in the stomach, which is moderately dilated.    Images of the abdomen at 8 hours demonstrate no change in the appearance of the stomach. Faint enteric contrast material is seen in small bowel loops, which are abnormally dilated. Nondilated loops of small bowel are clearly evident on CT scan. A   transition point is not identified on this study. No contrast is seen in the colon.      Impression:      Impression:  Small bowel follow-through demonstrating findings concerning for small bowel obstruction.      Electronically Signed: Don Leroy MD    10/11/2024 5:48 PM EDT    Workstation ID: [id number]    XR Abdomen 2+ VW with Chest 1 VW [[id number]] Collected: 10/11/24 0832     Updated: 10/11/24 0839    Narrative:      XR ABDOMEN 2+ VIEWS W CHEST 1 VW    Date of Exam: 10/11/2024 8:27 AM EDT    Indication: sbo    Comparison: 10/10/2024 CT abdomen/pelvis    Findings:  Nasogastric tube distal tip and sideport overlie the stomach. There is no acute cardiopulmonary abnormality.    There are multiple dilated loops of small bowel with gas fluid levels consistent with small bowel obstruction. Surgical clips overlie the left hemiabdomen. Lumbar fusion hardware without evidence of complication. No acute bone abnormality.      Impression:      Impression:  Multiple dilated loops of small bowel with gas fluid levels consistent with small bowel obstruction.    No acute cardiopulmonary abnormality.      Electronically Signed: Rober Tomlinson MD    10/11/2024  8:36 AM EDT    Workstation ID: [id number]    XR Abdomen KUB [[id number]] Collected: 10/11/24 0823     Updated: 10/11/24 0827    Narrative:      XR ABDOMEN KUB    Date of Exam: 10/11/2024 6:10 AM EDT    Indication: after insertion of ng tube    Comparison: 10/10/2024 CT    Findings:  Nasogastric tube distal tip and sideport overlie the stomach. There are multiple dilated loops of small bowel in keeping with evidence of small bowel obstruction. Surgical clips overlie the left hemiabdomen. Partially visualized lumbar fusion hardware.   Lung bases are unremarkable. No acute osseous abnormality.      Impression:      Impression:  Nasogastric tube tip and sideport overlie the stomach.      Electronically Signed: Rober Tomlinson MD    10/11/2024 8:24 AM EDT    Workstation ID: [id number]    CT Abdomen Pelvis With Contrast [[id number]] Collected: 10/10/24 2203     Updated: 10/10/24 2219    Narrative:      CT ABDOMEN PELVIS W CONTRAST    Date of Exam: 10/10/2024 9:37 PM EDT    Indication: abdominal pain, hx ruptured diverticulitis.    Comparison: CT abdomen pelvis 2/16/2023    Technique: Axial CT images were obtained of the abdomen and pelvis following the uneventful intravenous administration of iodinated contrast. Sagittal and coronal reconstructions were performed.  Automated exposure control and iterative reconstruction   methods were used.        Findings:  Lung Bases: Bibasal atelectasis. Coronary artery calcifications.    Liver: Hepatic steatosis.     Gallbladder and biliary tree: No significant abnormality.     Spleen: No significant abnormality.     Pancreas: No significant abnormality.     Adrenal glands: No significant abnormality.     Kidneys and ureters: Right renal cyst. No hydroureteronephrosis.     Stomach and duodenum:No significant abnormality.    Small and large bowel: Postoperative changes of prior sigmoid resection. Normal-appearing appendix. There are dilated loops of small bowel in the mid abdomen with feculent  material.    Peritoneal cavity: No free fluid or free air.    Bladder: No significant abnormality.     Pelvic organs: No significant abnormality.     Vasculature: Atherosclerotic calcifications.     Lymph nodes: No pathologic appearing lymph nodes by imaging criteria.     Bones and soft tissues: Degenerative changes of the imaged spine. No acute osseous abnormality. Postsurgical changes of posterior decompression and posterior instrumented fixation of the lumbar spine.      Impression:      Impression:  Dilated loops of small bowel in the mid abdomen with feculent material, suggestive of partial small bowel obstruction.            Electronically Signed: Chester Cagle    10/10/2024 10:16 PM EDT    Workstation ID: [id number]                      Assessment & Plan     Active Hospital Problems    Diagnosis     **Partial small bowel obstruction         I will advance his diet.  If he tolerates his diet then he may go home from my standpoint.      Kiana Mireles DO  10/14/24  10:38 EDT

## 2024-10-14 NOTE — PLAN OF CARE
Goal Outcome Evaluation:  Plan of Care Reviewed With: patient        Progress: improving  Outcome Evaluation: VS stable, blood sugar with normal range. Tolerates clears without n/v. Ambulates to the bathroom. NO complaints made.

## 2024-10-14 NOTE — DISCHARGE SUMMARY
Date of Admission: 10/10/2024    Date of Discharge:  10/14/2024    Length of stay:  LOS: 3 days     Admission Diagnosis:   Partial small bowel obstruction [K56.600]      Discharge Diagnosis:   Partial small bowel obstruction- resolved  - NG tube has been removed and patient is tolerating diet without issue and having bowel movements.  - Dr. Mireles recommends to stop Trulicity for now due to gastroparesis   - He has endocrinology follow-up in November at which time he can consider other agents.      Essential hypertension  - resume home meds    Diabetes mellitus type 2  - Trulicity has been discontinued as above  - patient has only required 6 units of insulin over the past 4 days, I questioned the patient about his use of 70 units of insulin daily and he states that he takes this daily and does not have low blood sugars. He will hold today's dose of long acting insulin and can resume tomorrow. I have instructed him to check his blood sugars 3 times per day for now and to call his endocrinologist office if he has any low blood sugars  - will continue his metformin and jardiance     Anxiety/depression: No acute issues.  Resume home regimen         Active Hospital Problems:    Active Hospital Problems   No active problems to display.      Resolved Hospital Problems    Diagnosis Date Resolved POA    **Partial small bowel obstruction [K56.600] 10/14/2024 Yes       Hospital Course:  Lloyd Greene is a 64 y.o. male with hx of COPD, DM II, hyperlipidemia, HTN, and ruptured diverticulitis s/p partial colon resection,  presents to [facility] ED for evaluation of abdominal pain.  It started abruptly around 2 PM day of admisison with intense, crampy pain, mid to lower abdomen.  He thought it might be another flair of diverticulitis.  The pain intensified over the next few hours, so he decided to come get evaluated.  He admits to nausea and dry heaves, is passing some gas, but denies diarrhea, melena, hematochezia,  fevers, or chills.  He feels like his abdomen is much more distended than usual.    Patient was found to have a partial small bowel obstruction. Surgery was consulted and patient had ngt placed. He had improvement in his symptoms. NG has been removed. He is tolerating a regular diet and has had multiple bowel movements. Dr. Mireles felt like the patient had some gastroparesis which could be related to his Trulicity and this has been held for now. Patient has only required a total of 6 units of insulin over the past 4 days. He has not been eating and drinking much, but is still a large decrease of his 70 units daily he normally takes. I have ask patient to monitor his blood sugars and call his endocrinologist with any low blood sugars. He has follow up appointment with them next month and can see how blood sugars are doing without Trulicity.   Patient will be discharged home in stable condition.       Procedures Performed:  none         Consults:   Consults       Date and Time Order Name Status Description    10/10/2024 10:58 PM Inpatient General Surgery Consult Completed             Vital Signs:  Temp:  [98.1 °F (36.7 °C)-99.4 °F (37.4 °C)] 98.9 °F (37.2 °C)  Heart Rate:  [67-93] 72  Resp:  [16] 16  BP: (128-169)/(64-78) 169/78  Body mass index is 39.44 kg/m².    Physical Exam:  Physical Exam  Vitals reviewed.   Constitutional:       General: He is not in acute distress.     Appearance: He is obese.   Cardiovascular:      Rate and Rhythm: Normal rate and regular rhythm.   Pulmonary:      Effort: Pulmonary effort is normal. No respiratory distress.   Abdominal:      General: Bowel sounds are normal.      Palpations: Abdomen is soft.      Tenderness: There is no abdominal tenderness.   Neurological:      Mental Status: He is alert.   Psychiatric:         Mood and Affect: Mood normal.         Behavior: Behavior normal.           Pertinent Test Results:   Lab Results (last 72 hours)       Procedure Component Value  Units Date/Time    POC Glucose Once [[id number]]  (Normal) Collected: 10/14/24 1215    Specimen: Blood Updated: 10/14/24 1222     Glucose 124 mg/dL     POC Glucose Once [[id number]]  (Abnormal) Collected: 10/14/24 0816    Specimen: Blood Updated: 10/14/24 0822     Glucose 136 mg/dL     Renal Function Panel [[id number]]  (Abnormal) Collected: 10/14/24 0354    Specimen: Blood Updated: 10/14/24 0442     Glucose 166 mg/dL      BUN 13 mg/dL      Creatinine 0.69 mg/dL      Sodium 136 mmol/L      Potassium 3.8 mmol/L      Chloride 101 mmol/L      CO2 23.6 mmol/L      Calcium 8.8 mg/dL      Albumin 3.9 g/dL      Phosphorus 2.5 mg/dL      Anion Gap 11.4 mmol/L      BUN/Creatinine Ratio 18.8     eGFR 104.6 mL/min/1.73     Narrative:      GFR Normal >60  Chronic Kidney Disease <60  Kidney Failure <15      CBC & Differential [[id number]]  (Abnormal) Collected: 10/14/24 0354    Specimen: Blood Updated: 10/14/24 0424    Narrative:      The following orders were created for panel order CBC & Differential.  Procedure                               Abnormality         Status                     ---------                               -----------         ------                     CBC Auto Differential[[id number]]        Abnormal            Final result                 Please view results for these tests on the individual orders.    CBC Auto Differential [[id number]]  (Abnormal) Collected: 10/14/24 0354    Specimen: Blood Updated: 10/14/24 0424     WBC 9.69 10*3/mm3      RBC 4.62 10*6/mm3      Hemoglobin 13.2 g/dL      Hematocrit 42.2 %      MCV 91.3 fL      MCH 28.6 pg      MCHC 31.3 g/dL      RDW 13.6 %      RDW-SD 45.9 fl      MPV 10.5 fL      Platelets 294 10*3/mm3      Neutrophil % 61.5 %      Lymphocyte % 25.5 %      Monocyte % 9.9 %      Eosinophil % 2.4 %      Basophil % 0.5 %      Immature Grans % 0.2 %      Neutrophils, Absolute 5.96 10*3/mm3      Lymphocytes, Absolute 2.47 10*3/mm3      Monocytes, Absolute 0.96 10*3/mm3       Eosinophils, Absolute 0.23 10*3/mm3      Basophils, Absolute 0.05 10*3/mm3      Immature Grans, Absolute 0.02 10*3/mm3      nRBC 0.0 /100 WBC     POC Glucose Once [[id number]]  (Normal) Collected: 10/13/24 2112    Specimen: Blood Updated: 10/13/24 2118     Glucose 116 mg/dL     POC Glucose Once [[id number]]  (Normal) Collected: 10/13/24 1659    Specimen: Blood Updated: 10/13/24 1706     Glucose 125 mg/dL     POC Glucose Once [[id number]]  (Normal) Collected: 10/13/24 1203    Specimen: Blood Updated: 10/13/24 1209     Glucose 100 mg/dL     Renal Function Panel [[id number]]  (Abnormal) Collected: 10/13/24 0607    Specimen: Blood Updated: 10/13/24 0651     Glucose 112 mg/dL      BUN 21 mg/dL      Creatinine 0.75 mg/dL      Sodium 140 mmol/L      Potassium 4.1 mmol/L      Comment: Specimen hemolyzed.  Result may be falsely elevated.        Chloride 101 mmol/L      CO2 21.9 mmol/L      Calcium 8.5 mg/dL      Albumin 3.8 g/dL      Phosphorus 2.6 mg/dL      Anion Gap 17.1 mmol/L      BUN/Creatinine Ratio 28.0     eGFR 102.0 mL/min/1.73     Narrative:      GFR Normal >60  Chronic Kidney Disease <60  Kidney Failure <15      CBC & Differential [[id number]]  (Abnormal) Collected: 10/13/24 0607    Specimen: Blood Updated: 10/13/24 0637    Narrative:      The following orders were created for panel order CBC & Differential.  Procedure                               Abnormality         Status                     ---------                               -----------         ------                     CBC Auto Differential[[id number]]        Abnormal            Final result                 Please view results for these tests on the individual orders.    CBC Auto Differential [[id number]]  (Abnormal) Collected: 10/13/24 0607    Specimen: Blood Updated: 10/13/24 0637     WBC 11.98 10*3/mm3      RBC 4.89 10*6/mm3      Hemoglobin 14.0 g/dL      Hematocrit 45.5 %      MCV 93.0 fL      MCH 28.6 pg      MCHC 30.8 g/dL      RDW 14.2 %      RDW-SD  48.2 fl      MPV 10.7 fL      Platelets 349 10*3/mm3      Neutrophil % 70.1 %      Lymphocyte % 19.6 %      Monocyte % 9.1 %      Eosinophil % 0.5 %      Basophil % 0.4 %      Immature Grans % 0.3 %      Neutrophils, Absolute 8.39 10*3/mm3      Lymphocytes, Absolute 2.35 10*3/mm3      Monocytes, Absolute 1.09 10*3/mm3      Eosinophils, Absolute 0.06 10*3/mm3      Basophils, Absolute 0.05 10*3/mm3      Immature Grans, Absolute 0.04 10*3/mm3      nRBC 0.0 /100 WBC     POC Glucose Once [[id number]]  (Normal) Collected: 10/13/24 0606    Specimen: Blood Updated: 10/13/24 0612     Glucose 100 mg/dL     POC Glucose Once [[id number]]  (Normal) Collected: 10/13/24 0047    Specimen: Blood Updated: 10/13/24 0053     Glucose 121 mg/dL     POC Glucose Once [[id number]]  (Normal) Collected: 10/12/24 1707    Specimen: Blood Updated: 10/12/24 1714     Glucose 130 mg/dL     POC Glucose Once [[id number]]  (Abnormal) Collected: 10/12/24 1159    Specimen: Blood Updated: 10/12/24 1206     Glucose 146 mg/dL     POC Glucose Once [[id number]]  (Abnormal) Collected: 10/12/24 0812    Specimen: Blood Updated: 10/12/24 0818     Glucose 157 mg/dL     Renal Function Panel [[id number]]  (Abnormal) Collected: 10/12/24 0439    Specimen: Blood Updated: 10/12/24 0459     Glucose 174 mg/dL      BUN 28 mg/dL      Creatinine 1.01 mg/dL      Sodium 145 mmol/L      Potassium 4.8 mmol/L      Chloride 104 mmol/L      CO2 25.1 mmol/L      Calcium 10.0 mg/dL      Albumin 4.7 g/dL      Phosphorus 4.2 mg/dL      Anion Gap 15.9 mmol/L      BUN/Creatinine Ratio 27.7     eGFR 84.1 mL/min/1.73     Narrative:      GFR Normal >60  Chronic Kidney Disease <60  Kidney Failure <15      CBC & Differential [[id number]]  (Abnormal) Collected: 10/12/24 0439    Specimen: Blood Updated: 10/12/24 0445    Narrative:      The following orders were created for panel order CBC & Differential.  Procedure                               Abnormality         Status                     ---------                                -----------         ------                     CBC Auto Differential[[id number]]        Abnormal            Final result                 Please view results for these tests on the individual orders.    CBC Auto Differential [[id number]]  (Abnormal) Collected: 10/12/24 0439    Specimen: Blood Updated: 10/12/24 0445     WBC 13.57 10*3/mm3      RBC 5.65 10*6/mm3      Hemoglobin 16.1 g/dL      Hematocrit 51.9 %      MCV 91.9 fL      MCH 28.5 pg      MCHC 31.0 g/dL      RDW 14.3 %      RDW-SD 47.7 fl      MPV 10.7 fL      Platelets 397 10*3/mm3      Neutrophil % 72.5 %      Lymphocyte % 15.6 %      Monocyte % 10.7 %      Eosinophil % 0.4 %      Basophil % 0.4 %      Immature Grans % 0.4 %      Neutrophils, Absolute 9.84 10*3/mm3      Lymphocytes, Absolute 2.12 10*3/mm3      Monocytes, Absolute 1.45 10*3/mm3      Eosinophils, Absolute 0.05 10*3/mm3      Basophils, Absolute 0.06 10*3/mm3      Immature Grans, Absolute 0.05 10*3/mm3      nRBC 0.0 /100 WBC     POC Glucose Once [[id number]]  (Abnormal) Collected: 10/12/24 0007    Specimen: Blood Updated: 10/12/24 0013     Glucose 149 mg/dL     POC Glucose Once [[id number]]  (Abnormal) Collected: 10/11/24 2018    Specimen: Blood Updated: 10/11/24 2024     Glucose 159 mg/dL     POC Glucose Once [[id number]]  (Abnormal) Collected: 10/11/24 1752    Specimen: Blood Updated: 10/11/24 1759     Glucose 190 mg/dL     TSH [[id number]]  (Normal) Collected: 10/11/24 0421    Specimen: Blood Updated: 10/11/24 1524     TSH 2.400 uIU/mL           Imaging Results (Most Recent)       Procedure Component Value Units Date/Time    XR Abdomen KUB [[id number]] Collected: 10/13/24 0939     Updated: 10/13/24 0945    Narrative:      XR ABDOMEN KUB    Date of Exam: 10/13/2024 9:07 AM EDT    Indication: sbo    Comparison: 10/12/2024    Findings:  Nasogastric tube and sideport are in the stomach. Prior posterior fusion in the lower lumbar spine. The lung bases are grossly clear. No  definite pneumoperitoneum. Stable small bowel dilatation      Impression:      Impression:  Stable small bowel dilatation suspicious for small bowel obstruction.      Electronically Signed: Donell Menchaca MD    10/13/2024 9:42 AM EDT    Workstation ID: [id number]    XR Abdomen KUB [[id number]] Collected: 10/12/24 1103     Updated: 10/12/24 1110    Narrative:      XR ABDOMEN KUB    Date of Exam: 10/12/2024 10:46 AM EDT    Indication: sbo    Comparison: Abdominal radiograph 10/12/2024    Findings:  Antegrade oriented gastric tube terminates over mid stomach. Partial decompression of the stomach since prior comparison. Few mildly dilated loops of small bowel in the mid abdomen. This appears similar to prior. Previously administered positive enteric   contrast is scattered throughout the colon intermixed with moderate to large volume formed colonic stool. Limited assessment of renal stones. Pelvic phleboliths. Multilevel lumbar fusion hardware noted.      Impression:      Impression:  Similar mildly dilated loops of small bowel with intermixed positive enteric contrast throughout the colon. Findings likely reflect resolving small bowel obstruction.    Moderate to large volume colonic stool.      Electronically Signed: Melvin Coles MD    10/12/2024 11:07 AM EDT    Workstation ID: [id number]    XR Abdomen KUB [[id number]] Collected: 10/12/24 1011     Updated: 10/12/24 1018    Narrative:      XR ABDOMEN KUB    Date of Exam: 10/12/2024 6:27 AM EDT    Indication: sbo    Comparison: Small bowel follow-through exam 10/11/2024, CT abdomen and pelvis 10/10/2024    Findings:  Esophagogastric tube tip overlies the proximal body of the stomach. Contrast administered during the prior small bowel follow-through has progressed distally and is now located in the colon at the hepatic flexure. There is decreased distention of small   bowel loops in the left mid abdomen most consistent with resolving small bowel obstruction. Lumbar fixation  hardware again noted. No gross pneumoperitoneum.      Impression:      Impression:  1. Esophagogastric tube tip overlies the proximal body of the stomach.  2. Contrast administered during the prior small bowel follow-through has progressed distally and is now located in the colon at the hepatic flexure.  3. Decreased distention of small bowel loops in the left mid abdomen suggesting resolving small bowel obstruction.        Electronically Signed: Franky Chang MD    10/12/2024 10:15 AM EDT    Workstation ID: [id number]    FL Small Bowel Follow Through Single-Contrast [[id number]] Collected: 10/11/24 1740     Updated: 10/11/24 1751    Narrative:      FL SMALL BOWEL FOLLOW THROUGH SINGLE-CONTRAST    Date of Exam: 10/11/2024 1:39 PM EDT    Indication: sbo.    Comparison: CT scan of the abdomen and pelvis 10/10/2024    Findings:  Images at 4 hours demonstrate NG tube in the stomach, the tip is 10 cm beyond the GE junction. Enteric contrast is seen in the stomach, which is moderately dilated.    Images of the abdomen at 8 hours demonstrate no change in the appearance of the stomach. Faint enteric contrast material is seen in small bowel loops, which are abnormally dilated. Nondilated loops of small bowel are clearly evident on CT scan. A   transition point is not identified on this study. No contrast is seen in the colon.      Impression:      Impression:  Small bowel follow-through demonstrating findings concerning for small bowel obstruction.      Electronically Signed: Don Leroy MD    10/11/2024 5:48 PM EDT    Workstation ID: [id number]    XR Abdomen 2+ VW with Chest 1 VW [[id number]] Collected: 10/11/24 0832     Updated: 10/11/24 0839    Narrative:      XR ABDOMEN 2+ VIEWS W CHEST 1 VW    Date of Exam: 10/11/2024 8:27 AM EDT    Indication: sbo    Comparison: 10/10/2024 CT abdomen/pelvis    Findings:  Nasogastric tube distal tip and sideport overlie the stomach. There is no acute cardiopulmonary abnormality.    There  are multiple dilated loops of small bowel with gas fluid levels consistent with small bowel obstruction. Surgical clips overlie the left hemiabdomen. Lumbar fusion hardware without evidence of complication. No acute bone abnormality.      Impression:      Impression:  Multiple dilated loops of small bowel with gas fluid levels consistent with small bowel obstruction.    No acute cardiopulmonary abnormality.      Electronically Signed: Rober Tomlinson MD    10/11/2024 8:36 AM EDT    Workstation ID: [id number]    XR Abdomen KUB [[id number]] Collected: 10/11/24 0823     Updated: 10/11/24 0827    Narrative:      XR ABDOMEN KUB    Date of Exam: 10/11/2024 6:10 AM EDT    Indication: after insertion of ng tube    Comparison: 10/10/2024 CT    Findings:  Nasogastric tube distal tip and sideport overlie the stomach. There are multiple dilated loops of small bowel in keeping with evidence of small bowel obstruction. Surgical clips overlie the left hemiabdomen. Partially visualized lumbar fusion hardware.   Lung bases are unremarkable. No acute osseous abnormality.      Impression:      Impression:  Nasogastric tube tip and sideport overlie the stomach.      Electronically Signed: Rober Tomlinson MD    10/11/2024 8:24 AM EDT    Workstation ID: [id number]    CT Abdomen Pelvis With Contrast [[id number]] Collected: 10/10/24 2203     Updated: 10/10/24 2219    Narrative:      CT ABDOMEN PELVIS W CONTRAST    Date of Exam: 10/10/2024 9:37 PM EDT    Indication: abdominal pain, hx ruptured diverticulitis.    Comparison: CT abdomen pelvis 2/16/2023    Technique: Axial CT images were obtained of the abdomen and pelvis following the uneventful intravenous administration of iodinated contrast. Sagittal and coronal reconstructions were performed.  Automated exposure control and iterative reconstruction   methods were used.        Findings:  Lung Bases: Bibasal atelectasis. Coronary artery calcifications.    Liver: Hepatic steatosis.     Gallbladder and  biliary tree: No significant abnormality.     Spleen: No significant abnormality.     Pancreas: No significant abnormality.     Adrenal glands: No significant abnormality.     Kidneys and ureters: Right renal cyst. No hydroureteronephrosis.     Stomach and duodenum:No significant abnormality.    Small and large bowel: Postoperative changes of prior sigmoid resection. Normal-appearing appendix. There are dilated loops of small bowel in the mid abdomen with feculent material.    Peritoneal cavity: No free fluid or free air.    Bladder: No significant abnormality.     Pelvic organs: No significant abnormality.     Vasculature: Atherosclerotic calcifications.     Lymph nodes: No pathologic appearing lymph nodes by imaging criteria.     Bones and soft tissues: Degenerative changes of the imaged spine. No acute osseous abnormality. Postsurgical changes of posterior decompression and posterior instrumented fixation of the lumbar spine.      Impression:      Impression:  Dilated loops of small bowel in the mid abdomen with feculent material, suggestive of partial small bowel obstruction.            Electronically Signed: Chester Cagle    10/10/2024 10:16 PM EDT    Workstation ID: [id number]              Imaging Results (All)       Procedure Component Value Units Date/Time    XR Abdomen KUB [[id number]] Collected: 10/13/24 0939     Updated: 10/13/24 0945    Narrative:      XR ABDOMEN KUB    Date of Exam: 10/13/2024 9:07 AM EDT    Indication: sbo    Comparison: 10/12/2024    Findings:  Nasogastric tube and sideport are in the stomach. Prior posterior fusion in the lower lumbar spine. The lung bases are grossly clear. No definite pneumoperitoneum. Stable small bowel dilatation      Impression:      Impression:  Stable small bowel dilatation suspicious for small bowel obstruction.      Electronically Signed: Donell Menchaca MD    10/13/2024 9:42 AM EDT    Workstation ID: [id number]    XR Abdomen KUB [[id number]] Collected: 10/12/24  1103     Updated: 10/12/24 1110    Narrative:      XR ABDOMEN KUB    Date of Exam: 10/12/2024 10:46 AM EDT    Indication: sbo    Comparison: Abdominal radiograph 10/12/2024    Findings:  Antegrade oriented gastric tube terminates over mid stomach. Partial decompression of the stomach since prior comparison. Few mildly dilated loops of small bowel in the mid abdomen. This appears similar to prior. Previously administered positive enteric   contrast is scattered throughout the colon intermixed with moderate to large volume formed colonic stool. Limited assessment of renal stones. Pelvic phleboliths. Multilevel lumbar fusion hardware noted.      Impression:      Impression:  Similar mildly dilated loops of small bowel with intermixed positive enteric contrast throughout the colon. Findings likely reflect resolving small bowel obstruction.    Moderate to large volume colonic stool.      Electronically Signed: Melvin Coles MD    10/12/2024 11:07 AM EDT    Workstation ID: [id number]    XR Abdomen KUB [[id number]] Collected: 10/12/24 1011     Updated: 10/12/24 1018    Narrative:      XR ABDOMEN KUB    Date of Exam: 10/12/2024 6:27 AM EDT    Indication: sbo    Comparison: Small bowel follow-through exam 10/11/2024, CT abdomen and pelvis 10/10/2024    Findings:  Esophagogastric tube tip overlies the proximal body of the stomach. Contrast administered during the prior small bowel follow-through has progressed distally and is now located in the colon at the hepatic flexure. There is decreased distention of small   bowel loops in the left mid abdomen most consistent with resolving small bowel obstruction. Lumbar fixation hardware again noted. No gross pneumoperitoneum.      Impression:      Impression:  1. Esophagogastric tube tip overlies the proximal body of the stomach.  2. Contrast administered during the prior small bowel follow-through has progressed distally and is now located in the colon at the hepatic flexure.  3.  Decreased distention of small bowel loops in the left mid abdomen suggesting resolving small bowel obstruction.        Electronically Signed: Franky Chang MD    10/12/2024 10:15 AM EDT    Workstation ID: [id number]    FL Small Bowel Follow Through Single-Contrast [[id number]] Collected: 10/11/24 1740     Updated: 10/11/24 1751    Narrative:      FL SMALL BOWEL FOLLOW THROUGH SINGLE-CONTRAST    Date of Exam: 10/11/2024 1:39 PM EDT    Indication: sbo.    Comparison: CT scan of the abdomen and pelvis 10/10/2024    Findings:  Images at 4 hours demonstrate NG tube in the stomach, the tip is 10 cm beyond the GE junction. Enteric contrast is seen in the stomach, which is moderately dilated.    Images of the abdomen at 8 hours demonstrate no change in the appearance of the stomach. Faint enteric contrast material is seen in small bowel loops, which are abnormally dilated. Nondilated loops of small bowel are clearly evident on CT scan. A   transition point is not identified on this study. No contrast is seen in the colon.      Impression:      Impression:  Small bowel follow-through demonstrating findings concerning for small bowel obstruction.      Electronically Signed: Don Leroy MD    10/11/2024 5:48 PM EDT    Workstation ID: [id number]    XR Abdomen 2+ VW with Chest 1 VW [[id number]] Collected: 10/11/24 0832     Updated: 10/11/24 0839    Narrative:      XR ABDOMEN 2+ VIEWS W CHEST 1 VW    Date of Exam: 10/11/2024 8:27 AM EDT    Indication: sbo    Comparison: 10/10/2024 CT abdomen/pelvis    Findings:  Nasogastric tube distal tip and sideport overlie the stomach. There is no acute cardiopulmonary abnormality.    There are multiple dilated loops of small bowel with gas fluid levels consistent with small bowel obstruction. Surgical clips overlie the left hemiabdomen. Lumbar fusion hardware without evidence of complication. No acute bone abnormality.      Impression:      Impression:  Multiple dilated loops of small bowel with  gas fluid levels consistent with small bowel obstruction.    No acute cardiopulmonary abnormality.      Electronically Signed: Rober Tomlinson MD    10/11/2024 8:36 AM EDT    Workstation ID: [id number]    XR Abdomen KUB [[id number]] Collected: 10/11/24 0823     Updated: 10/11/24 0827    Narrative:      XR ABDOMEN KUB    Date of Exam: 10/11/2024 6:10 AM EDT    Indication: after insertion of ng tube    Comparison: 10/10/2024 CT    Findings:  Nasogastric tube distal tip and sideport overlie the stomach. There are multiple dilated loops of small bowel in keeping with evidence of small bowel obstruction. Surgical clips overlie the left hemiabdomen. Partially visualized lumbar fusion hardware.   Lung bases are unremarkable. No acute osseous abnormality.      Impression:      Impression:  Nasogastric tube tip and sideport overlie the stomach.      Electronically Signed: Rober Tomlinson MD    10/11/2024 8:24 AM EDT    Workstation ID: [id number]    CT Abdomen Pelvis With Contrast [[id number]] Collected: 10/10/24 2203     Updated: 10/10/24 2219    Narrative:      CT ABDOMEN PELVIS W CONTRAST    Date of Exam: 10/10/2024 9:37 PM EDT    Indication: abdominal pain, hx ruptured diverticulitis.    Comparison: CT abdomen pelvis 2/16/2023    Technique: Axial CT images were obtained of the abdomen and pelvis following the uneventful intravenous administration of iodinated contrast. Sagittal and coronal reconstructions were performed.  Automated exposure control and iterative reconstruction   methods were used.        Findings:  Lung Bases: Bibasal atelectasis. Coronary artery calcifications.    Liver: Hepatic steatosis.     Gallbladder and biliary tree: No significant abnormality.     Spleen: No significant abnormality.     Pancreas: No significant abnormality.     Adrenal glands: No significant abnormality.     Kidneys and ureters: Right renal cyst. No hydroureteronephrosis.     Stomach and duodenum:No significant abnormality.    Small and large  bowel: Postoperative changes of prior sigmoid resection. Normal-appearing appendix. There are dilated loops of small bowel in the mid abdomen with feculent material.    Peritoneal cavity: No free fluid or free air.    Bladder: No significant abnormality.     Pelvic organs: No significant abnormality.     Vasculature: Atherosclerotic calcifications.     Lymph nodes: No pathologic appearing lymph nodes by imaging criteria.     Bones and soft tissues: Degenerative changes of the imaged spine. No acute osseous abnormality. Postsurgical changes of posterior decompression and posterior instrumented fixation of the lumbar spine.      Impression:      Impression:  Dilated loops of small bowel in the mid abdomen with feculent material, suggestive of partial small bowel obstruction.            Electronically Signed: Chester Cagle    10/10/2024 10:16 PM EDT    Workstation ID: [id number]                  Discharge Disposition:      Discharge Medications:     Discharge Medications        Changes to Medications        Instructions Start Date   meloxicam 15 MG tablet  Commonly known as: MOBIC  What changed: when to take this   15 mg, Oral, Daily PRN             Continue These Medications        Instructions Start Date   amLODIPine 2.5 MG tablet  Commonly known as: NORVASC   2.5 mg, Oral, Daily      ARIPiprazole 5 MG tablet  Commonly known as: ABILIFY   5 mg, Oral, Daily      BASAGLAR KWIKPEN 100 UNIT/ML injection pen   70 Units, Daily      buPROPion  MG 24 hr tablet  Commonly known as: WELLBUTRIN XL   150 mg, Oral, Daily      fenofibrate 160 MG tablet   160 mg, Oral, Daily      Flaxseed (Linseed) 1000 MG capsule   Oral      Jardiance 25 MG tablet tablet  Generic drug: empagliflozin   No dose, route, or frequency recorded.      [employer] Pen Needles 31G X 6 MM misc  Generic drug: Insulin Pen Needle   No dose, route, or frequency recorded.      metFORMIN 1000 MG tablet  Commonly known as: GLUCOPHAGE   1,000 mg, Oral, 2 Times  Daily With Meals      multivitamin with minerals tablet tablet   1 tablet, Oral, Daily      rosuvastatin 20 MG tablet  Commonly known as: CRESTOR   20 mg, Oral, Daily      True Metrix Air Glucose Meter device   1 Device, Does not apply, Daily      True Metrix Blood Glucose Test test strip  Generic drug: glucose blood   TEST BLOOD SUGAR EVERY DAY      TRUEplus Lancets 28G misc   TEST BLOOD SUGAR TWO TIMES DAILY      venlafaxine 100 MG tablet  Commonly known as: EFFEXOR   100 mg, Oral, 2 Times Daily      vitamin D3 125 MCG (5000 UT) capsule capsule   5,000 Units, Oral, Daily             Stop These Medications      Trulicity 3 MG/0.5ML solution pen-injector  Generic drug: Dulaglutide              Landon  not reviewed    Discharge Diet:   Diet Instructions       Diet: Diabetic Diets; Consistent Carbohydrate; Regular (IDDSI 7); Thin (IDDSI 0)      Discharge Diet: Diabetic Diets    Diabetic Diet: Consistent Carbohydrate    Texture: Regular (IDDSI 7)    Fluid Consistency: Thin (IDDSI 0)            Activity at Discharge:   Activity Instructions       Activity as Tolerated              Pre-discharge education  Diabetic    Follow-up Appointments  Future Appointments   Date Time Provider Department Center   1/23/2025  8:00 AM Monica Cates MD [facility]       Additional Instructions for the Follow-ups that You Need to Schedule       Discharge Follow-up with PCP   As directed       Currently Documented PCP:    Monica Cates MD    PCP Phone Number:    [phone]     Follow Up Details: one week or sooner if needed                Test Results Pending at Discharge:  none    Condition on Discharge:    stable     Risk for Readmission (LACE): Score: 10 (10/14/2024  6:00 AM)          Izabela Koch DO  10/14/24  14:52 EDT    Time: Discharge 32 min with face-to-face history/exam, writing all prescriptions, and documenting discharge data including care coordination            Note disclaimer: At [facility], we  believe that sharing information builds trust and better relationships. You are receiving this note because you recently visited [facility]. It is possible you will see health information before a provider has talked with you about it. This kind of information can be easy to misunderstand. To help you fully understand what it means for your health, we urge you to discuss this note with your provider.

## 2024-10-14 NOTE — OUTREACH NOTE
Prep Survey      Flowsheet Row Responses   [facility] facility patient discharged from? [place]   Is LACE score < 7 ? No   Eligibility [facility]   Date of Admission 10/10/24   Date of Discharge 10/14/24   Discharge diagnosis Partial small bowel obstruction   Does the patient have one of the following disease processes/diagnoses(primary or secondary)? Other   Does the patient have Home health ordered? No   Is there a DME ordered? No   Prep survey completed? Yes            Almita BRAMBILA - Registered Nurse

## 2024-10-14 NOTE — DISCHARGE INSTRUCTIONS
Stop Trulicity   Monitor blood sugars 3 times per day before meals or if you feel bad.   If you have any blood sugars less than 70 call your endocrinologist.   Follow up with your endocrinologist as scheduled.

## 2024-10-14 NOTE — CASE MANAGEMENT/SOCIAL WORK
Case Management Discharge Note      Final Note: dc home         Selected Continued Care - Discharged on 10/14/2024 Admission date: 10/10/2024 - Discharge disposition: Home or Self Care      Destination    No services have been selected for the patient.                Durable Medical Equipment    No services have been selected for the patient.                Dialysis/Infusion    No services have been selected for the patient.                Home Medical Care    No services have been selected for the patient.                Therapy    No services have been selected for the patient.                Community Resources    No services have been selected for the patient.                Community & DME    No services have been selected for the patient.                         Final Discharge Disposition Code: 01 - home or self-care

## 2024-10-15 ENCOUNTER — TRANSITIONAL CARE MANAGEMENT TELEPHONE ENCOUNTER (OUTPATIENT)
Dept: CALL CENTER | Facility: HOSPITAL | Age: 64
End: 2024-10-15
Payer: MEDICARE

## 2024-10-15 NOTE — OUTREACH NOTE
Call Center TCM Note      Flowsheet Row Responses   [facility] patient discharged from? [place]   Does the patient have one of the following disease processes/diagnoses(primary or secondary)? Other   TCM attempt successful? Yes   Call start time 1145   Call end time 1148   Discharge diagnosis Partial small bowel obstruction   Meds reviewed with patient/caregiver? Yes   Is the patient having any side effects they believe may be caused by any medication additions or changes? No   Does the patient have all medications ordered at discharge? Yes   Is the patient taking all medications as directed (includes completed medication regime)? Yes   Comments TCM APPT WITH PCP [facility] (DR SORIANO FOR THIS VISIT) IS 10/18/2024   Does the patient have an appointment with their PCP within 7-14 days of discharge? Yes   Has home health visited the patient within 72 hours of discharge? N/A   Psychosocial issues? No   Did the patient receive a copy of their discharge instructions? Yes   Nursing interventions Reviewed instructions with patient   What is the patient's perception of their health status since discharge? Improving   Is the patient/caregiver able to teach back signs and symptoms related to disease process for when to call PCP? Yes   Is the patient/caregiver able to teach back signs and symptoms related to disease process for when to call 911? Yes   Is the patient/caregiver able to teach back the hierarchy of who to call/visit for symptoms/problems? PCP, Specialist, Home health nurse, Urgent Care, ED, 911 Yes   If the patient is a current smoker, are they able to teach back resources for cessation? Not a smoker   TCM call completed? Yes   Wrap up additional comments D/C DX: Partial SBO - Pt feels well, no pain since discharge. No questions. TCM APPT with [facility]CP [facility] (Dr Soriano for this visit) is 10/18/2024.   Call end time 1148            Monica Tong MA    10/15/2024, 12:01 EDT

## 2024-10-18 ENCOUNTER — OFFICE VISIT (OUTPATIENT)
Dept: INTERNAL MEDICINE | Facility: CLINIC | Age: 64
End: 2024-10-18
Payer: MEDICARE

## 2024-10-18 VITALS
OXYGEN SATURATION: 95 % | WEIGHT: 240 LBS | SYSTOLIC BLOOD PRESSURE: 120 MMHG | DIASTOLIC BLOOD PRESSURE: 60 MMHG | BODY MASS INDEX: 39.94 KG/M2 | TEMPERATURE: 98 F | HEART RATE: 80 BPM

## 2024-10-18 DIAGNOSIS — E11.65 UNCONTROLLED TYPE 2 DIABETES MELLITUS WITH HYPERGLYCEMIA: ICD-10-CM

## 2024-10-18 DIAGNOSIS — Z09 HOSPITAL DISCHARGE FOLLOW-UP: Primary | ICD-10-CM

## 2024-10-18 DIAGNOSIS — K56.600 PARTIAL SMALL BOWEL OBSTRUCTION: ICD-10-CM

## 2024-10-18 DIAGNOSIS — K31.84 GASTROPARESIS: ICD-10-CM

## 2024-10-18 NOTE — PROGRESS NOTES
Transitional Care Follow Up Visit  Subjective     Lloyd Greene is a 64 y.o. male who presents for a transitional care management visit.    Within 48 business hours after discharge our office contacted him via telephone to coordinate his care and needs.      I reviewed and discussed the details of that call along with the discharge summary, hospital problems, inpatient lab results, inpatient diagnostic studies, and consultation reports with Lloyd.     Current outpatient and discharge medications have been reconciled for the patient.  Reviewed by: Kathleen Ponce MD          10/14/2024     5:00 PM   Date of TCM Phone Call   [facility]   Date of Admission 10/10/2024   Date of Discharge 10/14/2024     Risk for Readmission (LACE) Score: 10 (10/14/2024  6:00 AM)      History of Present Illness   Course During Hospital Stay:  Pt was admitted at [facility] with partial small bowel obstruction that required treatment with NG tube .  He was on trulicity to help treat his T2DM and this has been stopped.  Pt reports that he was taking 70 units of insulin daily at home but in the hospital he only required 6 units of insulin over 4 days.Pt says that glucoses are now in the 140-180 range.  Pt is back to taking his home 70 units a night of basaglar, metformin and jardiance.  He has an appt with endo on Nov 15th.  Pt reports normal bm's and urination.      The following portions of the patient's history were reviewed and updated as appropriate: allergies, current medications, past family history, past medical history, past social history, past surgical history, and problem list.    Review of Systems   Constitutional: Negative.    HENT: Negative.     Eyes: Negative.    Respiratory: Negative.     Cardiovascular: Negative.    Gastrointestinal: Negative.    Endocrine: Negative.    Genitourinary: Negative.    Musculoskeletal: Negative.    Skin: Negative.    Allergic/Immunologic: Negative.    Neurological:  Negative.    Hematological: Negative.    Psychiatric/Behavioral: Negative.     All other systems reviewed and are negative.      Objective   /60 (BP Location: Right arm, Patient Position: Sitting, Cuff Size: Adult)   Pulse 80   Temp 98 °F (36.7 °C) (Infrared)   Wt 109 kg (240 lb)   SpO2 95%   BMI 39.94 kg/m²   Physical Exam  Vitals and nursing note reviewed.   Constitutional:       General: He is not in acute distress.     Appearance: He is well-developed.   HENT:      Head: Normocephalic and atraumatic.      Right Ear: External ear normal.      Left Ear: External ear normal.      Nose: Nose normal.   Eyes:      Conjunctiva/sclera: Conjunctivae normal.      Pupils: Pupils are equal, round, and reactive to light.   Cardiovascular:      Rate and Rhythm: Normal rate and regular rhythm.      Heart sounds: Normal heart sounds.   Pulmonary:      Effort: Pulmonary effort is normal. No respiratory distress.      Breath sounds: Normal breath sounds. No wheezing.   Musculoskeletal:         General: Normal range of motion.      Cervical back: Normal range of motion and neck supple.      Comments: Normal gait   Skin:     General: Skin is warm and dry.   Neurological:      Mental Status: He is alert and oriented to person, place, and time.   Psychiatric:         Behavior: Behavior normal.         Thought Content: Thought content normal.         Judgment: Judgment normal.         Assessment & Plan   Diagnoses and all orders for this visit:    1. Hospital discharge follow-up (Primary)    2. Partial small bowel obstruction - pt improved at this time.     3. Gastroparesis - rec gastric emptying study in about 2 months once trulicity is fully out of his system.     4. Uncontrolled type 2 diabetes mellitus with hyperglycemia    Reviewed diabetic meds with pt today.  Cont basaglar, jardiance, metformin.

## 2024-10-24 ENCOUNTER — READMISSION MANAGEMENT (OUTPATIENT)
Dept: CALL CENTER | Facility: HOSPITAL | Age: 64
End: 2024-10-24
Payer: MEDICARE

## 2024-10-24 NOTE — OUTREACH NOTE
Medical Week 2 Survey      Flowsheet Row Responses   [facility] facility patient discharged from? [place]   Does the patient have one of the following disease processes/diagnoses(primary or secondary)? Other   Week 2 attempt successful? No   Unsuccessful attempts Attempt 1            [place]una TORO - Registered Nurse

## 2024-10-28 ENCOUNTER — READMISSION MANAGEMENT (OUTPATIENT)
Dept: CALL CENTER | Facility: HOSPITAL | Age: 64
End: 2024-10-28
Payer: MEDICARE

## 2024-10-28 NOTE — OUTREACH NOTE
Medical Week 2 Survey      Flowsheet Row Responses   [facility] facility patient discharged from? [place]   Does the patient have one of the following disease processes/diagnoses(primary or secondary)? Other   Week 2 attempt successful? No   Unsuccessful attempts Attempt 2            PAO SCHAEFFER - Registered Nurse

## 2024-11-04 ENCOUNTER — READMISSION MANAGEMENT (OUTPATIENT)
Dept: CALL CENTER | Facility: HOSPITAL | Age: 64
End: 2024-11-04
Payer: MEDICARE

## 2024-11-04 NOTE — OUTREACH NOTE
Medical Week 3 Survey      Flowsheet Row Responses   [facility] facility patient discharged from? [place]   Does the patient have one of the following disease processes/diagnoses(primary or secondary)? Other   Week 3 attempt successful? No   Unsuccessful attempts Attempt 1            [place] TORO - Registered Nurse

## 2025-01-23 ENCOUNTER — OFFICE VISIT (OUTPATIENT)
Dept: INTERNAL MEDICINE | Facility: CLINIC | Age: 65
End: 2025-01-23
Payer: MEDICARE

## 2025-01-23 VITALS
TEMPERATURE: 97.8 F | WEIGHT: 247 LBS | BODY MASS INDEX: 41.15 KG/M2 | HEIGHT: 65 IN | DIASTOLIC BLOOD PRESSURE: 60 MMHG | HEART RATE: 87 BPM | OXYGEN SATURATION: 95 % | SYSTOLIC BLOOD PRESSURE: 112 MMHG

## 2025-01-23 DIAGNOSIS — E66.813 CLASS 3 SEVERE OBESITY WITH SERIOUS COMORBIDITY AND BODY MASS INDEX (BMI) OF 40.0 TO 44.9 IN ADULT, UNSPECIFIED OBESITY TYPE: ICD-10-CM

## 2025-01-23 DIAGNOSIS — M54.50 CHRONIC RIGHT-SIDED LOW BACK PAIN WITHOUT SCIATICA: ICD-10-CM

## 2025-01-23 DIAGNOSIS — E66.01 CLASS 3 SEVERE OBESITY WITH SERIOUS COMORBIDITY AND BODY MASS INDEX (BMI) OF 40.0 TO 44.9 IN ADULT, UNSPECIFIED OBESITY TYPE: ICD-10-CM

## 2025-01-23 DIAGNOSIS — F51.01 PRIMARY INSOMNIA: ICD-10-CM

## 2025-01-23 DIAGNOSIS — G89.29 CHRONIC RIGHT-SIDED LOW BACK PAIN WITHOUT SCIATICA: ICD-10-CM

## 2025-01-23 DIAGNOSIS — E11.42 TYPE 2 DIABETES MELLITUS WITH DIABETIC POLYNEUROPATHY, WITHOUT LONG-TERM CURRENT USE OF INSULIN: Primary | ICD-10-CM

## 2025-01-23 PROCEDURE — 1126F AMNT PAIN NOTED NONE PRSNT: CPT | Performed by: INTERNAL MEDICINE

## 2025-01-23 PROCEDURE — 99214 OFFICE O/P EST MOD 30 MIN: CPT | Performed by: INTERNAL MEDICINE

## 2025-01-23 PROCEDURE — 1160F RVW MEDS BY RX/DR IN RCRD: CPT | Performed by: INTERNAL MEDICINE

## 2025-01-23 PROCEDURE — 3074F SYST BP LT 130 MM HG: CPT | Performed by: INTERNAL MEDICINE

## 2025-01-23 PROCEDURE — 1159F MED LIST DOCD IN RCRD: CPT | Performed by: INTERNAL MEDICINE

## 2025-01-23 PROCEDURE — 3078F DIAST BP <80 MM HG: CPT | Performed by: INTERNAL MEDICINE

## 2025-01-23 RX ORDER — GLIMEPIRIDE 2 MG/1
2 TABLET ORAL
COMMUNITY
Start: 2024-11-15

## 2025-01-23 RX ORDER — NALTREXONE HYDROCHLORIDE 50 MG/1
TABLET, FILM COATED ORAL
Qty: 45 TABLET | Refills: 1 | Status: SHIPPED | OUTPATIENT
Start: 2025-01-23

## 2025-01-23 NOTE — PROGRESS NOTES
"Chief Complaint  Diabetes (6 mo f/u)    Subjective        Lloyd Greene presents to [facility]  Diabetes        Had a good holiday.  Had fun making food. Concerned about his weight.     Objective   Vital Signs:  /60 (BP Location: Left arm, Patient Position: Sitting, Cuff Size: Adult)   Pulse 87   Temp 97.8 °F (36.6 °C) (Infrared)   Ht 165.1 cm (65\")   Wt 112 kg (247 lb)   SpO2 95%   BMI 41.10 kg/m²   Estimated body mass index is 41.1 kg/m² as calculated from the following:    Height as of this encounter: 165.1 cm (65\").    Weight as of this encounter: 112 kg (247 lb).            Physical Exam  Vitals reviewed.   Constitutional:       General: He is not in acute distress.     Appearance: Normal appearance.   HENT:      Head: Normocephalic and atraumatic.      Nose: Nose normal.      Mouth/Throat:      Mouth: Mucous membranes are moist.   Eyes:      Conjunctiva/sclera: Conjunctivae normal.   Pulmonary:      Effort: Pulmonary effort is normal.   Skin:     General: Skin is warm and dry.   Neurological:      General: No focal deficit present.      Mental Status: He is alert.   Psychiatric:         Mood and Affect: Mood normal.        Result Review :  The following data was reviewed by: Monica Cates MD on 01/23/2025:  Common labs          10/12/2024    04:39 10/13/2024    06:07 10/14/2024    03:54   Common Labs   Glucose 174  112  166    BUN 28  21  13    Creatinine 1.01  0.75  0.69    Sodium 145  140  136    Potassium 4.8  4.1  3.8    Chloride 104  101  101    Calcium 10.0  8.5  8.8    Albumin 4.7  3.8  3.9    WBC 13.57  11.98  9.69    Hemoglobin 16.1  14.0  13.2    Hematocrit 51.9  45.5  42.2    Platelets 397  349  294      Data reviewed : reviewed labs in system from October           Assessment and Plan   Diagnoses and all orders for this visit:    1. Type 2 diabetes mellitus with diabetic polyneuropathy, without long-term current use of insulin (Primary)    2. Class 3 " severe obesity with serious comorbidity and body mass index (BMI) of 40.0 to 44.9 in adult, unspecified obesity type  -     naltrexone (DEPADE) 50 MG tablet; Start with 1/4 tablet daily for 1-2 weeks and then move up to 1/2 tablet daily.  Dispense: 45 tablet; Refill: 1    3. Primary insomnia    4. Chronic right-sided low back pain without sciatica  -     diclofenac (VOLTAREN) 50 MG EC tablet; Take 1 tablet by mouth 2 (Two) Times a Day As Needed (low back pain). Take twice daily scheduled for 5-7 days and then try to wean backwards to once a day and then as needed.  Dispense: 60 tablet; Refill: 2      Discussed ways to add vegetables to breakfast and making sure he is eating breakfast at all.     Discussed bowel obstruction in November and the thought that Trulicity was a cause.  Will give him low dose naltrexone to add to Wellbutrin to help with cravings.     Insomnia--awakens at 7 am; nap at 12 pm for 2-3 hours; bed at 8:30 pm and then intermittently awakens throughout the night.  Has taken Trazodone previously and no longer works.  Try Magnesium + melatonin.             Follow Up   Return in about 3 months (around 4/23/2025) for f/u insomnia, weight concerns.  Patient was given instructions and counseling regarding his condition or for health maintenance advice. Please see specific information pulled into the AVS if appropriate.

## 2025-01-26 DIAGNOSIS — I10 HYPERTENSION, ESSENTIAL: ICD-10-CM

## 2025-01-26 DIAGNOSIS — E11.65 UNCONTROLLED TYPE 2 DIABETES MELLITUS WITH HYPERGLYCEMIA: ICD-10-CM

## 2025-01-26 DIAGNOSIS — F32.9 REACTIVE DEPRESSION: ICD-10-CM

## 2025-01-26 DIAGNOSIS — E78.5 HYPERLIPIDEMIA, UNSPECIFIED HYPERLIPIDEMIA TYPE: ICD-10-CM

## 2025-01-30 ENCOUNTER — TELEPHONE (OUTPATIENT)
Dept: GASTROENTEROLOGY | Facility: CLINIC | Age: 65
End: 2025-01-30
Payer: MEDICARE

## 2025-01-30 RX ORDER — BUPROPION HYDROCHLORIDE 150 MG/1
150 TABLET ORAL DAILY
Qty: 90 TABLET | Refills: 1 | Status: SHIPPED | OUTPATIENT
Start: 2025-01-30

## 2025-01-30 RX ORDER — FENOFIBRATE 160 MG/1
160 TABLET ORAL DAILY
Qty: 90 TABLET | Refills: 1 | Status: SHIPPED | OUTPATIENT
Start: 2025-01-30

## 2025-01-30 RX ORDER — VENLAFAXINE 100 MG/1
100 TABLET ORAL 2 TIMES DAILY
Qty: 180 TABLET | Refills: 3 | Status: SHIPPED | OUTPATIENT
Start: 2025-01-30

## 2025-01-30 RX ORDER — AMLODIPINE BESYLATE 2.5 MG/1
2.5 TABLET ORAL DAILY
Qty: 90 TABLET | Refills: 3 | Status: SHIPPED | OUTPATIENT
Start: 2025-01-30

## 2025-01-30 NOTE — TELEPHONE ENCOUNTER
Metformin :  HgA1c in past 6 months   Abilify: No checkmarks available    Rx Refill Note  Requested Prescriptions     Pending Prescriptions Disp Refills    ARIPiprazole (ABILIFY) 5 MG tablet 90 tablet 1     Sig: Take 1 tablet by mouth Daily.    fenofibrate 160 MG tablet 90 tablet 1     Sig: Take 1 tablet by mouth Daily.    buPROPion XL (WELLBUTRIN XL) 150 MG 24 hr tablet 90 tablet 1     Sig: Take 1 tablet by mouth Daily.    amLODIPine (NORVASC) 2.5 MG tablet 90 tablet 3     Sig: Take 1 tablet by mouth Daily.    venlafaxine (EFFEXOR) 100 MG tablet 180 tablet 3     Sig: Take 1 tablet by mouth 2 (Two) Times a Day.    metFORMIN (GLUCOPHAGE) 1000 MG tablet 180 tablet 3     Sig: Take 1 tablet by mouth 2 (Two) Times a Day With Meals.      Last office visit with prescribing clinician: 1/23/2025   Last telemedicine visit with prescribing clinician: Visit date not found   Next office visit with prescribing clinician: 4/23/2025                         Would you like a call back once the refill request has been completed: [] Yes [] No    If the office needs to give you a call back, can they leave a voicemail: [] Yes [] No    Daniel Pena MA  01/30/25, 09:44 EST

## 2025-01-30 NOTE — TELEPHONE ENCOUNTER
LAST COLONOSCOPY-2019/[facility]/ CRISTOBAL REF-SCREENING  NO FAMILY HISTORY  DID NOT JOHNNY ANY  SCHEDULE AT [facility]

## 2025-01-31 DIAGNOSIS — Z86.0101 PERSONAL HISTORY OF ADENOMATOUS AND SERRATED COLON POLYPS: Primary | ICD-10-CM

## 2025-01-31 RX ORDER — ARIPIPRAZOLE 5 MG/1
5 TABLET ORAL DAILY
Qty: 90 TABLET | Refills: 1 | Status: SHIPPED | OUTPATIENT
Start: 2025-01-31

## 2025-02-20 DIAGNOSIS — E66.813 CLASS 3 SEVERE OBESITY WITH SERIOUS COMORBIDITY AND BODY MASS INDEX (BMI) OF 40.0 TO 44.9 IN ADULT, UNSPECIFIED OBESITY TYPE: ICD-10-CM

## 2025-02-20 DIAGNOSIS — G89.29 CHRONIC RIGHT-SIDED LOW BACK PAIN WITHOUT SCIATICA: ICD-10-CM

## 2025-02-20 DIAGNOSIS — E11.65 UNCONTROLLED TYPE 2 DIABETES MELLITUS WITH HYPERGLYCEMIA: ICD-10-CM

## 2025-02-20 DIAGNOSIS — I10 HYPERTENSION, ESSENTIAL: ICD-10-CM

## 2025-02-20 DIAGNOSIS — E78.5 HYPERLIPIDEMIA, UNSPECIFIED HYPERLIPIDEMIA TYPE: ICD-10-CM

## 2025-02-20 DIAGNOSIS — F32.9 REACTIVE DEPRESSION: ICD-10-CM

## 2025-02-20 DIAGNOSIS — M54.50 CHRONIC RIGHT-SIDED LOW BACK PAIN WITHOUT SCIATICA: ICD-10-CM

## 2025-02-20 DIAGNOSIS — E66.01 CLASS 3 SEVERE OBESITY WITH SERIOUS COMORBIDITY AND BODY MASS INDEX (BMI) OF 40.0 TO 44.9 IN ADULT, UNSPECIFIED OBESITY TYPE: ICD-10-CM

## 2025-02-20 RX ORDER — VENLAFAXINE 100 MG/1
100 TABLET ORAL 2 TIMES DAILY
Qty: 180 TABLET | Refills: 1 | Status: SHIPPED | OUTPATIENT
Start: 2025-02-20

## 2025-02-20 RX ORDER — ARIPIPRAZOLE 5 MG/1
5 TABLET ORAL DAILY
Qty: 90 TABLET | Refills: 1 | Status: SHIPPED | OUTPATIENT
Start: 2025-02-20

## 2025-02-20 RX ORDER — GLIMEPIRIDE 2 MG/1
2 TABLET ORAL
Qty: 90 TABLET | Refills: 1 | Status: SHIPPED | OUTPATIENT
Start: 2025-02-20

## 2025-02-20 RX ORDER — AMLODIPINE BESYLATE 2.5 MG/1
2.5 TABLET ORAL DAILY
Qty: 90 TABLET | Refills: 1 | Status: SHIPPED | OUTPATIENT
Start: 2025-02-20

## 2025-02-20 RX ORDER — NALTREXONE HYDROCHLORIDE 50 MG/1
TABLET, FILM COATED ORAL
Qty: 45 TABLET | Refills: 1 | Status: SHIPPED | OUTPATIENT
Start: 2025-02-20

## 2025-02-20 RX ORDER — ROSUVASTATIN CALCIUM 20 MG/1
20 TABLET, COATED ORAL DAILY
Qty: 90 TABLET | Refills: 1 | Status: SHIPPED | OUTPATIENT
Start: 2025-02-20

## 2025-02-20 RX ORDER — BUPROPION HYDROCHLORIDE 150 MG/1
150 TABLET ORAL DAILY
Qty: 90 TABLET | Refills: 1 | Status: SHIPPED | OUTPATIENT
Start: 2025-02-20

## 2025-02-20 RX ORDER — FENOFIBRATE 160 MG/1
160 TABLET ORAL DAILY
Qty: 90 TABLET | Refills: 1 | Status: SHIPPED | OUTPATIENT
Start: 2025-02-20

## 2025-02-20 NOTE — TELEPHONE ENCOUNTER
Caller: [facility] - [place], [place] - [address] [phone] [place] [phone] FX    Relationship: Pharmacy    Best call back number: [phone]     Requested Prescriptions:   Requested Prescriptions     Pending Prescriptions Disp Refills    glimepiride (AMARYL) 2 MG tablet       Sig: Take 1 tablet by mouth.    metFORMIN (GLUCOPHAGE) 1000 MG tablet 180 tablet 1     Sig: Take 1 tablet by mouth 2 (Two) Times a Day With Meals.    diclofenac (VOLTAREN) 50 MG EC tablet 60 tablet 2     Sig: Take 1 tablet by mouth 2 (Two) Times a Day As Needed (low back pain). Take twice daily scheduled for 5-7 days and then try to wean backwards to once a day and then as needed.    naltrexone (DEPADE) 50 MG tablet 45 tablet 1     Sig: Start with 1/4 tablet daily for 1-2 weeks and then move up to 1/2 tablet daily.    rosuvastatin (CRESTOR) 20 MG tablet 30 tablet 11     Sig: Take 1 tablet by mouth Daily.    ARIPiprazole (ABILIFY) 5 MG tablet 90 tablet 1     Sig: Take 1 tablet by mouth Daily.    fenofibrate 160 MG tablet 90 tablet 1     Sig: Take 1 tablet by mouth Daily.    buPROPion XL (WELLBUTRIN XL) 150 MG 24 hr tablet 90 tablet 1     Sig: Take 1 tablet by mouth Daily.    amLODIPine (NORVASC) 2.5 MG tablet 90 tablet 3     Sig: Take 1 tablet by mouth Daily.    venlafaxine (EFFEXOR) 100 MG tablet 180 tablet 3     Sig: Take 1 tablet by mouth 2 (Two) Times a Day.        Pharmacy where request should be sent: [facility] - [place], [place] - [address] [phone] [place] [phone] FX     Last office visit with prescribing clinician: 1/23/2025   Last telemedicine visit with prescribing clinician: Visit date not found   Next office visit with prescribing clinician: 4/23/2025     Additional details provided by patient: PATIENTS MEDS WERE SENT TO THE WRONG PHARMACY ON 1/31/25 AND THEY NEED TO BE RESENT TO THE ONE ABOVE.    PATIENTS PHARMACY ALSO STATES THAT HE IS ON GLIMEPIRIDE 4 MG  INSTEAD OF 2    AND THE PATIENT ALSO TOLD THEM HE IS ON MELOXICAM 15MG DAILY.    PLEASE CALL PATIENT IF FURTHER INFO IS NEEDED    Does the patient have less than a 3 day supply:  [x] Yes  [] No    Meron Hurt Rep   02/20/25 12:20 EST

## 2025-02-20 NOTE — TELEPHONE ENCOUNTER
Some meds are from historical providers and have not been filled by you prior. Others were recently filled and are not due yet or don't have appropriate checkmarks.     Rx Refill Note  Requested Prescriptions     Pending Prescriptions Disp Refills    glimepiride (AMARYL) 2 MG tablet       Sig: Take 1 tablet by mouth.    metFORMIN (GLUCOPHAGE) 1000 MG tablet 180 tablet 1     Sig: Take 1 tablet by mouth 2 (Two) Times a Day With Meals.    diclofenac (VOLTAREN) 50 MG EC tablet 60 tablet 2     Sig: Take 1 tablet by mouth 2 (Two) Times a Day As Needed (low back pain). Take twice daily scheduled for 5-7 days and then try to wean backwards to once a day and then as needed.    naltrexone (DEPADE) 50 MG tablet 45 tablet 1     Sig: Start with 1/4 tablet daily for 1-2 weeks and then move up to 1/2 tablet daily.    rosuvastatin (CRESTOR) 20 MG tablet 30 tablet 11     Sig: Take 1 tablet by mouth Daily.    ARIPiprazole (ABILIFY) 5 MG tablet 90 tablet 1     Sig: Take 1 tablet by mouth Daily.    fenofibrate 160 MG tablet 90 tablet 1     Sig: Take 1 tablet by mouth Daily.    buPROPion XL (WELLBUTRIN XL) 150 MG 24 hr tablet 90 tablet 1     Sig: Take 1 tablet by mouth Daily.    amLODIPine (NORVASC) 2.5 MG tablet 90 tablet 3     Sig: Take 1 tablet by mouth Daily.    venlafaxine (EFFEXOR) 100 MG tablet 180 tablet 3     Sig: Take 1 tablet by mouth 2 (Two) Times a Day.      Last office visit with prescribing clinician: 1/23/2025   Last telemedicine visit with prescribing clinician: Visit date not found   Next office visit with prescribing clinician: 4/23/2025                         Would you like a call back once the refill request has been completed: [] Yes [] No    If the office needs to give you a call back, can they leave a voicemail: [] Yes [] No    Daniel Pena MA  02/20/25, 14:03 EST

## 2025-03-13 ENCOUNTER — TELEPHONE (OUTPATIENT)
Dept: GASTROENTEROLOGY | Facility: CLINIC | Age: 65
End: 2025-03-13
Payer: MEDICARE

## 2025-03-13 NOTE — TELEPHONE ENCOUNTER
Pt called has scope howie for 3-17-25 stated he is having a flare up with his diverticulitis says not bad enough to go to the ER wondering if should still have his procedure?

## 2025-04-07 ENCOUNTER — OFFICE VISIT (OUTPATIENT)
Dept: INTERNAL MEDICINE | Facility: CLINIC | Age: 65
End: 2025-04-07
Payer: MEDICARE

## 2025-04-07 VITALS
HEIGHT: 65 IN | BODY MASS INDEX: 39.99 KG/M2 | SYSTOLIC BLOOD PRESSURE: 116 MMHG | HEART RATE: 60 BPM | OXYGEN SATURATION: 98 % | DIASTOLIC BLOOD PRESSURE: 64 MMHG | TEMPERATURE: 98.2 F | WEIGHT: 240 LBS

## 2025-04-07 DIAGNOSIS — S33.5XXA LOW BACK SPRAIN, INITIAL ENCOUNTER: Primary | ICD-10-CM

## 2025-04-07 RX ORDER — CYCLOBENZAPRINE HCL 10 MG
10 TABLET ORAL 3 TIMES DAILY PRN
Qty: 30 TABLET | Refills: 0 | Status: SHIPPED | OUTPATIENT
Start: 2025-04-07

## 2025-04-07 RX ORDER — HYDROCODONE BITARTRATE AND ACETAMINOPHEN 5; 325 MG/1; MG/1
1-2 TABLET ORAL EVERY 6 HOURS PRN
Qty: 30 TABLET | Refills: 0 | Status: SHIPPED | OUTPATIENT
Start: 2025-04-07

## 2025-04-07 NOTE — PROGRESS NOTES
Subjective   Lloyd Greene is a 64 y.o. male presenting today for follow up of   Chief Complaint   Patient presents with    Back Pain     Right side, lumbar back pain. Started yesterday afternoon while he bent over trimming toe nails. Had a sudden sharp pain. States ROM has improved just slightly from yesterday. Hx of chronic back pain but says it has never hurt this bad before.        History of Present Illness     This is a 63 yo male patient who sees Dr. Cates.  He has a history of low back pain, s/p laminectomy 5 years ago.  He also has a history of HTN, DMII, hyperlipidema.  He presents with acute low back pain.  He was bending over to clip his toenails yesterday and felt a sudden sharp pain in the right low back.  He has been unable to move much since then without significant pain.  He is feeling a little better this morning.  He took naproxen and acetaminophen with minimal improvement.  Denies numbness, tingling, bowel/bladder changes, radiating pain down the leg.    Patient Active Problem List   Diagnosis    Other chronic pain    Angio-edema    Adenomatous polyp of colon    Type 2 diabetes mellitus without complication, with long-term current use of insulin    Familial hypercholesterolemia    Primary hypertension    Type 2 diabetes mellitus with diabetic polyneuropathy, without long-term current use of insulin    DDD (degenerative disc disease), lumbar    Lumbar stenosis with neurogenic claudication    Spinal stenosis of lumbar region with neurogenic claudication    Sleep apnea    Hyponatremia    Encounter to establish care with new doctor    Class 3 severe obesity with serious comorbidity and body mass index (BMI) of 40.0 to 44.9 in adult    Primary insomnia    Nocturnal foot cramps    Mixed hyperlipidemia    Diverticulitis    History of smoking    Kidney stone    Personal history of adenomatous and serrated colon polyps       Current Outpatient Medications on File Prior to Visit   Medication Sig     amLODIPine (NORVASC) 2.5 MG tablet Take 1 tablet by mouth Daily.    ARIPiprazole (ABILIFY) 5 MG tablet Take 1 tablet by mouth Daily.    Blood Glucose Monitoring Suppl (TRUE METRIX AIR GLUCOSE METER) device 1 Device Daily.    buPROPion XL (WELLBUTRIN XL) 150 MG 24 hr tablet Take 1 tablet by mouth Daily.    Cholecalciferol (VITAMIN D3) 125 MCG (5000 UT) capsule capsule Take 1 capsule by mouth Daily.    diclofenac (VOLTAREN) 50 MG EC tablet Take 1 tablet by mouth 2 (Two) Times a Day As Needed (low back pain). Take twice daily scheduled for 5-7 days and then try to wean backwards to once a day and then as needed.    fenofibrate 160 MG tablet Take 1 tablet by mouth Daily.    Flaxseed, Linseed, 1000 MG capsule Take  by mouth.    glimepiride (AMARYL) 2 MG tablet Take 1 tablet by mouth Every Morning Before Breakfast.    Insulin Glargine (BASAGLAR KWIKPEN) 100 UNIT/ML injection pen 70 Units Daily.    Jardiance 25 MG tablet tablet     [employer] Pen Needles 31G X 6 MM misc     metFORMIN (GLUCOPHAGE) 1000 MG tablet Take 1 tablet by mouth 2 (Two) Times a Day With Meals.    naltrexone (DEPADE) 50 MG tablet Start with 1/4 tablet daily for 1-2 weeks and then move up to 1/2 tablet daily.    rosuvastatin (CRESTOR) 20 MG tablet Take 1 tablet by mouth Daily.    True Metrix Blood Glucose Test test strip TEST BLOOD SUGAR EVERY DAY    TRUEplus Lancets 28G misc TEST BLOOD SUGAR TWO TIMES DAILY    venlafaxine (EFFEXOR) 100 MG tablet Take 1 tablet by mouth 2 (Two) Times a Day.    multivitamin with minerals tablet tablet Take 1 tablet by mouth Daily.     No current facility-administered medications on file prior to visit.          The following portions of the patient's history were reviewed and updated as appropriate: allergies, current medications, past family history, past medical history, past social history, past surgical history and problem list.    Review of Systems   Constitutional: Negative.    Gastrointestinal: Negative.   "  Genitourinary: Negative.    Musculoskeletal:  Positive for back pain.   Neurological:  Negative for weakness and numbness.       Objective   Vitals:    04/07/25 0957   BP: 116/64   BP Location: Left arm   Patient Position: Sitting   Cuff Size: Adult   Pulse: 60   Temp: 98.2 °F (36.8 °C)   TempSrc: Infrared   SpO2: 98%   Weight: 109 kg (240 lb)   Height: 165.1 cm (65\")       BP Readings from Last 3 Encounters:   04/07/25 116/64   01/23/25 112/60   10/18/24 120/60        Wt Readings from Last 3 Encounters:   04/07/25 109 kg (240 lb)   01/23/25 112 kg (247 lb)   10/18/24 109 kg (240 lb)        Body mass index is 39.94 kg/m².  Nursing notes and vitals reviewed.    Physical Exam  Vitals and nursing note reviewed.   Constitutional:       Appearance: He is not ill-appearing.      Comments: Sitting in wheelchair.   HENT:      Head: Normocephalic and atraumatic.      Mouth/Throat:      Mouth: Mucous membranes are moist.   Eyes:      Extraocular Movements: Extraocular movements intact.      Conjunctiva/sclera: Conjunctivae normal.   Pulmonary:      Effort: Pulmonary effort is normal. No respiratory distress.   Musculoskeletal:      Lumbar back: Spasms and tenderness present. Decreased range of motion.        Back:    Neurological:      General: No focal deficit present.      Mental Status: He is alert and oriented to person, place, and time.      Deep Tendon Reflexes: Reflexes normal.   Psychiatric:         Mood and Affect: Mood normal.         Behavior: Behavior normal.         No results found for this or any previous visit (from the past 4 weeks).      Assessment & Plan   Diagnoses and all orders for this visit:    1. Low back sprain, initial encounter (Primary)  -     cyclobenzaprine (FLEXERIL) 10 MG tablet; Take 1 tablet by mouth 3 (Three) Times a Day As Needed for Muscle Spasms.  Dispense: 30 tablet; Refill: 0  -     HYDROcodone-acetaminophen (NORCO) 5-325 MG per tablet; Take 1-2 tablets by mouth Every 6 (Six) Hours " As Needed for Moderate Pain or Severe Pain.  Dispense: 30 tablet; Refill: 0      Warning s/sx and anticipatory guidance discussed with patient.  Medication and gentle ROM for symptom control.  He takes diclofenac daily and I instructed him to discontinue naproxen.  He has f/u with Dr. Cates in 2 weeks.        Medications, including side effects, were discussed with the patient. Patient verbalized understanding.  The plan of care was discussed. All questions were answered. Patient verbalized understanding.      Return for Next scheduled follow up.

## 2025-04-23 ENCOUNTER — OFFICE VISIT (OUTPATIENT)
Dept: INTERNAL MEDICINE | Facility: CLINIC | Age: 65
End: 2025-04-23
Payer: MEDICARE

## 2025-04-23 VITALS
OXYGEN SATURATION: 98 % | DIASTOLIC BLOOD PRESSURE: 68 MMHG | HEIGHT: 65 IN | RESPIRATION RATE: 18 BRPM | WEIGHT: 241.4 LBS | BODY MASS INDEX: 40.22 KG/M2 | HEART RATE: 85 BPM | TEMPERATURE: 98 F | SYSTOLIC BLOOD PRESSURE: 120 MMHG

## 2025-04-23 DIAGNOSIS — M54.50 CHRONIC RIGHT-SIDED LOW BACK PAIN WITHOUT SCIATICA: ICD-10-CM

## 2025-04-23 DIAGNOSIS — Z87.891 HISTORY OF SMOKING GREATER THAN 50 PACK YEARS: ICD-10-CM

## 2025-04-23 DIAGNOSIS — F51.01 PRIMARY INSOMNIA: ICD-10-CM

## 2025-04-23 DIAGNOSIS — Z79.4 TYPE 2 DIABETES MELLITUS WITHOUT COMPLICATION, WITH LONG-TERM CURRENT USE OF INSULIN: ICD-10-CM

## 2025-04-23 DIAGNOSIS — G47.33 OSA (OBSTRUCTIVE SLEEP APNEA): Primary | ICD-10-CM

## 2025-04-23 DIAGNOSIS — E11.9 TYPE 2 DIABETES MELLITUS WITHOUT COMPLICATION, WITH LONG-TERM CURRENT USE OF INSULIN: ICD-10-CM

## 2025-04-23 DIAGNOSIS — G89.29 CHRONIC RIGHT-SIDED LOW BACK PAIN WITHOUT SCIATICA: ICD-10-CM

## 2025-04-23 NOTE — PROGRESS NOTES
"Chief Complaint  Insomnia, Obesity, and Back Pain (Low back)    Subjective        Lloyd Greene presents to [facility]  History of Present Illness    Stopped naltrexone as it was ineffective    Encouraged moving sleep appt up for cpap check    Encouraged diabetes educator/nutritionist but not interested at this time  Encouraged low intensity exercise but states his back hurts, wants to go see his neurosurgeon again.  He is not interested in PT at this time.      Objective   Vital Signs:  /68 (BP Location: Left arm, Patient Position: Sitting, Cuff Size: Large Adult)   Pulse 85   Temp 98 °F (36.7 °C) (Infrared)   Resp 18   Ht 165.1 cm (65\")   Wt 109 kg (241 lb 6.4 oz)   SpO2 98%   BMI 40.17 kg/m²   Estimated body mass index is 40.17 kg/m² as calculated from the following:    Height as of this encounter: 165.1 cm (65\").    Weight as of this encounter: 109 kg (241 lb 6.4 oz).            Physical Exam  Vitals reviewed.   Constitutional:       General: He is not in acute distress.     Appearance: Normal appearance.   HENT:      Head: Normocephalic and atraumatic.      Nose: Nose normal.      Mouth/Throat:      Mouth: Mucous membranes are moist.   Eyes:      Conjunctiva/sclera: Conjunctivae normal.   Pulmonary:      Effort: Pulmonary effort is normal.   Skin:     General: Skin is warm and dry.   Neurological:      General: No focal deficit present.      Mental Status: He is alert.   Psychiatric:         Mood and Affect: Mood normal.        Result Review :           Assessment and Plan   Diagnoses and all orders for this visit:    1. GREGORY (obstructive sleep apnea) (Primary)    2. History of smoking greater than 50 pack years  -      CT Chest Low Dose Cancer Screening WO    3. Primary insomnia    4. Type 2 diabetes mellitus without complication, with long-term current use of insulin  -     Microalbumin / Creatinine Urine Ratio - Urine, Clean Catch    5. Chronic right-sided low " back pain without sciatica  -     Ambulatory Referral to Neurosurgery             Follow Up   Return in about 3 months (around 7/23/2025) for f/u diabetes, insomnia.  Patient was given instructions and counseling regarding his condition or for health maintenance advice. Please see specific information pulled into the AVS if appropriate.

## 2025-04-24 LAB
ALBUMIN/CREAT UR: 90 MG/G CREAT (ref 0–29)
CREAT UR-MCNC: 52.2 MG/DL
MICROALBUMIN UR-MCNC: 46.8 UG/ML

## 2025-05-01 ENCOUNTER — ANESTHESIA EVENT (OUTPATIENT)
Dept: PERIOP | Facility: HOSPITAL | Age: 65
End: 2025-05-01
Payer: MEDICARE

## 2025-05-05 ENCOUNTER — HOSPITAL ENCOUNTER (OUTPATIENT)
Facility: HOSPITAL | Age: 65
Setting detail: HOSPITAL OUTPATIENT SURGERY
Discharge: HOME | End: 2025-05-05
Attending: INTERNAL MEDICINE | Admitting: INTERNAL MEDICINE
Payer: MEDICARE

## 2025-05-05 ENCOUNTER — ANESTHESIA (OUTPATIENT)
Dept: PERIOP | Facility: HOSPITAL | Age: 65
End: 2025-05-05
Payer: MEDICARE

## 2025-05-05 VITALS
BODY MASS INDEX: 39.22 KG/M2 | DIASTOLIC BLOOD PRESSURE: 99 MMHG | SYSTOLIC BLOOD PRESSURE: 173 MMHG | RESPIRATION RATE: 14 BRPM | HEART RATE: 60 BPM | OXYGEN SATURATION: 93 % | HEIGHT: 65 IN | TEMPERATURE: 98.1 F | WEIGHT: 235.4 LBS

## 2025-05-05 LAB — GLUCOSE BLDC GLUCOMTR-MCNC: 164 MG/DL (ref 70–130)

## 2025-05-05 PROCEDURE — 82948 REAGENT STRIP/BLOOD GLUCOSE: CPT

## 2025-05-05 PROCEDURE — 25810000003 LACTATED RINGERS PER 1000 ML: Performed by: NURSE ANESTHETIST, CERTIFIED REGISTERED

## 2025-05-05 PROCEDURE — 25010000002 LIDOCAINE 2% SOLUTION: Performed by: NURSE ANESTHETIST, CERTIFIED REGISTERED

## 2025-05-05 PROCEDURE — 25010000002 PROPOFOL 200 MG/20ML EMULSION: Performed by: NURSE ANESTHETIST, CERTIFIED REGISTERED

## 2025-05-05 RX ORDER — SODIUM CHLORIDE 0.9 % (FLUSH) 0.9 %
10 SYRINGE (ML) INJECTION AS NEEDED
Status: DISCONTINUED | OUTPATIENT
Start: 2025-05-05 | End: 2025-05-05 | Stop reason: HOSPADM

## 2025-05-05 RX ORDER — DIPHENHYDRAMINE HYDROCHLORIDE 50 MG/ML
12.5 INJECTION, SOLUTION INTRAMUSCULAR; INTRAVENOUS
Status: DISCONTINUED | OUTPATIENT
Start: 2025-05-05 | End: 2025-05-05 | Stop reason: HOSPADM

## 2025-05-05 RX ORDER — LIDOCAINE HYDROCHLORIDE 10 MG/ML
0.5 INJECTION, SOLUTION EPIDURAL; INFILTRATION; INTRACAUDAL; PERINEURAL ONCE AS NEEDED
Status: DISCONTINUED | OUTPATIENT
Start: 2025-05-05 | End: 2025-05-05 | Stop reason: HOSPADM

## 2025-05-05 RX ORDER — LIDOCAINE HYDROCHLORIDE 20 MG/ML
INJECTION, SOLUTION INFILTRATION; PERINEURAL AS NEEDED
Status: DISCONTINUED | OUTPATIENT
Start: 2025-05-05 | End: 2025-05-05 | Stop reason: SURG

## 2025-05-05 RX ORDER — ONDANSETRON 2 MG/ML
4 INJECTION INTRAMUSCULAR; INTRAVENOUS ONCE AS NEEDED
Status: DISCONTINUED | OUTPATIENT
Start: 2025-05-05 | End: 2025-05-05 | Stop reason: HOSPADM

## 2025-05-05 RX ORDER — SODIUM CHLORIDE, SODIUM LACTATE, POTASSIUM CHLORIDE, CALCIUM CHLORIDE 600; 310; 30; 20 MG/100ML; MG/100ML; MG/100ML; MG/100ML
9 INJECTION, SOLUTION INTRAVENOUS CONTINUOUS
Status: DISCONTINUED | OUTPATIENT
Start: 2025-05-05 | End: 2025-05-05 | Stop reason: HOSPADM

## 2025-05-05 RX ORDER — PROPOFOL 10 MG/ML
INJECTION, EMULSION INTRAVENOUS AS NEEDED
Status: DISCONTINUED | OUTPATIENT
Start: 2025-05-05 | End: 2025-05-05 | Stop reason: SURG

## 2025-05-05 RX ADMIN — PROPOFOL 100 MG: 10 INJECTION, EMULSION INTRAVENOUS at 08:57

## 2025-05-05 RX ADMIN — PROPOFOL 50 MG: 10 INJECTION, EMULSION INTRAVENOUS at 09:10

## 2025-05-05 RX ADMIN — PROPOFOL 50 MG: 10 INJECTION, EMULSION INTRAVENOUS at 09:00

## 2025-05-05 RX ADMIN — LIDOCAINE HYDROCHLORIDE 80 MG: 20 INJECTION, SOLUTION INFILTRATION; PERINEURAL at 08:57

## 2025-05-05 RX ADMIN — PROPOFOL 50 MG: 10 INJECTION, EMULSION INTRAVENOUS at 09:05

## 2025-05-05 RX ADMIN — SODIUM CHLORIDE, POTASSIUM CHLORIDE, SODIUM LACTATE AND CALCIUM CHLORIDE: 600; 310; 30; 20 INJECTION, SOLUTION INTRAVENOUS at 08:53

## 2025-05-05 NOTE — BRIEF OP NOTE
COLONOSCOPY  Progress Note    Lloyd Greene  5/5/2025    Pre-op Diagnosis:   Personal history of adenomatous and serrated colon polyps [Z86.0101]       Post-Op Diagnosis Codes:     * Personal history of adenomatous and serrated colon polyps [Z86.0101]     * S/P left colectomy [Z90.49]     * Diverticulosis [K57.90]    Procedure(s):      Procedure(s):  COLONOSCOPY              Surgeon(s):  Tin Giang MD    Anesthesia: Monitored Anesthesia Care    Staff:   Circulator: Valerie Roche RN  Scrub Person: Frances Robbins       Estimated Blood Loss: none    Urine Voided: * No values recorded between 5/5/2025  8:53 AM and 5/5/2025  9:15 AM *    Specimens:                None      Drains: * No LDAs found *    Findings: Colon to TI POOR PREP  S/P Sigmoid Resection End to end   Rare residual diverticulum      Complications: None          Tin Giang MD     Date: 5/5/2025  Time: 09:16 EDT

## 2025-05-05 NOTE — ANESTHESIA PREPROCEDURE EVALUATION
" Anesthesia Evaluation     Patient summary reviewed and Nursing notes reviewed   history of anesthetic complications (\"stopped breathing last colonoscopy because of sleep apnea\"):   NPO Solid Status: > 8 hours  NPO Liquid Status: > 8 hours           Airway   Mallampati: II  TM distance: <3 FB  Neck ROM: full  Possible difficult intubation and Large neck circumference  Dental      Comment: Scattered fillings  1 chipped tooth      Pulmonary    (+) a smoker (quit smoking 12 years ago, quit vaping 4 years ago) Former, vape and cigarettes, COPD (mild) mild,sleep apnea on CPAP  Cardiovascular   Exercise tolerance: good (4-7 METS)    ECG reviewed  Rhythm: regular  Rate: normal    (+) hypertension (amlodipine) well controlled less than 2 medications, dysrhythmias (h/o RBBB), hyperlipidemia (crestor)      Neuro/Psych  (+) TIA (\"many years ago\" no residual issues. 2018), psychiatric history Depression  (-) numbness (denies)  GI/Hepatic/Renal/Endo    (+) morbid obesity, GERD (\"rarely\") well controlled, renal disease- stones, diabetes mellitus type 2    ROS Comment: Diverticulitis (h/o colostomy but since been reversed)  Incisional hernia repair        Musculoskeletal         ROS comment: L2-5 lumbar fusion 2020    Abdominal    Substance History   (-) alcohol use, drug use     OB/GYN          Other   arthritis,     ROS/Med Hx Other: R ankle ORIF                    Anesthesia Plan    ASA 3     MAC       Anesthetic plan, risks, benefits, and alternatives have been provided, discussed and informed consent has been obtained with: patient.  Pre-procedure education provided  Use of blood products discussed with patient  Consented to blood products.    Plan discussed with CRNA.        CODE STATUS:         "

## 2025-05-05 NOTE — H&P
"Patient Care Team:  Monica Cates MD as PCP - General (Internal Medicine)  Barry Jacobson DO (Orthopedic Surgery)  Kathy Aguilar APRN (Endocrinology)    CHIEF COMPLAINT: Personal hx colon polyps    HISTORY OF PRESENT ILLNESS:  Last exam was 2019    Past Medical History:   Diagnosis Date    Anesthesia complication     PATIENT STATES \"STOPPED BREATHING DURING COLONOSCOPY APPROX 4-5 YRS AGO\".    Anxiety     Arthritis     Colon polyp     COPD (chronic obstructive pulmonary disease)     Depression     Diabetes mellitus     Diverticulitis     Diverticulitis of colon 2006    Diverticulosis     Elevated cholesterol     Erectile dysfunction 2020    Hernia 2011    History of TIA (transient ischemic attack)     2018    Hyperlipidemia     Hypertension     Kidney stone 02/16/2023    Low back pain     Numbness and tingling     BILATERAL LEGS RIGHT LEG WORSE    Obesity     Sleep apnea     uses cpap occasionally    Stroke     TIA (transient ischemic attack)      Past Surgical History:   Procedure Laterality Date    ABDOMINAL SURGERY  2006    Ruptured Diverticuli  colon resection    COLON SURGERY      COLONOSCOPY  2014    COLONOSCOPY N/A 04/12/2019    Procedure: COLONOSCOPY w/ polypectomy;  Surgeon: Tin Giang MD;  Location: [facility];  Service: Gastroenterology    COLOSTOMY      Dr. Gupta    COLOSTOMY REVISION  07/2006    Diverticulitis-Dr. Gupta    INCISIONAL HERNIA REPAIR  2011    LUMBAR LAMINECTOMY WITH FUSION N/A 07/20/2020    Procedure: Lumbar 2 to lumbar 3, lumbar 3 to lumbar 4 and lumbar 4 lumbar 5 laminectomy with a fusion by orthopedics;  Surgeon: John Tran MD;  Location: [facility];  Service: Neurosurgery;  Laterality: N/A;    LUMBAR LAMINECTOMY WITH FUSION N/A 07/20/2020    Procedure: Lumbar 2 to lumbar 5 fusion with instrumentation and laminectomy by Dr. Hutchinson;  Surgeon: Melvin Ya MD;  Location: [facility] OR;  Service: Neurosurgery;  Laterality: N/A;    ORIF ANKLE FRACTURE " Right 1995    SPINE SURGERY  2020     Family History   Problem Relation Age of Onset    Depression Mother     Hypertension Mother     Arthritis Mother     Depression Father     Kidney disease Father     COPD Father     Hypertension Father     Hearing loss Father     Asthma Father     Miscarriages / Stillbirths Daughter     Colon cancer Neg Hx     Colon polyps Neg Hx     Malig Hyperthermia Neg Hx      Social History     Tobacco Use    Smoking status: Former     Current packs/day: 2.00     Average packs/day: 2.0 packs/day for 35.0 years (70.0 ttl pk-yrs)     Types: Cigarettes, Electronic Cigarette    Smokeless tobacco: Never   Vaping Use    Vaping status: Never Used   Substance Use Topics    Alcohol use: Yes     Comment: Rarely consume alcohol    Drug use: No     Medications Prior to Admission   Medication Sig Dispense Refill Last Dose/Taking    amLODIPine (NORVASC) 2.5 MG tablet Take 1 tablet by mouth Daily. 90 tablet 1 5/5/2025 at  6:30 AM    ARIPiprazole (ABILIFY) 5 MG tablet Take 1 tablet by mouth Daily. 90 tablet 1 5/5/2025 at  6:30 AM    Blood Glucose Monitoring Suppl (TRUE METRIX AIR GLUCOSE METER) device 1 Device Daily. 1 Device 0 5/4/2025    buPROPion XL (WELLBUTRIN XL) 150 MG 24 hr tablet Take 1 tablet by mouth Daily. 90 tablet 1 5/5/2025 at  6:30 AM    Cholecalciferol (VITAMIN D3) 125 MCG (5000 UT) capsule capsule Take 1 capsule by mouth Daily.   5/4/2025 at  9:00 PM    diclofenac (VOLTAREN) 50 MG EC tablet Take 1 tablet by mouth 2 (Two) Times a Day As Needed (low back pain). Take twice daily scheduled for 5-7 days and then try to wean backwards to once a day and then as needed. 60 tablet 2 5/5/2025 at  6:30 AM    fenofibrate 160 MG tablet Take 1 tablet by mouth Daily. 90 tablet 1 5/5/2025 at  6:30 AM    Flaxseed, Linseed, 1000 MG capsule Take  by mouth.   5/4/2025 at  9:00 PM    glimepiride (AMARYL) 2 MG tablet Take 1 tablet by mouth Every Morning Before Breakfast. 90 tablet 1 5/5/2025 at  6:30 AM     "Insulin Glargine (BASAGLAR KWIKPEN) 100 UNIT/ML injection pen 70 Units Daily.   5/5/2025 at  6:30 AM    Jardiance 25 MG tablet tablet    5/5/2025 at  6:30 AM    [employer] Pen Needles 31G X 6 MM misc    5/4/2025    metFORMIN (GLUCOPHAGE) 1000 MG tablet Take 1 tablet by mouth 2 (Two) Times a Day With Meals. 180 tablet 1 5/5/2025 at  6:30 AM    multivitamin with minerals tablet tablet Take 1 tablet by mouth Daily.   5/4/2025 at  9:00 PM    rosuvastatin (CRESTOR) 20 MG tablet Take 1 tablet by mouth Daily. 90 tablet 1 5/4/2025 at  9:00 PM    True Metrix Blood Glucose Test test strip TEST BLOOD SUGAR EVERY  each 0 5/4/2025    TRUEplus Lancets 28G misc TEST BLOOD SUGAR TWO TIMES DAILY 200 each 2 5/4/2025    venlafaxine (EFFEXOR) 100 MG tablet Take 1 tablet by mouth 2 (Two) Times a Day. 180 tablet 1 5/5/2025 at  6:30 AM     Allergies:  Lisinopril, Tamiflu [oseltamivir phosphate], and Oseltamivir    REVIEW OF SYSTEMS:  Please see the above history of present illness for pertinent positives and negatives.  The remainder of the patient's systems have been reviewed and are negative.     Vital Signs  Temp:  [98.4 °F (36.9 °C)] 98.4 °F (36.9 °C)  Heart Rate:  [70] 70  Resp:  [14] 14  BP: (145)/(70) 145/70    Flowsheet Rows      Flowsheet Row First Filed Value   Admission Height 165.1 cm (65\") Documented at 05/05/2025 0757   Admission Weight 107 kg (235 lb 6.4 oz) Documented at 05/05/2025 0757             Physical Exam:  Physical Exam   Constitutional: Patient appears well-developed and well-nourished and in no acute distress   HEENT:   Head: Normocephalic and atraumatic.   Eyes:  Pupils are equal, round, and reactive to light. EOM are intact. Sclerae are anicteric and non-injected.  Mouth and Throat: Patient has moist mucous membranes. Oropharynx is clear of any erythema or exudate.     Neck: Neck supple. No JVD present. No thyromegaly present. No lymphadenopathy present.  Cardiovascular: Regular rate, regular rhythm, S1 " normal and S2 normal.  Exam reveals no gallop and no friction rub.  No murmur heard.  Pulmonary/Chest: Lungs are clear to auscultation bilaterally. No respiratory distress. No wheezes. No rhonchi. No rales.   Abdominal: Soft. Bowel sounds are normal. No distension and no mass. There is no hepatosplenomegaly. There is no tenderness.   Musculoskeletal: Normal Muscle tone  Extremities: No edema. Pulses are palpable in all 4 extremities.  Neurological: Patient is alert and oriented to person, place, and time. Cranial nerves II-XII are grossly intact with no focal deficits.  Skin: Skin is warm. No rash noted. Nails show no clubbing.  No cyanosis or erythema.    Debilities/Disabilities Identified: None  Emotional Behavior: Appropriate     Results Review:   I reviewed the patient's new clinical results.    Lab Results (most recent)       Procedure Component Value Units Date/Time    POC Glucose Once [[id number]]  (Abnormal) Collected: 05/05/25 0813    Specimen: Blood Updated: 05/05/25 0819     Glucose 164 mg/dL             Imaging Results (Most Recent)       None          reviewed    ECG/EMG Results (most recent)       None          reviewed    Assessment & Plan   Personal hx colon polyps/  colonoscopy      I discussed the patient's findings and my recommendations with patient.     Tin Giang MD  05/05/25  08:54 EDT    Time: 10 min prior to procedure.

## 2025-05-05 NOTE — ANESTHESIA POSTPROCEDURE EVALUATION
Patient: Lloyd Greene    Procedure Summary       Date: 05/05/25 Room / Location: [facility] ENDOSCOPY 1 /  [facility]    Anesthesia Start: 0853 Anesthesia Stop: 0913    Procedure: COLONOSCOPY Diagnosis:       Personal history of adenomatous and serrated colon polyps      S/P left colectomy      Diverticulosis      (Personal history of adenomatous and serrated colon polyps [Z86.0101])    Surgeons: Tin Giang MD Provider: Tim Dumont CRNA    Anesthesia Type: MAC ASA Status: 3            Anesthesia Type: MAC    Vitals  Vitals Value Taken Time   /89 05/05/25 09:50   Temp 98.1 °F (36.7 °C) 05/05/25 09:16   Pulse 62 05/05/25 09:54   Resp 14 05/05/25 09:40   SpO2 95 % 05/05/25 09:54   Vitals shown include unfiled device data.        Post Anesthesia Care and Evaluation    Patient location during evaluation: PHASE II  Patient participation: complete - patient participated  Level of consciousness: awake and alert  Pain score: 0  Pain management: adequate    Airway patency: patent  Anesthetic complications: No anesthetic complications  PONV Status: none  Cardiovascular status: acceptable  Respiratory status: acceptable  Hydration status: acceptable

## 2025-05-06 ENCOUNTER — TELEPHONE (OUTPATIENT)
Dept: GASTROENTEROLOGY | Facility: CLINIC | Age: 65
End: 2025-05-06
Payer: MEDICARE

## 2025-05-06 NOTE — TELEPHONE ENCOUNTER
----- Message from Tin Giang sent at 5/5/2025  9:51 AM EDT -----  Had Polyps in the past but this was poorly prepped normal exam- recall in 5 years with 2 day Prep please, Thx Welcome back

## 2025-05-20 ENCOUNTER — PATIENT MESSAGE (OUTPATIENT)
Dept: INTERNAL MEDICINE | Facility: CLINIC | Age: 65
End: 2025-05-20
Payer: MEDICARE

## 2025-05-20 DIAGNOSIS — M79.673 PAIN OF FOOT, UNSPECIFIED LATERALITY: Primary | ICD-10-CM

## 2025-06-03 ENCOUNTER — OFFICE VISIT (OUTPATIENT)
Dept: NEUROSURGERY | Facility: CLINIC | Age: 65
End: 2025-06-03
Payer: MEDICARE

## 2025-06-03 DIAGNOSIS — M48.062 SPINAL STENOSIS OF LUMBAR REGION WITH NEUROGENIC CLAUDICATION: Primary | ICD-10-CM

## 2025-06-03 PROCEDURE — 99203 OFFICE O/P NEW LOW 30 MIN: CPT | Performed by: NEUROLOGICAL SURGERY

## 2025-06-03 NOTE — PROGRESS NOTES
Subjective   Patient ID: Lloyd Greene is a 64 y.o. male is being seen for consultation today at the request of Monica Cates MD for chronic R sided low back pain. No recent imaging    Treatment: No recent treatment     Patient state that he has R sided low back pain, patient denies B/L leg pain, N/T.     History of Present Illness    This patient was last seen in 2020.  He had an L2-L5 laminectomy with fusion by orthopedics.  He was doing well but over the past year or 2 has developed a lot of pain in his right lower back mostly.  There may be some pain on the left side as well.  He has no radiation into his legs.  He has had no recent treatment or imaging.    The following portions of the patient's history were reviewed and updated as appropriate: allergies, current medications, past family history, past medical history, past social history, past surgical history, and problem list.    Review of Systems   Constitutional:  Negative for chills and fever.   HENT:  Negative for congestion.    Musculoskeletal:  Positive for back pain. Negative for gait problem and myalgias.   Neurological:  Negative for weakness and numbness.         Objective     There were no vitals filed for this visit.  There is no height or weight on file to calculate BMI.    Tobacco Use: Medium Risk (6/3/2025)    Patient History     Smoking Tobacco Use: Former     Smokeless Tobacco Use: Never     Passive Exposure: Not on file          Physical Exam    Constitutional:       Appearance: She is well-developed.   HENT:      Head: Normocephalic and atraumatic.   Eyes:      General: Lids are normal.      Extraocular Movements: Extraocular movements intact.      Conjunctiva/sclera: Conjunctivae normal.      Pupils: Pupils are equal, round, and reactive to light.   Neck:      Vascular: No carotid bruit.   Neurological:      Motor: Motor strength is normal.     Coordination: Coordination is intact.      Deep Tendon Reflexes:      Reflex Scores:        Tricep reflexes are 2+ on the right side and 2+ on the left side.       Bicep reflexes are 2+ on the right side and 2+ on the left side.       Brachioradialis reflexes are 2+ on the right side and 2+ on the left side.       Patellar reflexes are 2+ on the right side and 2+ on the left side.       Achilles reflexes are 2+ on the right side and 2+ on the left side.    Neurological Exam    Mental Status  Awake, alert and oriented to person, place and time. Oriented to person, place and time.    Cranial Nerves  CN II: Visual acuity is normal. Visual fields full to confrontation.  CN III, IV, VI: Extraocular movements intact bilaterally. Normal lids and orbits bilaterally. Pupils equal round and reactive to light bilaterally.  CN V: Facial sensation is normal.  CN VII: Full and symmetric facial movement.  CN IX, X: Palate elevates symmetrically. Normal gag reflex.  CN XI: Shoulder shrug strength is normal.  CN XII: Tongue midline without atrophy or fasciculations.    Motor   Strength is 5/5 throughout all four extremities.    Sensory  Sensation is intact to light touch, pinprick, vibration and proprioception in all four extremities.    Reflexes                                            Right                      Left  Brachioradialis                    2+                         2+  Biceps                                 2+                         2+  Triceps                                2+                         2+  Finger flex                           2+                         2+  Hamstring                            2+                         2+  Patellar                                2+                         2+  Achilles                                2+                         2+    Coordination    Finger-to-nose, rapid alternating movements and heel-to-shin normal bilaterally without dysmetria.    Gait  Normal casual, toe, heel and tandem gait.    Assessment & Plan   Independent Review of Radiographic  Studies:      I personally reviewed the images from the following studies.    There is no new imaging to review    Medical Decision Making:      I told the patient that from my point of view I think we need to get some imaging of his lumbar spine.  To that end I suggested a lumbar MRI and lumbar plain films.  I will see him back when those have been completed.    Diagnoses and all orders for this visit:    1. Spinal stenosis of lumbar region with neurogenic claudication (Primary)  -     XR Spine Lumbar Complete With Flex & Ext; Future  -     MRI Lumbar Spine With & Without Contrast; Future      Return for After radiology test.

## 2025-06-04 ENCOUNTER — PATIENT ROUNDING (BHMG ONLY) (OUTPATIENT)
Dept: NEUROSURGERY | Facility: CLINIC | Age: 65
End: 2025-06-04
Payer: MEDICARE

## 2025-06-20 ENCOUNTER — HOSPITAL ENCOUNTER (OUTPATIENT)
Dept: CT IMAGING | Facility: HOSPITAL | Age: 65
Discharge: HOME | End: 2025-06-20
Payer: MEDICARE

## 2025-06-20 PROCEDURE — 71271 CT THORAX LUNG CANCER SCR C-: CPT

## 2025-06-30 ENCOUNTER — HOSPITAL ENCOUNTER (OUTPATIENT)
Dept: GENERAL RADIOLOGY | Facility: HOSPITAL | Age: 65
Discharge: HOME | End: 2025-06-30
Admitting: NEUROLOGICAL SURGERY
Payer: MEDICARE

## 2025-06-30 ENCOUNTER — TELEPHONE (OUTPATIENT)
Dept: NEUROSURGERY | Facility: CLINIC | Age: 65
End: 2025-06-30
Payer: MEDICARE

## 2025-06-30 DIAGNOSIS — M48.062 SPINAL STENOSIS OF LUMBAR REGION WITH NEUROGENIC CLAUDICATION: Primary | ICD-10-CM

## 2025-06-30 DIAGNOSIS — M48.062 SPINAL STENOSIS OF LUMBAR REGION WITH NEUROGENIC CLAUDICATION: ICD-10-CM

## 2025-06-30 PROCEDURE — 72114 X-RAY EXAM L-S SPINE BENDING: CPT

## 2025-06-30 NOTE — TELEPHONE ENCOUNTER
[facility] called patient has not had any PT or imaging completed . Spoke to patient he will go to [facility] to have imaging completed. Can we order PT for patient to go to [facility] [[email or web address]] in [place], [place]? Then we can send him to MRI after it is completed.

## 2025-07-08 ENCOUNTER — TELEMEDICINE (OUTPATIENT)
Dept: NEUROSURGERY | Facility: CLINIC | Age: 65
End: 2025-07-08
Payer: MEDICARE

## 2025-07-08 DIAGNOSIS — M48.062 SPINAL STENOSIS OF LUMBAR REGION WITH NEUROGENIC CLAUDICATION: Primary | ICD-10-CM

## 2025-07-08 PROCEDURE — 99213 OFFICE O/P EST LOW 20 MIN: CPT | Performed by: NEUROLOGICAL SURGERY

## 2025-07-08 NOTE — PROGRESS NOTES
Subjective   Patient ID: Lloyd Greene is a 65 y.o. male is here today via video for follow-up with anew XR L-spine done on 6/30/2025.    You have chosen to receive care through a telehealth visit.  Do you consent to use a video/audio connection for your medical care today? Yes     We had a video visit today.  The patient was at home and I was in the office.  We talked for 5 minutes.    History of Present Illness    This patient was last here about a month ago.  He was having pain in the right side of his lower back.  Maybe a little on the left.  He had no radiation into his legs.  As a result of those symptoms he was sent for an MRI and plain films.  Currently his symptoms are about the same.  His plain films were done but his MRI was denied because he had not done any nonsurgical treatment.    The following portions of the patient's history were reviewed and updated as appropriate: allergies, current medications, past family history, past medical history, past social history, past surgical history, and problem list.      Objective         Tobacco Use: Medium Risk (6/3/2025)    Patient History     Smoking Tobacco Use: Former     Smokeless Tobacco Use: Never     Passive Exposure: Not on file          Physical Exam    Neurological:      Mental Status: He is alert and oriented to person, place, and time.       Neurological Exam    Mental Status  Alert. Oriented to person, place, and time.            Assessment & Plan   Independent Review of Radiographic Studies:      I personally reviewed the images from the following studies.    I reviewed plain films done on 30 June of this year.  This shows the previous fusion at L2-L5.  This shows good alignment of the vertebrae and what appears to be a solid fusion.    Medical Decision Making:      I told the patient that we really needed to do some physical therapy.  He is fine with that but cannot do it until September.  I told her to go ahead and do the therapy in  September.  If it helps then we do not need to do anything further but if it does not then he is to call and we will get him back into reevaluate.    Diagnoses and all orders for this visit:    1. Spinal stenosis of lumbar region with neurogenic claudication (Primary)  -     Ambulatory Referral to Physical Therapy for Evaluation & Treatment      Return for Recheck and call after treatment or consultation.

## 2025-07-11 DIAGNOSIS — M54.50 CHRONIC RIGHT-SIDED LOW BACK PAIN WITHOUT SCIATICA: ICD-10-CM

## 2025-07-11 DIAGNOSIS — G89.29 CHRONIC RIGHT-SIDED LOW BACK PAIN WITHOUT SCIATICA: ICD-10-CM

## 2025-07-23 ENCOUNTER — OFFICE VISIT (OUTPATIENT)
Dept: INTERNAL MEDICINE | Facility: CLINIC | Age: 65
End: 2025-07-23
Payer: MEDICARE

## 2025-07-23 VITALS
OXYGEN SATURATION: 95 % | DIASTOLIC BLOOD PRESSURE: 70 MMHG | SYSTOLIC BLOOD PRESSURE: 122 MMHG | HEIGHT: 65 IN | BODY MASS INDEX: 39.58 KG/M2 | WEIGHT: 237.6 LBS | TEMPERATURE: 99.3 F | HEART RATE: 72 BPM | RESPIRATION RATE: 18 BRPM

## 2025-07-23 DIAGNOSIS — E66.01 CLASS 2 SEVERE OBESITY WITH SERIOUS COMORBIDITY AND BODY MASS INDEX (BMI) OF 39.0 TO 39.9 IN ADULT, UNSPECIFIED OBESITY TYPE: ICD-10-CM

## 2025-07-23 DIAGNOSIS — E66.812 CLASS 2 SEVERE OBESITY WITH SERIOUS COMORBIDITY AND BODY MASS INDEX (BMI) OF 39.0 TO 39.9 IN ADULT, UNSPECIFIED OBESITY TYPE: ICD-10-CM

## 2025-07-23 DIAGNOSIS — I25.10 CORONARY ARTERY CALCIFICATION: Primary | ICD-10-CM

## 2025-07-23 RX ORDER — INSULIN GLARGINE 100 [IU]/ML
35 INJECTION, SOLUTION SUBCUTANEOUS DAILY
COMMUNITY
Start: 2025-07-08

## 2025-07-23 NOTE — PROGRESS NOTES
"Chief Complaint  Diabetes and Insomnia    Subjective        Lloyd Greene presents to [facility]  Diabetes  Insomnia      Saw Dr. Tran and was sent to PT.  Having trouble with getting imaging so having to put this off for a bit due to cost.  Will be going to take of this end of the year.     Discussed mild COPD findings on low dose lung CT.  For now we will just monitor as he is asymptomatic.     Maintaining same weight, but not losing.  Has cut back on portions and slightly increased activity level.       Objective   Vital Signs:  /70 (BP Location: Left arm, Patient Position: Sitting, Cuff Size: Large Adult)   Pulse 72   Temp 99.3 °F (37.4 °C) (Infrared)   Resp 18   Ht 165.1 cm (65\")   Wt 108 kg (237 lb 9.6 oz)   SpO2 95%   BMI 39.54 kg/m²   Estimated body mass index is 39.54 kg/m² as calculated from the following:    Height as of this encounter: 165.1 cm (65\").    Weight as of this encounter: 108 kg (237 lb 9.6 oz).            Physical Exam  Vitals reviewed.   Constitutional:       General: He is not in acute distress.     Appearance: Normal appearance.   HENT:      Head: Normocephalic and atraumatic.      Nose: Nose normal.      Mouth/Throat:      Mouth: Mucous membranes are moist.   Eyes:      Conjunctiva/sclera: Conjunctivae normal.   Pulmonary:      Effort: Pulmonary effort is normal.   Skin:     General: Skin is warm and dry.   Neurological:      General: No focal deficit present.      Mental Status: He is alert.   Psychiatric:         Mood and Affect: Mood normal.        Result Review :                Assessment and Plan   Diagnoses and all orders for this visit:    1. Coronary artery calcification (Primary)    2. Class 2 severe obesity with serious comorbidity and body mass index (BMI) of 39.0 to 39.9 in adult, unspecified obesity type      CAD seen on CTA.  No symptoms of cardiac disease, but advised on symptoms to be on the look out for.  Also discussed " mild COPD seen and continuing monitoring for now.     Discussed continuing portion control for longer to look for more weight change.          Follow Up   Return in about 3 months (around 10/23/2025) for Medicare Wellness.  Patient was given instructions and counseling regarding his condition or for health maintenance advice. Please see specific information pulled into the AVS if appropriate.

## 2025-08-08 DIAGNOSIS — F32.9 REACTIVE DEPRESSION: ICD-10-CM

## 2025-08-08 DIAGNOSIS — E78.5 HYPERLIPIDEMIA, UNSPECIFIED HYPERLIPIDEMIA TYPE: ICD-10-CM

## 2025-08-08 DIAGNOSIS — I10 HYPERTENSION, ESSENTIAL: ICD-10-CM

## 2025-08-13 RX ORDER — BUPROPION HYDROCHLORIDE 150 MG/1
150 TABLET ORAL DAILY
Qty: 90 TABLET | Refills: 1 | Status: SHIPPED | OUTPATIENT
Start: 2025-08-13

## 2025-08-13 RX ORDER — AMLODIPINE BESYLATE 2.5 MG/1
2.5 TABLET ORAL DAILY
Qty: 90 TABLET | Refills: 1 | Status: SHIPPED | OUTPATIENT
Start: 2025-08-13

## 2025-08-21 ENCOUNTER — EXTERNAL PBMM DATA (OUTPATIENT)
Dept: PHARMACY | Facility: OTHER | Age: 65
End: 2025-08-21
Payer: MEDICARE

## (undated) DEVICE — SNAR POLYP CAPTIFLX WD OVL 27MM 240CM

## (undated) DEVICE — SUCTION CANISTER, 3000CC,SAFELINER: Brand: DEROYAL

## (undated) DEVICE — LINER SURG CANSTR SXN S/RIGD 1500CC

## (undated) DEVICE — ANTIBACTERIAL UNDYED BRAIDED (POLYGLACTIN 910), SYNTHETIC ABSORBABLE SUTURE: Brand: COATED VICRYL

## (undated) DEVICE — SPNG GZ WOVN 4X4IN 12PLY 10/BX STRL

## (undated) DEVICE — CODMAN® SURGICAL PATTIES 1/2" X 3" (1.27CM X 7.62CM): Brand: CODMAN®

## (undated) DEVICE — KT ORCA ORCAPOD DISP STRL

## (undated) DEVICE — PK NEURO SPINE 40

## (undated) DEVICE — SUT VIC 1 OS8 27IN J699H

## (undated) DEVICE — BW-412T DISP COMBO CLEANING BRUSH: Brand: SINGLE USE COMBINATION CLEANING BRUSH

## (undated) DEVICE — Device

## (undated) DEVICE — PK ATS CUST W CARDIOTOMY RESEVOIR

## (undated) DEVICE — SMOKE EVACUATION TUBING WITH 7/8 IN TO 1/4 IN REDUCER: Brand: BUFFALO FILTER

## (undated) DEVICE — DRP MICROSCOPE 4 BINOCULAR CV 54X150IN

## (undated) DEVICE — GLV SURG PREMIERPRO ORTHO LTX PF SZ8 BRN

## (undated) DEVICE — SUT MNCRYL PLS ANTIB UD 4/0 PS2 18IN

## (undated) DEVICE — PAD GRND E/S W/CORD SPLT A/

## (undated) DEVICE — ADAPT CLN BIOGUARD AIR/H2O DISP

## (undated) DEVICE — SYR LL 3CC

## (undated) DEVICE — GLV SURG SENSICARE W/ALOE PF LF 7.5 STRL

## (undated) DEVICE — CONN TBG Y 5 IN 1 LF STRL

## (undated) DEVICE — MASK,FACE,FLUID RESIST,SHLD,EARLOOP: Brand: MEDLINE

## (undated) DEVICE — PENCL E/S ULTRAVAC TELESCP NOSE HOLSTR 10FT

## (undated) DEVICE — TRAP POLYP 4CHAMBER

## (undated) DEVICE — DRSNG WND SIL OPTIFOAM GENTLE BRDR ADHS W/SA 4X4IN

## (undated) DEVICE — Device: Brand: DEFENDO AIR/WATER/SUCTION AND BIOPSY VALVE

## (undated) DEVICE — 1 ML TUBERCULIN SYRINGE REGULAR TIP: Brand: MONOJECT

## (undated) DEVICE — GLV SURG SENSICARE PI MIC PF SZ8 LF STRL

## (undated) DEVICE — HANDPIECE SET WITH COAXIAL HIGH FLOW TIP AND SUCTION TUBE: Brand: INTERPULSE

## (undated) DEVICE — ENDO. PORT CONNECTOR W/VALVE FOR OLYMPUS® SCOPES: Brand: ERBE

## (undated) DEVICE — GOWN ISOL W/THUMB UNIV BLU BX/15

## (undated) DEVICE — DRSNG GZ PETROLTM XEROFORM CURAD 1X8IN STRL

## (undated) DEVICE — GLV SURG BIOGEL LTX PF 7 1/2

## (undated) DEVICE — SOL NACL 0.9PCT 1000ML

## (undated) DEVICE — MEDI-VAC YANKAUER SUCTION HANDLE W/BULBOUS TIP: Brand: CARDINAL HEALTH

## (undated) DEVICE — GLV SURG SENSICARE MICRO PF LF 7.5 STRL

## (undated) DEVICE — 3.0MM NEURO (MATCH HEAD) LESS AGGRESSIVE

## (undated) DEVICE — ADHS SKIN DERMABOND TOP ADVANCED

## (undated) DEVICE — GOWN,ECLIPSE,FABRIC-REINFORCED,X-LARGE: Brand: MEDLINE

## (undated) DEVICE — GLV SURG BIOGEL LTX PF 7

## (undated) DEVICE — GOWN,NON-REINFORCED,SIRUS,SET IN SLV,XL: Brand: MEDLINE

## (undated) DEVICE — BONE MARROW ASPIRATION NEEDLES ASPIRATOR KIT 3-HOLE 4 INCHES NDLE

## (undated) DEVICE — TRAP FLD MINIVAC MEGADYNE 100ML

## (undated) DEVICE — DRSNG PAD ABD 8X10IN STRL

## (undated) DEVICE — SPONGE,LAP,12"X12",XR,ST,5/PK,40PK/CS: Brand: MEDLINE

## (undated) DEVICE — DISPOSABLE IRRIGATION BIPOLAR CORD, M1000 TYPE: Brand: KIRWAN

## (undated) DEVICE — PAD,ABDOMINAL,8"X10",ST,LF: Brand: MEDLINE

## (undated) DEVICE — TOTAL TRAY, 16FR 10ML SIL FOLEY, URN: Brand: MEDLINE

## (undated) DEVICE — 6.0MM PRECISION ROUND

## (undated) DEVICE — VIAL FORMALIN CAP 10P 40ML

## (undated) DEVICE — FRCP BX RADJAW4 NDL 2.8 240CM LG OG BX40

## (undated) DEVICE — JACKT LAB F/R KNIT CUFF/COLR XLG BLU

## (undated) DEVICE — APPL CHLORAPREP HI/LITE 26ML ORNG

## (undated) DEVICE — CODMAN® SURGICAL PATTIES 3/4" X 3/4" (1.91CM X 1.91CM): Brand: CODMAN®

## (undated) DEVICE — BLOOD TRANSFUSION FILTER: Brand: HAEMONETICS